# Patient Record
Sex: FEMALE | Race: BLACK OR AFRICAN AMERICAN | NOT HISPANIC OR LATINO | Employment: OTHER | URBAN - METROPOLITAN AREA
[De-identification: names, ages, dates, MRNs, and addresses within clinical notes are randomized per-mention and may not be internally consistent; named-entity substitution may affect disease eponyms.]

---

## 2017-01-19 ENCOUNTER — CLINICAL SUPPORT (OUTPATIENT)
Dept: ELECTROPHYSIOLOGY | Facility: CLINIC | Age: 82
End: 2017-01-19
Payer: MEDICARE

## 2017-01-19 DIAGNOSIS — G45.9 TRANSIENT CEREBRAL ISCHEMIA: ICD-10-CM

## 2017-01-19 PROCEDURE — 93299 LOOP RECORDER REMOTE: CPT | Mod: S$GLB,,, | Performed by: INTERNAL MEDICINE

## 2017-01-19 PROCEDURE — 93297 REM INTERROG DEV EVAL ICPMS: CPT | Mod: S$GLB,,, | Performed by: INTERNAL MEDICINE

## 2017-02-21 ENCOUNTER — CLINICAL SUPPORT (OUTPATIENT)
Dept: ELECTROPHYSIOLOGY | Facility: CLINIC | Age: 82
End: 2017-02-21
Payer: MEDICARE

## 2017-02-21 DIAGNOSIS — G45.9 TRANSIENT CEREBRAL ISCHEMIA: ICD-10-CM

## 2017-02-21 PROCEDURE — 93297 REM INTERROG DEV EVAL ICPMS: CPT | Mod: S$GLB,,, | Performed by: INTERNAL MEDICINE

## 2017-02-21 PROCEDURE — 93299 LOOP RECORDER REMOTE: CPT | Mod: S$GLB,,, | Performed by: INTERNAL MEDICINE

## 2017-03-02 ENCOUNTER — HOSPITAL ENCOUNTER (OUTPATIENT)
Dept: RADIOLOGY | Facility: HOSPITAL | Age: 82
Discharge: HOME | End: 2017-03-02
Attending: INTERNAL MEDICINE
Payer: MEDICARE

## 2017-03-02 DIAGNOSIS — R92.30 DENSE BREASTS: ICD-10-CM

## 2017-03-02 PROCEDURE — 77062 BREAST TOMOSYNTHESIS BI: CPT | Mod: 26,,, | Performed by: RADIOLOGY

## 2017-03-02 PROCEDURE — 77062 BREAST TOMOSYNTHESIS BI: CPT | Mod: TC

## 2017-03-02 PROCEDURE — 77066 DX MAMMO INCL CAD BI: CPT | Mod: 26,,, | Performed by: RADIOLOGY

## 2017-03-20 DIAGNOSIS — E11.9 TYPE 2 DIABETES MELLITUS WITHOUT COMPLICATION: ICD-10-CM

## 2017-03-24 ENCOUNTER — CLINICAL SUPPORT (OUTPATIENT)
Dept: ELECTROPHYSIOLOGY | Facility: CLINIC | Age: 82
End: 2017-03-24
Payer: MEDICARE

## 2017-03-24 DIAGNOSIS — G45.9 TRANSIENT CEREBRAL ISCHEMIA: ICD-10-CM

## 2017-04-23 PROCEDURE — 93299 LOOP RECORDER REMOTE: CPT | Mod: S$GLB,,, | Performed by: INTERNAL MEDICINE

## 2017-04-23 PROCEDURE — 93297 REM INTERROG DEV EVAL ICPMS: CPT | Mod: S$GLB,,, | Performed by: INTERNAL MEDICINE

## 2017-04-27 DIAGNOSIS — H40.003 GLAUCOMA SUSPECT, BILATERAL: ICD-10-CM

## 2017-04-27 RX ORDER — LATANOPROST 50 UG/ML
SOLUTION/ DROPS OPHTHALMIC
Qty: 2.5 ML | Refills: 6 | Status: SHIPPED | OUTPATIENT
Start: 2017-04-27 | End: 2017-10-27 | Stop reason: SDUPTHER

## 2017-05-25 ENCOUNTER — TELEPHONE (OUTPATIENT)
Dept: INTERNAL MEDICINE | Facility: CLINIC | Age: 82
End: 2017-05-25

## 2017-05-25 NOTE — TELEPHONE ENCOUNTER
----- Message from Lexus Lopes sent at 5/25/2017  7:53 AM CDT -----  Contact: self  Appointment Request From: Calli Jamison    With Provider: Sheridan Hurd MD [-Primary Care Physician-]    Would Accept With:Only the person I've selected    Preferred Date Range: From 5/23/2017 To 6/12/2017    Preferred Times: Monday Afternoon    Reason for visit: Request an Appt    Comments:  There are two test and one foot exam that should have been done. These test or exams are overdue.  Thank you.                                                                                                                                                                         Calli Jamison

## 2017-05-28 ENCOUNTER — TELEPHONE (OUTPATIENT)
Dept: ELECTROPHYSIOLOGY | Facility: CLINIC | Age: 82
End: 2017-05-28

## 2017-05-28 NOTE — TELEPHONE ENCOUNTER
So, she plugged it back in and I asked her to wait 24 hours to recharge the mouse. Then follow what the machine displays for her to do. She was familiar with the process. After this transmission , she will wait an additional 24 hours (no less) and send another transmission. This will determine if the batter had an erroneous reading or if it is true RRT. I gave her Markel's number to call as I will be out of the office this week.

## 2017-05-29 ENCOUNTER — CLINICAL SUPPORT (OUTPATIENT)
Dept: ELECTROPHYSIOLOGY | Facility: CLINIC | Age: 82
End: 2017-05-29
Payer: MEDICARE

## 2017-05-29 DIAGNOSIS — G45.9 TRANSIENT CEREBRAL ISCHEMIA: ICD-10-CM

## 2017-05-29 PROCEDURE — 93297 REM INTERROG DEV EVAL ICPMS: CPT | Mod: S$GLB,,, | Performed by: INTERNAL MEDICINE

## 2017-05-29 PROCEDURE — 93299 LOOP RECORDER REMOTE: CPT | Mod: S$GLB,,, | Performed by: INTERNAL MEDICINE

## 2017-05-31 ENCOUNTER — TELEPHONE (OUTPATIENT)
Dept: INTERNAL MEDICINE | Facility: CLINIC | Age: 82
End: 2017-05-31

## 2017-05-31 ENCOUNTER — OFFICE VISIT (OUTPATIENT)
Dept: INTERNAL MEDICINE | Facility: CLINIC | Age: 82
End: 2017-05-31
Payer: MEDICARE

## 2017-05-31 VITALS
HEIGHT: 61 IN | TEMPERATURE: 98 F | DIASTOLIC BLOOD PRESSURE: 88 MMHG | OXYGEN SATURATION: 97 % | WEIGHT: 196.19 LBS | BODY MASS INDEX: 37.04 KG/M2 | SYSTOLIC BLOOD PRESSURE: 164 MMHG | HEART RATE: 82 BPM

## 2017-05-31 DIAGNOSIS — R19.7 DIARRHEA, UNSPECIFIED TYPE: ICD-10-CM

## 2017-05-31 DIAGNOSIS — M54.31 BILATERAL SCIATICA: Primary | ICD-10-CM

## 2017-05-31 DIAGNOSIS — M54.32 BILATERAL SCIATICA: Primary | ICD-10-CM

## 2017-05-31 PROCEDURE — 1125F AMNT PAIN NOTED PAIN PRSNT: CPT | Mod: S$GLB,,, | Performed by: NURSE PRACTITIONER

## 2017-05-31 PROCEDURE — 99999 PR PBB SHADOW E&M-EST. PATIENT-LVL V: CPT | Mod: PBBFAC,,, | Performed by: NURSE PRACTITIONER

## 2017-05-31 PROCEDURE — 99213 OFFICE O/P EST LOW 20 MIN: CPT | Mod: S$GLB,,, | Performed by: NURSE PRACTITIONER

## 2017-05-31 PROCEDURE — 1159F MED LIST DOCD IN RCRD: CPT | Mod: S$GLB,,, | Performed by: NURSE PRACTITIONER

## 2017-05-31 RX ORDER — ACETAMINOPHEN 500 MG
500-1000 TABLET ORAL EVERY 12 HOURS PRN
Refills: 0 | COMMUNITY
Start: 2017-05-31 | End: 2021-06-17

## 2017-05-31 NOTE — TELEPHONE ENCOUNTER
Spoke to pt and she states having diarrhea for the last few days due to dumping syndrome. Pt also c/o pain and soreness in her buttocks and believes she has cellulitis. Scheduled an urgent care appt to see Bria Menon NP in extended hours for 7:30 p.m today 5/31/17.

## 2017-05-31 NOTE — TELEPHONE ENCOUNTER
----- Message from Lexus Lopes sent at 5/31/2017  6:42 AM CDT -----  Contact: self  Appointment Request From: Calli Jamison    With Provider: Sheridan Hurd MD [-Primary Care Physician-]    Would Accept With:Only the person I've selected    Preferred Date Range: From 5/31/2017 To 6/5/2017    Preferred Times: Monday Morning, Monday Afternoon    Reason for visit: Request an Appt    Comments:  Dr. Hurd:   I have had dumping syndrome every since I had surgery, five years ago, for Chrons disease.  Recently I have been having pain in my left buttocks and soreness. I tried to check the situation, and it looks like cellulitis as far   as I can see. and now my right buttocks is hurting too. This has been going on for about close to three weeks.  I need help please. Thank you.

## 2017-06-01 NOTE — PROGRESS NOTES
Subjective:       Patient ID: Calli Jamison is a 86 y.o. female.    Chief Complaint: Back Pain (pain that extends from back into left buttocks x 3 wks) and Diarrhea    Ms. Jamison presents for recurrent diarrhea and pain in her left buttock and occasionally in her right buttock that radiates to her thigh.       Back Pain   This is a new problem. The current episode started 1 to 4 weeks ago. The problem occurs daily. The problem has been gradually worsening since onset. The pain is present in the gluteal. The quality of the pain is described as aching and burning. The pain radiates to the left thigh. The pain is at a severity of 4/10. The pain is moderate. The pain is the same all the time. The symptoms are aggravated by standing. Stiffness is present all day. Associated symptoms include headaches and leg pain. Pertinent negatives include no abdominal pain, bladder incontinence, bowel incontinence, chest pain, dysuria, fever, numbness, paresis, paresthesias, pelvic pain, perianal numbness, tingling, weakness or weight loss. She has tried nothing for the symptoms. The treatment provided no relief.   Diarrhea    This is a chronic problem. The current episode started more than 1 year ago. The problem occurs 2 to 4 times per day. The problem has been waxing and waning. The stool consistency is described as watery. The patient states that diarrhea does not awaken her from sleep. Associated symptoms include headaches. Pertinent negatives include no abdominal pain, arthralgias, bloating, chills, coughing, fever, increased  flatus, myalgias, sweats, URI, vomiting or weight loss. She has tried nothing for the symptoms. Her past medical history is significant for bowel resection.     Review of Systems   Constitutional: Negative for chills, fever and weight loss.   HENT: Negative for facial swelling.    Eyes: Negative for visual disturbance.   Respiratory: Negative for cough.    Cardiovascular: Negative for chest pain.    Gastrointestinal: Positive for diarrhea. Negative for abdominal pain, bloating, bowel incontinence, flatus and vomiting.   Genitourinary: Negative for bladder incontinence, dysuria, hematuria and pelvic pain.   Musculoskeletal: Positive for back pain. Negative for arthralgias and myalgias.   Skin: Negative for rash.   Neurological: Positive for headaches. Negative for tingling, weakness, numbness and paresthesias.   Psychiatric/Behavioral: Negative for confusion.       Objective:      Physical Exam   Constitutional: She is oriented to person, place, and time. She appears well-developed. No distress.   obese   HENT:   Head: Atraumatic.   Eyes: No scleral icterus.   Neck: Normal range of motion. Neck supple.   Cardiovascular: Normal rate, regular rhythm and normal heart sounds.  Exam reveals no gallop and no friction rub.    No murmur heard.  Pulmonary/Chest: Effort normal and breath sounds normal. No respiratory distress. She has no wheezes. She has no rales.   Abdominal: Soft. Bowel sounds are normal. She exhibits no distension. There is no tenderness. There is no guarding.   Musculoskeletal:        Lumbar back: She exhibits decreased range of motion and pain. She exhibits no tenderness, no bony tenderness, no swelling, no edema, no deformity, no laceration, no spasm and normal pulse.   Neurological: She is alert and oriented to person, place, and time.   Skin: Skin is warm and dry. She is not diaphoretic.   Psychiatric: She has a normal mood and affect. Her behavior is normal.   Nursing note and vitals reviewed.      Assessment:       1. Bilateral sciatica    2. Diarrhea, unspecified type        Plan:   1. Bilateral sciatica  - acetaminophen (TYLENOL) 500 MG tablet; Take 1-2 tablets (500-1,000 mg total) by mouth every 12 (twelve) hours as needed for Pain.; Refill: 0    2. Diarrhea, unspecified type  - polycarbophil (FIBERCON) 625 mg tablet; Take 1 tablet (625 mg total) by mouth once daily.      Pt has been given  instructions populated from [employer] database and has verbalized understanding of the after visit summary and information contained wherein.    Follow up with a primary care provider. May go to ER for acute shortness of breath, lightheadedness, fever, or any other emergent complaints or changes in condition.  Answers for HPI/ROS submitted by the patient on 5/31/2017   genital pain: No

## 2017-06-11 RX ORDER — POTASSIUM CHLORIDE 750 MG/1
10 TABLET, EXTENDED RELEASE ORAL
Qty: 24 TABLET | Refills: 2 | Status: SHIPPED | OUTPATIENT
Start: 2017-06-11 | End: 2017-09-16 | Stop reason: SDUPTHER

## 2017-06-19 ENCOUNTER — OFFICE VISIT (OUTPATIENT)
Dept: GASTROENTEROLOGY | Facility: CLINIC | Age: 82
End: 2017-06-19
Payer: MEDICARE

## 2017-06-19 VITALS
HEART RATE: 83 BPM | HEIGHT: 61 IN | WEIGHT: 196.19 LBS | SYSTOLIC BLOOD PRESSURE: 145 MMHG | DIASTOLIC BLOOD PRESSURE: 82 MMHG | BODY MASS INDEX: 37.04 KG/M2

## 2017-06-19 DIAGNOSIS — K50.80 CROHN'S DISEASE OF BOTH SMALL AND LARGE INTESTINE WITHOUT COMPLICATION: Primary | ICD-10-CM

## 2017-06-19 PROCEDURE — 1126F AMNT PAIN NOTED NONE PRSNT: CPT | Mod: S$GLB,,, | Performed by: NURSE PRACTITIONER

## 2017-06-19 PROCEDURE — 99214 OFFICE O/P EST MOD 30 MIN: CPT | Mod: S$GLB,,, | Performed by: NURSE PRACTITIONER

## 2017-06-19 PROCEDURE — 99999 PR PBB SHADOW E&M-EST. PATIENT-LVL IV: CPT | Mod: PBBFAC,,, | Performed by: NURSE PRACTITIONER

## 2017-06-19 PROCEDURE — 1159F MED LIST DOCD IN RCRD: CPT | Mod: S$GLB,,, | Performed by: NURSE PRACTITIONER

## 2017-06-19 PROCEDURE — 99499 UNLISTED E&M SERVICE: CPT | Mod: S$GLB,,, | Performed by: NURSE PRACTITIONER

## 2017-06-19 NOTE — PROGRESS NOTES
"    [facility] Established Patient Visit    Reason for Visit:  The encounter diagnosis was Crohn's disease of both small and large intestine without complication.    PCP: Sheridan Hurd    HPI:  This is a 86 y.o. female here for f/u IBD- crohn's disease of the large and small bowel dx several years ago. She is s/p ileocecal resection with small and large bowel resection in 2011. She is followed by Dr. Robles and her last visit with him was in 12/2017. She currently reports she usually has 2 bristol type 4 BM/week and feels like this is "good" for her. She takes fibercon once daily since 5/31/2017. She may have multiple loose stools per day occurring once monthly-worse with spicy meals-takes imodium with relief. No blood, pus, mucus, nor nocturnal stools.  She reports she was seen in primary care for back and leg pain and dx w/ sciatica. She was advised to take tylenol for this.     Abdominal pain - no  Reflux - + hx GERD. Rare. Takes Prilosec PRN (twice monthly) with good control  Dysphagia/odynophagia - +hx esophageal stricture. Currently states swallowing has been fine.  GI bleeding - denies hematochezia, hematemesis, melena, BRBPR, black/tarry stools, and coffee ground emesis  NSAID usage - none    ROS:  Constitutional: No fevers, no chills, No unintentional weight loss, no increased fatigue,   ENT: No allergies  CV: No chest pain, no palpitations, no perif. edema, no sob on exertion  Pulm: No cough, No shortness of breath, no wheezes, no sputum  Ophtho: No vision changes  GI: see HPI; also no nausea, no vomiting, no change in appetite  Derm: No rash  Heme: No lymphadenopathy, No bruising  MSK: No arthritis, no muscle pain, no muscle weakness  : No dysuria, No hematuria  Endo: No hot or cold intolerance  Neuro: No syncope, No seizure,     PMHX:  has a past medical history of Abnormal Pap smear; After-cataract, unspecified - Both Eyes (11/5/2012); Arthritis; ASCUS (atypical squamous cells of " undetermined significance) on Pap smear (7/1/2012); Asthma; Back pain (2009); Bullous pemphigoid; Carpal tunnel syndrome; Chronic back pain; COPD (chronic obstructive pulmonary disease); Coronary artery disease; Crohn's disease; Diabetes mellitus; Discoid lupus; Diverticulosis; History of migraine headaches; Hypertension; Multiple thyroid nodules; Personal history of colonic polyps; Pulmonary nodule; Restless legs syndrome; Sciatica; Sleep apnea; Stricture and stenosis of esophagus; Stroke; and TIA (transient ischemic attack).    PSHX:  has a past surgical history that includes Left carpal tunnel surgery; Hysterectomy; Iliocolic Crohns Disease with stricure (3/2011); Upper gastrointestinal endoscopy (11/2011); Colonoscopy (9/30/2008); Cataract: right eye (1/2008); Cataract left eye (1/2008); Breast: wire localization (1/2004); Rectal fistulae repair; Laparoscopic-assisted ileocecectomy. ; Left carpal tunnel; Colon surgery; Tonsillectomy; and Cataract extraction w/  intraocular lens implant (Bilateral).    The patient's social and family histories were reviewed by me and updated in the appropriate section of the electronic medical record.    Review of patient's allergies indicates:   Allergen Reactions    Oxycodone Other (See Comments)       Current Outpatient Prescriptions   Medication Sig    atorvastatin (LIPITOR) 20 MG tablet Take 1 tablet (20 mg total) by mouth once daily.    blood sugar diagnostic (BLOOD GLUCOSE TEST) Strp Inject 1 strip into the skin once daily.    calcium citrate-vitamin D3 315-200 mg (CALCIUM CITRATE + D) 315-200 mg-unit per tablet Take 2 tablets by mouth Daily.    conjugated estrogens (PREMARIN) vaginal cream fnger tip twice  Per week    cyanocobalamin (VITAMIN B-12) 500 MCG tablet Take 1 tablet by mouth Daily.    fluticasone (FLONASE) 50 mcg/actuation nasal spray 1 spray by Each Nare route once daily.    fluticasone (FLOVENT HFA) 110 mcg/actuation inhaler USE 2 PUFFS BY MOUTH TWICE  "A DAY for asthma    furosemide (LASIX) 20 MG tablet Take 1 tablet (20 mg total) by mouth once daily.    latanoprost 0.005 % ophthalmic solution PLACE 1 DROP INTO BOTH EYES NIGHTLY.    losartan (COZAAR) 100 MG tablet Take 1 tablet (100 mg total) by mouth once daily.    metformin (GLUCOPHAGE) 500 MG tablet Take 1 tablet (500 mg total) by mouth 2 (two) times daily.    omeprazole (PRILOSEC) 20 MG capsule Take 1 capsule by mouth Daily.    polycarbophil (FIBERCON) 625 mg tablet Take 1 tablet (625 mg total) by mouth once daily.    potassium chloride SA (K-DUR,KLOR-CON) 10 MEQ tablet TAKE 1 TABLET (10 MEQ TOTAL) BY MOUTH AS DIRECTED.    PROAIR HFA 90 mcg/actuation inhaler INHALE 2 PUFFS BY MOUTH EVERY 4-6 HOURS AS NEEDED    ropinirole (REQUIP) 0.25 MG tablet Take 2 tablets (0.5 mg total) by mouth nightly as needed. As directed.    verapamil (VERELAN) 360 MG C24P Take 1 capsule (360 mg total) by mouth once daily.    acetaminophen (TYLENOL) 500 MG tablet Take 1-2 tablets (500-1,000 mg total) by mouth every 12 (twelve) hours as needed for Pain.    fluocinonide (LIDEX) 0.05 % cream Apply topically. AAA scalp qd bid.     No current facility-administered medications for this visit.          Objective Findings:    Vital Signs:  BP (!) 145/82   Pulse 83   Ht 5' 1" (1.549 m)   Wt 89 kg (196 lb 3.4 oz)   BMI 37.07 kg/m²  Body mass index is 37.07 kg/m².    Physical Exam:  General Appearance: Well appearing in no acute distress  Head:   Normocephalic, without obvious abnormality  Eyes:    No scleral icterus, EOMI  Throat: Lips, mucosa, and tongue normal; teeth and gums normal  Lungs: CTA bilaterally in anterior and posterior fields, no wheezes, no crackles.  Heart:  Regular rate and rhythm, S1, S2 normal, no murmurs heard  Abdomen: Soft, non tender, non distended with positive bowel sounds in all four quadrants. No hepatosplenomegaly, ascites, or mass  Extremities:    2+ radial pulses, no clubbing, cyanosis or " edema  Skin: No rash to exposed areas  Neurologic: A&Ox4      Labs:  Lab Results   Component Value Date    WBC 9.33 06/19/2017    HGB 13.0 06/19/2017    HCT 39.3 06/19/2017     06/19/2017    CHOL 136 02/19/2016    TRIG 75 02/19/2016    HDL 51 02/19/2016    ALT 19 12/19/2016    AST 17 12/19/2016     12/19/2016    K 3.8 12/19/2016     12/19/2016    CREATININE 0.9 12/19/2016    BUN 17 12/19/2016    CO2 25 12/19/2016    TSH 1.307 02/19/2016    INR 0.9 03/20/2014    HGBA1C 6.4 (H) 07/21/2016       Endoscopy:   3/29/2016 EGD Smith-benign appearing esophageal stenosis. Dilated w/ balloon to a max of 12 mm. Normal stomach. Normal duodenum. Repeat PRN.   2/16/2016 EGD Robles- benign esophageal stenosis. Dilated w/ balloon to a max of 11 mm. Gastric erythema.  repeat in 1 month for retreatment.   3/27/2014 EGD Robles-benign esophageal stenosis. Dilated w/ balloon to a max of 13.5mm. HH. Gastric erythema. Duodenal erythema. bx-chronic gastritis. Repeat PRN.  2013 EGD Robles-benign esophageal stricture. Dilated w/ balloon to a max of 13.5 mm. Repeat PRN.  2012 colonoscopy Robles- normal colon. TI erosions. Ileal crohn's disease.  Assessment:    1. Crohn's disease of both small and large intestine without complication          Recommendations:  1.  Crohn's-doing well from a symptomology standpoint. Continue conservative treatment. labs today. Increase fiber to twice daily for constipation with a goal of at least 3 BM/week. Maintain adequate daily hydration. Start a daily probiotic. Written instructions provided.     Return in about 6 months (around 12/19/2017) for IBD w/ Dr. Robles.    Order summary:  Orders Placed This Encounter    CBC auto differential    Comprehensive metabolic panel    C-reactive protein       Thank you for allowing me to participate in the care of Calli Niño, APRN, FNP-C

## 2017-06-19 NOTE — PATIENT INSTRUCTIONS
"Increase fibercon to twice daily for a goal of at least 3 formed-soft bowel movements per week.  Drink at least 6-8 eight oz cups of water per day.  Take a daily probiotic as per the handout below.         Probiotics       For IBS and bloating, we typically recommend Align, VSL#3, or [employer] Colon Health, which can typically be found without a prescription at the pharmacy. Give this at least  a month to work, but can take up to 3 months to repopulate your intestines, so be patient!     VSL#3 is a potent probiotic which can be found in pill form (try [employer] for best price, or VSL3.com). $52 for a 2 month supply. The sachets are for ulcerative colitis and require a prescription.    If you go on the internet, a reputable/powerful probiotic appears to be Super Shield from [employer] at: http://www.bluerockholistics.com/product/pross.asp  You can also choose PB 8 which can be found at [employer] or online.    For diarrhea from travel or antibiotics, we typically recommend Culturelle or Florastor (especially for diarrhea from C diff infection).         General information:  Pick one that has at least seven strains, and five billion CFU (colony forming units).    Products containing probiotics have flooded the market in recent years. As more people seek natural or non-drug ways to maintain their health, manufacturers have responded by offering probiotics in everything from yogurt to chocolate and granola bars to powders and capsules.    Although probiotics have been around for generations - think of the "live active cultures"in several brands of yogurt - the sheer number of products with probiotics now available may overwhelm even the most conscientious of shoppers. In some respects, the industry has grown faster than the research and scientists and doctors are calling for more studies to help determine which probiotics are beneficial and which might be a waste of money.    What Are Probiotics?  Probiotics are " living microscopic organisms, or micro-organisms, that scientific research has shown to benefit your health. Most often they are bacteria, but they may also be other organisms such as yeasts. In some cases they are similar, or the same, as the good bacteria already in your body, particularly those in your gut.    The most common probiotic bacteria come from two groups, Lactobacillus or Bifidobacterium, although it is important to remember that there are many other types of bacteria that are also classified as probiotics. Each group of bacteria has different species and each species has different strains. This is important to remember because different strains have different benefits for different parts of your body. For example, Lactobacillus casei Shirota has been shown to support the immune system and to help food move through the gut, but Lactobacillus bulgaricus may help relieve symptoms of lactose intolerance, a condition in which people cannot digest the lactose found in most milk and dairy products. In general, not all probiotics are the same, and they dont all work the same way.    Scientists are still sorting out exactly how probiotics work. They may:       Boost your immune system by producing antibodies for certain viruses.  Produce substances that prevent infection.  Prevent harmful bacteria from attaching to the gut wall and growing there.  Send signals to your cells to strengthen the mucus in your intestine and help it act as a barrier against infection.  Inhibit or destroy toxins released by certain bad bacteria that can make you sick.  Produce B vitamins necessary for metabolizing the food you eat, warding off anemia caused by deficiencies in B-6 and B-12, and maintaining healthy skin and a healthy nervous system.      Common Uses  Probiotics are most often used to promote digestive health. Because there are different kinds of probiotics, it is important to find the right one for the specific  health benefit you seek. Researchers are still studying which probiotic should be used for which health or disease state. Nevertheless, probiotics have been shown to help regulate the movement of food through the intestine. They also may help treat digestive disease, something of much interest to gastroenterologists. Some of the most common uses for probiotics include the treatment of the following:    Irritable Bowel Syndrome    Irritable bowel syndrome (IBS) is a disorder of movement in the gut. People who have IBS may have diarrhea, constipation or alternating bouts of both. IBS is not caused by injury or illness. Often the only way doctors can diagnose it is to rule out other conditions through testing.    Probiotics, particularly Bifidobacterium infantis, Sacchromyces boulardii, Lactobacillus plantarum and combination probiotics may help regulate how often people with IBS have bowel movements. Probiotics may also help relieve bloating from gas. Bifidobacterium infantis 82177, Lactobacillus plantarum 299V or Bifidobacterium bifidum [id number] have been shown to help regulate bowel movements and relieve bloating, pain and gas.    Inflammatory Bowel Disease    Though some of the symptoms are the same, inflammatory bowel disease (IBD) is different from IBS because in IBD, the intestines become inflamed. Unlike IBS, IBD is a disorder of the immune system. Symptoms include abdominal cramps, pain, diarrhea, weight loss and blood in your stools. In Crohns disease, ulcers may develop anywhere in your intestine including both the large and small bowels. In ulcerative colitis, inflammation only involves the large intestine. Bouts of inflammation may come and go, but in some cases, prescription medication is needed to keep inflammation in check.        Some studies suggest that probiotics may help decrease inflammation and delay the next bout of disease. Ulcerative colitis seems to respond better to probiotics than Crohns  disease does. So far, E. coli Nissle, and a mixture of several strains of Lactobacillus, Bifidobacterium and Streptococcus may be most beneficial. Research is continuing to determine which probiotics are best to treat IBD.    Infectious Diarrhea    Infectious diarrhea is caused by bacteria, viruses or parasites. There is evidence that probiotics such as Lactobacillus rhamnosus and Lactobacillus casei may be particularly helpful in treating diarrhea caused by rotavirus, which often affects babies and small children. Several strains of Lactobacillus and a strain of the yeast Saccharomyces boulardii may help treat and shorten the course of infectious diarrhea.    Antibiotic-Related Diarrhea  Take Lactobacillus rhamnosus GG and/or Saccharomyces boulardii (Culturelle and/or Florastor) six hours after each dose of antibiotics. Increase the dose to 10 billion CFUs per day and continue for one to two weeks after you stop taking the antibiotic.    Sometimes taking an antibiotic can cause infectious diarrhea by reducing the number of good microorganisms in your gut. Then bacteria that normally do not give you any trouble can grow out of control. One such bacterium is Clostridium difficile, which is a major cause of diarrhea in hospitalized patients and people in long-term care facilities like nursing homes. The trouble with Clostridium difficile is that it tends to come back, but there is evidence that taking probiotics such as Saccharomyces boulardii may help prevent this. There is also evidence that taking probiotics when you first start taking an antibiotic may help prevent antibiotic-related diarrhea in the first place.    Travelers Diarrhea    Its possible to get infectious diarrhea when you travel and your body is exposed to new, normally harmless bacteria (travelers diarrhea). Most studies show that probiotics are not very effective in preventing or treating travelers diarrhea in adults. Taking Saccharomyces  boulardii weeks before your trip may help prevent travelers diarrhea, which usually comes from ingesting food or water thats been contaminated with bacteria.    Other Uses    Other potential uses for probiotics include maintaining a healthy mouth, preventing and treating certain skin conditions like eczema, promoting health in the urinary tract and vagina, and preventing allergies (especially in children). There is not as much research about these uses as there is about the benefits of probiotics for your digestive system, and studies have had mixed results. If you have eczema ?Lactobacillus rhamnosus  and Lactobacillus fermentum VRI-003 PCC have been shown to help treat those itchy, scaly skin rashes -- especially in children.If you have a cold ?Some research suggests that Bifidobacterium animalis lactis Bi-07 and Lactobacillus acidophilus NCFM can help reduce the duration and severity of the common cold and flu by enhancing the bodys production of antibodies. If you have a vaginal infection ?Lactobacillus acidophilus, Lactobacillus rhamnosus GR-1 and Lactobacillus reuteri RC-14 have been shown to help prevent and clear up bacterial vaginosis and urinary tract infections in some individuals. Researchers point to Lactobacillus reuteri RC-14 and Lactobacillus rhamnosus GR-1 as the most effective stains to protect against yeast infections as theyre especially adept at colonizing the vaginal environment and fighting off unwelcome bacteria and fungi. If you have bad breath, gingivitis or periodontitis ?A probiotic lozenge or mouthwash might be your best bet. Lactobacillus reuteri LR-1 or LR-2 promote oral health by binding to teeth and gums, preventing plaque formation in the mouth. Research has demonstrated the ability of Weissella cibaria to freshen breath by inhibiting the production of sulfur compounds in the mouth.    Are Probiotics Safe?  It is generally thought that most probiotics are safe, although  it is not yet known if they are safe for people with severely impaired immune systems. They may be taken by people without a diagnosed digestive problem. Their safety is evident since they have a long history of use in dairy foods like yogurt, cheese and milk.    However, you should talk to your doctor before adding these or any other probiotics to your diet. Probiotics might not be appropriate for seniors. Some probiotics may interfere with or interact with medications. Your doctor will be able to help you determine if probiotics are right for you based on your medical history.    Research about the use of probiotics in children has grown in recent years. Although studies have shown that probiotics may help to treat infectious diarrhea in babies and small children, researchers are unsure whether probiotics are particularly helpful for children with Crohns disease or other types of inflammatory bowel disease. Ask your childs pediatrician about probiotics before giving them to your child.    The exception here is breastfeeding. Breast milk stimulates the growth of normal gut organisms that are important for a babys digestive health and developing immune system. That is one reason why doctors strongly encourage mothers to breastfeed their babies.    Overall, scientists agree that more research is necessary before they can make blanket statements about the safety of probiotics in general or about individual groups and strains. Future studies will show whether probiotics can be used to treat diseases, are safe to use for a long time, and if it is possible to take too many probiotics or mix them in inappropriate ways. These studies will also guide us as to which probiotics to use for different disorders.    Keep in mind that probiotics are considered dietary supplements and are not FDA-regulated like drugs. They are not standardized, meaning they are made in different ways by different companies and have different  additives. How well a probiotic works may differ from brand to brand and even from batch to batch within the same brand. Probiotics also vary tremendously in their cost, and cost does not necessarily reflect higher quality.    Side effects may vary, too. The most common are gas and bloating. These are usually mild and temporary. More serious side effects include allergic reactions, either to the probiotics themselves or to other ingredients in the food or supplement.    Choosing a Probiotic  Probiotics are available in yogurt and other dairy products, chocolate and granola bars, juices, powders, and capsules. You can purchase them at your local [employer] or health food store as well as on the Internet. Here are some tips to help you choose.    Check the label. The more information there is on the label, the better. Ideally, the label will tell you the probiotics group, species and strain, and how many of the microorganisms will still be alive on the use-by date. Although some products guarantee how many organisms were present at the time it was manufactured, often it is less clear how many organisms are present when these products are actually consumed.    Call the . Unfortunately, many labels dont say exactly which strain is in the product; many list only the group and the species, such as Lactobacillus acidophilus or Bifidobacterium lactis. If youre planning to take a probiotic for a specific condition, call the company and find out exactly which strains its products contain and what research they have done to support their health claims. You may be able to find this information on their Web site, as well.    Beware of the Internet. If you order products from the Internet, make sure you know the company from which you are ordering. There are plenty of scammers out there who are willing to send you fake products labeled as probiotics. At best, the ingredients could be harmless, like garlic powder.  At worst, they could be laced with powerful herbs, prescription medications or illegal drugs. Some companies may simply take your money and disappear.    Stick to well-established companies and companies you know. The longer a company has been around, the more likely its products have been tested and studied repeatedly and the bigger the reputation the company has to protect. Some manufacturers that have been making products with probiotics for a while are [employer], [employer], [employer], [employer], [employer], [employer], [employer], and [employer].    Storage    One final note: Remember to store your probiotic according to package instructions and make sure the product has a sell-by or expiration date. Probiotics are living organisms. Even if they are dried and dormant, like in a powder or capsule, they must be stored properly or they will die. Some require refrigeration whereas others do not. They also have a shelf-life, so make sure you use them before the expiration date on the package.

## 2017-06-22 ENCOUNTER — TELEPHONE (OUTPATIENT)
Dept: INTERNAL MEDICINE | Facility: CLINIC | Age: 82
End: 2017-06-22

## 2017-06-22 NOTE — TELEPHONE ENCOUNTER
----- Message from Cristina Molina sent at 6/21/2017  2:42 PM CDT -----  Contact: Pt can be reached at   Pt is called to schedule an appt for scaitia nerve of spine, pt is requesting June 28th. 9-11:00 p.m.      Thank you!

## 2017-06-27 DIAGNOSIS — E78.5 HYPERLIPIDEMIA, UNSPECIFIED HYPERLIPIDEMIA TYPE: ICD-10-CM

## 2017-06-28 ENCOUNTER — OFFICE VISIT (OUTPATIENT)
Dept: INTERNAL MEDICINE | Facility: CLINIC | Age: 82
End: 2017-06-28
Payer: MEDICARE

## 2017-06-28 ENCOUNTER — PATIENT MESSAGE (OUTPATIENT)
Dept: INTERNAL MEDICINE | Facility: CLINIC | Age: 82
End: 2017-06-28

## 2017-06-28 ENCOUNTER — HOSPITAL ENCOUNTER (OUTPATIENT)
Dept: RADIOLOGY | Facility: HOSPITAL | Age: 82
Discharge: HOME | End: 2017-06-28
Attending: INTERNAL MEDICINE
Payer: MEDICARE

## 2017-06-28 VITALS
HEART RATE: 88 BPM | WEIGHT: 194.69 LBS | SYSTOLIC BLOOD PRESSURE: 139 MMHG | HEIGHT: 61 IN | DIASTOLIC BLOOD PRESSURE: 86 MMHG | BODY MASS INDEX: 36.76 KG/M2

## 2017-06-28 VITALS
HEIGHT: 61 IN | BODY MASS INDEX: 36.76 KG/M2 | DIASTOLIC BLOOD PRESSURE: 86 MMHG | SYSTOLIC BLOOD PRESSURE: 139 MMHG | HEART RATE: 88 BPM | WEIGHT: 194.69 LBS | OXYGEN SATURATION: 96 %

## 2017-06-28 DIAGNOSIS — I67.89 CEREBRAL MICROVASCULAR DISEASE: ICD-10-CM

## 2017-06-28 DIAGNOSIS — I48.0 PAROXYSMAL ATRIAL FIBRILLATION: ICD-10-CM

## 2017-06-28 DIAGNOSIS — E11.9 DM TYPE 2 WITHOUT RETINOPATHY: ICD-10-CM

## 2017-06-28 DIAGNOSIS — K21.9 GASTROESOPHAGEAL REFLUX DISEASE WITHOUT ESOPHAGITIS: ICD-10-CM

## 2017-06-28 DIAGNOSIS — I10 ESSENTIAL HYPERTENSION: ICD-10-CM

## 2017-06-28 DIAGNOSIS — Z86.73 HISTORY OF TIA (TRANSIENT ISCHEMIC ATTACK): ICD-10-CM

## 2017-06-28 DIAGNOSIS — E66.01 MORBID OBESITY DUE TO EXCESS CALORIES: ICD-10-CM

## 2017-06-28 DIAGNOSIS — E11.22 CONTROLLED TYPE 2 DIABETES MELLITUS WITH STAGE 3 CHRONIC KIDNEY DISEASE, WITHOUT LONG-TERM CURRENT USE OF INSULIN: ICD-10-CM

## 2017-06-28 DIAGNOSIS — G47.30 SLEEP APNEA, UNSPECIFIED TYPE: ICD-10-CM

## 2017-06-28 DIAGNOSIS — J44.9 CHRONIC OBSTRUCTIVE PULMONARY DISEASE, UNSPECIFIED COPD TYPE: ICD-10-CM

## 2017-06-28 DIAGNOSIS — F43.23 ADJUSTMENT DISORDER WITH MIXED ANXIETY AND DEPRESSED MOOD: ICD-10-CM

## 2017-06-28 DIAGNOSIS — N18.30 CONTROLLED TYPE 2 DIABETES MELLITUS WITH STAGE 3 CHRONIC KIDNEY DISEASE, WITHOUT LONG-TERM CURRENT USE OF INSULIN: ICD-10-CM

## 2017-06-28 DIAGNOSIS — I70.0 AORTIC ATHEROSCLEROSIS: ICD-10-CM

## 2017-06-28 DIAGNOSIS — K22.2 ESOPHAGEAL STRICTURE: ICD-10-CM

## 2017-06-28 DIAGNOSIS — M48.00 SPINAL STENOSIS, UNSPECIFIED SPINAL REGION: ICD-10-CM

## 2017-06-28 DIAGNOSIS — L12.9 PEMPHIGOID: ICD-10-CM

## 2017-06-28 DIAGNOSIS — E04.2 MULTIPLE THYROID NODULES: ICD-10-CM

## 2017-06-28 DIAGNOSIS — Z00.00 ENCOUNTER FOR PREVENTIVE HEALTH EXAMINATION: Primary | ICD-10-CM

## 2017-06-28 DIAGNOSIS — M48.00 SPINAL STENOSIS, UNSPECIFIED SPINAL REGION: Primary | ICD-10-CM

## 2017-06-28 DIAGNOSIS — E78.00 PURE HYPERCHOLESTEROLEMIA: ICD-10-CM

## 2017-06-28 DIAGNOSIS — K50.80 CROHN'S DISEASE OF BOTH SMALL AND LARGE INTESTINE WITHOUT COMPLICATION: ICD-10-CM

## 2017-06-28 DIAGNOSIS — J45.20 ASTHMA WITH ALLERGIC RHINITIS, MILD INTERMITTENT, UNCOMPLICATED: ICD-10-CM

## 2017-06-28 PROCEDURE — G0439 PPPS, SUBSEQ VISIT: HCPCS | Mod: S$GLB,,, | Performed by: NURSE PRACTITIONER

## 2017-06-28 PROCEDURE — 99499 UNLISTED E&M SERVICE: CPT | Mod: S$GLB,,, | Performed by: NURSE PRACTITIONER

## 2017-06-28 PROCEDURE — 99999 PR PBB SHADOW E&M-EST. PATIENT-LVL V: CPT | Mod: PBBFAC,,, | Performed by: INTERNAL MEDICINE

## 2017-06-28 PROCEDURE — 72100 X-RAY EXAM L-S SPINE 2/3 VWS: CPT | Mod: 26,,, | Performed by: RADIOLOGY

## 2017-06-28 PROCEDURE — 99999 PR PBB SHADOW E&M-EST. PATIENT-LVL V: CPT | Mod: PBBFAC,,, | Performed by: NURSE PRACTITIONER

## 2017-06-28 PROCEDURE — 99215 OFFICE O/P EST HI 40 MIN: CPT | Mod: S$GLB,,, | Performed by: INTERNAL MEDICINE

## 2017-06-28 PROCEDURE — 99499 UNLISTED E&M SERVICE: CPT | Mod: S$GLB,,, | Performed by: INTERNAL MEDICINE

## 2017-06-28 PROCEDURE — 1125F AMNT PAIN NOTED PAIN PRSNT: CPT | Mod: S$GLB,,, | Performed by: INTERNAL MEDICINE

## 2017-06-28 PROCEDURE — 1159F MED LIST DOCD IN RCRD: CPT | Mod: S$GLB,,, | Performed by: INTERNAL MEDICINE

## 2017-06-28 RX ORDER — ASPIRIN 81 MG/1
81 TABLET ORAL DAILY
COMMUNITY

## 2017-06-28 RX ORDER — ATORVASTATIN CALCIUM 20 MG/1
20 TABLET, FILM COATED ORAL DAILY
Qty: 90 TABLET | Refills: 3 | Status: SHIPPED | OUTPATIENT
Start: 2017-06-28 | End: 2017-07-03 | Stop reason: SDUPTHER

## 2017-06-28 RX ORDER — FLUOCINONIDE 0.5 MG/G
CREAM TOPICAL 2 TIMES DAILY PRN
Qty: 1 TUBE | Refills: 0 | Status: SHIPPED | OUTPATIENT
Start: 2017-06-28 | End: 2021-06-17

## 2017-06-28 NOTE — PROGRESS NOTES
Subjective:      Patient ID: Calli Jamison is a 86 y.o. female.    Chief Complaint: Back Pain    HPI:  HPI   1. Off and on severe pain in lower back, pain goes to toes in both legs. She cannot stand up for a long period and then has to rest.She was seen in urgent care in 5/31/2017. Patient has a loop recorder. Her last xray was in 11/2014 which showed significant DJD.    Patient need follow up of diabetes and needs to update :    Patient Active Problem List   Diagnosis    Sleep apnea    Iron deficiency    Asthma with allergic rhinitis    Pemphigoid    Multiple thyroid nodules    Bilateral carpal tunnel syndrome    Diverticulosis    Type II diabetes mellitus    GERD (gastroesophageal reflux disease)    Personal history of solitary pulmonary nodule    Dry eyes - Both Eyes    Pseudophakia    Angina pectoris    COPD (chronic obstructive pulmonary disease)    Restless legs syndrome (RLS)    Transient cerebral ischemia    Hyperlipidemia    Cerebral microvascular disease    Refractive error    Adjustment disorder with mixed anxiety and depressed mood    Crohn's disease of both small and large intestine    Paroxysmal atrial fibrillation    Esophageal stricture    Essential hypertension    DM type 2 without retinopathy    Glaucoma suspect of both eyes    Bilateral posterior capsular opacification    Severe obesity    History of TIA (transient ischemic attack)    Insufficiency of tear film of both eyes    Controlled type 2 diabetes mellitus with stage 3 chronic kidney disease, without long-term current use of insulin     Past Medical History:   Diagnosis Date    Abnormal Pap smear     After-cataract, unspecified - Both Eyes 11/5/2012    Arthritis     ASCUS (atypical squamous cells of undetermined significance) on Pap smear 7/1/2012    Asthma     Back pain 2009    Bullous pemphigoid     Carpal tunnel syndrome     Chronic back pain     COPD (chronic obstructive pulmonary disease)      Coronary artery disease     Crohn's disease     Diabetes mellitus     Discoid lupus     Diverticulosis     History of migraine headaches     Hypertension     Multiple thyroid nodules     Personal history of colonic polyps     Pulmonary nodule     Restless legs syndrome     Sciatica     Right leg    Sleep apnea     Stricture and stenosis of esophagus     Stroke     TIA (transient ischemic attack)      Past Surgical History:   Procedure Laterality Date    Breast: wire localization  1/2004    CATARACT EXTRACTION W/  INTRAOCULAR LENS IMPLANT Bilateral     Cataract left eye  1/2008    Cataract: right eye  1/2008    COLON SURGERY      ileocecectomy 2011    COLONOSCOPY  9/30/2008    Crohns in remission    HYSTERECTOMY      ovaries remain    Iliocolic Crohns Disease with stricure  3/2011    [place] iliocecectomy    Laparoscopic-assisted ileocecectomy.       Left carpal tunnel      Left carpal tunnel surgery      Rectal fistulae repair      TONSILLECTOMY      UPPER GASTROINTESTINAL ENDOSCOPY  11/2011    hiatal hernia, benign appearing esophageal  stricture dilated  prn repeat rec.     Family History   Problem Relation Age of Onset    Pneumonia Father     Cataracts Mother     Macular degeneration Mother     Alkaptonuria Maternal Grandfather     Crohn's disease Neg Hx     Ulcerative colitis Neg Hx     Inflammatory bowel disease Neg Hx     Amblyopia Neg Hx     Blindness Neg Hx     Glaucoma Neg Hx     Retinal detachment Neg Hx     Strabismus Neg Hx      Review of Systems   Constitutional: Negative for chills, fatigue, fever and unexpected weight change.   HENT: Negative for trouble swallowing.    Respiratory: Negative for cough, shortness of breath and wheezing.    Cardiovascular: Negative for chest pain, palpitations and leg swelling.   Gastrointestinal: Negative for abdominal distention, abdominal pain, blood in stool and vomiting.   Musculoskeletal: Positive for back pain.  "    Objective:     Vitals:    06/28/17 0743   BP: (!) 142/78   Pulse: 88   SpO2: 96%   Weight: 88.3 kg (194 lb 10.7 oz)   Height: 5' 1" (1.549 m)   PainSc:   4     Body mass index is 36.78 kg/m².  Physical Exam   Constitutional: She is oriented to person, place, and time. She appears well-developed and well-nourished. No distress.   Neck: Carotid bruit is not present. No thyromegaly present.   Cardiovascular: Normal rate, regular rhythm and normal heart sounds.  PMI is not displaced.    Pulses:       Dorsalis pedis pulses are 1+ on the right side, and 1+ on the left side.        Posterior tibial pulses are 1+ on the right side, and 1+ on the left side.   Pulmonary/Chest: Effort normal and breath sounds normal. No respiratory distress.   Abdominal: Soft. Bowel sounds are normal. She exhibits no distension. There is no tenderness.   Musculoskeletal: She exhibits no edema.        Right foot: There is normal range of motion and no deformity.        Left foot: There is normal range of motion and no deformity.   Feet:   Right Foot:   Protective Sensation: 5 sites tested. 5 sites sensed.   Skin Integrity: Negative for ulcer, blister, skin breakdown, warmth, callus or dry skin.   Left Foot:   Protective Sensation: 5 sites tested. 5 sites sensed.   Skin Integrity: Negative for ulcer, blister, skin breakdown, erythema, warmth, callus or dry skin.   Neurological: She is alert and oriented to person, place, and time.     Assessment:     1. Spinal stenosis, unspecified spinal region    2. Asthma with allergic rhinitis, mild intermittent, uncomplicated    3. Cerebral microvascular disease    4. Chronic obstructive pulmonary disease, unspecified COPD type    5. DM type 2 without retinopathy    6. Esophageal stricture    7. Essential hypertension    8. Gastroesophageal reflux disease without esophagitis    9. Pure hypercholesterolemia    10. Multiple thyroid nodules    11. Paroxysmal atrial fibrillation    12. Pemphigoid    13. " Sleep apnea, unspecified type    14. Morbid obesity due to excess calories    15. Controlled type 2 diabetes mellitus with stage 3 chronic kidney disease, without long-term current use of insulin      Plan:   Calli was seen today for back pain.    Diagnoses and all orders for this visit:  Ordered This Visit     Ambulatory consult to Podiatry         CBC auto differential         Comprehensive metabolic panel         CT Lumbar Spine Without Contrast         Hemoglobin A1c         Lipid panel         TSH         X-Ray Lumbar Spine AP And Lateral               Spinal stenosis, unspecified spinal region: will work up spinal stenosis  -     X-Ray Lumbar Spine AP And Lateral; Future  -     CT Lumbar Spine Without Contrast; Future    Asthma with allergic rhinitis, mild intermittent, uncomplicated  The current medical regimen is effective;  continue present plan and medications.      Cerebral microvascular disease  The current medical regimen is effective;  continue present plan and medications.      Chronic obstructive pulmonary disease, unspecified COPD type  The current medical regimen is effective;  continue present plan and medications.      DM type 2 without retinopathy: will assess    Esophageal stricture: assymptomatic    Essential hypertension:  The current medical regimen is effective;  continue present plan and medications.    -     CBC auto differential; Future  -     Comprehensive metabolic panel; Future  -     TSH; Future    Gastroesophageal reflux disease without esophagitis  The current medical regimen is effective;  continue present plan and medications.      Pure hypercholesterolemia: labs needed    Multiple thyroid nodules: labs needed    Paroxysmal atrial fibrillation  The current medical regimen is effective;  continue present plan and medications.      Pemphigoid: stable    Sleep apnea, unspecified type: does not use machine    Morbid obesity due to excess calories: discissed    Controlled type 2  diabetes mellitus with stage 3 chronic kidney disease, without long-term current use of insulin: will need to evaluate  -     Ambulatory consult to Podiatry  -     Hemoglobin A1c; Future  -     Lipid panel; Future    Return in about 3 weeks (around 7/19/2017) for FU studies.         Medication List          Accurate as of 6/28/17  8:18 AM. If you have any questions, ask your nurse or doctor.               CONTINUE taking these medications    acetaminophen 500 MG tablet  Commonly known as:  TYLENOL  Take 1-2 tablets (500-1,000 mg total) by mouth every 12 (twelve) hours as needed for Pain.     atorvastatin 20 MG tablet  Commonly known as:  LIPITOR  Take 1 tablet (20 mg total) by mouth once daily.     blood sugar diagnostic Strp  Commonly known as:  BLOOD GLUCOSE TEST  Inject 1 strip into the skin once daily.     CALCIUM CITRATE + D 315-200 mg-unit per tablet  Generic drug:  calcium citrate-vitamin D3 315-200 mg     conjugated estrogens vaginal cream  Commonly known as:  PREMARIN  fnger tip twice  Per week     * fluticasone 50 mcg/actuation nasal spray  Commonly known as:  FLONASE  1 spray by Each Nare route once daily.     * fluticasone 110 mcg/actuation inhaler  Commonly known as:  FLOVENT HFA  USE 2 PUFFS BY MOUTH TWICE A DAY for asthma     furosemide 20 MG tablet  Commonly known as:  LASIX  Take 1 tablet (20 mg total) by mouth once daily.     latanoprost 0.005 % ophthalmic solution  PLACE 1 DROP INTO BOTH EYES NIGHTLY.     LIDEX 0.05 % cream  Generic drug:  fluocinonide 0.05%     losartan 100 MG tablet  Commonly known as:  COZAAR  Take 1 tablet (100 mg total) by mouth once daily.     metformin 500 MG tablet  Commonly known as:  GLUCOPHAGE  Take 1 tablet (500 mg total) by mouth 2 (two) times daily.     omeprazole 20 MG capsule  Commonly known as:  PRILOSEC     polycarbophil 625 mg tablet  Commonly known as:  FIBERCON  Take 1 tablet (625 mg total) by mouth once daily.     potassium chloride SA 10 MEQ tablet  Commonly  known as:  K-DUR,KLOR-CON  TAKE 1 TABLET (10 MEQ TOTAL) BY MOUTH AS DIRECTED.     PROAIR HFA 90 mcg/actuation inhaler  Generic drug:  albuterol  INHALE 2 PUFFS BY MOUTH EVERY 4-6 HOURS AS NEEDED     ropinirole 0.25 MG tablet  Commonly known as:  REQUIP  Take 2 tablets (0.5 mg total) by mouth nightly as needed. As directed.     verapamil 360 MG C24p  Commonly known as:  VERELAN  Take 1 capsule (360 mg total) by mouth once daily.     VITAMIN B-12 500 MCG tablet  Generic drug:  cyanocobalamin        * This list has 2 medication(s) that are the same as other medications prescribed for you. Read the directions carefully, and ask your doctor or other care provider to review them with you.

## 2017-06-28 NOTE — PATIENT INSTRUCTIONS
Counseling and Referral of Other Preventative  (Italic type indicates deductible and co-insurance are waived)    Patient Name: Calli Jamison  Today's Date: 6/28/2017      SERVICE LIMITATIONS RECOMMENDATION    Vaccines    · Pneumococcal (once after 65)    · Influenza (annually)    · Hepatitis B (if medium/high risk)    · Prevnar 13      Hepatitis B medium/high risk factors:       - End-stage renal disease       - Hemophiliacs who received Factor VII or         IX concentrates       - Clients of institutions for the mentally             retarded       - Persons who live in the same house as          a HepB carrier       - Homosexual men       - Illicit injectable drug abusers     Pneumococcal: Done, no repeat necessary     Influenza: Done, repeat in one year     Hepatitis B: N/A     Prevnar 13: Done, no repeat necessary    Mammogram (biennial age 50-74)  Annually (age 40 or over)  Done this year, repeat every year    Pap (up to age 70 and after 70 if unknown history or abnormal study last 10 years)    N/A     The USPSTF recommends against screening for cervical cancer in women older than age 65 years who have had adequate prior screening and are not otherwise at high risk for cervical cancer.      Colorectal cancer screening (to age 75)    · Fecal occult blood test (annual)  · Flexible sigmoidoscopy (5y)  · Screening colonoscopy (10y)  · Barium enema   Last done 2012, recommend to repeat every 3  years    Diabetes self-management training (no USPSTF recommendations)  Requires referral by treating physician for patient with diabetes or renal disease. 10 hours of initial DSMT sessions of no less than 30 minutes each in a continuous 12-month period. 2 hours of follow-up DSMT in subsequent years.  Scheduled, see appointments    Bone mass measurements (age 65 & older, biennial)  Requires diagnosis related to osteoporosis or estrogen deficiency. Biennial benefit unless patient has history of long-term glucocorticoid   Done 2/19/2016, no need to repeat unless clinical change    Glaucoma screening (no USPSTF recommendation)  Diabetes mellitus, family history   , age 50 or over    American, age 65 or over  Done this year, repeat every year    Medical nutrition therapy for diabetes or renal disease (no recommended schedule)  Requires referral by treating physician for patient with diabetes or renal disease or kidney transplant within the past 3 years.  Can be provided in same year as diabetes self-management training (DSMT), and CMS recommends medical nutrition therapy take place after DSMT. Up to 3 hours for initial year and 2 hours in subsequent years.  Scheduled, see appointments    Cardiovascular screening blood tests (every 5 years)  · Fasting lipid panel  Order as a panel if possible  Done this year, repeat every year    Diabetes screening tests (at least every 3 years, Medicare covers annually or at 6-month intervals for prediabetic patients)  · Fasting blood sugar (FBS) or glucose tolerance test (GTT)  Patient must be diagnosed with one of the following:       - Hypertension       - Dyslipidemia       - Obesity (BMI 30kg/m2)       - Previous elevated impaired FBS or GTT       ... or any two of the following:       - Overweight (BMI 25 but <30)       - Family history of diabetes       - Age 65 or older       - History of gestational diabetes or birth of baby weighing more than 9 pounds  Done this year, repeat every year    Abdominal aortic aneurysm screening (once)  · Sonogram   Limited to patients who meet one of the following criteria:       - Men who are 65-75 years old and have smoked more than 100 cigarette in their lifetime       - Anyone with a family history of abdominal aortic aneurysm       - Anyone recommended for screening by the USPSTF  N/A    HIV screening (annually for increased risk patients)  · HIV-1 and HIV-2 by EIA, or RODRICK, rapid antibody test or oral mucosa transudate  Patients  must be at increased risk for HIV infection per USPSTF guidelines or pregnant. Tests covered annually for patient at increased risk or as requested by the patient. Pregnant patients may receive up to 3 tests during pregnancy.  Risks discussed, screening is not recommended    Smoking cessation counseling (up to 8 sessions per year)  Patients must be asymptomatic of tobacco-related conditions to receive as a preventative service.  Non-smoker    Subsequent annual wellness visit  At least 12 months since last AWV  Return in one year     The following information is provided to all patients.  This information is to help you find resources for any of the problems found today that may be affecting your health:                Living healthy guide: www.[facility].[place].[facility]      Understanding Diabetes: www.diabetes.org      Eating healthy: www.cdc.gov/healthyweight      CDC home safety checklist: www.cdc.gov/steadi/patient.html      Agency on Aging: www.goea.[place].[facility]      Alcoholics anonymous (AA): www.aa.org      Physical Activity: www.franca.nih.gov/pa8iiun      Tobacco use: www.quitwithusla.org

## 2017-06-29 PROBLEM — I70.0 AORTIC ATHEROSCLEROSIS: Status: ACTIVE | Noted: 2017-06-29

## 2017-06-29 NOTE — PROGRESS NOTES
"Calli Jamison presented for a  Medicare AWV and comprehensive Health Risk Assessment today. The following components were reviewed and updated:    · Medical history  · Family History  · Social history  · Allergies and Current Medications  · Health Risk Assessment  · Health Maintenance  · Care Team     ** See Completed Assessments for Annual Wellness Visit within the encounter summary.**       The following assessments were completed:  · Living Situation  · CAGE  · Depression Screening  · Timed Get Up and Go  · Whisper Test  · Cognitive Function Screening  · Nutrition Screening  · ADL Screening  · PAQ Screening    Vitals:    06/28/17 0940   BP: 139/86   Pulse: 88   Weight: 88.3 kg (194 lb 10.7 oz)   Height: 5' 1" (1.549 m)     Body mass index is 36.78 kg/m².  Physical Exam   Constitutional: She is oriented to person, place, and time. She appears well-developed.   obese   HENT:   Head: Normocephalic and atraumatic.   Nose: Nose normal.   Eyes: Conjunctivae and EOM are normal.   Neck: Normal range of motion. Neck supple.   Cardiovascular: Normal rate, regular rhythm, normal heart sounds and intact distal pulses.    No murmur heard.  Pulmonary/Chest: Effort normal and breath sounds normal.   Musculoskeletal: Normal range of motion.   Neurological: She is alert and oriented to person, place, and time.   Skin: Skin is warm and dry.   Psychiatric: She has a normal mood and affect. Her behavior is normal. Judgment and thought content normal.   Nursing note and vitals reviewed.        Diagnoses and health risks identified today and associated recommendations/orders:    1. Encounter for preventive health examination  Assessment performed. Health maintenance updated. Chart review completed.    2. DM type 2 without retinopathy  - Ambulatory consult to Diabetic Education  Last A1C 6.2. Stable and controlled. Patient would like to discuss dietary modifications    3. Controlled type 2 diabetes mellitus with stage 3 chronic " kidney disease, without long-term current use of insulin  - Ambulatory consult to Diabetic Education  Last creatinine 1.2, A1C 6.2. Stable. Followed by PCP.    4. Chronic obstructive pulmonary disease, unspecified COPD type  Stable with current regimen. Followed by Pulmonology.    5. Paroxysmal atrial fibrillation  Chronic, stable with current regimen. Followed by Cardiology.    6. Crohn's disease of both small and large intestine without complication  Chronic but stable with current regimen. Followed by Gastroenterology.    7. Essential hypertension  Stable on medication. Followed by PCP.    8. Pure hypercholesterolemia  Component      Latest Ref Rng & Units 6/28/2017   Cholesterol      120 - 199 mg/dL 107 (L)   Triglycerides      30 - 150 mg/dL 61   HDL      40 - 75 mg/dL 53   LDL Cholesterol      63.0 - 159.0 mg/dL 41.8 (L)   HDL/Chol Ratio      20.0 - 50.0 % 49.5   Total Cholesterol/HDL Ratio      2.0 - 5.0 2.0   Non-HDL Cholesterol      mg/dL 54   Stable and controlled. Followed by PCP. LDL Low, HDL not cardioprotective.    9. Gastroesophageal reflux disease without esophagitis  Chronic, stable. Followed by Gastroenterology.    10. Severe Obesity  BMI 36. Discussed diet and exercise. Weight loss encouraged. Followed by PCP.    11. History of TIA (transient ischemic attack)  Stable followed by PCP.    12. Adjustment disorder with mixed anxiety and depressed mood  Chronic, stable with medication. Followed by Psychiatry.    13. Sleep apnea, unspecified type  Chronic stable. Followed by Sleep Medicine    14. Aortic atherosclerosis  Noted on imaging 6/28/2017. Stable on statin therapy and BP control. Followed by PCP.      Provided Leugenia with a 5-10 year written screening schedule and personal prevention plan. Recommendations were developed using the USPSTF age appropriate recommendations. Education, counseling, and referrals were provided as needed. After Visit Summary printed and given to patient which includes  a list of additional screenings\tests needed.    Return for follow up with Primary Care Provider as instructed, ;sooner if problems, HRA in 1 year.    OPAL Griffith

## 2017-06-30 PROCEDURE — 93299 LOOP RECORDER REMOTE: CPT | Mod: S$GLB,,, | Performed by: INTERNAL MEDICINE

## 2017-06-30 PROCEDURE — 93297 REM INTERROG DEV EVAL ICPMS: CPT | Mod: S$GLB,,, | Performed by: INTERNAL MEDICINE

## 2017-07-03 DIAGNOSIS — E78.5 HYPERLIPIDEMIA, UNSPECIFIED HYPERLIPIDEMIA TYPE: ICD-10-CM

## 2017-07-03 RX ORDER — ATORVASTATIN CALCIUM 20 MG/1
20 TABLET, FILM COATED ORAL DAILY
Qty: 90 TABLET | Refills: 3 | Status: SHIPPED | OUTPATIENT
Start: 2017-07-03 | End: 2018-07-03

## 2017-07-03 NOTE — TELEPHONE ENCOUNTER
----- Message from Candis London sent at 7/3/2017 11:44 AM CDT -----  Contact: [facility] [phone]  Prescription Request:     Name of medication: atorvastatin (LIPITOR) 20 MG tablet 90 day supply    Reason for request: Refill    Pharmacy: [facility]/pharmacy #[id number] - [place] [id number]    Please advise    Thank You

## 2017-07-11 ENCOUNTER — CLINICAL SUPPORT (OUTPATIENT)
Dept: ELECTROPHYSIOLOGY | Facility: CLINIC | Age: 82
End: 2017-07-11
Payer: MEDICARE

## 2017-07-11 DIAGNOSIS — G45.9 TRANSIENT CEREBRAL ISCHEMIA: ICD-10-CM

## 2017-07-12 ENCOUNTER — TELEPHONE (OUTPATIENT)
Dept: INTERNAL MEDICINE | Facility: CLINIC | Age: 82
End: 2017-07-12

## 2017-07-12 ENCOUNTER — HOSPITAL ENCOUNTER (OUTPATIENT)
Dept: RADIOLOGY | Facility: HOSPITAL | Age: 82
Discharge: HOME | End: 2017-07-12
Attending: INTERNAL MEDICINE
Payer: MEDICARE

## 2017-07-12 DIAGNOSIS — M48.00 SPINAL STENOSIS, UNSPECIFIED SPINAL REGION: ICD-10-CM

## 2017-07-12 PROCEDURE — 72131 CT LUMBAR SPINE W/O DYE: CPT | Mod: 26,,, | Performed by: RADIOLOGY

## 2017-07-12 PROCEDURE — 72131 CT LUMBAR SPINE W/O DYE: CPT | Mod: TC

## 2017-07-13 ENCOUNTER — TELEPHONE (OUTPATIENT)
Dept: INTERNAL MEDICINE | Facility: CLINIC | Age: 82
End: 2017-07-13

## 2017-07-13 DIAGNOSIS — M48.00 SPINAL STENOSIS, UNSPECIFIED SPINAL REGION: Primary | ICD-10-CM

## 2017-07-13 NOTE — TELEPHONE ENCOUNTER
Please call the patient:    I have put in a referral to Pain Clinic, please see if the patient would like to make an appt for her spinal stenosis.    GML

## 2017-07-15 ENCOUNTER — PATIENT MESSAGE (OUTPATIENT)
Dept: INTERNAL MEDICINE | Facility: CLINIC | Age: 82
End: 2017-07-15

## 2017-07-17 ENCOUNTER — TELEPHONE (OUTPATIENT)
Dept: ELECTROPHYSIOLOGY | Facility: CLINIC | Age: 82
End: 2017-07-17

## 2017-07-17 DIAGNOSIS — I15.9 SECONDARY HYPERTENSION: Primary | ICD-10-CM

## 2017-07-25 ENCOUNTER — PATIENT MESSAGE (OUTPATIENT)
Dept: DIABETES | Facility: CLINIC | Age: 82
End: 2017-07-25

## 2017-07-31 ENCOUNTER — CLINICAL SUPPORT (OUTPATIENT)
Dept: ELECTROPHYSIOLOGY | Facility: CLINIC | Age: 82
End: 2017-07-31
Payer: MEDICARE

## 2017-07-31 DIAGNOSIS — G45.9 TRANSIENT CEREBRAL ISCHEMIA: ICD-10-CM

## 2017-07-31 PROCEDURE — 93299 LOOP RECORDER REMOTE: CPT | Mod: S$GLB,,, | Performed by: INTERNAL MEDICINE

## 2017-07-31 PROCEDURE — 93297 REM INTERROG DEV EVAL ICPMS: CPT | Mod: S$GLB,,, | Performed by: INTERNAL MEDICINE

## 2017-08-04 RX ORDER — VERAPAMIL HYDROCHLORIDE 360 MG/1
360 CAPSULE, DELAYED RELEASE PELLETS ORAL DAILY
Qty: 90 CAPSULE | Refills: 3 | Status: SHIPPED | OUTPATIENT
Start: 2017-08-04 | End: 2018-09-27 | Stop reason: SDUPTHER

## 2017-08-04 RX ORDER — LOSARTAN POTASSIUM 100 MG/1
100 TABLET ORAL DAILY
Qty: 90 TABLET | Refills: 0 | Status: SHIPPED | OUTPATIENT
Start: 2017-08-04 | End: 2017-12-08 | Stop reason: SDUPTHER

## 2017-08-04 RX ORDER — VERAPAMIL HYDROCHLORIDE 360 MG/1
360 CAPSULE, DELAYED RELEASE PELLETS ORAL DAILY
Qty: 60 CAPSULE | Refills: 3 | Status: SHIPPED | OUTPATIENT
Start: 2017-08-04 | End: 2017-08-04 | Stop reason: SDUPTHER

## 2017-08-18 RX ORDER — METFORMIN HYDROCHLORIDE 500 MG/1
TABLET ORAL
Qty: 60 TABLET | Refills: 7 | Status: SHIPPED | OUTPATIENT
Start: 2017-08-18 | End: 2017-10-09 | Stop reason: SDUPTHER

## 2017-08-18 NOTE — TELEPHONE ENCOUNTER
----- Message from Candis London sent at 8/18/2017 11:22 AM CDT -----  Contact: Patient [phone]  Patient has transportation to come for a follow up on 08/31/2017 between 9:30-11:30. Please call today if possible.    Thank You

## 2017-08-29 ENCOUNTER — TELEPHONE (OUTPATIENT)
Dept: ELECTROPHYSIOLOGY | Facility: CLINIC | Age: 82
End: 2017-08-29

## 2017-09-17 RX ORDER — POTASSIUM CHLORIDE 750 MG/1
10 TABLET, EXTENDED RELEASE ORAL
Qty: 24 TABLET | Refills: 2 | Status: SHIPPED | OUTPATIENT
Start: 2017-09-17 | End: 2018-05-22

## 2017-09-19 ENCOUNTER — IMMUNIZATION (OUTPATIENT)
Dept: INTERNAL MEDICINE | Facility: CLINIC | Age: 82
End: 2017-09-19
Payer: MEDICARE

## 2017-09-19 ENCOUNTER — OFFICE VISIT (OUTPATIENT)
Dept: INTERNAL MEDICINE | Facility: CLINIC | Age: 82
End: 2017-09-19
Payer: MEDICARE

## 2017-09-19 VITALS
SYSTOLIC BLOOD PRESSURE: 132 MMHG | WEIGHT: 191 LBS | HEIGHT: 61 IN | OXYGEN SATURATION: 96 % | DIASTOLIC BLOOD PRESSURE: 82 MMHG | HEART RATE: 80 BPM | BODY MASS INDEX: 36.06 KG/M2

## 2017-09-19 DIAGNOSIS — E11.22 CONTROLLED TYPE 2 DIABETES MELLITUS WITH STAGE 3 CHRONIC KIDNEY DISEASE, WITHOUT LONG-TERM CURRENT USE OF INSULIN: ICD-10-CM

## 2017-09-19 DIAGNOSIS — Z01.00 ENCOUNTER FOR VISION SCREENING: ICD-10-CM

## 2017-09-19 DIAGNOSIS — E78.00 PURE HYPERCHOLESTEROLEMIA: ICD-10-CM

## 2017-09-19 DIAGNOSIS — K50.80 CROHN'S DISEASE OF BOTH SMALL AND LARGE INTESTINE WITHOUT COMPLICATION: ICD-10-CM

## 2017-09-19 DIAGNOSIS — M48.061 SPINAL STENOSIS OF LUMBAR REGION: ICD-10-CM

## 2017-09-19 DIAGNOSIS — I10 ESSENTIAL HYPERTENSION: Primary | ICD-10-CM

## 2017-09-19 DIAGNOSIS — R23.4 SKIN TEXTURE CHANGES: ICD-10-CM

## 2017-09-19 DIAGNOSIS — N18.30 CONTROLLED TYPE 2 DIABETES MELLITUS WITH STAGE 3 CHRONIC KIDNEY DISEASE, WITHOUT LONG-TERM CURRENT USE OF INSULIN: ICD-10-CM

## 2017-09-19 PROCEDURE — 99214 OFFICE O/P EST MOD 30 MIN: CPT | Mod: S$GLB,,, | Performed by: INTERNAL MEDICINE

## 2017-09-19 PROCEDURE — 90662 IIV NO PRSV INCREASED AG IM: CPT | Mod: S$GLB,,, | Performed by: INTERNAL MEDICINE

## 2017-09-19 PROCEDURE — 1159F MED LIST DOCD IN RCRD: CPT | Mod: S$GLB,,, | Performed by: INTERNAL MEDICINE

## 2017-09-19 PROCEDURE — 99499 UNLISTED E&M SERVICE: CPT | Mod: S$GLB,,, | Performed by: INTERNAL MEDICINE

## 2017-09-19 PROCEDURE — 99999 PR PBB SHADOW E&M-EST. PATIENT-LVL III: CPT | Mod: PBBFAC,,, | Performed by: INTERNAL MEDICINE

## 2017-09-19 PROCEDURE — G0008 ADMIN INFLUENZA VIRUS VAC: HCPCS | Mod: S$GLB,,, | Performed by: INTERNAL MEDICINE

## 2017-09-19 PROCEDURE — 1126F AMNT PAIN NOTED NONE PRSNT: CPT | Mod: S$GLB,,, | Performed by: INTERNAL MEDICINE

## 2017-09-19 PROCEDURE — 3008F BODY MASS INDEX DOCD: CPT | Mod: S$GLB,,, | Performed by: INTERNAL MEDICINE

## 2017-09-19 NOTE — PROGRESS NOTES
"Subjective:      Patient ID: Calli Jamison is a 86 y.o. female.    Chief Complaint: Follow-up    HPI:  HPI   Diabetes type 2    /82   Pulse 80   Ht 5' 1" (1.549 m)   Wt 86.6 kg (191 lb)   SpO2 96%   BMI 36.09 kg/m² ,   Hemoglobin A1C   Date Value Ref Range Status   06/28/2017 6.2 (H) 4.0 - 5.6 % Final     Comment:     According to ADA guidelines, hemoglobin A1c <7.0% represents  optimal control in non-pregnant diabetic patients. Different  metrics may apply to specific patient populations.   Standards of Medical Care in Diabetes-2016.  For the purpose of screening for the presence of diabetes:  <5.7%     Consistent with the absence of diabetes  5.7-6.4%  Consistent with increasing risk for diabetes   (prediabetes)  >or=6.5%  Consistent with diabetes  Currently, no consensus exists for use of hemoglobin A1c  for diagnosis of diabetes for children.  This Hemoglobin A1c assay has significant interference with fetal   hemoglobin   (HbF). The results are invalid for patients with abnormal amounts of   HbF,   including those with known Hereditary Persistence   of Fetal Hemoglobin. Heterozygous hemoglobin variants (HbAS, HbAC,   HbAD, HbAE, HbA2) do not significantly interfere with this assay;   however, presence of multiple variants in a sample may impact the %   interference.     07/21/2016 6.4 (H) 4.5 - 6.2 % Final     Comment:     According to ADA guidelines, hemoglobin A1C <7.0% represents  optimal control in non-pregnant diabetic patients.  Different  metrics may apply to specific populations.   Standards of Medical Care in Diabetes - 2016.  For the purpose of screening for the presence of diabetes:  <5.7%     Consistent with the absence of diabetes  5.7-6.4%  Consistent with increasing risk for diabetes   (prediabetes)  >or=6.5%  Consistent with diabetes  Currently no consensus exists for use of hemoglobin A1C  for diagnosis of diabetes for children.     02/19/2016 6.2 4.5 - 6.2 % Final   , "   Creatinine   Date Value Ref Range Status   06/28/2017 1.2 0.5 - 1.4 mg/dL Final   06/19/2017 1.0 0.5 - 1.4 mg/dL Final   12/19/2016 0.9 0.5 - 1.4 mg/dL Final       Lab Results   Component Value Date    LDLCALC 41.8 (L) 06/28/2017    LDLCALC 70.0 02/19/2016    LDLCALC 45.4 (L) 03/23/2015         Patient Active Problem List   Diagnosis    Sleep apnea    Iron deficiency    Asthma with allergic rhinitis    Pemphigoid    Multiple thyroid nodules    Bilateral carpal tunnel syndrome    Diverticulosis    GERD (gastroesophageal reflux disease)    Personal history of solitary pulmonary nodule    Dry eyes - Both Eyes    Pseudophakia    Angina pectoris    COPD (chronic obstructive pulmonary disease)    Restless legs syndrome (RLS)    Transient cerebral ischemia    Hyperlipidemia    Cerebral microvascular disease    Refractive error    Adjustment disorder with mixed anxiety and depressed mood    Crohn's disease of both small and large intestine    Paroxysmal atrial fibrillation    Esophageal stricture    Essential hypertension    DM type 2 without retinopathy    Glaucoma suspect of both eyes    Bilateral posterior capsular opacification    Severe obesity    History of TIA (transient ischemic attack)    Insufficiency of tear film of both eyes    Controlled type 2 diabetes mellitus with stage 3 chronic kidney disease, without long-term current use of insulin    Aortic atherosclerosis    Spinal stenosis of lumbar region: CT 2017     Past Medical History:   Diagnosis Date    Abnormal Pap smear     After-cataract, unspecified - Both Eyes 11/5/2012    Arthritis     ASCUS (atypical squamous cells of undetermined significance) on Pap smear 7/1/2012    Asthma     Back pain 2009    Bullous pemphigoid     Carpal tunnel syndrome     Chronic back pain     COPD (chronic obstructive pulmonary disease)     Coronary artery disease     Crohn's disease     Diabetes mellitus     Discoid lupus      Diverticulosis     History of migraine headaches     Hypertension     Multiple thyroid nodules     Personal history of colonic polyps     Pulmonary nodule     Restless legs syndrome     Sciatica     Right leg    Sleep apnea     Stricture and stenosis of esophagus     Stroke     TIA (transient ischemic attack)      Past Surgical History:   Procedure Laterality Date    Breast: wire localization  1/2004    CATARACT EXTRACTION W/  INTRAOCULAR LENS IMPLANT Bilateral     Cataract left eye  1/2008    Cataract: right eye  1/2008    COLON SURGERY      ileocecectomy 2011    COLONOSCOPY  9/30/2008    Crohns in remission    HYSTERECTOMY      ovaries remain    Iliocolic Crohns Disease with stricure  3/2011    [place] iliocecectomy    Laparoscopic-assisted ileocecectomy.       Left carpal tunnel      Left carpal tunnel surgery      Rectal fistulae repair      TONSILLECTOMY      UPPER GASTROINTESTINAL ENDOSCOPY  11/2011    hiatal hernia, benign appearing esophageal  stricture dilated  prn repeat rec.     Family History   Problem Relation Age of Onset    Pneumonia Father     Cataracts Mother     Macular degeneration Mother     Hypertension Sister     Alkaptonuria Maternal Grandfather     Asthma Son     No Known Problems Son     No Known Problems Son     No Known Problems Son     Crohn's disease Neg Hx     Ulcerative colitis Neg Hx     Inflammatory bowel disease Neg Hx     Amblyopia Neg Hx     Blindness Neg Hx     Glaucoma Neg Hx     Retinal detachment Neg Hx     Strabismus Neg Hx      Review of Systems   Constitutional: Negative for chills, fatigue, fever and unexpected weight change.   HENT: Negative for trouble swallowing.    Respiratory: Negative for cough, shortness of breath and wheezing.    Cardiovascular: Negative for chest pain, palpitations and leg swelling.   Gastrointestinal: Negative for abdominal distention, abdominal pain, blood in stool and vomiting.     Objective:     Vitals:     "09/19/17 1401   BP: 132/82   Pulse: 80   SpO2: 96%   Weight: 86.6 kg (191 lb)   Height: 5' 1" (1.549 m)   PainSc: 0-No pain     Body mass index is 36.09 kg/m².  Physical Exam   Constitutional: She is oriented to person, place, and time. She appears well-developed and well-nourished. No distress.   Neck: Carotid bruit is not present. No thyromegaly present.   Cardiovascular: Normal rate, regular rhythm and normal heart sounds.  PMI is not displaced.    Pulmonary/Chest: Effort normal and breath sounds normal. No respiratory distress.   Abdominal: Soft. Bowel sounds are normal. She exhibits no distension. There is no tenderness.   Musculoskeletal: She exhibits no edema.   Neurological: She is alert and oriented to person, place, and time.     Assessment:     1. Essential hypertension    2. Spinal stenosis of lumbar region: CT 2017    3. Controlled type 2 diabetes mellitus with stage 3 chronic kidney disease, without long-term current use of insulin    4. Skin texture changes    5. Pure hypercholesterolemia    6. Crohn's disease of both small and large intestine without complication      Plan:   Calli was seen today for follow-up.    Diagnoses and all orders for this visit:    Essential hypertension: well controlled    Spinal stenosis of lumbar region: CT 2017: tylenol 2 twice a day with lidocaine patch, TENS unit    Controlled type 2 diabetes mellitus with stage 3 chronic kidney disease, without long-term current use of insulin: less than 7    Skin texture changes: thigh    Pure hypercholesterolemia:  The current medical regimen is effective;  continue present plan and medications.      Crohn's disease of both small and large intestine without complication: stable    Return in about 4 months (around 1/19/2018) for Follow up with cbc, cmp, lipid, A1C.         Medication List          Accurate as of 9/19/17  2:40 PM. If you have any questions, ask your nurse or doctor.               CONTINUE taking these medications  "   acetaminophen 500 MG tablet  Commonly known as:  TYLENOL  Take 1-2 tablets (500-1,000 mg total) by mouth every 12 (twelve) hours as needed for Pain.     aspirin 81 MG EC tablet  Commonly known as:  ECOTRIN     atorvastatin 20 MG tablet  Commonly known as:  LIPITOR  Take 1 tablet (20 mg total) by mouth once daily.     blood sugar diagnostic Strp  Commonly known as:  BLOOD GLUCOSE TEST  Inject 1 strip into the skin once daily.     CALCIUM CITRATE + D 315-200 mg-unit per tablet  Generic drug:  calcium citrate-vitamin D3 315-200 mg     conjugated estrogens vaginal cream  Commonly known as:  PREMARIN  fnger tip twice  Per week     fluocinonide 0.05% 0.05 % cream  Commonly known as:  LIDEX  Apply topically 2 (two) times daily as needed. AAA scalp qd bid.     * fluticasone 50 mcg/actuation nasal spray  Commonly known as:  FLONASE  1 spray by Each Nare route once daily.     * fluticasone 110 mcg/actuation inhaler  Commonly known as:  FLOVENT HFA  USE 2 PUFFS BY MOUTH TWICE A DAY for asthma     furosemide 20 MG tablet  Commonly known as:  LASIX  Take 1 tablet (20 mg total) by mouth once daily.     latanoprost 0.005 % ophthalmic solution  PLACE 1 DROP INTO BOTH EYES NIGHTLY.     losartan 100 MG tablet  Commonly known as:  COZAAR  Take 1 tablet (100 mg total) by mouth once daily.     * metformin 500 MG tablet  Commonly known as:  GLUCOPHAGE  Take 1 tablet (500 mg total) by mouth 2 (two) times daily.     * metformin 500 MG tablet  Commonly known as:  GLUCOPHAGE  TAKE 1 TABLET BY MOUTH TWICE A DAY     omeprazole 20 MG capsule  Commonly known as:  PRILOSEC     polycarbophil 625 mg tablet  Commonly known as:  FIBERCON  Take 1 tablet (625 mg total) by mouth once daily.     potassium chloride SA 10 MEQ tablet  Commonly known as:  K-DUR,KLOR-CON  TAKE 1 TABLET (10 MEQ TOTAL) BY MOUTH AS DIRECTED.     PROAIR HFA 90 mcg/actuation inhaler  Generic drug:  albuterol  INHALE 2 PUFFS BY MOUTH EVERY 4-6 HOURS AS NEEDED     ropinirole 0.25  MG tablet  Commonly known as:  REQUIP  Take 2 tablets (0.5 mg total) by mouth nightly as needed. As directed.     verapamil 360 MG C24p  Commonly known as:  VERELAN  Take 1 capsule (360 mg total) by mouth once daily.     VITAMIN B-12 500 MCG tablet  Generic drug:  cyanocobalamin        * This list has 4 medication(s) that are the same as other medications prescribed for you. Read the directions carefully, and ask your doctor or other care provider to review them with you.

## 2017-09-26 ENCOUNTER — TELEPHONE (OUTPATIENT)
Dept: ELECTROPHYSIOLOGY | Facility: CLINIC | Age: 82
End: 2017-09-26

## 2017-09-26 ENCOUNTER — DOCUMENTATION ONLY (OUTPATIENT)
Dept: ELECTROPHYSIOLOGY | Facility: CLINIC | Age: 82
End: 2017-09-26

## 2017-09-26 NOTE — PROGRESS NOTES
Spoke to Ms Jamison. She missed her appt w/ Sue due to transportation issues. She would like to be seen to have her loop recorder explanted as it has reached EOS. Dr Med Carrillo's nurse is aware and will reeach out to her

## 2017-10-02 ENCOUNTER — TELEPHONE (OUTPATIENT)
Dept: ELECTROPHYSIOLOGY | Facility: CLINIC | Age: 82
End: 2017-10-02

## 2017-10-03 ENCOUNTER — TELEPHONE (OUTPATIENT)
Dept: ELECTROPHYSIOLOGY | Facility: CLINIC | Age: 82
End: 2017-10-03

## 2017-10-03 NOTE — TELEPHONE ENCOUNTER
Pt called In and wanted to reschedule her appt to discuss her ILR removal.  Scheduled her for 11:00 ekg, and Sue at 11:30.  Pt agreed.

## 2017-10-09 ENCOUNTER — HOSPITAL ENCOUNTER (OUTPATIENT)
Dept: CARDIOLOGY | Facility: CLINIC | Age: 82
Discharge: HOME | End: 2017-10-09
Payer: MEDICARE

## 2017-10-09 ENCOUNTER — OFFICE VISIT (OUTPATIENT)
Dept: ELECTROPHYSIOLOGY | Facility: CLINIC | Age: 82
End: 2017-10-09
Payer: MEDICARE

## 2017-10-09 VITALS
HEART RATE: 85 BPM | WEIGHT: 194 LBS | DIASTOLIC BLOOD PRESSURE: 76 MMHG | SYSTOLIC BLOOD PRESSURE: 126 MMHG | BODY MASS INDEX: 36.63 KG/M2 | HEIGHT: 61 IN

## 2017-10-09 DIAGNOSIS — J44.9 CHRONIC OBSTRUCTIVE PULMONARY DISEASE, UNSPECIFIED COPD TYPE: ICD-10-CM

## 2017-10-09 DIAGNOSIS — I15.9 SECONDARY HYPERTENSION: ICD-10-CM

## 2017-10-09 DIAGNOSIS — N18.30 CONTROLLED TYPE 2 DIABETES MELLITUS WITH STAGE 3 CHRONIC KIDNEY DISEASE, WITHOUT LONG-TERM CURRENT USE OF INSULIN: ICD-10-CM

## 2017-10-09 DIAGNOSIS — E11.22 CONTROLLED TYPE 2 DIABETES MELLITUS WITH STAGE 3 CHRONIC KIDNEY DISEASE, WITHOUT LONG-TERM CURRENT USE OF INSULIN: ICD-10-CM

## 2017-10-09 DIAGNOSIS — I10 ESSENTIAL HYPERTENSION: ICD-10-CM

## 2017-10-09 DIAGNOSIS — I70.0 AORTIC ATHEROSCLEROSIS: ICD-10-CM

## 2017-10-09 DIAGNOSIS — Z86.73 HISTORY OF TIA (TRANSIENT ISCHEMIC ATTACK): ICD-10-CM

## 2017-10-09 DIAGNOSIS — I48.0 PAROXYSMAL ATRIAL FIBRILLATION: Primary | ICD-10-CM

## 2017-10-09 DIAGNOSIS — G47.30 SLEEP APNEA, UNSPECIFIED TYPE: ICD-10-CM

## 2017-10-09 DIAGNOSIS — E66.01 SEVERE OBESITY (BMI 35.0-39.9) WITH COMORBIDITY: ICD-10-CM

## 2017-10-09 DIAGNOSIS — J45.909 ASTHMA WITH ALLERGIC RHINITIS WITHOUT COMPLICATION, UNSPECIFIED ASTHMA SEVERITY, UNSPECIFIED WHETHER PERSISTENT: ICD-10-CM

## 2017-10-09 DIAGNOSIS — E78.00 PURE HYPERCHOLESTEROLEMIA: ICD-10-CM

## 2017-10-09 DIAGNOSIS — F43.23 ADJUSTMENT DISORDER WITH MIXED ANXIETY AND DEPRESSED MOOD: ICD-10-CM

## 2017-10-09 PROCEDURE — 99999 PR PBB SHADOW E&M-EST. PATIENT-LVL III: CPT | Mod: PBBFAC,,, | Performed by: NURSE PRACTITIONER

## 2017-10-09 PROCEDURE — 99499 UNLISTED E&M SERVICE: CPT | Mod: S$GLB,,, | Performed by: NURSE PRACTITIONER

## 2017-10-09 PROCEDURE — 93000 ELECTROCARDIOGRAM COMPLETE: CPT | Mod: S$GLB,,, | Performed by: INTERNAL MEDICINE

## 2017-10-09 PROCEDURE — 99214 OFFICE O/P EST MOD 30 MIN: CPT | Mod: S$GLB,,, | Performed by: NURSE PRACTITIONER

## 2017-10-09 NOTE — PROGRESS NOTES
Subjective:    Patient ID:  Calli Jamison is a 86 y.o. female who presents for follow-up of Paroxsymal Atrial Fibrillation.     Calli Jamison is a patient of Dr. Jovel.    HPI     Ms. Jamison is an 86 year old female with HTN, Crohn's disease, DM, LULY, COPD, asthma, and a hx of possible TIA (work-up negative). An Echo at the time (03/21/14) revealed normal biventricular function; JULISA 28.41. A Loop monitor was implanted (03/30/14). Interrogations have revealed no AF, and no significant events. Her BP was elevated at her last office visit (she indicated that she had white coat HTN); SBPs at the time had been running in the 140's-150's at home; verapamil was increased to 360 mg daily.    Since her last office visit, Ms. Jamison reports feeling well; she denies chest pain, SOB/LESTER, dizziness, palpitations, or syncope. She states that her energy level remains stable.     Recent cardiac studies:  ILR Transmissions (07/28/14 - 04/23/17) revealed SR/occasional ST w/HRs in the 70s-100s, and occasional PVCs (in 2015); There was one episode of WCT (15 second duration; 06/2016), and only 1 SYMPTOM episode c/w SR in 80s w/PVCs (04/2015). Device at RRT (04/23/17).     I reviewed today's ECG which demonstrated NSR at 85 bpm; , QRS 80, and QTc 409.    Review of Systems   Constitution: Negative for diaphoresis and malaise/fatigue.   Eyes: Negative for double vision.   Cardiovascular: Negative for chest pain, dyspnea on exertion, irregular heartbeat, near-syncope, palpitations and syncope.   Respiratory: Negative for shortness of breath.    Skin: Negative.    Musculoskeletal: Positive for stiffness.   Neurological: Negative for dizziness and light-headedness.   Psychiatric/Behavioral: Negative for altered mental status.        Objective:    Physical Exam   Constitutional: She is oriented to person, place, and time. She appears well-developed and well-nourished.   HENT:   Head: Normocephalic and atraumatic.   Eyes:  Pupils are equal, round, and reactive to light.   Cardiovascular: Normal rate, regular rhythm and normal heart sounds.    Pulmonary/Chest: Effort normal and breath sounds normal.   Neurological: She is alert and oriented to person, place, and time.   Skin: She is not diaphoretic.   Vitals reviewed.        Assessment:       1. Paroxysmal atrial fibrillation    2. History of TIA (transient ischemic attack)    3. Aortic atherosclerosis    4. Pure hypercholesterolemia    5. Essential hypertension    6. Sleep apnea, unspecified type    7. Asthma with allergic rhinitis without complication, unspecified asthma severity, unspecified whether persistent    8. Chronic obstructive pulmonary disease, unspecified COPD type    9. Controlled type 2 diabetes mellitus with stage 3 chronic kidney disease, without long-term current use of insulin    10. Severe obesity (BMI 35.0-39.9) with comorbidity    11. Adjustment disorder with mixed anxiety and depressed mood         Plan:       Ms. Jamison is doing well from a rhythm perspective with no AF noted on ILR to date; device reached RRT (noted on 04/2017 transmission). On ASA.     I have discussed the risks, benefits, and alternatives of ILR removal in detail with  Albakulwinder Jamison; the alternative would be to leave the device in situ. Ms. Jamison verbalized understanding and all questions were answered. She wishes to defer at this time in favor of leaving the device in place.     Follow up with Dr. Hurd (PCP) as directed.   Follow up in EP clinic PRN.     Sue Aguilar, [place], APRN, FNP-C        (A copy of today's note was sent to Rosales Jovel and Vannessa.)

## 2017-10-18 ENCOUNTER — CLINICAL SUPPORT (OUTPATIENT)
Dept: OPHTHALMOLOGY | Facility: CLINIC | Age: 82
End: 2017-10-18
Payer: MEDICARE

## 2017-10-18 ENCOUNTER — OFFICE VISIT (OUTPATIENT)
Dept: OPHTHALMOLOGY | Facility: CLINIC | Age: 82
End: 2017-10-18
Payer: MEDICARE

## 2017-10-18 DIAGNOSIS — H52.7 REFRACTIVE ERROR: ICD-10-CM

## 2017-10-18 DIAGNOSIS — H04.123 DRY EYE SYNDROME, BILATERAL: ICD-10-CM

## 2017-10-18 DIAGNOSIS — H26.493 PCO (POSTERIOR CAPSULAR OPACIFICATION), BILATERAL: ICD-10-CM

## 2017-10-18 DIAGNOSIS — I10 ESSENTIAL HYPERTENSION: ICD-10-CM

## 2017-10-18 DIAGNOSIS — H40.003 GLAUCOMA SUSPECT, BILATERAL: Primary | ICD-10-CM

## 2017-10-18 DIAGNOSIS — Z96.1 PSEUDOPHAKIA: ICD-10-CM

## 2017-10-18 DIAGNOSIS — E11.9 DM TYPE 2 WITHOUT RETINOPATHY: ICD-10-CM

## 2017-10-18 PROCEDURE — 99999 PR PBB SHADOW E&M-EST. PATIENT-LVL II: CPT | Mod: PBBFAC,,, | Performed by: OPHTHALMOLOGY

## 2017-10-18 PROCEDURE — 99499 UNLISTED E&M SERVICE: CPT | Mod: S$GLB,,, | Performed by: OPHTHALMOLOGY

## 2017-10-18 PROCEDURE — 92014 COMPRE OPH EXAM EST PT 1/>: CPT | Mod: S$GLB,,, | Performed by: OPHTHALMOLOGY

## 2017-10-18 NOTE — LETTER
October 18, 2017      Sheridan Hurd MD  [address]  [facility] [address]           [place] [facility]  [address]  [place] [address]  Phone: [phone]  Fax: [phone]          Patient: Calli Jamison   MR Number: [id number]   YOB: 1931   Date of Visit: 10/18/2017       Dear Dr. Sheridan Hurd:    Thank you for referring Calli Jamison to me for evaluation. Attached you will find relevant portions of my assessment and plan of care.    If you have questions, please do not hesitate to call me. I look forward to following Calli Jamison along with you.    Sincerely,    Javid Head MD    Enclosure  CC:  No Recipients    If you would like to receive this communication electronically, please contact [email or web address] or [phone] to request more information on [facility] access.    For providers and/or their staff who would like to refer a patient to [facility], please contact us through our one-stop-shop provider referral line, [facility], at [phone].    If you feel you have received this communication in error or would no longer like to receive these types of communications, please e-mail [email or web address]

## 2017-10-18 NOTE — PROGRESS NOTES
Subjective:       Patient ID: Calli Jamison is a 86 y.o. female.    Chief Complaint: Glaucoma (IOP, HVF, DFE)    HPI  Review of Systems    Objective:      Physical Exam    Assessment:       1. Glaucoma suspect, bilateral    2. PCO (posterior capsular opacification), bilateral    3. Dry eye syndrome, bilateral    4. DM type 2 without retinopathy    5. Essential hypertension    6. Refractive error    7. Pseudophakia        Plan:       Glaucoma suspect OU-IOP's, ON's & HVF's are stable OU.  Early PCO OU- Not Visually Significant.     TRISTON-Doing well.  DM-No NPDR OU.  HTN-No retinopathy OU.  RE-No need to change Rx.      Cont Xalatan OU.  AT's.  Control DM & HTN.  RTC 6 mos for IOP's.

## 2017-10-27 DIAGNOSIS — H40.003 GLAUCOMA SUSPECT, BILATERAL: ICD-10-CM

## 2017-10-28 RX ORDER — LATANOPROST 50 UG/ML
SOLUTION/ DROPS OPHTHALMIC
Qty: 2.5 ML | Refills: 6 | Status: SHIPPED | OUTPATIENT
Start: 2017-10-28 | End: 2018-05-25 | Stop reason: SDUPTHER

## 2017-11-02 RX ORDER — FUROSEMIDE 20 MG/1
20 TABLET ORAL DAILY
Qty: 30 TABLET | Refills: 12 | Status: SHIPPED | OUTPATIENT
Start: 2017-11-02 | End: 2018-11-12 | Stop reason: SDUPTHER

## 2017-11-09 DIAGNOSIS — H40.003 GLAUCOMA SUSPECT OF BOTH EYES: Primary | ICD-10-CM

## 2017-12-07 ENCOUNTER — TELEPHONE (OUTPATIENT)
Dept: INTERNAL MEDICINE | Facility: CLINIC | Age: 82
End: 2017-12-07

## 2017-12-07 NOTE — TELEPHONE ENCOUNTER
----- Message from Rossy Lieberman sent at 12/7/2017  2:35 PM CST -----  Contact: Patient [phone]  Patient would like to get medical advice.  She is having a hard time swallowing her potassium although she's been taking it awhile it's gotten hard to swallow and the pharmacist told her to call you.    Thank you

## 2017-12-07 NOTE — TELEPHONE ENCOUNTER
Please call and clarify how she is taking the medication: example: is it every other day? I will then send a new prescription to the pharmacy. JONEL

## 2017-12-08 RX ORDER — POTASSIUM CHLORIDE 750 MG/1
10 TABLET, EXTENDED RELEASE ORAL DAILY
Qty: 30 TABLET | Refills: 12 | Status: SHIPPED | OUTPATIENT
Start: 2017-12-08 | End: 2018-01-22

## 2017-12-08 RX ORDER — POTASSIUM CHLORIDE 750 MG/1
10 TABLET, EXTENDED RELEASE ORAL
Qty: 24 TABLET | Refills: 2 | OUTPATIENT
Start: 2017-12-08

## 2017-12-08 RX ORDER — LOSARTAN POTASSIUM 100 MG/1
100 TABLET ORAL DAILY
Qty: 90 TABLET | Refills: 3 | Status: SHIPPED | OUTPATIENT
Start: 2017-12-08 | End: 2018-06-07 | Stop reason: ALTCHOICE

## 2017-12-28 ENCOUNTER — TELEPHONE (OUTPATIENT)
Dept: INTERNAL MEDICINE | Facility: CLINIC | Age: 82
End: 2017-12-28

## 2017-12-28 NOTE — TELEPHONE ENCOUNTER
----- Message from Marino Torrez sent at 12/22/2017  3:41 PM CST -----  Contact: self [phone]  Patient requesting a call from the office in regards to medication potassium chloride SA (K-DUR,KLOR-CON) 10 MEQ tablet , please advise Thanks

## 2018-01-05 ENCOUNTER — TELEPHONE (OUTPATIENT)
Dept: INTERNAL MEDICINE | Facility: CLINIC | Age: 83
End: 2018-01-05

## 2018-01-05 NOTE — TELEPHONE ENCOUNTER
----- Message from Lexus Lopes sent at 1/5/2018  6:49 AM CST -----  Contact: self  Appointment Request From: Calli Jamison    With Provider: Sheridan Hurd MD [-Primary Care Physician-]    Would Accept With:Only the person I've selected    Preferred Date Range: From 1/4/2018 To 1/9/2018    Preferred Times: Tuesday Morning    Reason for visit: Request an Appt    Comments:  Dr. Hurd I have had problems with my potassium 10 meQ tablet getting caught in my throat. I need this med. I haven't had a pill for over two weeks. Is their a liquid form I can take ?. Thank you.

## 2018-01-05 NOTE — TELEPHONE ENCOUNTER
Spoke to patient. States if she needed an appointment or can you change her Rx to something else.     Please advise. Thank you

## 2018-01-08 NOTE — TELEPHONE ENCOUNTER
Would you please call the pharmacy and see what a liquid potassium equivalent for 10 meq of potassium chloride is. I need a product that I can prescribe. GML

## 2018-01-09 NOTE — TELEPHONE ENCOUNTER
Called [facility] potassium would be:  Klorcon powder 10meq per packet.   it can be mixed with juice or water

## 2018-01-09 NOTE — TELEPHONE ENCOUNTER
Would you please phone this in  Klorcon powder 10 meq  Disp 30 packets  Sig: mix with 8 0z of fluid daily  12 refills

## 2018-01-22 ENCOUNTER — OFFICE VISIT (OUTPATIENT)
Dept: INTERNAL MEDICINE | Facility: CLINIC | Age: 83
End: 2018-01-22
Payer: MEDICARE

## 2018-01-22 VITALS
BODY MASS INDEX: 36.21 KG/M2 | OXYGEN SATURATION: 96 % | HEIGHT: 61 IN | SYSTOLIC BLOOD PRESSURE: 142 MMHG | DIASTOLIC BLOOD PRESSURE: 80 MMHG | WEIGHT: 191.81 LBS | HEART RATE: 88 BPM

## 2018-01-22 DIAGNOSIS — I10 ESSENTIAL HYPERTENSION: ICD-10-CM

## 2018-01-22 DIAGNOSIS — J44.9 CHRONIC OBSTRUCTIVE PULMONARY DISEASE, UNSPECIFIED COPD TYPE: ICD-10-CM

## 2018-01-22 DIAGNOSIS — I20.9 ANGINA PECTORIS: ICD-10-CM

## 2018-01-22 DIAGNOSIS — I48.0 PAROXYSMAL ATRIAL FIBRILLATION: ICD-10-CM

## 2018-01-22 DIAGNOSIS — E66.01 SEVERE OBESITY (BMI 35.0-39.9) WITH COMORBIDITY: ICD-10-CM

## 2018-01-22 DIAGNOSIS — E11.22 CONTROLLED TYPE 2 DIABETES MELLITUS WITH STAGE 3 CHRONIC KIDNEY DISEASE, WITHOUT LONG-TERM CURRENT USE OF INSULIN: Primary | ICD-10-CM

## 2018-01-22 DIAGNOSIS — J45.20 MILD INTERMITTENT ASTHMA WITH ALLERGIC RHINITIS WITHOUT COMPLICATION: ICD-10-CM

## 2018-01-22 DIAGNOSIS — E11.9 DM TYPE 2 WITHOUT RETINOPATHY: ICD-10-CM

## 2018-01-22 DIAGNOSIS — K50.80 CROHN'S DISEASE OF BOTH SMALL AND LARGE INTESTINE WITHOUT COMPLICATION: ICD-10-CM

## 2018-01-22 DIAGNOSIS — N18.30 CONTROLLED TYPE 2 DIABETES MELLITUS WITH STAGE 3 CHRONIC KIDNEY DISEASE, WITHOUT LONG-TERM CURRENT USE OF INSULIN: Primary | ICD-10-CM

## 2018-01-22 DIAGNOSIS — E04.2 MULTIPLE THYROID NODULES: ICD-10-CM

## 2018-01-22 DIAGNOSIS — K22.2 ESOPHAGEAL STRICTURE: ICD-10-CM

## 2018-01-22 DIAGNOSIS — E78.00 PURE HYPERCHOLESTEROLEMIA: ICD-10-CM

## 2018-01-22 DIAGNOSIS — Z86.73 HISTORY OF TIA (TRANSIENT ISCHEMIC ATTACK): ICD-10-CM

## 2018-01-22 DIAGNOSIS — F43.23 ADJUSTMENT DISORDER WITH MIXED ANXIETY AND DEPRESSED MOOD: ICD-10-CM

## 2018-01-22 DIAGNOSIS — I70.0 AORTIC ATHEROSCLEROSIS: ICD-10-CM

## 2018-01-22 DIAGNOSIS — E61.1 IRON DEFICIENCY: ICD-10-CM

## 2018-01-22 PROCEDURE — 99999 PR PBB SHADOW E&M-EST. PATIENT-LVL IV: CPT | Mod: PBBFAC,,, | Performed by: INTERNAL MEDICINE

## 2018-01-22 PROCEDURE — 99214 OFFICE O/P EST MOD 30 MIN: CPT | Mod: S$GLB,,, | Performed by: INTERNAL MEDICINE

## 2018-01-22 PROCEDURE — 99499 UNLISTED E&M SERVICE: CPT | Mod: S$GLB,,, | Performed by: INTERNAL MEDICINE

## 2018-01-22 RX ORDER — FLUTICASONE PROPIONATE 110 UG/1
AEROSOL, METERED RESPIRATORY (INHALATION)
Qty: 12 G | Refills: 12 | Status: SHIPPED | OUTPATIENT
Start: 2018-01-22 | End: 2019-03-24 | Stop reason: SDUPTHER

## 2018-01-22 RX ORDER — ALBUTEROL SULFATE 90 UG/1
2 AEROSOL, METERED RESPIRATORY (INHALATION)
Qty: 1 INHALER | Refills: 12 | Status: SHIPPED | OUTPATIENT
Start: 2018-01-22 | End: 2019-03-24 | Stop reason: SDUPTHER

## 2018-01-22 NOTE — PROGRESS NOTES
Subjective:      Patient ID: Calli Jamison is a 86 y.o. female.    Chief Complaint: Follow-up (4 months f/u)    HPI:  HPI   Patient is here for a 4 month follow up: and follow up of urgent care    Diabetes Management Status    Statin: Taking  ACE/ARB: Taking    Screening or Prevention Patient's value Goal Complete/Controlled?   HgA1C Testing and Control   Lab Results   Component Value Date    HGBA1C 6.2 (H) 06/28/2017      Annually/Less than 8% Yes   Lipid profile : 06/28/2017 Annually Yes   LDL control Lab Results   Component Value Date    LDLCALC 41.8 (L) 06/28/2017    Annually/Less than 100 mg/dl  Yes   Nephropathy screening Lab Results   Component Value Date    LABMICR 37.0 11/11/2015     Lab Results   Component Value Date    PROTEINUA Negative 02/23/2011    Annually No   Blood pressure BP Readings from Last 1 Encounters:   01/22/18 (!) 162/82    Less than 140/90 No   Dilated retinal exam : 10/18/2017 Annually Yes   Foot exam   : 06/28/2017 Annually Yes       Patient Active Problem List   Diagnosis    Sleep apnea    Iron deficiency    Asthma with allergic rhinitis    Pemphigoid    Multiple thyroid nodules    Bilateral carpal tunnel syndrome    Diverticulosis    GERD (gastroesophageal reflux disease)    Personal history of solitary pulmonary nodule    Dry eyes - Both Eyes    Pseudophakia    Angina pectoris    COPD (chronic obstructive pulmonary disease)    Restless legs syndrome (RLS)    Transient cerebral ischemia    Hyperlipidemia    Cerebral microvascular disease    Refractive error    Adjustment disorder with mixed anxiety and depressed mood    Crohn's disease of both small and large intestine    Paroxysmal atrial fibrillation    Esophageal stricture    Essential hypertension    DM type 2 without retinopathy    Glaucoma suspect, bilateral    PCO (posterior capsular opacification), bilateral    Severe obesity (BMI 35.0-39.9) with comorbidity    History of TIA (transient  ischemic attack)    Dry eye syndrome, bilateral    Controlled type 2 diabetes mellitus with stage 3 chronic kidney disease, without long-term current use of insulin    Aortic atherosclerosis    Spinal stenosis of lumbar region: CT 2017    BMI 37.0-37.9, adult     Past Medical History:   Diagnosis Date    Abnormal Pap smear     After-cataract, unspecified - Both Eyes 11/5/2012    Arthritis     ASCUS (atypical squamous cells of undetermined significance) on Pap smear 7/1/2012    Asthma     Back pain 2009    Bullous pemphigoid     Carpal tunnel syndrome     Chronic back pain     COPD (chronic obstructive pulmonary disease)     Coronary artery disease     Crohn's disease     Diabetes mellitus     Discoid lupus     Diverticulosis     History of migraine headaches     Hypertension     Multiple thyroid nodules     Personal history of colonic polyps     Pulmonary nodule     Restless legs syndrome     Sciatica     Right leg    Sleep apnea     Stricture and stenosis of esophagus     Stroke     TIA (transient ischemic attack)      Past Surgical History:   Procedure Laterality Date    Breast: wire localization  1/2004    CATARACT EXTRACTION W/  INTRAOCULAR LENS IMPLANT Bilateral     Cataract left eye  1/2008    Cataract: right eye  1/2008    COLON SURGERY      ileocecectomy 2011    COLONOSCOPY  9/30/2008    Crohns in remission    HYSTERECTOMY      ovaries remain    Iliocolic Crohns Disease with stricure  3/2011    LA iliocecectomy    Laparoscopic-assisted ileocecectomy.       Left carpal tunnel      Left carpal tunnel surgery      Rectal fistulae repair      TONSILLECTOMY      UPPER GASTROINTESTINAL ENDOSCOPY  11/2011    hiatal hernia, benign appearing esophageal  stricture dilated  prn repeat rec.     Family History   Problem Relation Age of Onset    Pneumonia Father     Cataracts Mother     Macular degeneration Mother     Hypertension Sister     Alkaptonuria Maternal  "Grandfather     Asthma Son     No Known Problems Son     No Known Problems Son     No Known Problems Son     Crohn's disease Neg Hx     Ulcerative colitis Neg Hx     Inflammatory bowel disease Neg Hx     Amblyopia Neg Hx     Blindness Neg Hx     Glaucoma Neg Hx     Retinal detachment Neg Hx     Strabismus Neg Hx      Review of Systems   Constitutional: Negative for chills, fatigue, fever and unexpected weight change.   HENT: Negative for trouble swallowing.    Respiratory: Negative for cough, shortness of breath and wheezing.    Cardiovascular: Negative for chest pain, palpitations and leg swelling.   Gastrointestinal: Negative for abdominal distention, abdominal pain, blood in stool and vomiting.     Objective:     Vitals:    01/22/18 1309   BP: (!) 162/82   Pulse: 88   SpO2: 96%   Weight: 87 kg (191 lb 12.8 oz)   Height: 5' 1" (1.549 m)   PainSc: 0-No pain     Body mass index is 36.24 kg/m².  Physical Exam   Constitutional: She is oriented to person, place, and time. She appears well-developed and well-nourished. No distress.   Neck: Carotid bruit is not present. No thyromegaly present.   Cardiovascular: Normal rate, regular rhythm and normal heart sounds.  PMI is not displaced.    Pulmonary/Chest: Effort normal and breath sounds normal. No respiratory distress.   Abdominal: Soft. Bowel sounds are normal. She exhibits no distension. There is no tenderness.   Musculoskeletal: She exhibits no edema.   Neurological: She is alert and oriented to person, place, and time.     Assessment:     1. Controlled type 2 diabetes mellitus with stage 3 chronic kidney disease, without long-term current use of insulin    2. BMI 37.0-37.9, adult    3. Adjustment disorder with mixed anxiety and depressed mood    4. Angina pectoris    5. Aortic atherosclerosis    6. Mild intermittent asthma with allergic rhinitis without complication    7. Chronic obstructive pulmonary disease, unspecified COPD type    8. Crohn's disease " of both small and large intestine without complication    9. Esophageal stricture    10. Essential hypertension    11. History of TIA (transient ischemic attack)    12. Pure hypercholesterolemia    13. Iron deficiency    14. Multiple thyroid nodules    15. Paroxysmal atrial fibrillation    16. Severe obesity (BMI 35.0-39.9) with comorbidity    17. DM type 2 without retinopathy      Plan:   Calli was seen today for follow-up.    Diagnoses and all orders for this visit:    Controlled type 2 diabetes mellitus with stage 3 chronic kidney disease, without long-term current use of insulin: will obtain A1C today  -     Ambulatory consult to Podiatry  -     Microalbumin/creatinine urine ratio; Future  -     CBC auto differential; Future  -     Comprehensive metabolic panel; Future  -     Hemoglobin A1c; Future    BMI 37.0-37.9, adult: has lost some weight    Adjustment disorder with mixed anxiety and depressed mood discussed    Angina pectoris: stable    Aortic atherosclerosis    Mild intermittent asthma with allergic rhinitis without complication: stable on medication    Chronic obstructive pulmonary disease, unspecified COPD type    Crohn's disease of both small and large intestine without complication    Esophageal stricture    Essential hypertension: elevated    History of TIA (transient ischemic attack)    Pure hypercholesterolemia    Iron deficiency    Multiple thyroid nodules    Paroxysmal atrial fibrillation    Severe obesity (BMI 35.0-39.9) with comorbidity    DM type 2 without retinopathy      Follow-up in about 4 months (around 5/22/2018) for Fu with cbc, cmp, A!C.     Medication List          Accurate as of 1/22/18  1:47 PM. If you have any questions, ask your nurse or doctor.               CONTINUE taking these medications    acetaminophen 500 MG tablet  Commonly known as:  TYLENOL  Take 1-2 tablets (500-1,000 mg total) by mouth every 12 (twelve) hours as needed for Pain.     aspirin 81 MG EC  tablet  Commonly known as:  ECOTRIN     atorvastatin 20 MG tablet  Commonly known as:  LIPITOR  Take 1 tablet (20 mg total) by mouth once daily.     blood sugar diagnostic Strp  Commonly known as:  BLOOD GLUCOSE TEST  Inject 1 strip into the skin once daily.     CALCIUM CITRATE + D 315-200 mg-unit per tablet  Generic drug:  calcium citrate-vitamin D3 315-200 mg     conjugated estrogens vaginal cream  Commonly known as:  PREMARIN  fnger tip twice  Per week     fluocinonide 0.05% 0.05 % cream  Commonly known as:  LIDEX  Apply topically 2 (two) times daily as needed. AAA scalp qd bid.     * fluticasone 50 mcg/actuation nasal spray  Commonly known as:  FLONASE  1 spray by Each Nare route once daily.     * fluticasone 110 mcg/actuation inhaler  Commonly known as:  FLOVENT HFA  USE 2 PUFFS BY MOUTH TWICE A DAY for asthma     furosemide 20 MG tablet  Commonly known as:  LASIX  Take 1 tablet (20 mg total) by mouth once daily.     latanoprost 0.005 % ophthalmic solution  PLACE 1 DROP INTO BOTH EYES NIGHTLY.     losartan 100 MG tablet  Commonly known as:  COZAAR  TAKE 1 TABLET (100 MG TOTAL) BY MOUTH ONCE DAILY.     metFORMIN 500 MG tablet  Commonly known as:  GLUCOPHAGE  Take 1 tablet (500 mg total) by mouth 2 (two) times daily.     omeprazole 20 MG capsule  Commonly known as:  PRILOSEC     polycarbophil 625 mg tablet  Commonly known as:  FIBERCON  Take 1 tablet (625 mg total) by mouth once daily.     * potassium chloride SA 10 MEQ tablet  Commonly known as:  K-DUR,KLOR-CON  TAKE 1 TABLET (10 MEQ TOTAL) BY MOUTH AS DIRECTED.     * potassium chloride SA 10 MEQ tablet  Commonly known as:  K-DUR,KLOR-CON  Take 1 tablet (10 mEq total) by mouth once daily.     PROAIR HFA 90 mcg/actuation inhaler  Generic drug:  albuterol  INHALE 2 PUFFS BY MOUTH EVERY 4-6 HOURS AS NEEDED     rOPINIRole 0.25 MG tablet  Commonly known as:  REQUIP  Take 2 tablets (0.5 mg total) by mouth nightly as needed. As directed.     verapamil 360 MG  C24p  Commonly known as:  VERELAN  Take 1 capsule (360 mg total) by mouth once daily.     VITAMIN B-12 500 MCG tablet  Generic drug:  cyanocobalamin        * This list has 4 medication(s) that are the same as other medications prescribed for you. Read the directions carefully, and ask your doctor or other care provider to review them with you.

## 2018-02-01 ENCOUNTER — TELEPHONE (OUTPATIENT)
Dept: INTERNAL MEDICINE | Facility: CLINIC | Age: 83
End: 2018-02-01

## 2018-02-01 NOTE — TELEPHONE ENCOUNTER
Urgent care appt, please tell her I will not be in until next week so please book her with someone. GML

## 2018-02-01 NOTE — TELEPHONE ENCOUNTER
----- Message from Chintan Mcmahon sent at 2/1/2018  9:04 AM CST -----  Contact: self/[phone]  Pt is calling to speak with someone in the office. She states that she hasn't been having a bowel movement in about 7 or 8 days she also states that she does not have any pain. She also states that she's pretty scared and does not know what's going on. Please call and advise.    Thank Bear

## 2018-02-02 ENCOUNTER — HOSPITAL ENCOUNTER (EMERGENCY)
Facility: HOSPITAL | Age: 83
Discharge: HOME | End: 2018-02-02
Attending: EMERGENCY MEDICINE
Payer: MEDICARE

## 2018-02-02 ENCOUNTER — OFFICE VISIT (OUTPATIENT)
Dept: INTERNAL MEDICINE | Facility: CLINIC | Age: 83
End: 2018-02-02
Payer: MEDICARE

## 2018-02-02 VITALS
BODY MASS INDEX: 36.05 KG/M2 | HEIGHT: 61 IN | WEIGHT: 190.94 LBS | OXYGEN SATURATION: 97 % | HEART RATE: 115 BPM | TEMPERATURE: 98 F

## 2018-02-02 VITALS
SYSTOLIC BLOOD PRESSURE: 176 MMHG | WEIGHT: 190 LBS | TEMPERATURE: 99 F | BODY MASS INDEX: 35.87 KG/M2 | RESPIRATION RATE: 16 BRPM | OXYGEN SATURATION: 96 % | HEIGHT: 61 IN | DIASTOLIC BLOOD PRESSURE: 81 MMHG | HEART RATE: 80 BPM

## 2018-02-02 DIAGNOSIS — R10.9 ABDOMINAL PAIN, UNSPECIFIED ABDOMINAL LOCATION: Primary | ICD-10-CM

## 2018-02-02 DIAGNOSIS — K56.41 FECAL IMPACTION IN RECTUM: Primary | ICD-10-CM

## 2018-02-02 PROCEDURE — 99211 OFF/OP EST MAY X REQ PHY/QHP: CPT | Mod: S$GLB,,, | Performed by: NURSE PRACTITIONER

## 2018-02-02 PROCEDURE — 99283 EMERGENCY DEPT VISIT LOW MDM: CPT

## 2018-02-02 PROCEDURE — 99999 PR PBB SHADOW E&M-EST. PATIENT-LVL IV: CPT | Mod: PBBFAC,,, | Performed by: NURSE PRACTITIONER

## 2018-02-02 PROCEDURE — 99283 EMERGENCY DEPT VISIT LOW MDM: CPT | Mod: ,,, | Performed by: EMERGENCY MEDICINE

## 2018-02-02 PROCEDURE — 25000003 PHARM REV CODE 250: Performed by: EMERGENCY MEDICINE

## 2018-02-02 RX ORDER — PSEUDOEPHEDRINE/ACETAMINOPHEN 30MG-500MG
100 TABLET ORAL
Status: COMPLETED | OUTPATIENT
Start: 2018-02-02 | End: 2018-02-02

## 2018-02-02 RX ORDER — SYRING-NEEDL,DISP,INSUL,0.3 ML 29 G X1/2"
296 SYRINGE, EMPTY DISPOSABLE MISCELLANEOUS
Status: COMPLETED | OUTPATIENT
Start: 2018-02-02 | End: 2018-02-02

## 2018-02-02 RX ORDER — LIDOCAINE HYDROCHLORIDE 20 MG/ML
JELLY TOPICAL
Status: COMPLETED | OUTPATIENT
Start: 2018-02-02 | End: 2018-02-02

## 2018-02-02 RX ORDER — SENNOSIDES 8.6 MG/1
1 TABLET ORAL DAILY
Qty: 20 TABLET | COMMUNITY
Start: 2018-02-02 | End: 2018-02-02

## 2018-02-02 RX ORDER — SENNOSIDES 8.6 MG/1
1 TABLET ORAL DAILY
Qty: 14 TABLET | Refills: 0 | Status: SHIPPED | OUTPATIENT
Start: 2018-02-02 | End: 2019-01-31

## 2018-02-02 RX ORDER — DOCUSATE SODIUM 100 MG/1
100 CAPSULE, LIQUID FILLED ORAL 2 TIMES DAILY
Qty: 60 CAPSULE | Refills: 0 | COMMUNITY
Start: 2018-02-02 | End: 2018-02-02

## 2018-02-02 RX ORDER — DOCUSATE SODIUM 100 MG/1
100 CAPSULE, LIQUID FILLED ORAL 2 TIMES DAILY
Qty: 14 CAPSULE | Refills: 0 | Status: SHIPPED | OUTPATIENT
Start: 2018-02-02 | End: 2019-02-18 | Stop reason: SDUPTHER

## 2018-02-02 RX ADMIN — MAGESIUM CITRATE 296 ML: 1.75 LIQUID ORAL at 03:02

## 2018-02-02 RX ADMIN — LIDOCAINE HYDROCHLORIDE: 20 JELLY TOPICAL at 02:02

## 2018-02-02 RX ADMIN — SODIUM CHLORIDE 500 ML: 0.9 INJECTION, SOLUTION INTRAVENOUS at 02:02

## 2018-02-02 RX ADMIN — Medication 100 ML: at 03:02

## 2018-02-02 NOTE — ED NOTES
Patient identifiers verified and correct for Ms Jamiosn  C/C: Anal pain ,fecal impaction  APPEARANCE: awake and alert in NAD.  SKIN: warm, dry and intact. No breakdown or bruising.  MUSCULOSKELETAL: Patient moving all extremities spontaneously, no obvious swelling or deformities noted. Ambulates independently.  RESPIRATORY: Denies shortness of breath.Respirations unlabored.   CARDIAC: Denies CP, 2+ distal pulses; no peripheral edema  ABDOMEN: S/ND/NT, Denies nausea. Round area to rectum with stool noted.   : voids spontaneously, denies difficulty  Neurologic: AAO x 4; follows commands equal strength in all extremities; denies numbness/tingling. Denies dizziness

## 2018-02-02 NOTE — ED PROVIDER NOTES
Encounter Date: 2/2/2018       History     Chief Complaint   Patient presents with    Fecal Impaction     no bm 6-7 day took ducolox, impaction half in and out     86 year old female with PMH of DM2, HTN, COPD, stroke, CAD and Crohn's disease s/p ileocecetomy (2011) presents with inability to pass stool.  She reports that she has not had a bowel movement in 6 days, which she attributes to a change to Mediterranean diet.  Denies any use of opioids. She tried taking ducolax last night and passed a very small amount of stool this morning.  Patient attempted digital disimpaction herself but was unable to get any stool out.  Reports 10/10 rectal pain and fullness.  Denies abdominal pain, nausea, vomiting or blood in stool. Denies chest pain, shortness of breath, dysuria or urinary frequency.      The history is provided by the patient.     Review of patient's allergies indicates:   Allergen Reactions    Oxycodone Other (See Comments)     Past Medical History:   Diagnosis Date    Abnormal Pap smear     After-cataract, unspecified - Both Eyes 11/5/2012    Arthritis     ASCUS (atypical squamous cells of undetermined significance) on Pap smear 7/1/2012    Asthma     Back pain 2009    Bullous pemphigoid     Carpal tunnel syndrome     Chronic back pain     COPD (chronic obstructive pulmonary disease)     Coronary artery disease     Crohn's disease     Diabetes mellitus     Discoid lupus     Diverticulosis     History of migraine headaches     Hypertension     Multiple thyroid nodules     Personal history of colonic polyps     Pulmonary nodule     Restless legs syndrome     Sciatica     Right leg    Sleep apnea     Stricture and stenosis of esophagus     Stroke     TIA (transient ischemic attack)      Past Surgical History:   Procedure Laterality Date    Breast: wire localization  1/2004    CATARACT EXTRACTION W/  INTRAOCULAR LENS IMPLANT Bilateral     Cataract left eye  1/2008    Cataract: right  eye  1/2008    COLON SURGERY      ileocecectomy 2011    COLONOSCOPY  9/30/2008    Crohns in remission    HYSTERECTOMY      ovaries remain    Iliocolic Crohns Disease with stricure  3/2011    LA iliocecectomy    Laparoscopic-assisted ileocecectomy.       Left carpal tunnel      Left carpal tunnel surgery      Rectal fistulae repair      TONSILLECTOMY      UPPER GASTROINTESTINAL ENDOSCOPY  11/2011    hiatal hernia, benign appearing esophageal  stricture dilated  prn repeat rec.     Family History   Problem Relation Age of Onset    Pneumonia Father     Cataracts Mother     Macular degeneration Mother     Hypertension Sister     Alkaptonuria Maternal Grandfather     Asthma Son     No Known Problems Son     No Known Problems Son     No Known Problems Son     Crohn's disease Neg Hx     Ulcerative colitis Neg Hx     Inflammatory bowel disease Neg Hx     Amblyopia Neg Hx     Blindness Neg Hx     Glaucoma Neg Hx     Retinal detachment Neg Hx     Strabismus Neg Hx      Social History   Substance Use Topics    Smoking status: Former Smoker     Packs/day: 1.00     Years: 25.00     Types: Cigarettes     Quit date: 1/12/1980    Smokeless tobacco: Never Used    Alcohol use No     Review of Systems   Constitutional: Negative for appetite change, fatigue and fever.   HENT: Negative for congestion.    Respiratory: Negative for cough and shortness of breath.    Cardiovascular: Negative for chest pain and palpitations.   Gastrointestinal: Positive for constipation and rectal pain. Negative for abdominal distention, abdominal pain, anal bleeding, blood in stool, diarrhea, nausea and vomiting.   Genitourinary: Negative for difficulty urinating, dysuria, frequency and urgency.   Musculoskeletal: Negative for myalgias.   Skin: Negative for rash.   Neurological: Negative for light-headedness and headaches.   Psychiatric/Behavioral: Negative for confusion.       Physical Exam     Initial Vitals [02/02/18 1317]    BP Pulse Resp Temp SpO2   (!) 194/86 (!) 118 18 98.2 °F (36.8 °C) --      MAP       122         Physical Exam    Nursing note and vitals reviewed.  Constitutional: She appears well-developed and well-nourished.   HENT:   Head: Normocephalic and atraumatic.   Eyes: Pupils are equal, round, and reactive to light.   Neck: Normal range of motion. Neck supple.   Cardiovascular: Normal rate, regular rhythm and normal heart sounds. Exam reveals no gallop and no friction rub.    No murmur heard.  Pulmonary/Chest: Breath sounds normal. She has no wheezes. She has no rhonchi. She has no rales. She exhibits no tenderness.   Abdominal: Soft. Bowel sounds are normal. She exhibits no distension and no mass. There is no tenderness. There is no rebound and no guarding.   Genitourinary: Rectal exam shows tenderness. Rectal exam shows no external hemorrhoid, no mass and anal tone normal.       Pelvic exam was performed with patient in the knee-chest position.   Neurological: She is alert and oriented to person, place, and time.   Skin: Skin is warm and dry.   Psychiatric: She has a normal mood and affect. Her behavior is normal.         ED Course   Procedures  Labs Reviewed - No data to display          Medical Decision Making:   History:   Old Medical Records: I decided to obtain old medical records.  Old Records Summarized: records from clinic visits.  Initial Assessment:   86 year old female with impacted stool.   ED Management:  Performed rectal disimpaction, patient felt immediate relief.  Follow up enema relieved pressure as well.  Patient's vitals staiblized after treatment.       APC / Resident Notes:   Patient presenting for constipation and rectal pain.  Performed disimpaction with lidocaine jelly to alleviate some tenderness.  Hard brown stool removed. Patient reported relief after this.  Will follow up with an enema to remove remaining soft stool.    Ddx:  Fecal impaction  Bowel obstruction  Constipation    Absence of  abdominal pain/nausea/vomiting make concern for bowel obstruction less likely.    BP and HR high on admission, suspect this is due to pain and discomfort.  Repeat vitals stable.    After disimpaction and enema, pt felt complete relief.  I do not believe she needs any labs at this time and is stable for discharge.  Patient discharged with Colace and Senna for constipation.  Instructed patient to follow up with her PCP.  All patient's questions were answered.    I discussed this patient with my supervising physician.         Attending Attestation:     Physician Attestation Statement for NP/PA:   I have conducted a face to face encounter with this patient in addition to the NP/PA, due to Medical Complexity    Other NP/PA Attestation Additions:    History of Present Illness: 85 yo f, h/o crohn's, DM, HTN, stroke, here with severe constipation x 1 week with rectal pain x 1 day, no relief with OTC laxatives.  No severe abd pain, no n/v.  On exam, + hard fecal mass c/w impaction.  Disimpacted in the ED, followed by brown bomb enema with significant improvement in sx and VS.  Will d/c home with laxatives/bowel regimen, close f/u PCP                   ED Course      Clinical Impression:   Fecal Impaction    Disposition:   Disposition: Discharged  Condition: Stable                        Kristen Chen PA-C  02/02/18 2791

## 2018-02-02 NOTE — PROGRESS NOTES
Patient checked into clinic but then directed to ER for severe abdominal pain related to fecal impaction.  Patient was not examined or treated but encouraged to go to ER for 10/10 abdominal pain and appearing in great distress.

## 2018-02-02 NOTE — ED TRIAGE NOTES
"Patient states constipation x 6-7 days, after dieting,  Took 3 Dulcolax last night, states she has a fecal impaction that is "about to kill me"   "

## 2018-02-28 ENCOUNTER — TELEPHONE (OUTPATIENT)
Dept: INTERNAL MEDICINE | Facility: CLINIC | Age: 83
End: 2018-02-28

## 2018-02-28 DIAGNOSIS — Z12.31 ENCOUNTER FOR SCREENING MAMMOGRAM FOR BREAST CANCER: Primary | ICD-10-CM

## 2018-02-28 NOTE — TELEPHONE ENCOUNTER
----- Message from Samantha Perez sent at 2/28/2018 11:45 AM CST -----  Contact: patient [phone]  Pt is due for her annual mammogram and had the last one 03/02/2017. Please enter orders and call patient so she can schedule it for herself.

## 2018-03-15 ENCOUNTER — HOSPITAL ENCOUNTER (OUTPATIENT)
Dept: RADIOLOGY | Facility: HOSPITAL | Age: 83
Discharge: HOME | End: 2018-03-15
Attending: INTERNAL MEDICINE
Payer: MEDICARE

## 2018-03-15 DIAGNOSIS — Z12.31 ENCOUNTER FOR SCREENING MAMMOGRAM FOR BREAST CANCER: ICD-10-CM

## 2018-03-15 PROCEDURE — 77067 SCR MAMMO BI INCL CAD: CPT | Mod: TC

## 2018-03-15 PROCEDURE — 77067 SCR MAMMO BI INCL CAD: CPT | Mod: 26,,, | Performed by: RADIOLOGY

## 2018-03-26 ENCOUNTER — TELEPHONE (OUTPATIENT)
Dept: INTERNAL MEDICINE | Facility: CLINIC | Age: 83
End: 2018-03-26

## 2018-03-26 NOTE — TELEPHONE ENCOUNTER
----- Message from Yazmin Ashby sent at 3/26/2018  8:44 AM CDT -----  Contact: [facility]  Appointment Request From: Calli Jamison    With Provider: Sheridan Hurd MD [-Primary Care Physician-]    Would Accept With:Only the person I've selected    Preferred Date Range: Any date 4/3/2018 or earlier    Preferred Times: Any    Reason for visit: Request an Appt    Comments:  Dear Dr Hurd,  I have been having pain in my right breast and under my right arm  and I need this check please. Also I have had pain in my stomach under the naval area for the last four days, I have not had this stomach pain in this area before. This is not Dumpin Syndrome pain. My son was able to get this time off from work on 4/3/2018, to bring me to the clinic. We need the time of the appointment to be at 9:30 a.m please. My mammograms on 3/15/18 showed no sign of breast cancer.They showed signs of breast Density though.

## 2018-04-03 ENCOUNTER — OFFICE VISIT (OUTPATIENT)
Dept: INTERNAL MEDICINE | Facility: CLINIC | Age: 83
End: 2018-04-03
Payer: MEDICARE

## 2018-04-03 ENCOUNTER — PES CALL (OUTPATIENT)
Dept: ADMINISTRATIVE | Facility: CLINIC | Age: 83
End: 2018-04-03

## 2018-04-03 VITALS — HEART RATE: 78 BPM | OXYGEN SATURATION: 97 % | BODY MASS INDEX: 35.01 KG/M2 | WEIGHT: 185.44 LBS | HEIGHT: 61 IN

## 2018-04-03 DIAGNOSIS — R47.89 WORD FINDING DIFFICULTY: ICD-10-CM

## 2018-04-03 DIAGNOSIS — Z12.31 ENCOUNTER FOR SCREENING MAMMOGRAM FOR BREAST CANCER: ICD-10-CM

## 2018-04-03 DIAGNOSIS — M25.511 RIGHT SHOULDER PAIN, UNSPECIFIED CHRONICITY: Primary | ICD-10-CM

## 2018-04-03 PROCEDURE — 99213 OFFICE O/P EST LOW 20 MIN: CPT | Mod: S$GLB,,, | Performed by: INTERNAL MEDICINE

## 2018-04-03 PROCEDURE — 99999 PR PBB SHADOW E&M-EST. PATIENT-LVL V: CPT | Mod: PBBFAC,,, | Performed by: INTERNAL MEDICINE

## 2018-04-03 NOTE — PROGRESS NOTES
Subjective:      Patient ID: Calli Jamison is a 87 y.o. female.    Chief Complaint: Shoulder Pain (right side ) and Breast Pain (right side )    HPI:  Shoulder Pain    Pain location: right shoulder. This is a new problem. Episode onset: about a month ago. There has been no history of extremity trauma. The problem occurs constantly. Progression since onset: no improvement. Pertinent negatives include no fever. Associated symptoms comments: No associated symptoms.      Right breast lateral discomfort, no masses have been felt    Patient wanted to discuss memory:worse sense her husbands death.    Patient Active Problem List   Diagnosis    Sleep apnea    Iron deficiency    Asthma with allergic rhinitis    Pemphigoid    Multiple thyroid nodules    Bilateral carpal tunnel syndrome    Diverticulosis    GERD (gastroesophageal reflux disease)    Personal history of solitary pulmonary nodule    Dry eyes - Both Eyes    Pseudophakia    Angina pectoris    COPD (chronic obstructive pulmonary disease)    Restless legs syndrome (RLS)    Transient cerebral ischemia    Hyperlipidemia    Cerebral microvascular disease    Refractive error    Adjustment disorder with mixed anxiety and depressed mood    Crohn's disease of both small and large intestine    Paroxysmal atrial fibrillation    Esophageal stricture    Essential hypertension    DM type 2 without retinopathy    Glaucoma suspect, bilateral    PCO (posterior capsular opacification), bilateral    Severe obesity (BMI 35.0-39.9) with comorbidity    History of TIA (transient ischemic attack)    Dry eye syndrome, bilateral    Controlled type 2 diabetes mellitus with stage 3 chronic kidney disease, without long-term current use of insulin    Aortic atherosclerosis    Spinal stenosis of lumbar region: CT 2017    BMI 37.0-37.9, adult     Past Medical History:   Diagnosis Date    Abnormal Pap smear     After-cataract, unspecified - Both Eyes  11/5/2012    Arthritis     ASCUS (atypical squamous cells of undetermined significance) on Pap smear 7/1/2012    Asthma     Back pain 2009    Bullous pemphigoid     Carpal tunnel syndrome     Chronic back pain     COPD (chronic obstructive pulmonary disease)     Coronary artery disease     Crohn's disease     Diabetes mellitus     Discoid lupus     Diverticulosis     History of migraine headaches     Hypertension     Multiple thyroid nodules     Personal history of colonic polyps     Pulmonary nodule     Restless legs syndrome     Sciatica     Right leg    Sleep apnea     Stricture and stenosis of esophagus     Stroke     TIA (transient ischemic attack)      Past Surgical History:   Procedure Laterality Date    BREAST BIOPSY      Breast: wire localization  1/2004    CATARACT EXTRACTION W/  INTRAOCULAR LENS IMPLANT Bilateral     Cataract left eye  1/2008    Cataract: right eye  1/2008    COLON SURGERY      ileocecectomy 2011    COLONOSCOPY  9/30/2008    Crohns in remission    HYSTERECTOMY      ovaries remain    Iliocolic Crohns Disease with stricure  3/2011    [place] iliocecectomy    Laparoscopic-assisted ileocecectomy.       Left carpal tunnel      Left carpal tunnel surgery      Rectal fistulae repair      TONSILLECTOMY      UPPER GASTROINTESTINAL ENDOSCOPY  11/2011    hiatal hernia, benign appearing esophageal  stricture dilated  prn repeat rec.     Family History   Problem Relation Age of Onset    Pneumonia Father     Cataracts Mother     Macular degeneration Mother     Hypertension Sister     Alkaptonuria Maternal Grandfather     Asthma Son     No Known Problems Son     No Known Problems Son     No Known Problems Son     Crohn's disease Neg Hx     Ulcerative colitis Neg Hx     Inflammatory bowel disease Neg Hx     Amblyopia Neg Hx     Blindness Neg Hx     Glaucoma Neg Hx     Retinal detachment Neg Hx     Strabismus Neg Hx      Review of Systems  "  Constitutional: Negative for activity change, chills, fever and unexpected weight change.   Respiratory: Negative for cough, chest tightness, shortness of breath and wheezing.    Cardiovascular: Negative for chest pain, palpitations and leg swelling.     Objective:     Vitals:    04/03/18 0925   Pulse: 78   SpO2: 97%   Weight: 84.1 kg (185 lb 6.5 oz)   Height: 5' 1" (1.549 m)   PainSc:   9   PainLoc: Shoulder     Body mass index is 35.03 kg/m².  Physical Exam   Constitutional: She appears well-developed and well-nourished.   Neck: No JVD present. No thyromegaly present.   Cardiovascular: Normal rate, normal heart sounds and intact distal pulses.    Pulmonary/Chest: Effort normal and breath sounds normal. No respiratory distress.   Musculoskeletal:   Restriction of motion of arm     Assessment:     1. Right shoulder pain, unspecified chronicity    2. Encounter for screening mammogram for breast cancer    3. Word finding difficulty      Plan:   Calli was seen today for shoulder pain and breast pain.    Diagnoses and all orders for this visit:    Right shoulder pain, unspecified chronicity  -     Ambulatory consult to Physical Therapy    Encounter for screening mammogram for breast cancer  -     Mammo Digital Screening Bilat with Tomosynthesis CAD; Future    Word finding difficulty: [facility]      Appt is made for May     Medication List          Accurate as of 4/3/18 10:05 AM. If you have any questions, ask your nurse or doctor.               CONTINUE taking these medications    acetaminophen 500 MG tablet  Commonly known as:  TYLENOL  Take 1-2 tablets (500-1,000 mg total) by mouth every 12 (twelve) hours as needed for Pain.     albuterol 90 mcg/actuation inhaler  Commonly known as:  PROAIR HFA  Inhale 2 puffs into the lungs every 4 to 6 hours as needed. Rescue     aspirin 81 MG EC tablet  Commonly known as:  ECOTRIN     atorvastatin 20 MG tablet  Commonly known as:  LIPITOR  Take 1 tablet (20 mg " total) by mouth once daily.     blood sugar diagnostic Strp  Commonly known as:  BLOOD GLUCOSE TEST  Inject 1 strip into the skin once daily.     CALCIUM CITRATE + D 315-200 mg-unit per tablet  Generic drug:  calcium citrate-vitamin D3 315-200 mg     conjugated estrogens vaginal cream  Commonly known as:  PREMARIN  fnger tip twice  Per week     docusate sodium 100 MG capsule  Commonly known as:  COLACE  Take 1 capsule (100 mg total) by mouth 2 (two) times daily.     fluocinonide 0.05% 0.05 % cream  Commonly known as:  LIDEX  Apply topically 2 (two) times daily as needed. AAA scalp qd bid.     * fluticasone 50 mcg/actuation nasal spray  Commonly known as:  FLONASE  1 spray by Each Nare route once daily.     * fluticasone 110 mcg/actuation inhaler  Commonly known as:  FLOVENT HFA  USE 2 PUFFS BY MOUTH TWICE A DAY for asthma     furosemide 20 MG tablet  Commonly known as:  LASIX  Take 1 tablet (20 mg total) by mouth once daily.     latanoprost 0.005 % ophthalmic solution  PLACE 1 DROP INTO BOTH EYES NIGHTLY.     losartan 100 MG tablet  Commonly known as:  COZAAR  TAKE 1 TABLET (100 MG TOTAL) BY MOUTH ONCE DAILY.     metFORMIN 500 MG tablet  Commonly known as:  GLUCOPHAGE  Take 1 tablet (500 mg total) by mouth 2 (two) times daily.     omeprazole 20 MG capsule  Commonly known as:  PRILOSEC     polycarbophil 625 mg tablet  Commonly known as:  FIBERCON  Take 1 tablet (625 mg total) by mouth once daily.     potassium chloride SA 10 MEQ tablet  Commonly known as:  K-DUR,KLOR-CON  TAKE 1 TABLET (10 MEQ TOTAL) BY MOUTH AS DIRECTED.     rOPINIRole 0.25 MG tablet  Commonly known as:  REQUIP  Take 2 tablets (0.5 mg total) by mouth nightly as needed. As directed.     senna 8.6 mg tablet  Commonly known as:  SENNA  Take 1 tablet by mouth once daily.     verapamil 360 MG C24p  Commonly known as:  VERELAN  Take 1 capsule (360 mg total) by mouth once daily.     VITAMIN B-12 500 MCG tablet  Generic drug:  cyanocobalamin        * This  list has 2 medication(s) that are the same as other medications prescribed for you. Read the directions carefully, and ask your doctor or other care provider to review them with you.

## 2018-04-09 ENCOUNTER — TELEPHONE (OUTPATIENT)
Dept: INTERNAL MEDICINE | Facility: CLINIC | Age: 83
End: 2018-04-09

## 2018-04-09 DIAGNOSIS — M25.511 RIGHT SHOULDER PAIN, UNSPECIFIED CHRONICITY: Primary | ICD-10-CM

## 2018-04-09 NOTE — TELEPHONE ENCOUNTER
----- Message from Katrin Man sent at 4/6/2018 10:39 AM CDT -----  Contact: self...Home: [phone]   Patient is having a hard time getting to physical therapy. She's requesting to get home health physical therapy for her right shoulder.

## 2018-04-10 ENCOUNTER — TELEPHONE (OUTPATIENT)
Dept: ADMINISTRATIVE | Facility: CLINIC | Age: 83
End: 2018-04-10

## 2018-04-10 NOTE — PATIENT INSTRUCTIONS
ANTICOAGULATION FOLLOW-UP CLINIC VISIT    Patient Name:  Arianna Siddiqi  Date:  10/3/2017  Contact Type:  Telephone    SUBJECTIVE:     Patient Findings     Positives Change in diet/appetite (Arianna was not drinking Ensure very much, but this last week has been having it for lunch.  She will try to keep it consistent)           OBJECTIVE    INR   Date Value Ref Range Status   10/03/2017 1.7  Final       ASSESSMENT / PLAN  INR assessment SUB    Recheck INR In: 1 WEEK    INR Location Home INR      Anticoagulation Summary as of 10/3/2017     INR goal 2.0-3.0   Today's INR 1.7!   Maintenance plan 3 mg (3 mg x 1) on Mon, Fri; 4.5 mg (3 mg x 1.5) all other days   Full instructions 10/3: 6 mg; Otherwise 3 mg on Mon, Fri; 4.5 mg all other days   Weekly total 28.5 mg   Plan last modified Paradise Pizarro RN (10/3/2017)   Next INR check 10/10/2017   Priority INR   Target end date     Indications   Long-term (current) use of anticoagulants [Z79.01] [Z79.01]  Atrial fibrillation (H) [I48.91] [I48.91]         Anticoagulation Episode Summary     INR check location Clinic Lab    Preferred lab     Send INR reminders to [facility]    Comments 1/17 [employer] [facility] [employer] to start on 2/28.  Pt's voicemail box is full and pt is unable to retrieve voice messages       Anticoagulation Care Providers     Provider Role Specialty Phone number    Amelia Angulo MD PhD Responsible Family Practice [phone]            See the Encounter Report to view Anticoagulation Flowsheet and Dosing Calendar (Go to Encounters tab in chart review, and find the Anticoagulation Therapy Visit)    Spoke with Arianna.  She hadn't been drinking Ensure very much, but this last week has been having it for her lunch.  Warfarin dose was increased today because she is planning to drink Ensure on a more regular basis.  Reviewed with her that starting Ensure or stopping it can affect her INR.  She verbalizes understanding of this and plans to be consistent.   [facility] is requesting approval for new admit date:      Attempted homecare admit visit today. Patient request visit be rescheduled to 4/11/18.        Darinel Akhtar RN   [facility] [place]   [phone]      Please reply         Thanks       Paradise Pizarro RN

## 2018-04-11 ENCOUNTER — TELEPHONE (OUTPATIENT)
Dept: INTERNAL MEDICINE | Facility: CLINIC | Age: 83
End: 2018-04-11

## 2018-04-11 NOTE — TELEPHONE ENCOUNTER
----- Message from Domonique Ryan sent at 4/11/2018 12:23 PM CDT -----  Contact: [facility]  [phone]  Just completed evaluation, wants to continue seeing her for a little while to help her get complete usage of arm and shoulder area again

## 2018-04-27 ENCOUNTER — TELEPHONE (OUTPATIENT)
Dept: INTERNAL MEDICINE | Facility: CLINIC | Age: 83
End: 2018-04-27

## 2018-04-27 DIAGNOSIS — E11.22 CONTROLLED TYPE 2 DIABETES MELLITUS WITH STAGE 3 CHRONIC KIDNEY DISEASE, WITHOUT LONG-TERM CURRENT USE OF INSULIN: Primary | ICD-10-CM

## 2018-04-27 DIAGNOSIS — N18.30 CONTROLLED TYPE 2 DIABETES MELLITUS WITH STAGE 3 CHRONIC KIDNEY DISEASE, WITHOUT LONG-TERM CURRENT USE OF INSULIN: Primary | ICD-10-CM

## 2018-04-27 NOTE — TELEPHONE ENCOUNTER
----- Message from Fabriciohui Salome sent at 4/27/2018  1:37 PM CDT -----  Contact: self/[phone]  Pt states that she needs another glucose meter. She states that she has misplaced the other one because she has to test her sugars daily. She will need the meter sent to [facility]/pharmacy #[id number] - [place], [place] - [address] Please advise.      Thanks

## 2018-05-01 RX ORDER — DEXTROSE 4 G
TABLET,CHEWABLE ORAL
Qty: 1 EACH | Refills: 0 | Status: SHIPPED | OUTPATIENT
Start: 2018-05-01 | End: 2019-05-10 | Stop reason: SDUPTHER

## 2018-05-03 ENCOUNTER — TELEPHONE (OUTPATIENT)
Dept: INTERNAL MEDICINE | Facility: CLINIC | Age: 83
End: 2018-05-03

## 2018-05-03 DIAGNOSIS — E11.22 CONTROLLED TYPE 2 DIABETES MELLITUS WITH STAGE 3 CHRONIC KIDNEY DISEASE, WITHOUT LONG-TERM CURRENT USE OF INSULIN: Primary | ICD-10-CM

## 2018-05-03 DIAGNOSIS — N18.30 CONTROLLED TYPE 2 DIABETES MELLITUS WITH STAGE 3 CHRONIC KIDNEY DISEASE, WITHOUT LONG-TERM CURRENT USE OF INSULIN: Primary | ICD-10-CM

## 2018-05-03 RX ORDER — LANCETS
EACH MISCELLANEOUS
Qty: 180 EACH | Refills: 3 | Status: SHIPPED | OUTPATIENT
Start: 2018-05-03 | End: 2019-02-18 | Stop reason: SDUPTHER

## 2018-05-03 RX ORDER — DEXTROSE 4 G
TABLET,CHEWABLE ORAL
Qty: 1 EACH | Refills: 0 | Status: SHIPPED | OUTPATIENT
Start: 2018-05-03 | End: 2019-02-18 | Stop reason: SDUPTHER

## 2018-05-03 NOTE — TELEPHONE ENCOUNTER
----- Message from Candis London sent at 5/3/2018  1:06 PM CDT -----  Contact: [facility] [phone]  Prescription Alternative Needed:     The pharmacy needs alternative on the following RX:    blood-glucose meter Misc    Reason: Patient has been using One Touch but it is no longer covered.  Requesting new Rx for meter, strips and lancets.    Pharmacy: [facility]ACY #[id number] - [place], [place] - [address]  Please advise.    Thank You

## 2018-05-08 ENCOUNTER — TELEPHONE (OUTPATIENT)
Dept: INTERNAL MEDICINE | Facility: CLINIC | Age: 83
End: 2018-05-08

## 2018-05-08 NOTE — TELEPHONE ENCOUNTER
----- Message from Marino Torrez sent at 5/7/2018  3:30 PM CDT -----  Contact: self [phone]  Patient requesting a call back from the office in regards to her cellulitis , stated she think think are getting infected . Please advise, Thanks

## 2018-05-22 ENCOUNTER — OFFICE VISIT (OUTPATIENT)
Dept: INTERNAL MEDICINE | Facility: CLINIC | Age: 83
End: 2018-05-22
Payer: MEDICARE

## 2018-05-22 ENCOUNTER — LAB VISIT (OUTPATIENT)
Dept: LAB | Facility: HOSPITAL | Age: 83
End: 2018-05-22
Attending: INTERNAL MEDICINE
Payer: MEDICARE

## 2018-05-22 VITALS
WEIGHT: 183 LBS | DIASTOLIC BLOOD PRESSURE: 84 MMHG | HEART RATE: 91 BPM | BODY MASS INDEX: 34.55 KG/M2 | SYSTOLIC BLOOD PRESSURE: 152 MMHG | HEIGHT: 61 IN

## 2018-05-22 VITALS
SYSTOLIC BLOOD PRESSURE: 170 MMHG | DIASTOLIC BLOOD PRESSURE: 80 MMHG | WEIGHT: 183 LBS | HEIGHT: 61 IN | BODY MASS INDEX: 34.55 KG/M2 | HEART RATE: 91 BPM

## 2018-05-22 DIAGNOSIS — F43.23 ADJUSTMENT DISORDER WITH MIXED ANXIETY AND DEPRESSED MOOD: ICD-10-CM

## 2018-05-22 DIAGNOSIS — E11.22 CONTROLLED TYPE 2 DIABETES MELLITUS WITH STAGE 3 CHRONIC KIDNEY DISEASE, WITHOUT LONG-TERM CURRENT USE OF INSULIN: ICD-10-CM

## 2018-05-22 DIAGNOSIS — Z86.73 HISTORY OF TIA (TRANSIENT ISCHEMIC ATTACK): ICD-10-CM

## 2018-05-22 DIAGNOSIS — R03.0 ELEVATED BLOOD PRESSURE READING: Primary | ICD-10-CM

## 2018-05-22 DIAGNOSIS — Z87.898 PERSONAL HISTORY OF SOLITARY PULMONARY NODULE: ICD-10-CM

## 2018-05-22 DIAGNOSIS — E11.9 DM TYPE 2 WITHOUT RETINOPATHY: ICD-10-CM

## 2018-05-22 DIAGNOSIS — M48.061 SPINAL STENOSIS OF LUMBAR REGION WITHOUT NEUROGENIC CLAUDICATION: ICD-10-CM

## 2018-05-22 DIAGNOSIS — E61.1 IRON DEFICIENCY: ICD-10-CM

## 2018-05-22 DIAGNOSIS — N18.2 STAGE 2 CHRONIC KIDNEY DISEASE: ICD-10-CM

## 2018-05-22 DIAGNOSIS — E11.21 TYPE 2 DIABETES MELLITUS WITH DIABETIC NEPHROPATHY, WITHOUT LONG-TERM CURRENT USE OF INSULIN: ICD-10-CM

## 2018-05-22 DIAGNOSIS — R00.2 AWARENESS OF HEART BEAT: ICD-10-CM

## 2018-05-22 DIAGNOSIS — E66.09 CLASS 1 OBESITY DUE TO EXCESS CALORIES WITHOUT SERIOUS COMORBIDITY WITH BODY MASS INDEX (BMI) OF 34.0 TO 34.9 IN ADULT: ICD-10-CM

## 2018-05-22 DIAGNOSIS — K57.90 DIVERTICULOSIS OF INTESTINE WITHOUT BLEEDING, UNSPECIFIED INTESTINAL TRACT LOCATION: ICD-10-CM

## 2018-05-22 DIAGNOSIS — I10 ESSENTIAL HYPERTENSION: ICD-10-CM

## 2018-05-22 DIAGNOSIS — L12.9 PEMPHIGOID: ICD-10-CM

## 2018-05-22 DIAGNOSIS — I20.9 ANGINA PECTORIS: ICD-10-CM

## 2018-05-22 DIAGNOSIS — G25.81 RESTLESS LEGS SYNDROME (RLS): ICD-10-CM

## 2018-05-22 DIAGNOSIS — I70.0 AORTIC ATHEROSCLEROSIS: ICD-10-CM

## 2018-05-22 DIAGNOSIS — K21.9 GASTROESOPHAGEAL REFLUX DISEASE, ESOPHAGITIS PRESENCE NOT SPECIFIED: ICD-10-CM

## 2018-05-22 DIAGNOSIS — G47.30 SLEEP APNEA, UNSPECIFIED TYPE: ICD-10-CM

## 2018-05-22 DIAGNOSIS — N18.30 CONTROLLED TYPE 2 DIABETES MELLITUS WITH STAGE 3 CHRONIC KIDNEY DISEASE, WITHOUT LONG-TERM CURRENT USE OF INSULIN: ICD-10-CM

## 2018-05-22 DIAGNOSIS — K22.2 ESOPHAGEAL STRICTURE: ICD-10-CM

## 2018-05-22 DIAGNOSIS — I48.0 PAROXYSMAL ATRIAL FIBRILLATION: ICD-10-CM

## 2018-05-22 DIAGNOSIS — J43.9 PULMONARY EMPHYSEMA, UNSPECIFIED EMPHYSEMA TYPE: ICD-10-CM

## 2018-05-22 DIAGNOSIS — J45.20 MILD INTERMITTENT ASTHMA WITH ALLERGIC RHINITIS WITHOUT COMPLICATION: ICD-10-CM

## 2018-05-22 DIAGNOSIS — M47.816 LUMBAR FACET ARTHROPATHY: ICD-10-CM

## 2018-05-22 DIAGNOSIS — Z00.00 ENCOUNTER FOR PREVENTIVE HEALTH EXAMINATION: Primary | ICD-10-CM

## 2018-05-22 DIAGNOSIS — K50.80 CROHN'S DISEASE OF BOTH SMALL AND LARGE INTESTINE WITHOUT COMPLICATION: ICD-10-CM

## 2018-05-22 DIAGNOSIS — Z51.81 MEDICATION MONITORING ENCOUNTER: ICD-10-CM

## 2018-05-22 DIAGNOSIS — E04.2 MULTIPLE THYROID NODULES: ICD-10-CM

## 2018-05-22 DIAGNOSIS — I67.89 CEREBRAL MICROVASCULAR DISEASE: ICD-10-CM

## 2018-05-22 DIAGNOSIS — E78.5 HYPERLIPIDEMIA, UNSPECIFIED HYPERLIPIDEMIA TYPE: ICD-10-CM

## 2018-05-22 PROBLEM — E11.29 TYPE 2 DIABETES MELLITUS WITH RENAL MANIFESTATIONS: Status: ACTIVE | Noted: 2017-06-28

## 2018-05-22 PROBLEM — E66.9 CLASS 1 OBESITY IN ADULT: Status: ACTIVE | Noted: 2018-05-22

## 2018-05-22 PROBLEM — E66.811 CLASS 1 OBESITY IN ADULT: Status: ACTIVE | Noted: 2018-05-22

## 2018-05-22 LAB
ALBUMIN SERPL BCP-MCNC: 4 G/DL
ALP SERPL-CCNC: 100 U/L
ALT SERPL W/O P-5'-P-CCNC: 19 U/L
ANION GAP SERPL CALC-SCNC: 10 MMOL/L
AST SERPL-CCNC: 21 U/L
BASOPHILS # BLD AUTO: 0.06 K/UL
BASOPHILS NFR BLD: 0.6 %
BILIRUB SERPL-MCNC: 0.6 MG/DL
BUN SERPL-MCNC: 13 MG/DL
CALCIUM SERPL-MCNC: 9.9 MG/DL
CHLORIDE SERPL-SCNC: 106 MMOL/L
CO2 SERPL-SCNC: 25 MMOL/L
CREAT SERPL-MCNC: 0.8 MG/DL
DIFFERENTIAL METHOD: ABNORMAL
EOSINOPHIL # BLD AUTO: 0.3 K/UL
EOSINOPHIL NFR BLD: 3.2 %
ERYTHROCYTE [DISTWIDTH] IN BLOOD BY AUTOMATED COUNT: 16.7 %
EST. GFR  (AFRICAN AMERICAN): >60 ML/MIN/1.73 M^2
EST. GFR  (NON AFRICAN AMERICAN): >60 ML/MIN/1.73 M^2
ESTIMATED AVG GLUCOSE: 123 MG/DL
GLUCOSE SERPL-MCNC: 93 MG/DL
HBA1C MFR BLD HPLC: 5.9 %
HCT VFR BLD AUTO: 40.2 %
HGB BLD-MCNC: 13 G/DL
LYMPHOCYTES # BLD AUTO: 2.5 K/UL
LYMPHOCYTES NFR BLD: 26.4 %
MCH RBC QN AUTO: 27.7 PG
MCHC RBC AUTO-ENTMCNC: 32.3 G/DL
MCV RBC AUTO: 86 FL
MONOCYTES # BLD AUTO: 0.6 K/UL
MONOCYTES NFR BLD: 6.5 %
NEUTROPHILS # BLD AUTO: 6 K/UL
NEUTROPHILS NFR BLD: 63.2 %
PLATELET # BLD AUTO: 336 K/UL
PMV BLD AUTO: 10.9 FL
POTASSIUM SERPL-SCNC: 3.9 MMOL/L
PROT SERPL-MCNC: 8.3 G/DL
RBC # BLD AUTO: 4.69 M/UL
SODIUM SERPL-SCNC: 141 MMOL/L
WBC # BLD AUTO: 9.54 K/UL

## 2018-05-22 PROCEDURE — 85025 COMPLETE CBC W/AUTO DIFF WBC: CPT

## 2018-05-22 PROCEDURE — 99999 PR PBB SHADOW E&M-EST. PATIENT-LVL V: CPT | Mod: PBBFAC,,, | Performed by: NURSE PRACTITIONER

## 2018-05-22 PROCEDURE — 99499 UNLISTED E&M SERVICE: CPT | Mod: S$GLB,,, | Performed by: NURSE PRACTITIONER

## 2018-05-22 PROCEDURE — 99999 PR PBB SHADOW E&M-EST. PATIENT-LVL III: CPT | Mod: PBBFAC,,, | Performed by: INTERNAL MEDICINE

## 2018-05-22 PROCEDURE — 93010 ELECTROCARDIOGRAM REPORT: CPT | Mod: S$GLB,,, | Performed by: INTERNAL MEDICINE

## 2018-05-22 PROCEDURE — 80053 COMPREHEN METABOLIC PANEL: CPT

## 2018-05-22 PROCEDURE — 99499 UNLISTED E&M SERVICE: CPT | Mod: S$PBB,,, | Performed by: INTERNAL MEDICINE

## 2018-05-22 PROCEDURE — 83036 HEMOGLOBIN GLYCOSYLATED A1C: CPT

## 2018-05-22 PROCEDURE — G0439 PPPS, SUBSEQ VISIT: HCPCS | Mod: S$GLB,,, | Performed by: NURSE PRACTITIONER

## 2018-05-22 PROCEDURE — 99214 OFFICE O/P EST MOD 30 MIN: CPT | Mod: S$GLB,,, | Performed by: INTERNAL MEDICINE

## 2018-05-22 PROCEDURE — 36415 COLL VENOUS BLD VENIPUNCTURE: CPT

## 2018-05-22 PROCEDURE — 93005 ELECTROCARDIOGRAM TRACING: CPT | Mod: S$GLB,,, | Performed by: INTERNAL MEDICINE

## 2018-05-22 NOTE — PROGRESS NOTES
"Calli Jamison presented for a  Medicare AWV and comprehensive Health Risk Assessment today. The following components were reviewed and updated:    · Medical history  · Family History  · Social history  · Allergies and Current Medications  · Health Risk Assessment  · Health Maintenance  · Care Team     ** See Completed Assessments for Annual Wellness Visit within the encounter summary.**       The following assessments were completed:  · Living Situation  · CAGE  · Depression Screening  · Timed Get Up and Go  · Whisper Test  · Cognitive Function Screening  ·   ·   ·   · Nutrition Screening  · ADL Screening  · PAQ Screening    Vitals:    05/22/18 1301 05/22/18 1436   BP: (!) 170/80 (!) 152/84   BP Location:  Left arm   Pulse: 91    Weight: 83 kg (182 lb 15.7 oz)    Height: 5' 1" (1.549 m)      Body mass index is 34.57 kg/m².  Physical Exam   Constitutional: She is oriented to person, place, and time.   Obese   Cardiovascular:   Pulses:       Dorsalis pedis pulses are 2+ on the right side, and 2+ on the left side.        Posterior tibial pulses are 1+ on the right side, and 1+ on the left side.   Musculoskeletal:        Right foot: There is no deformity.        Left foot: There is decreased range of motion. There is no deformity.   Ambulates with walker   Feet:   Right Foot:   Protective Sensation: 7 sites tested. 7 sites sensed.   Skin Integrity: Negative for ulcer, blister, skin breakdown, erythema, warmth, callus or dry skin.   Left Foot:   Protective Sensation: 7 sites tested. 7 sites sensed.   Skin Integrity: Negative for ulcer, blister, skin breakdown, erythema, warmth, callus or dry skin.   Neurological: She is alert and oriented to person, place, and time.   Skin: Skin is warm and dry.   Psychiatric: She has a normal mood and affect.   Nursing note and vitals reviewed.        Diagnoses and health risks identified today and associated recommendations/orders:    1. Encounter for preventive health " examination  Here for Health Risk Assessment/Annual Wellness Visit.  Follow up in one year.    2. Essential hypertension  Chronic, B/P elevated. Seen by PCP prior to AWV.    3. Aortic atherosclerosis  Chronic, stable on current medications. Noted Lumbar XR 6/28/12. Followed by PCP.    4. Paroxysmal atrial fibrillation  Chronic, stable on current medications. Followed by Cardiology.    5. Angina pectoris  Chronic, stable. No C/O chest pain. Followed by Cardiology.    6. Hyperlipidemia, unspecified hyperlipidemia type  Chronic, stable on current medications. Followed by PCP.    7. Pulmonary emphysema, unspecified emphysema type  Chronic, stable on current medications. Followed by PCP.    8. Mild intermittent asthma with allergic rhinitis without complication  Chronic, stable on current medications. Followed by PCP.    9. Personal history of solitary pulmonary nodule  Chronic, stable. . Followed by PCP.    10. Pemphigoid  Stable. Followed by PCP.    11. History of TIA (transient ischemic attack)  Stable on current medications. Followed by PCP.    12. Cerebral microvascular disease  Chronic, stable on current medications. Followed by PCP.    13. DM type 2 without retinopathy  Chronic, stable on current medications. Followed by PCP.    14. Type 2 diabetes mellitus with diabetic nephropathy, without long-term current use of insulin  Chronic, stable on current medication. Followed by PCP.    15. Stage 2 chronic kidney disease  Chronic, mild, stable. GFR 77. . Followed by PCP.    16. Multiple thyroid nodules  Chronic, stable. Followed by PCP.    17. Iron deficiency  Chronic, stable. Followed by PCP.    18. Class 1 obesity due to excess calories without serious comorbidity with body mass index (BMI) of 34.0 to 34.9 in adult  Chronic, weight decreased 11 pounds from AWV 6/2017.  Followed by PCP.    19. Crohn's disease of both small and large intestine without complication  Chronic, stable on current medications. Followed by  PCP.    20. Diverticulosis of intestine without bleeding, unspecified intestinal tract location  Chronic, stable.  Followed by PCP, Gastroenterology.    21. Esophageal stricture  Chronic, stable Followed by Gastroenterology.    22. Gastroesophageal reflux disease, esophagitis presence not specified  Chronic, stable on current medications. Followed by PCP, Gastroenterology.    23. Sleep apnea, unspecified type  Chronic, no CPAP use. Followed by PCP.    24. Lumbar facet arthropathy  Chronic, stable. Noted Lumbar XR 6/28/17. . Followed by PCP.    25. Adjustment disorder with mixed anxiety and depressed mood  Chronic, stable on current medication. PHQ-2 score 0. Followed by PCP.    26. Spinal stenosis of lumbar region without neurogenic claudication  Chronic, stable. . Followed by PCP, Physical Medicine.    27. Restless legs syndrome (RLS)  Chronic, stable on PRN  medications. Followed by PCP.      Provided Leugenia with a 5-10 year written screening schedule and personal prevention plan. Recommendations were developed using the USPSTF age appropriate recommendations. Education, counseling, and referrals were provided as needed. After Visit Summary printed and given to patient which includes a list of additional screenings\tests needed.    Follow-up in 2 weeks (on 6/7/2018).with PCP    Fany Livingston NP

## 2018-05-22 NOTE — PROGRESS NOTES
Subjective:      Patient ID: Calli Jamison is a 87 y.o. female.    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 Sometimes she feels it in her throat. She has no shortness of breath, and no chest pain but she feels it in her chest. She has not changed any medications. It is not worsened with walking. It is worse after eating and she sits down and feels tired. After it stops it she feels OK.     Reviewing her vitals : pulses average in the high 80's. Her last blood pressure recorded was high and she did not take her medication her high. She states some times she does not take her medication because she has not eaten. I have told her that she does not have to fast for any lab test and she does not have to eat to take her medications.    Diabetes Management Status    Statin: Taking  ACE/ARB: Taking    Screening or Prevention Patient's value Goal Complete/Controlled?   HgA1C Testing and Control   Lab Results   Component Value Date    HGBA1C 6.2 (H) 06/28/2017      Annually/Less than 8% Yes   Lipid profile : 06/28/2017 Annually Yes   LDL control Lab Results   Component Value Date    LDLCALC 41.8 (L) 06/28/2017    Annually/Less than 100 mg/dl  Yes   Nephropathy screening Lab Results   Component Value Date    LABMICR 37.0 11/11/2015     Lab Results   Component Value Date    PROTEINUA Negative 02/23/2011    Annually No   Blood pressure BP Readings from Last 1 Encounters:   05/22/18 (!) 170/80    Less than 140/90 No   Dilated retinal exam : 10/18/2017 Annually Yes   Foot exam   : 06/28/2017 Annually Yes           Patient Active Problem List   Diagnosis    Sleep apnea    Iron deficiency    Asthma with allergic rhinitis    Pemphigoid    Multiple thyroid nodules    Bilateral carpal tunnel syndrome     Diverticulosis    GERD (gastroesophageal reflux disease)    Personal history of solitary pulmonary nodule    Dry eyes - Both Eyes    Pseudophakia    Angina pectoris    COPD (chronic obstructive pulmonary disease)    Restless legs syndrome (RLS)    Transient cerebral ischemia    Hyperlipidemia    Cerebral microvascular disease    Refractive error    Adjustment disorder with mixed anxiety and depressed mood    Crohn's disease of both small and large intestine    Paroxysmal atrial fibrillation    Esophageal stricture    Essential hypertension    DM type 2 without retinopathy    Glaucoma suspect, bilateral    PCO (posterior capsular opacification), bilateral    Severe obesity (BMI 35.0-39.9) with comorbidity    History of TIA (transient ischemic attack)    Dry eye syndrome, bilateral    Controlled type 2 diabetes mellitus with stage 3 chronic kidney disease, without long-term current use of insulin    Aortic atherosclerosis    Spinal stenosis of lumbar region: CT 2017    BMI 37.0-37.9, adult     Past Medical History:   Diagnosis Date    Abnormal Pap smear     After-cataract, unspecified - Both Eyes 11/5/2012    Arthritis     ASCUS (atypical squamous cells of undetermined significance) on Pap smear 7/1/2012    Asthma     Back pain 2009    Bullous pemphigoid     Carpal tunnel syndrome     Chronic back pain     COPD (chronic obstructive pulmonary disease)     Coronary artery disease     Crohn's disease     Diabetes mellitus     Discoid lupus     Diverticulosis     History of migraine headaches     Hypertension     Multiple thyroid nodules     Personal history of colonic polyps     Pulmonary nodule     Restless legs syndrome     Sciatica     Right leg    Sleep apnea     Stricture and stenosis of esophagus     Stroke     TIA (transient ischemic attack)      Past Surgical History:   Procedure Laterality Date    BREAST BIOPSY      Breast: wire localization  1/2004     "CATARACT EXTRACTION W/  INTRAOCULAR LENS IMPLANT Bilateral     Cataract left eye  1/2008    Cataract: right eye  1/2008    COLON SURGERY      ileocecectomy 2011    COLONOSCOPY  9/30/2008    Crohns in remission    HYSTERECTOMY      ovaries remain    Iliocolic Crohns Disease with stricure  3/2011    LA iliocecectomy    Laparoscopic-assisted ileocecectomy.       Left carpal tunnel      Left carpal tunnel surgery      Rectal fistulae repair      TONSILLECTOMY      UPPER GASTROINTESTINAL ENDOSCOPY  11/2011    hiatal hernia, benign appearing esophageal  stricture dilated  prn repeat rec.     Family History   Problem Relation Age of Onset    Pneumonia Father     Cataracts Mother     Macular degeneration Mother     Hypertension Sister     Alkaptonuria Maternal Grandfather     Asthma Son     No Known Problems Son     No Known Problems Son     No Known Problems Son     Crohn's disease Neg Hx     Ulcerative colitis Neg Hx     Inflammatory bowel disease Neg Hx     Amblyopia Neg Hx     Blindness Neg Hx     Glaucoma Neg Hx     Retinal detachment Neg Hx     Strabismus Neg Hx      Review of Systems   Constitutional: Negative for chills, fever and unexpected weight change.   HENT: Negative for trouble swallowing.    Respiratory: Negative for cough, shortness of breath and wheezing.    Cardiovascular:        See HPI   Gastrointestinal: Negative for abdominal distention, abdominal pain, blood in stool and vomiting.   Musculoskeletal: Negative for back pain.     Objective:     Vitals:    05/22/18 1305 05/22/18 1319   BP:  (!) 170/80   Pulse: 91    Weight: 83 kg (183 lb)    Height: 5' 1" (1.549 m)    PainSc: 0-No pain      Body mass index is 34.58 kg/m².  Physical Exam   Constitutional: She is oriented to person, place, and time. She appears well-developed and well-nourished. No distress.   Neck: Carotid bruit is not present. No thyromegaly present.   Cardiovascular: Normal rate, regular rhythm and normal " heart sounds.  PMI is not displaced.    Pulmonary/Chest: Effort normal and breath sounds normal. No respiratory distress.   Abdominal: Soft. Bowel sounds are normal. She exhibits no distension. There is no tenderness.   Musculoskeletal: She exhibits no edema.   Neurological: She is alert and oriented to person, place, and time.     Assessment:     1. Elevated blood pressure reading    2. Medication monitoring encounter    3. Essential hypertension    4. Awareness of heart beat      Plan:   Calli was seen today for follow-up.    Diagnoses and all orders for this visit:  Patient has not taken blood pressure medication today and may not take it if she has not eaten or needs blood work. She is told that she should always take the medication no matter what. I would like to see her on the medications and suspect she stills has hypertension that will need additional medication.    Elevated blood pressure reading  -     Ambulatory consult to Cardiology    Medication monitoring encounter  -     Ambulatory consult to Cardiology    Essential hypertension  -     Ambulatory consult to Cardiology    Awareness of heart beat  -     Ambulatory consult to Cardiology  -     IN OFFICE EKG 12-LEAD (to Muse)      Follow-up in about 1 week (around 5/29/2018) for Follow up hypertension.     Medication List          Accurate as of 5/22/18  1:31 PM. If you have any questions, ask your nurse or doctor.               CHANGE how you take these medications    KLOR-CON SPRINKLE 10 MEQ Cpsr  Generic drug:  potassium chloride  What changed:  Another medication with the same name was removed. Continue taking this medication, and follow the directions you see here.  Changed by:  Sheridan Hurd MD        CONTINUE taking these medications    acetaminophen 500 MG tablet  Commonly known as:  TYLENOL  Take 1-2 tablets (500-1,000 mg total) by mouth every 12 (twelve) hours as needed for Pain.     albuterol 90 mcg/actuation inhaler  Commonly known as:   PROAIR HFA  Inhale 2 puffs into the lungs every 4 to 6 hours as needed. Rescue     aspirin 81 MG EC tablet  Commonly known as:  ECOTRIN     atorvastatin 20 MG tablet  Commonly known as:  LIPITOR  Take 1 tablet (20 mg total) by mouth once daily.     * blood sugar diagnostic Strp  Commonly known as:  BLOOD GLUCOSE TEST  Inject 1 strip into the skin once daily.     * blood sugar diagnostic Strp  Commonly known as:  BLOOD GLUCOSE TEST  One test strip per glucose testing twice a day: Insurance Brand Preference     * blood-glucose meter Misc  Dispense one meter: Insurance Brand Preference     * blood-glucose meter Misc  Dispense one meter: Insurance Brand Preference     CALCIUM CITRATE + D 315-200 mg-unit per tablet  Generic drug:  calcium citrate-vitamin D3 315-200 mg     conjugated estrogens vaginal cream  Commonly known as:  PREMARIN  fnger tip twice  Per week     docusate sodium 100 MG capsule  Commonly known as:  COLACE  Take 1 capsule (100 mg total) by mouth 2 (two) times daily.     fluocinonide 0.05% 0.05 % cream  Commonly known as:  LIDEX  Apply topically 2 (two) times daily as needed. AAA scalp qd bid.     * fluticasone 50 mcg/actuation nasal spray  Commonly known as:  FLONASE  1 spray by Each Nare route once daily.     * fluticasone 110 mcg/actuation inhaler  Commonly known as:  FLOVENT HFA  USE 2 PUFFS BY MOUTH TWICE A DAY for asthma     furosemide 20 MG tablet  Commonly known as:  LASIX  Take 1 tablet (20 mg total) by mouth once daily.     lancets Misc  One lancet per glucose testing twice a day: Insurance Brand Preference     latanoprost 0.005 % ophthalmic solution  PLACE 1 DROP INTO BOTH EYES NIGHTLY.     losartan 100 MG tablet  Commonly known as:  COZAAR  TAKE 1 TABLET (100 MG TOTAL) BY MOUTH ONCE DAILY.     metFORMIN 500 MG tablet  Commonly known as:  GLUCOPHAGE  Take 1 tablet (500 mg total) by mouth 2 (two) times daily.     omeprazole 20 MG capsule  Commonly known as:  PRILOSEC     polycarbophil 625 mg  tablet  Commonly known as:  FIBERCON  Take 1 tablet (625 mg total) by mouth once daily.     rOPINIRole 0.25 MG tablet  Commonly known as:  REQUIP  Take 2 tablets (0.5 mg total) by mouth nightly as needed. As directed.     senna 8.6 mg tablet  Commonly known as:  SENNA  Take 1 tablet by mouth once daily.     verapamil 360 MG C24p  Commonly known as:  VERELAN  Take 1 capsule (360 mg total) by mouth once daily.     VITAMIN B-12 500 MCG tablet  Generic drug:  cyanocobalamin        * This list has 6 medication(s) that are the same as other medications prescribed for you. Read the directions carefully, and ask your doctor or other care provider to review them with you.

## 2018-05-22 NOTE — PATIENT INSTRUCTIONS
Counseling and Referral of Other Preventative  (Italic type indicates deductible and co-insurance are waived)    Patient Name: Calli Jamison  Today's Date: 5/22/2018    Health Maintenance       Date Due Completion Date    Lipid Panel 06/28/2018 6/28/2017    Influenza Vaccine 08/01/2018 9/19/2017    Eye Exam 10/18/2018 10/18/2017    Hemoglobin A1c 11/22/2018 5/22/2018 (Done)    Override on 5/22/2018: Done    Mammogram 03/15/2019 3/15/2018    Foot Exam 05/22/2019 5/22/2018 (Done)    Override on 5/22/2018: Done    Override on 6/28/2017: Done    TETANUS VACCINE 09/24/2022 9/24/2012        No orders of the defined types were placed in this encounter.    The following information is provided to all patients.  This information is to help you find resources for any of the problems found today that may be affecting your health:                Living healthy guide: www.[facility].[place].[facility]      Understanding Diabetes: www.diabetes.org      Eating healthy: www.cdc.gov/healthyweight      CDC home safety checklist: www.cdc.gov/steadi/patient.html      Agency on Aging: www.goea.[place].[facility]      Alcoholics anonymous (AA): www.aa.org      Physical Activity: www.franca.nih.gov/zh2bilt      Tobacco use: www.quitwithusla.org

## 2018-05-25 DIAGNOSIS — H40.003 GLAUCOMA SUSPECT, BILATERAL: ICD-10-CM

## 2018-05-25 RX ORDER — LATANOPROST 50 UG/ML
SOLUTION/ DROPS OPHTHALMIC
Qty: 2.5 ML | Refills: 6 | Status: SHIPPED | OUTPATIENT
Start: 2018-05-25 | End: 2018-11-20 | Stop reason: SDUPTHER

## 2018-06-07 ENCOUNTER — OFFICE VISIT (OUTPATIENT)
Dept: INTERNAL MEDICINE | Facility: CLINIC | Age: 83
End: 2018-06-07
Payer: MEDICARE

## 2018-06-07 VITALS
OXYGEN SATURATION: 95 % | BODY MASS INDEX: 35.38 KG/M2 | WEIGHT: 187.38 LBS | HEIGHT: 61 IN | SYSTOLIC BLOOD PRESSURE: 150 MMHG | DIASTOLIC BLOOD PRESSURE: 80 MMHG | HEART RATE: 84 BPM

## 2018-06-07 DIAGNOSIS — R03.0 ELEVATED BLOOD PRESSURE READING: ICD-10-CM

## 2018-06-07 DIAGNOSIS — I10 ESSENTIAL HYPERTENSION: Primary | ICD-10-CM

## 2018-06-07 PROCEDURE — 99213 OFFICE O/P EST LOW 20 MIN: CPT | Mod: S$GLB,,, | Performed by: INTERNAL MEDICINE

## 2018-06-07 PROCEDURE — 99499 UNLISTED E&M SERVICE: CPT | Mod: S$PBB,,, | Performed by: INTERNAL MEDICINE

## 2018-06-07 PROCEDURE — 99999 PR PBB SHADOW E&M-EST. PATIENT-LVL III: CPT | Mod: PBBFAC,,, | Performed by: INTERNAL MEDICINE

## 2018-06-07 RX ORDER — VALSARTAN 320 MG/1
320 TABLET ORAL DAILY
Qty: 90 TABLET | Refills: 3 | Status: SHIPPED | OUTPATIENT
Start: 2018-06-07 | End: 2018-06-19 | Stop reason: ALTCHOICE

## 2018-06-07 NOTE — PROGRESS NOTES
Subjective:      Patient ID: Calli Jamison is a 87 y.o. female.    Chief Complaint: Follow-up  Elevated blood pressure    HPI:  HPI   At the last appt the patient had not taken her blood pressure medications. She states that she did take them today. She takes her medication between 7-10 every days. For blood pressure  Lasix 20 mg plus potassium  Losartan 100 mg  Verapamil 360 mg  Patient Active Problem List   Diagnosis    Sleep apnea    Iron deficiency    Asthma with allergic rhinitis    Pemphigoid    Multiple thyroid nodules    Bilateral carpal tunnel syndrome    Diverticulosis    GERD (gastroesophageal reflux disease)    Personal history of solitary pulmonary nodule    Pseudophakia    Angina pectoris    COPD (chronic obstructive pulmonary disease)    Restless legs syndrome (RLS)    Hyperlipidemia    Cerebral microvascular disease    Refractive error    Adjustment disorder with mixed anxiety and depressed mood    Crohn's disease of both small and large intestine    Paroxysmal atrial fibrillation    Esophageal stricture    Essential hypertension    DM type 2 without retinopathy    Glaucoma suspect, bilateral    PCO (posterior capsular opacification), bilateral    History of TIA (transient ischemic attack)    Dry eye syndrome, bilateral    Type 2 diabetes mellitus with renal manifestations    Aortic atherosclerosis    Spinal stenosis of lumbar region: CT 2017    Stage 2 chronic kidney disease    Class 1 obesity in adult    Lumbar facet arthropathy     Past Medical History:   Diagnosis Date    Abnormal Pap smear     After-cataract, unspecified - Both Eyes 11/5/2012    Arthritis     ASCUS (atypical squamous cells of undetermined significance) on Pap smear 7/1/2012    Asthma     Atrial fibrillation     Back pain 2009    Bullous pemphigoid     Carpal tunnel syndrome     Chronic back pain     COPD (chronic obstructive pulmonary disease)     Coronary artery disease      Crohn's disease     Depression     Diabetes mellitus     Discoid lupus     Diverticulosis     History of migraine headaches     Hyperlipidemia     Hypertension     Multiple thyroid nodules     Obesity     Personal history of colonic polyps     Pulmonary nodule     Renal manifestation of secondary diabetes mellitus     Restless legs syndrome     Sciatica     Right leg    Sleep apnea     Stricture and stenosis of esophagus     Stroke     TIA (transient ischemic attack)      Past Surgical History:   Procedure Laterality Date    BREAST BIOPSY      Breast: wire localization  1/2004    CATARACT EXTRACTION W/  INTRAOCULAR LENS IMPLANT Bilateral     Cataract left eye  1/2008    Cataract: right eye  1/2008    COLON SURGERY      ileocecectomy 2011    COLONOSCOPY  9/30/2008    Crohns in remission    HYSTERECTOMY      ovaries remain    Iliocolic Crohns Disease with stricure  3/2011    [place] iliocecectomy    Laparoscopic-assisted ileocecectomy.       Left carpal tunnel      Left carpal tunnel surgery      Rectal fistulae repair      TONSILLECTOMY      UPPER GASTROINTESTINAL ENDOSCOPY  11/2011    hiatal hernia, benign appearing esophageal  stricture dilated  prn repeat rec.     Family History   Problem Relation Age of Onset    Pneumonia Father     Cataracts Mother     Macular degeneration Mother     Hypertension Sister     Alkaptonuria Maternal Grandfather     Asthma Son     No Known Problems Son     No Known Problems Son     No Known Problems Son     Crohn's disease Neg Hx     Ulcerative colitis Neg Hx     Inflammatory bowel disease Neg Hx     Amblyopia Neg Hx     Blindness Neg Hx     Glaucoma Neg Hx     Retinal detachment Neg Hx     Strabismus Neg Hx      Review of Systems   Constitutional: Negative for activity change, chills, fever and unexpected weight change.   Respiratory: Negative for cough, chest tightness, shortness of breath and wheezing.    Cardiovascular: Negative for  "chest pain, palpitations and leg swelling.     Objective:     Vitals:    06/07/18 1432 06/07/18 1442   BP: (!) 160/86 (!) 150/80   Pulse: 84    SpO2: 95%    Weight: 85 kg (187 lb 6.3 oz)    Height: 5' 1" (1.549 m)    PainSc: 0-No pain      Body mass index is 35.41 kg/m².  Physical Exam   Constitutional: She appears well-developed and well-nourished.   Neck: No JVD present. No thyromegaly present.   Cardiovascular: Normal rate, normal heart sounds and intact distal pulses.    Pulmonary/Chest: Effort normal and breath sounds normal. No respiratory distress.     Assessment:     1. Essential hypertension    2. Elevated blood pressure reading      Plan:   Calli was seen today for follow-up.    Diagnoses and all orders for this visit:    Essential hypertension: will stop the losartan and start valsartan 320 mg and continue the lasix and verapamil    Elevated blood pressure reading: continues to be elevated      Follow-up in about 1 month (around 7/7/2018) for Follow up.     Medication List          Accurate as of 6/7/18  2:49 PM. If you have any questions, ask your nurse or doctor.               START taking these medications    valsartan 320 MG tablet  Commonly known as:  DIOVAN  Take 1 tablet (320 mg total) by mouth once daily. Stop losartan  Started by:  Sheridan Hurd MD        CONTINUE taking these medications    acetaminophen 500 MG tablet  Commonly known as:  TYLENOL  Take 1-2 tablets (500-1,000 mg total) by mouth every 12 (twelve) hours as needed for Pain.     albuterol 90 mcg/actuation inhaler  Commonly known as:  PROAIR HFA  Inhale 2 puffs into the lungs every 4 to 6 hours as needed. Rescue     aspirin 81 MG EC tablet  Commonly known as:  ECOTRIN     atorvastatin 20 MG tablet  Commonly known as:  LIPITOR  Take 1 tablet (20 mg total) by mouth once daily.     * blood sugar diagnostic Strp  Commonly known as:  BLOOD GLUCOSE TEST  Inject 1 strip into the skin once daily.     * blood sugar diagnostic " Strp  Commonly known as:  BLOOD GLUCOSE TEST  One test strip per glucose testing twice a day: Insurance Brand Preference     * blood-glucose meter Misc  Dispense one meter: Insurance Brand Preference     * blood-glucose meter Misc  Dispense one meter: Insurance Brand Preference     CALCIUM CITRATE + D 315-200 mg-unit per tablet  Generic drug:  calcium citrate-vitamin D3 315-200 mg     conjugated estrogens vaginal cream  Commonly known as:  PREMARIN  fnger tip twice  Per week     docusate sodium 100 MG capsule  Commonly known as:  COLACE  Take 1 capsule (100 mg total) by mouth 2 (two) times daily.     fluocinonide 0.05% 0.05 % cream  Commonly known as:  LIDEX  Apply topically 2 (two) times daily as needed. AAA scalp qd bid.     * fluticasone 50 mcg/actuation nasal spray  Commonly known as:  FLONASE  1 spray by Each Nare route once daily.     * fluticasone 110 mcg/actuation inhaler  Commonly known as:  FLOVENT HFA  USE 2 PUFFS BY MOUTH TWICE A DAY for asthma     furosemide 20 MG tablet  Commonly known as:  LASIX  Take 1 tablet (20 mg total) by mouth once daily.     KLOR-CON SPRINKLE 10 MEQ Cpsr  Generic drug:  potassium chloride     lancets Misc  One lancet per glucose testing twice a day: Insurance Brand Preference     latanoprost 0.005 % ophthalmic solution  PLACE 1 DROP INTO BOTH EYES NIGHTLY.     metFORMIN 500 MG tablet  Commonly known as:  GLUCOPHAGE  Take 1 tablet (500 mg total) by mouth 2 (two) times daily.     omeprazole 20 MG capsule  Commonly known as:  PRILOSEC     polycarbophil 625 mg tablet  Commonly known as:  FIBERCON  Take 1 tablet (625 mg total) by mouth once daily.     rOPINIRole 0.25 MG tablet  Commonly known as:  REQUIP  Take 2 tablets (0.5 mg total) by mouth nightly as needed. As directed.     senna 8.6 mg tablet  Commonly known as:  SENNA  Take 1 tablet by mouth once daily.     verapamil 360 MG C24p  Commonly known as:  VERELAN  Take 1 capsule (360 mg total) by mouth once daily.     VITAMIN B-12  500 MCG tablet  Generic drug:  cyanocobalamin        * This list has 6 medication(s) that are the same as other medications prescribed for you. Read the directions carefully, and ask your doctor or other care provider to review them with you.            STOP taking these medications    losartan 100 MG tablet  Commonly known as:  SIDDHARTH  Stopped by:  Sheridan Hurd MD           Where to Get Your Medications      You can get these medications from any pharmacy    Bring a paper prescription for each of these medications  · valsartan 320 MG tablet

## 2018-06-12 DIAGNOSIS — E78.5 HYPERLIPIDEMIA, UNSPECIFIED HYPERLIPIDEMIA TYPE: ICD-10-CM

## 2018-06-12 RX ORDER — METFORMIN HYDROCHLORIDE 500 MG/1
TABLET ORAL
Qty: 60 TABLET | Refills: 7 | Status: SHIPPED | OUTPATIENT
Start: 2018-06-12 | End: 2018-08-29 | Stop reason: SDUPTHER

## 2018-06-12 RX ORDER — ATORVASTATIN CALCIUM 20 MG/1
TABLET, FILM COATED ORAL
Qty: 90 TABLET | Refills: 3 | Status: SHIPPED | OUTPATIENT
Start: 2018-06-12 | End: 2019-06-21 | Stop reason: SDUPTHER

## 2018-06-15 ENCOUNTER — TELEPHONE (OUTPATIENT)
Dept: INTERNAL MEDICINE | Facility: CLINIC | Age: 83
End: 2018-06-15

## 2018-06-15 DIAGNOSIS — Z12.31 ENCOUNTER FOR SCREENING MAMMOGRAM FOR BREAST CANCER: Primary | ICD-10-CM

## 2018-06-15 NOTE — TELEPHONE ENCOUNTER
----- Message from Katrin Man sent at 6/15/2018  1:12 PM CDT -----  Contact: Kailey with [facility]  [phone]  Pharmacy is calling to clarify an RX.  RX name:  Valsartan 320 mg   What do they need to clarify:  Change to 160 mg taking it twice a day.    Comments: Patient finds the 320 mg hard to swollen.

## 2018-06-15 NOTE — TELEPHONE ENCOUNTER
----- Message from Fady Dorman sent at 6/14/2018  2:09 PM CDT -----  Contact: PT  Pt would like a call back regarding putting order in for mammogram   Pt can be reached at [phone]

## 2018-06-18 ENCOUNTER — TELEPHONE (OUTPATIENT)
Dept: OPHTHALMOLOGY | Facility: CLINIC | Age: 83
End: 2018-06-18

## 2018-06-19 ENCOUNTER — TELEPHONE (OUTPATIENT)
Dept: INTERNAL MEDICINE | Facility: CLINIC | Age: 83
End: 2018-06-19

## 2018-06-19 ENCOUNTER — TELEPHONE (OUTPATIENT)
Dept: GASTROENTEROLOGY | Facility: CLINIC | Age: 83
End: 2018-06-19

## 2018-06-19 DIAGNOSIS — K22.2 ESOPHAGEAL STRICTURE: Primary | ICD-10-CM

## 2018-06-19 RX ORDER — VALSARTAN 160 MG/1
320 TABLET ORAL DAILY
Qty: 60 TABLET | Refills: 12 | Status: SHIPPED | OUTPATIENT
Start: 2018-06-19 | End: 2019-06-26 | Stop reason: SDUPTHER

## 2018-06-19 NOTE — TELEPHONE ENCOUNTER
----- Message from Du Robles MD sent at 6/19/2018  8:35 AM CDT -----  Contact: Home: [phone]   done  ----- Message -----  From: Kathryn Lopez MA  Sent: 6/19/2018   8:31 AM  To: Du Robles MD        ----- Message -----  From: Radha Campuzano MA  Sent: 6/19/2018   8:28 AM  To: [facility] Staff    Needs Advice    Reason for call:   Pt is having trouble swallowing her medication, feels like its time to have a scope of her esophagus    Communication Preference: Home: [phone]

## 2018-06-19 NOTE — TELEPHONE ENCOUNTER
----- Message from Candis London sent at 6/19/2018  9:28 AM CDT -----  Contact: [facility] [phone]  Prescription Alternative Needed:     The pharmacy needs alternative on the following RX:    valsartan (DIOVAN) 320 MG tablet    Reason: Patient cannot swallow 320 mg tablet is too big. Advised to cut in half but staed that the edges hurt her throat. Requesting 160 mg twice a day. Please send new Rx    Pharmacy: [facility]/pharmacy #[id number] - [place] - [address]    Please advise.    Thank You

## 2018-06-21 ENCOUNTER — NURSE TRIAGE (OUTPATIENT)
Dept: ADMINISTRATIVE | Facility: CLINIC | Age: 83
End: 2018-06-21

## 2018-06-21 NOTE — TELEPHONE ENCOUNTER
Reason for Disposition   [1] Prescription not at pharmacy AND [2] was prescribed today by PCP    Protocols used: ST MEDICATION QUESTION CALL-A-    Call transferred.  Pt calling regarding high blood pressure reading of /86 Pulse 82.  Pt states she is not use to blood pressure being that high.  Pt also states she was prescribed medication (Valsartan) for blood pressure but unable to take due to swallowing/stomach issue.  Noted in pt's chart Dr. Hurd changed medication on 06/19/2018.  Notified Pt.  Called Pt's Pharmacy to verify change and ready for -Pharmacy Staff Member states medication is ready for .  Pt called and notified.

## 2018-06-22 ENCOUNTER — TELEPHONE (OUTPATIENT)
Dept: INTERNAL MEDICINE | Facility: CLINIC | Age: 83
End: 2018-06-22

## 2018-06-22 NOTE — TELEPHONE ENCOUNTER
----- Message from Mayela Macario sent at 6/21/2018  9:32 AM CDT -----  Contact: [phone]  Pharmacy would like a call back regarding pts prescription for valsartan (DIOVAN) 160 MG tablet.

## 2018-07-12 ENCOUNTER — OFFICE VISIT (OUTPATIENT)
Dept: INTERNAL MEDICINE | Facility: CLINIC | Age: 83
End: 2018-07-12
Payer: MEDICARE

## 2018-07-12 VITALS — WEIGHT: 188.06 LBS | HEART RATE: 84 BPM | BODY MASS INDEX: 35.5 KG/M2 | OXYGEN SATURATION: 98 % | HEIGHT: 61 IN

## 2018-07-12 DIAGNOSIS — I10 ESSENTIAL HYPERTENSION: Primary | ICD-10-CM

## 2018-07-12 PROCEDURE — 99999 PR PBB SHADOW E&M-EST. PATIENT-LVL V: CPT | Mod: PBBFAC,,, | Performed by: INTERNAL MEDICINE

## 2018-07-12 PROCEDURE — 99213 OFFICE O/P EST LOW 20 MIN: CPT | Mod: S$GLB,,, | Performed by: INTERNAL MEDICINE

## 2018-07-12 RX ORDER — TERAZOSIN 1 MG/1
1 CAPSULE ORAL NIGHTLY
Qty: 30 CAPSULE | Refills: 2 | Status: SHIPPED | OUTPATIENT
Start: 2018-07-12 | End: 2018-10-09 | Stop reason: SDUPTHER

## 2018-07-12 NOTE — PROGRESS NOTES
Subjective:      Patient ID: Calli Jamison is a 87 y.o. female.    Chief Complaint: Follow-up (hypertension)    HPI:  HPI     Patient returns today for uncontrolled hypertension and adjustment of her medication.    At the last appt the patient had not taken her blood pressure medications. She states that she did take them today. She takes her medication between 7-10 every days. For blood pressure  Lasix 20 mg plus potassium  Losartan 100 mg changed to valsartan 320 mg  Verapamil 360 mg  Patient Active Problem List   Diagnosis    Sleep apnea    Iron deficiency    Asthma with allergic rhinitis    Pemphigoid    Multiple thyroid nodules    Bilateral carpal tunnel syndrome    Diverticulosis    GERD (gastroesophageal reflux disease)    Personal history of solitary pulmonary nodule    Pseudophakia    Angina pectoris    COPD (chronic obstructive pulmonary disease)    Restless legs syndrome (RLS)    Hyperlipidemia    Cerebral microvascular disease    Refractive error    Adjustment disorder with mixed anxiety and depressed mood    Crohn's disease of both small and large intestine    Paroxysmal atrial fibrillation    Esophageal stricture    Essential hypertension    DM type 2 without retinopathy    Glaucoma suspect, bilateral    PCO (posterior capsular opacification), bilateral    History of TIA (transient ischemic attack)    Dry eye syndrome, bilateral    Type 2 diabetes mellitus with renal manifestations    Aortic atherosclerosis    Spinal stenosis of lumbar region: CT 2017    Stage 2 chronic kidney disease    Class 1 obesity in adult    Lumbar facet arthropathy     Past Medical History:   Diagnosis Date    Abnormal Pap smear     After-cataract, unspecified - Both Eyes 11/5/2012    Arthritis     ASCUS (atypical squamous cells of undetermined significance) on Pap smear 7/1/2012    Asthma     Atrial fibrillation     Back pain 2009    Bullous pemphigoid     Carpal tunnel syndrome      Chronic back pain     COPD (chronic obstructive pulmonary disease)     Coronary artery disease     Crohn's disease     Depression     Diabetes mellitus     Discoid lupus     Diverticulosis     History of migraine headaches     Hyperlipidemia     Hypertension     Multiple thyroid nodules     Obesity     Personal history of colonic polyps     Pulmonary nodule     Renal manifestation of secondary diabetes mellitus     Restless legs syndrome     Sciatica     Right leg    Sleep apnea     Stricture and stenosis of esophagus     Stroke     TIA (transient ischemic attack)      Past Surgical History:   Procedure Laterality Date    BREAST BIOPSY      Breast: wire localization  1/2004    CATARACT EXTRACTION W/  INTRAOCULAR LENS IMPLANT Bilateral     Cataract left eye  1/2008    Cataract: right eye  1/2008    COLON SURGERY      ileocecectomy 2011    COLONOSCOPY  9/30/2008    Crohns in remission    HYSTERECTOMY      ovaries remain    Iliocolic Crohns Disease with stricure  3/2011    [place] iliocecectomy    Laparoscopic-assisted ileocecectomy.       Left carpal tunnel      Left carpal tunnel surgery      Rectal fistulae repair      TONSILLECTOMY      UPPER GASTROINTESTINAL ENDOSCOPY  11/2011    hiatal hernia, benign appearing esophageal  stricture dilated  prn repeat rec.     Family History   Problem Relation Age of Onset    Pneumonia Father     Cataracts Mother     Macular degeneration Mother     Hypertension Sister     Alkaptonuria Maternal Grandfather     Asthma Son     No Known Problems Son     No Known Problems Son     No Known Problems Son     Crohn's disease Neg Hx     Ulcerative colitis Neg Hx     Inflammatory bowel disease Neg Hx     Amblyopia Neg Hx     Blindness Neg Hx     Glaucoma Neg Hx     Retinal detachment Neg Hx     Strabismus Neg Hx      Review of Systems   Constitutional: Negative for activity change, chills, fever and unexpected weight change.   Respiratory:  "Negative for cough, chest tightness, shortness of breath and wheezing.    Cardiovascular: Negative for chest pain, palpitations and leg swelling.     Objective:     Vitals:    07/12/18 1449   Pulse: 84   SpO2: 98%   Weight: 85.3 kg (188 lb 0.8 oz)   Height: 5' 1" (1.549 m)   PainSc: 0-No pain     Body mass index is 35.53 kg/m².  Physical Exam   Constitutional: She appears well-developed and well-nourished.   Neck: No JVD present. No thyromegaly present.   Cardiovascular: Normal rate, normal heart sounds and intact distal pulses.    Pulmonary/Chest: Effort normal and breath sounds normal. No respiratory distress.     Assessment:     1. Essential hypertension      Plan:   Calli was seen today for follow-up.    Diagnoses and all orders for this visit:    Essential hypertension: add terazosin 1 mg at bedtime    Other orders  -     terazosin (HYTRIN) 1 MG capsule; Take 1 capsule (1 mg total) by mouth every evening.      Follow-up in about 1 month (around 8/12/2018).     Medication List          Accurate as of 7/12/18  3:11 PM. If you have any questions, ask your nurse or doctor.               START taking these medications    terazosin 1 MG capsule  Commonly known as:  HYTRIN  Take 1 capsule (1 mg total) by mouth every evening.  Started by:  Sheridan Hurd MD        CONTINUE taking these medications    acetaminophen 500 MG tablet  Commonly known as:  TYLENOL  Take 1-2 tablets (500-1,000 mg total) by mouth every 12 (twelve) hours as needed for Pain.     albuterol 90 mcg/actuation inhaler  Commonly known as:  PROAIR HFA  Inhale 2 puffs into the lungs every 4 to 6 hours as needed. Rescue     aspirin 81 MG EC tablet  Commonly known as:  ECOTRIN     atorvastatin 20 MG tablet  Commonly known as:  LIPITOR  TAKE 1 TABLET BY MOUTH EVERY DAY     * blood sugar diagnostic Strp  Commonly known as:  BLOOD GLUCOSE TEST  Inject 1 strip into the skin once daily.     * blood sugar diagnostic Strp  Commonly known as:  BLOOD GLUCOSE " TEST  One test strip per glucose testing twice a day: Insurance Brand Preference     * blood-glucose meter Misc  Dispense one meter: Insurance Brand Preference     * blood-glucose meter Misc  Dispense one meter: Insurance Brand Preference     CALCIUM CITRATE + D 315-200 mg-unit per tablet  Generic drug:  calcium citrate-vitamin D3 315-200 mg     conjugated estrogens vaginal cream  Commonly known as:  PREMARIN  fnger tip twice  Per week     docusate sodium 100 MG capsule  Commonly known as:  COLACE  Take 1 capsule (100 mg total) by mouth 2 (two) times daily.     fluocinonide 0.05% 0.05 % cream  Commonly known as:  LIDEX  Apply topically 2 (two) times daily as needed. AAA scalp qd bid.     * fluticasone 50 mcg/actuation nasal spray  Commonly known as:  FLONASE  1 spray by Each Nare route once daily.     * fluticasone 110 mcg/actuation inhaler  Commonly known as:  FLOVENT HFA  USE 2 PUFFS BY MOUTH TWICE A DAY for asthma     furosemide 20 MG tablet  Commonly known as:  LASIX  Take 1 tablet (20 mg total) by mouth once daily.     KLOR-CON SPRINKLE 10 MEQ Cpsr  Generic drug:  potassium chloride     lancets Misc  One lancet per glucose testing twice a day: Insurance Brand Preference     latanoprost 0.005 % ophthalmic solution  PLACE 1 DROP INTO BOTH EYES NIGHTLY.     * metFORMIN 500 MG tablet  Commonly known as:  GLUCOPHAGE  Take 1 tablet (500 mg total) by mouth 2 (two) times daily.     * metFORMIN 500 MG tablet  Commonly known as:  GLUCOPHAGE  TAKE 1 TABLET BY MOUTH TWICE A DAY     omeprazole 20 MG capsule  Commonly known as:  PRILOSEC     polycarbophil 625 mg tablet  Commonly known as:  FIBERCON  Take 1 tablet (625 mg total) by mouth once daily.     rOPINIRole 0.25 MG tablet  Commonly known as:  REQUIP  Take 2 tablets (0.5 mg total) by mouth nightly as needed. As directed.     senna 8.6 mg tablet  Commonly known as:  SENNA  Take 1 tablet by mouth once daily.     valsartan 160 MG tablet  Commonly known as:  DIOVAN  Take 2  tablets (320 mg total) by mouth once daily.     verapamil 360 MG C24p  Commonly known as:  VERELAN  Take 1 capsule (360 mg total) by mouth once daily.     VITAMIN B-12 500 MCG tablet  Generic drug:  cyanocobalamin        * This list has 8 medication(s) that are the same as other medications prescribed for you. Read the directions carefully, and ask your doctor or other care provider to review them with you.               Where to Get Your Medications      These medications [id number] [place] [facility] [address]  [address], [place] [address]    Phone:  [phone]   · terazosin 1 MG capsule

## 2018-07-18 ENCOUNTER — OFFICE VISIT (OUTPATIENT)
Dept: OPHTHALMOLOGY | Facility: CLINIC | Age: 83
End: 2018-07-18
Payer: MEDICARE

## 2018-07-18 DIAGNOSIS — H40.003 GLAUCOMA SUSPECT OF BOTH EYES: Primary | ICD-10-CM

## 2018-07-18 DIAGNOSIS — Z96.1 PSEUDOPHAKIA: ICD-10-CM

## 2018-07-18 DIAGNOSIS — H26.493 PCO (POSTERIOR CAPSULAR OPACIFICATION), BILATERAL: ICD-10-CM

## 2018-07-18 DIAGNOSIS — H04.123 DRY EYE SYNDROME, BILATERAL: ICD-10-CM

## 2018-07-18 DIAGNOSIS — H52.7 REFRACTIVE ERROR: ICD-10-CM

## 2018-07-18 PROCEDURE — 99999 PR PBB SHADOW E&M-EST. PATIENT-LVL II: CPT | Mod: PBBFAC,,, | Performed by: OPHTHALMOLOGY

## 2018-07-18 PROCEDURE — 92012 INTRM OPH EXAM EST PATIENT: CPT | Mod: S$GLB,,, | Performed by: OPHTHALMOLOGY

## 2018-07-18 NOTE — PROGRESS NOTES
Subjective:       Patient ID: Calli Jamison is a 87 y.o. female.    Chief Complaint: Glaucoma    HPI     87 y.o. Female is here for 6 months IOP check. Denies eye pain and f/f.   No noticeable VA changes since. One time pt put two drops into the eyes   wonder it can affect eye pressure or her eyes.     Meds: Latanoprost qhs OU     Last edited by CUCA Millan on 7/18/2018  9:24 AM. (History)             Assessment:       1. Glaucoma suspect of both eyes    2. PCO (posterior capsular opacification), bilateral    3. Dry eye syndrome, bilateral    4. Refractive error    5. Pseudophakia        Plan:       Glaucoma suspect OU-IOP's higher today OU.  Early PCO OU- Not Visually Significant.     [place]-Needs more AT's.  RE-Pt does not want MRx.      Cont Xalatan OU.  AT's.  RTC 4 mos for IOP's, HVF's & DFE.

## 2018-07-23 ENCOUNTER — ANESTHESIA EVENT (OUTPATIENT)
Dept: ENDOSCOPY | Facility: HOSPITAL | Age: 83
End: 2018-07-23
Payer: MEDICARE

## 2018-07-24 ENCOUNTER — SURGERY (OUTPATIENT)
Age: 83
End: 2018-07-24

## 2018-07-24 ENCOUNTER — HOSPITAL ENCOUNTER (OUTPATIENT)
Facility: HOSPITAL | Age: 83
Discharge: HOME | End: 2018-07-24
Attending: INTERNAL MEDICINE | Admitting: INTERNAL MEDICINE
Payer: MEDICARE

## 2018-07-24 ENCOUNTER — ANESTHESIA (OUTPATIENT)
Dept: ENDOSCOPY | Facility: HOSPITAL | Age: 83
End: 2018-07-24
Payer: MEDICARE

## 2018-07-24 VITALS
DIASTOLIC BLOOD PRESSURE: 76 MMHG | HEART RATE: 92 BPM | OXYGEN SATURATION: 98 % | RESPIRATION RATE: 16 BRPM | SYSTOLIC BLOOD PRESSURE: 133 MMHG | TEMPERATURE: 97 F

## 2018-07-24 DIAGNOSIS — K22.2 ESOPHAGEAL STRICTURE: ICD-10-CM

## 2018-07-24 LAB — POCT GLUCOSE: 98 MG/DL (ref 70–110)

## 2018-07-24 PROCEDURE — 43248 EGD GUIDE WIRE INSERTION: CPT | Mod: ,,, | Performed by: INTERNAL MEDICINE

## 2018-07-24 PROCEDURE — 43249 ESOPH EGD DILATION <30 MM: CPT | Mod: 51,,, | Performed by: INTERNAL MEDICINE

## 2018-07-24 PROCEDURE — 37000009 HC ANESTHESIA EA ADD 15 MINS: Performed by: INTERNAL MEDICINE

## 2018-07-24 PROCEDURE — E9220 PRA ENDO ANESTHESIA: HCPCS | Mod: ,,, | Performed by: NURSE ANESTHETIST, CERTIFIED REGISTERED

## 2018-07-24 PROCEDURE — 37000008 HC ANESTHESIA 1ST 15 MINUTES: Performed by: INTERNAL MEDICINE

## 2018-07-24 PROCEDURE — C1769 GUIDE WIRE: HCPCS | Performed by: INTERNAL MEDICINE

## 2018-07-24 PROCEDURE — 43249 ESOPH EGD DILATION <30 MM: CPT | Performed by: INTERNAL MEDICINE

## 2018-07-24 PROCEDURE — 63600175 PHARM REV CODE 636 W HCPCS: Performed by: NURSE ANESTHETIST, CERTIFIED REGISTERED

## 2018-07-24 PROCEDURE — C1726 CATH, BAL DIL, NON-VASCULAR: HCPCS | Performed by: INTERNAL MEDICINE

## 2018-07-24 PROCEDURE — 25000003 PHARM REV CODE 250: Performed by: INTERNAL MEDICINE

## 2018-07-24 PROCEDURE — 43248 EGD GUIDE WIRE INSERTION: CPT | Performed by: INTERNAL MEDICINE

## 2018-07-24 RX ORDER — SODIUM CHLORIDE 0.9 % (FLUSH) 0.9 %
3 SYRINGE (ML) INJECTION
Status: DISCONTINUED | OUTPATIENT
Start: 2018-07-24 | End: 2018-07-24 | Stop reason: HOSPADM

## 2018-07-24 RX ORDER — LIDOCAINE HCL/PF 100 MG/5ML
SYRINGE (ML) INTRAVENOUS
Status: DISCONTINUED | OUTPATIENT
Start: 2018-07-24 | End: 2018-07-24

## 2018-07-24 RX ORDER — SODIUM CHLORIDE 9 MG/ML
INJECTION, SOLUTION INTRAVENOUS CONTINUOUS
Status: DISCONTINUED | OUTPATIENT
Start: 2018-07-24 | End: 2018-07-24 | Stop reason: HOSPADM

## 2018-07-24 RX ORDER — PROPOFOL 10 MG/ML
VIAL (ML) INTRAVENOUS CONTINUOUS PRN
Status: DISCONTINUED | OUTPATIENT
Start: 2018-07-24 | End: 2018-07-24

## 2018-07-24 RX ORDER — PROPOFOL 10 MG/ML
VIAL (ML) INTRAVENOUS
Status: DISCONTINUED | OUTPATIENT
Start: 2018-07-24 | End: 2018-07-24

## 2018-07-24 RX ADMIN — PROPOFOL 150 MCG/KG/MIN: 10 INJECTION, EMULSION INTRAVENOUS at 03:07

## 2018-07-24 RX ADMIN — PROPOFOL 100 MG: 10 INJECTION, EMULSION INTRAVENOUS at 03:07

## 2018-07-24 RX ADMIN — SODIUM CHLORIDE: 0.9 INJECTION, SOLUTION INTRAVENOUS at 03:07

## 2018-07-24 RX ADMIN — LIDOCAINE HYDROCHLORIDE 50 MG: 20 INJECTION, SOLUTION INTRAVENOUS at 03:07

## 2018-07-24 NOTE — H&P (VIEW-ONLY)
Subjective:      Patient ID: Calli Jamison is a 87 y.o. female.    Chief Complaint: Follow-up (hypertension)    HPI:  HPI     Patient returns today for uncontrolled hypertension and adjustment of her medication.    At the last appt the patient had not taken her blood pressure medications. She states that she did take them today. She takes her medication between 7-10 every days. For blood pressure  Lasix 20 mg plus potassium  Losartan 100 mg changed to valsartan 320 mg  Verapamil 360 mg  Patient Active Problem List   Diagnosis    Sleep apnea    Iron deficiency    Asthma with allergic rhinitis    Pemphigoid    Multiple thyroid nodules    Bilateral carpal tunnel syndrome    Diverticulosis    GERD (gastroesophageal reflux disease)    Personal history of solitary pulmonary nodule    Pseudophakia    Angina pectoris    COPD (chronic obstructive pulmonary disease)    Restless legs syndrome (RLS)    Hyperlipidemia    Cerebral microvascular disease    Refractive error    Adjustment disorder with mixed anxiety and depressed mood    Crohn's disease of both small and large intestine    Paroxysmal atrial fibrillation    Esophageal stricture    Essential hypertension    DM type 2 without retinopathy    Glaucoma suspect, bilateral    PCO (posterior capsular opacification), bilateral    History of TIA (transient ischemic attack)    Dry eye syndrome, bilateral    Type 2 diabetes mellitus with renal manifestations    Aortic atherosclerosis    Spinal stenosis of lumbar region: CT 2017    Stage 2 chronic kidney disease    Class 1 obesity in adult    Lumbar facet arthropathy     Past Medical History:   Diagnosis Date    Abnormal Pap smear     After-cataract, unspecified - Both Eyes 11/5/2012    Arthritis     ASCUS (atypical squamous cells of undetermined significance) on Pap smear 7/1/2012    Asthma     Atrial fibrillation     Back pain 2009    Bullous pemphigoid     Carpal tunnel syndrome      Chronic back pain     COPD (chronic obstructive pulmonary disease)     Coronary artery disease     Crohn's disease     Depression     Diabetes mellitus     Discoid lupus     Diverticulosis     History of migraine headaches     Hyperlipidemia     Hypertension     Multiple thyroid nodules     Obesity     Personal history of colonic polyps     Pulmonary nodule     Renal manifestation of secondary diabetes mellitus     Restless legs syndrome     Sciatica     Right leg    Sleep apnea     Stricture and stenosis of esophagus     Stroke     TIA (transient ischemic attack)      Past Surgical History:   Procedure Laterality Date    BREAST BIOPSY      Breast: wire localization  1/2004    CATARACT EXTRACTION W/  INTRAOCULAR LENS IMPLANT Bilateral     Cataract left eye  1/2008    Cataract: right eye  1/2008    COLON SURGERY      ileocecectomy 2011    COLONOSCOPY  9/30/2008    Crohns in remission    HYSTERECTOMY      ovaries remain    Iliocolic Crohns Disease with stricure  3/2011    LA iliocecectomy    Laparoscopic-assisted ileocecectomy.       Left carpal tunnel      Left carpal tunnel surgery      Rectal fistulae repair      TONSILLECTOMY      UPPER GASTROINTESTINAL ENDOSCOPY  11/2011    hiatal hernia, benign appearing esophageal  stricture dilated  prn repeat rec.     Family History   Problem Relation Age of Onset    Pneumonia Father     Cataracts Mother     Macular degeneration Mother     Hypertension Sister     Alkaptonuria Maternal Grandfather     Asthma Son     No Known Problems Son     No Known Problems Son     No Known Problems Son     Crohn's disease Neg Hx     Ulcerative colitis Neg Hx     Inflammatory bowel disease Neg Hx     Amblyopia Neg Hx     Blindness Neg Hx     Glaucoma Neg Hx     Retinal detachment Neg Hx     Strabismus Neg Hx      Review of Systems   Constitutional: Negative for activity change, chills, fever and unexpected weight change.   Respiratory:  "Negative for cough, chest tightness, shortness of breath and wheezing.    Cardiovascular: Negative for chest pain, palpitations and leg swelling.     Objective:     Vitals:    07/12/18 1449   Pulse: 84   SpO2: 98%   Weight: 85.3 kg (188 lb 0.8 oz)   Height: 5' 1" (1.549 m)   PainSc: 0-No pain     Body mass index is 35.53 kg/m².  Physical Exam   Constitutional: She appears well-developed and well-nourished.   Neck: No JVD present. No thyromegaly present.   Cardiovascular: Normal rate, normal heart sounds and intact distal pulses.    Pulmonary/Chest: Effort normal and breath sounds normal. No respiratory distress.     Assessment:     1. Essential hypertension      Plan:   Calli was seen today for follow-up.    Diagnoses and all orders for this visit:    Essential hypertension: add terazosin 1 mg at bedtime    Other orders  -     terazosin (HYTRIN) 1 MG capsule; Take 1 capsule (1 mg total) by mouth every evening.      Follow-up in about 1 month (around 8/12/2018).     Medication List          Accurate as of 7/12/18  3:11 PM. If you have any questions, ask your nurse or doctor.               START taking these medications    terazosin 1 MG capsule  Commonly known as:  HYTRIN  Take 1 capsule (1 mg total) by mouth every evening.  Started by:  Sheridan Hurd MD        CONTINUE taking these medications    acetaminophen 500 MG tablet  Commonly known as:  TYLENOL  Take 1-2 tablets (500-1,000 mg total) by mouth every 12 (twelve) hours as needed for Pain.     albuterol 90 mcg/actuation inhaler  Commonly known as:  PROAIR HFA  Inhale 2 puffs into the lungs every 4 to 6 hours as needed. Rescue     aspirin 81 MG EC tablet  Commonly known as:  ECOTRIN     atorvastatin 20 MG tablet  Commonly known as:  LIPITOR  TAKE 1 TABLET BY MOUTH EVERY DAY     * blood sugar diagnostic Strp  Commonly known as:  BLOOD GLUCOSE TEST  Inject 1 strip into the skin once daily.     * blood sugar diagnostic Strp  Commonly known as:  BLOOD GLUCOSE " TEST  One test strip per glucose testing twice a day: Insurance Brand Preference     * blood-glucose meter Misc  Dispense one meter: Insurance Brand Preference     * blood-glucose meter Misc  Dispense one meter: Insurance Brand Preference     CALCIUM CITRATE + D 315-200 mg-unit per tablet  Generic drug:  calcium citrate-vitamin D3 315-200 mg     conjugated estrogens vaginal cream  Commonly known as:  PREMARIN  fnger tip twice  Per week     docusate sodium 100 MG capsule  Commonly known as:  COLACE  Take 1 capsule (100 mg total) by mouth 2 (two) times daily.     fluocinonide 0.05% 0.05 % cream  Commonly known as:  LIDEX  Apply topically 2 (two) times daily as needed. AAA scalp qd bid.     * fluticasone 50 mcg/actuation nasal spray  Commonly known as:  FLONASE  1 spray by Each Nare route once daily.     * fluticasone 110 mcg/actuation inhaler  Commonly known as:  FLOVENT HFA  USE 2 PUFFS BY MOUTH TWICE A DAY for asthma     furosemide 20 MG tablet  Commonly known as:  LASIX  Take 1 tablet (20 mg total) by mouth once daily.     KLOR-CON SPRINKLE 10 MEQ Cpsr  Generic drug:  potassium chloride     lancets Misc  One lancet per glucose testing twice a day: Insurance Brand Preference     latanoprost 0.005 % ophthalmic solution  PLACE 1 DROP INTO BOTH EYES NIGHTLY.     * metFORMIN 500 MG tablet  Commonly known as:  GLUCOPHAGE  Take 1 tablet (500 mg total) by mouth 2 (two) times daily.     * metFORMIN 500 MG tablet  Commonly known as:  GLUCOPHAGE  TAKE 1 TABLET BY MOUTH TWICE A DAY     omeprazole 20 MG capsule  Commonly known as:  PRILOSEC     polycarbophil 625 mg tablet  Commonly known as:  FIBERCON  Take 1 tablet (625 mg total) by mouth once daily.     rOPINIRole 0.25 MG tablet  Commonly known as:  REQUIP  Take 2 tablets (0.5 mg total) by mouth nightly as needed. As directed.     senna 8.6 mg tablet  Commonly known as:  SENNA  Take 1 tablet by mouth once daily.     valsartan 160 MG tablet  Commonly known as:  DIOVAN  Take 2  tablets (320 mg total) by mouth once daily.     verapamil 360 MG C24p  Commonly known as:  VERELAN  Take 1 capsule (360 mg total) by mouth once daily.     VITAMIN B-12 500 MCG tablet  Generic drug:  cyanocobalamin        * This list has 8 medication(s) that are the same as other medications prescribed for you. Read the directions carefully, and ask your doctor or other care provider to review them with you.               Where to Get Your Medications      These medications were sent to [id number] [place] - [address]  [address], [facility] [address]    Phone:  [phone]   · terazosin 1 MG capsule

## 2018-07-24 NOTE — ANESTHESIA PREPROCEDURE EVALUATION
07/24/2018  Calli Jamison is a 87 y.o., female.    Anesthesia Evaluation    I have reviewed the Patient Summary Reports.     I have reviewed the Medications.     Review of Systems  Hematology/Oncology:  Hematology Normal   Oncology Normal     EENT/Dental:EENT/Dental Normal   Cardiovascular:   Hypertension CAD   Angina (denies chest pain)    Pulmonary:   COPD Asthma mild Sleep Apnea (no CPAP)    Renal/:   Chronic Renal Disease    Hepatic/GI:   GERD    Musculoskeletal:  Musculoskeletal Normal    Neurological:   TIA, CVA Neuromuscular Disease,    Endocrine:   Diabetes    Dermatological:  Skin Normal    Psych:   depression          Physical Exam  General:  Well nourished    Airway/Jaw/Neck:  Airway Findings: Mallampati: I       Eyes/Ears/Nose:  EYES/EARS/NOSE FINDINGS: Normal   Dental:  DENTAL FINDINGS: Normal   Chest/Lungs:  Chest/Lungs Clear    Heart/Vascular:  Heart Findings: Normal Heart murmur: negative Vascular Findings: Normal    Abdomen:  Abdomen Findings: Normal    Musculoskeletal:  Musculoskeletal Findings: Normal   Skin:  Skin Findings: Normal    Mental Status:  Mental Status Findings: Normal        Anesthesia Plan  Type of Anesthesia, risks & benefits discussed:  Anesthesia Type:  general  Patient's Preference: general  Intra-op Monitoring Plan: standard ASA monitors  Intra-op Monitoring Plan Comments:   Post Op Pain Control Plan:   Post Op Pain Control Plan Comments:   Induction:   IV  Beta Blocker:  Patient is not currently on a Beta-Blocker (No further documentation required).       Informed Consent: Patient understands risks and agrees with Anesthesia plan.  Questions answered. Anesthesia consent signed with patient.  ASA Score: 3     Day of Surgery Review of History & Physical:  There are no significant changes.          Ready For Surgery From Anesthesia Perspective.

## 2018-07-24 NOTE — TRANSFER OF CARE
Anesthesia Transfer of Care Note    Patient: Calli Jamison    Procedure(s) Performed: Procedure(s) (LRB):  EGD (ESOPHAGOGASTRODUODENOSCOPY) (N/A)    Patient location: PACU    Anesthesia Type: general    Transport from OR: Transported from OR on room air with adequate spontaneous ventilation    Post pain: adequate analgesia    Post assessment: no apparent anesthetic complications    Post vital signs: stable    Level of consciousness: awake and alert    Nausea/Vomiting: no nausea/vomiting    Complications: none    Transfer of care protocol was followed      Last vitals:   Visit Vitals  BP (!) 173/84   Breastfeeding? No

## 2018-07-24 NOTE — DISCHARGE INSTRUCTIONS

## 2018-07-24 NOTE — [employer] PATIENT INSTRUCTIONS
Discharge Summary/Instructions after an Endoscopic Procedure  Patient Name: Calli Jamison  Patient MRN: [id number]  Patient YOB: 1931  Tuesday, July 24, 2018  Du Robles MD  RESTRICTIONS:  During your procedure today, you received medications for sedation.  These   medications may affect your judgment, balance and coordination.  Therefore,   for 24 hours, you have the following restrictions:   - DO NOT drive a car, operate machinery, make legal/financial decisions,   sign important papers or drink alcohol.    ACTIVITY:  Today: no heavy lifting, straining or running due to procedural   sedation/anesthesia.  The following day: return to full activity including work.  DIET:  Eat and drink normally unless instructed otherwise.     TREATMENT FOR COMMON SIDE EFFECTS:  - Mild abdominal pain, nausea, belching, bloating or excessive gas:  rest,   eat lightly and use a heating pad.  - Sore Throat: treat with throat lozenges and/or gargle with warm salt   water.  - Because air was used during the procedure, expelling large amounts of air   from your rectum or belching is normal.  - If a bowel prep was taken, you may not have a bowel movement for 1-3 days.    This is normal.  SYMPTOMS TO WATCH FOR AND REPORT TO YOUR PHYSICIAN:  1. Abdominal pain or bloating, other than gas cramps.  2. Chest pain.  3. Back pain.  4. Signs of infection such as: chills or fever occurring within 24 hours   after the procedure.  5. Rectal bleeding, which would show as bright red, maroon, or black stools.   (A tablespoon of blood from the rectum is not serious, especially if   hemorrhoids are present.)  6. Vomiting.  7. Weakness or dizziness.  GO DIRECTLY TO THE NEAREST EMERGENCY ROOM IF YOU HAVE ANY OF THE FOLLOWING:      Difficulty breathing              Chills and/or fever over 101 F   Persistent vomiting and/or vomiting blood   Severe abdominal pain   Severe chest pain   Black, tarry stools   Bleeding- more than one tablespoon   Any  other symptom or condition that you feel may need urgent attention  Your doctor recommends these additional instructions:  If any biopsies were taken, your doctors clinic will contact you in 1 to 2   weeks with any results.  - Patient has a contact number available for emergencies.  The signs and   symptoms of potential delayed complications were discussed with the   patient.  Return to normal activities tomorrow.  Written discharge   instructions were provided to the patient.   - Discharge patient to home (ambulatory).   - Resume previous diet.   - Continue present medications.   - Repeat upper endoscopy PRN for retreatment.   - Return to primary care physician PRN.  For questions, problems or results please call your physician - Du Robles MD at Work:  [phone].  [facility], EMERGENCY ROOM PHONE NUMBER: [phone]  IF A COMPLICATION OR EMERGENCY SITUATION ARISES AND YOU ARE UNABLE TO REACH   YOUR PHYSICIAN - GO DIRECTLY TO THE EMERGENCY ROOM.  Du Robles MD  7/24/2018 4:04:13 PM  This report has been verified and signed electronically.  [employer]

## 2018-07-25 NOTE — ANESTHESIA POSTPROCEDURE EVALUATION
Anesthesia Post Evaluation    Patient: Calli Jamison    Procedure(s) Performed: Procedure(s) (LRB):  EGD (ESOPHAGOGASTRODUODENOSCOPY) (N/A)    Final Anesthesia Type: general  Patient location during evaluation: PACU  Patient participation: Yes- Able to Participate  Level of consciousness: awake and alert  Post-procedure vital signs: reviewed and stable  Pain management: adequate  Airway patency: patent  PONV status at discharge: No PONV  Anesthetic complications: no      Cardiovascular status: blood pressure returned to baseline  Respiratory status: unassisted  Hydration status: euvolemic  Follow-up not needed.        Visit Vitals  /76 (BP Location: Left arm, Patient Position: Lying)   Pulse 92   Temp 36.3 °C (97.4 °F) (Temporal)   Resp 16   SpO2 98%   Breastfeeding? No       Pain/Marie Score: Pain Assessment Performed: Yes (7/24/2018  4:25 PM)  Presence of Pain: denies (7/24/2018  4:25 PM)  Marie Score: 10 (7/24/2018  4:16 PM)

## 2018-07-31 ENCOUNTER — TELEPHONE (OUTPATIENT)
Dept: ENDOSCOPY | Facility: HOSPITAL | Age: 83
End: 2018-07-31

## 2018-08-29 ENCOUNTER — OFFICE VISIT (OUTPATIENT)
Dept: INTERNAL MEDICINE | Facility: CLINIC | Age: 83
End: 2018-08-29
Payer: MEDICARE

## 2018-08-29 ENCOUNTER — LAB VISIT (OUTPATIENT)
Dept: LAB | Facility: HOSPITAL | Age: 83
End: 2018-08-29
Attending: INTERNAL MEDICINE
Payer: MEDICARE

## 2018-08-29 VITALS
HEART RATE: 72 BPM | HEIGHT: 61 IN | OXYGEN SATURATION: 96 % | SYSTOLIC BLOOD PRESSURE: 128 MMHG | DIASTOLIC BLOOD PRESSURE: 68 MMHG | BODY MASS INDEX: 36.13 KG/M2 | WEIGHT: 191.38 LBS

## 2018-08-29 DIAGNOSIS — E11.22 TYPE 2 DIABETES MELLITUS WITH STAGE 2 CHRONIC KIDNEY DISEASE, WITHOUT LONG-TERM CURRENT USE OF INSULIN: ICD-10-CM

## 2018-08-29 DIAGNOSIS — I10 ESSENTIAL HYPERTENSION: Primary | ICD-10-CM

## 2018-08-29 DIAGNOSIS — I10 ESSENTIAL HYPERTENSION: ICD-10-CM

## 2018-08-29 DIAGNOSIS — E78.00 PURE HYPERCHOLESTEROLEMIA: ICD-10-CM

## 2018-08-29 DIAGNOSIS — N18.2 TYPE 2 DIABETES MELLITUS WITH STAGE 2 CHRONIC KIDNEY DISEASE, WITHOUT LONG-TERM CURRENT USE OF INSULIN: ICD-10-CM

## 2018-08-29 LAB
ALBUMIN SERPL BCP-MCNC: 3.9 G/DL
ALP SERPL-CCNC: 102 U/L
ALT SERPL W/O P-5'-P-CCNC: 17 U/L
ANION GAP SERPL CALC-SCNC: 10 MMOL/L
AST SERPL-CCNC: 17 U/L
BASOPHILS # BLD AUTO: 0.06 K/UL
BASOPHILS NFR BLD: 0.6 %
BILIRUB SERPL-MCNC: 0.5 MG/DL
BUN SERPL-MCNC: 16 MG/DL
CALCIUM SERPL-MCNC: 10.3 MG/DL
CHLORIDE SERPL-SCNC: 105 MMOL/L
CHOLEST SERPL-MCNC: 106 MG/DL
CHOLEST/HDLC SERPL: 2 {RATIO}
CO2 SERPL-SCNC: 26 MMOL/L
CREAT SERPL-MCNC: 1 MG/DL
DIFFERENTIAL METHOD: ABNORMAL
EOSINOPHIL # BLD AUTO: 0.4 K/UL
EOSINOPHIL NFR BLD: 4.3 %
ERYTHROCYTE [DISTWIDTH] IN BLOOD BY AUTOMATED COUNT: 15.7 %
EST. GFR  (AFRICAN AMERICAN): 59 ML/MIN/1.73 M^2
EST. GFR  (NON AFRICAN AMERICAN): 51 ML/MIN/1.73 M^2
ESTIMATED AVG GLUCOSE: 120 MG/DL
GLUCOSE SERPL-MCNC: 107 MG/DL
HBA1C MFR BLD HPLC: 5.8 %
HCT VFR BLD AUTO: 38.8 %
HDLC SERPL-MCNC: 52 MG/DL
HDLC SERPL: 49.1 %
HGB BLD-MCNC: 12.5 G/DL
LDLC SERPL CALC-MCNC: 42.8 MG/DL
LYMPHOCYTES # BLD AUTO: 2.7 K/UL
LYMPHOCYTES NFR BLD: 28.2 %
MCH RBC QN AUTO: 27.8 PG
MCHC RBC AUTO-ENTMCNC: 32.2 G/DL
MCV RBC AUTO: 86 FL
MONOCYTES # BLD AUTO: 0.7 K/UL
MONOCYTES NFR BLD: 7 %
NEUTROPHILS # BLD AUTO: 5.8 K/UL
NEUTROPHILS NFR BLD: 59.8 %
NONHDLC SERPL-MCNC: 54 MG/DL
PLATELET # BLD AUTO: 298 K/UL
PMV BLD AUTO: 10.3 FL
POTASSIUM SERPL-SCNC: 3.5 MMOL/L
PROT SERPL-MCNC: 7.7 G/DL
RBC # BLD AUTO: 4.49 M/UL
SODIUM SERPL-SCNC: 141 MMOL/L
TRIGL SERPL-MCNC: 56 MG/DL
WBC # BLD AUTO: 9.7 K/UL

## 2018-08-29 PROCEDURE — 85025 COMPLETE CBC W/AUTO DIFF WBC: CPT

## 2018-08-29 PROCEDURE — 80053 COMPREHEN METABOLIC PANEL: CPT

## 2018-08-29 PROCEDURE — 99999 PR PBB SHADOW E&M-EST. PATIENT-LVL III: CPT | Mod: PBBFAC,,, | Performed by: INTERNAL MEDICINE

## 2018-08-29 PROCEDURE — 80061 LIPID PANEL: CPT

## 2018-08-29 PROCEDURE — 99214 OFFICE O/P EST MOD 30 MIN: CPT | Mod: S$GLB,,, | Performed by: INTERNAL MEDICINE

## 2018-08-29 PROCEDURE — 36415 COLL VENOUS BLD VENIPUNCTURE: CPT

## 2018-08-29 PROCEDURE — 83036 HEMOGLOBIN GLYCOSYLATED A1C: CPT

## 2018-08-29 NOTE — ASSESSMENT & PLAN NOTE
Patient is a goal and will continue to monitor her in 2 months.  Valsartan 160 2 daily, Verapamil 360, terazosin 1 mg

## 2018-08-29 NOTE — PROGRESS NOTES
Subjective:      Patient ID: Calli Jamison is a 87 y.o. female.    Chief Complaint: Follow-up and Fatigue    HPI:  HPI  Patient is here in follow-up of hypertension.  She has had difficult to control hypertension and at the last appointment we had added doxazosin 1 mg.  Her blood pressure is quite good on 2 checks today.  She also tells me that she get a systolic blood pressure of 134 at home and she was very pleased.  Patient Active Problem List   Diagnosis    Sleep apnea    Iron deficiency    Asthma with allergic rhinitis    Pemphigoid    Multiple thyroid nodules    Bilateral carpal tunnel syndrome    Diverticulosis    GERD (gastroesophageal reflux disease)    Personal history of solitary pulmonary nodule    Pseudophakia    Angina pectoris    COPD (chronic obstructive pulmonary disease)    Restless legs syndrome (RLS)    Hyperlipidemia    Cerebral microvascular disease    Refractive error    Adjustment disorder with mixed anxiety and depressed mood    Crohn's disease of both small and large intestine    Paroxysmal atrial fibrillation    Esophageal stricture    Essential hypertension    DM type 2 without retinopathy    Glaucoma suspect of both eyes    PCO (posterior capsular opacification), bilateral    History of TIA (transient ischemic attack)    Dry eye syndrome, bilateral    Type 2 diabetes mellitus with renal manifestations    Aortic atherosclerosis    Spinal stenosis of lumbar region: CT 2017    Stage 2 chronic kidney disease    Class 1 obesity in adult    Lumbar facet arthropathy     Past Medical History:   Diagnosis Date    Abnormal Pap smear     After-cataract, unspecified - Both Eyes 11/5/2012    Arthritis     ASCUS (atypical squamous cells of undetermined significance) on Pap smear 7/1/2012    Asthma     Atrial fibrillation     Back pain 2009    Bullous pemphigoid     Carpal tunnel syndrome     Chronic back pain     COPD (chronic obstructive pulmonary  disease)     Coronary artery disease     Crohn's disease     Depression     Diabetes mellitus     Discoid lupus     Diverticulosis     History of migraine headaches     Hyperlipidemia     Hypertension     Multiple thyroid nodules     Obesity     Personal history of colonic polyps     Pulmonary nodule     Renal manifestation of secondary diabetes mellitus     Restless legs syndrome     Sciatica     Right leg    Sleep apnea     Stricture and stenosis of esophagus     Stroke     TIA (transient ischemic attack)      Past Surgical History:   Procedure Laterality Date    BREAST BIOPSY      Breast: wire localization  1/2004    CATARACT EXTRACTION W/  INTRAOCULAR LENS IMPLANT Bilateral     Cataract left eye  1/2008    Cataract: right eye  1/2008    COLON SURGERY      ileocecectomy 2011    COLONOSCOPY  9/30/2008    Crohns in remission    HYSTERECTOMY      ovaries remain    Iliocolic Crohns Disease with stricure  3/2011    LA iliocecectomy    Laparoscopic-assisted ileocecectomy.       Left carpal tunnel      Left carpal tunnel surgery      Rectal fistulae repair      TONSILLECTOMY      UPPER GASTROINTESTINAL ENDOSCOPY  11/2011    hiatal hernia, benign appearing esophageal  stricture dilated  prn repeat rec.     Family History   Problem Relation Age of Onset    Pneumonia Father     Cataracts Mother     Macular degeneration Mother     Hypertension Sister     Alkaptonuria Maternal Grandfather     Asthma Son     No Known Problems Son     No Known Problems Son     No Known Problems Son     Crohn's disease Neg Hx     Ulcerative colitis Neg Hx     Inflammatory bowel disease Neg Hx     Amblyopia Neg Hx     Blindness Neg Hx     Glaucoma Neg Hx     Retinal detachment Neg Hx     Strabismus Neg Hx      Review of Systems   Constitutional: Negative for activity change, chills, fever and unexpected weight change.   Respiratory: Negative for cough, chest tightness, shortness of breath and  "wheezing.    Cardiovascular: Negative for chest pain, palpitations and leg swelling.     Objective:     Vitals:    08/29/18 1419 08/29/18 1457   BP:  128/68   Pulse: 72    SpO2: 96%    Weight: 86.8 kg (191 lb 5.8 oz)    Height: 5' 1" (1.549 m)    PainSc: 0-No pain      Body mass index is 36.16 kg/m².  Physical Exam   Constitutional: She appears well-developed and well-nourished.   Neck: No JVD present. No thyromegaly present.   Cardiovascular: Normal rate, normal heart sounds and intact distal pulses.   Pulmonary/Chest: Effort normal and breath sounds normal. No respiratory distress.     Assessment:     1. Essential hypertension    2. Type 2 diabetes mellitus with stage 2 chronic kidney disease, without long-term current use of insulin    3. Pure hypercholesterolemia      Plan:     Problem List Items Addressed This Visit     Hyperlipidemia    Current Assessment & Plan     On atorvastatin 20 mg  Check lipid profile today         Relevant Orders    Lipid panel    Essential hypertension - Primary    Current Assessment & Plan     Patient is a goal and will continue to monitor her in 2 months.  Valsartan 160 2 daily, Verapamil 360, terazosin 1 mg         Relevant Orders    CBC auto differential (Completed)    Comprehensive metabolic panel (Completed)    Type 2 diabetes mellitus with renal manifestations    Current Assessment & Plan     Monitor and obtain A1c today  Continue metformin 500 mg once daily         Relevant Orders    Hemoglobin A1c        Follow-up in about 2 months (around 11/12/2018) for follow up 3:00.     Medication List           Accurate as of 8/29/18  5:36 PM. If you have any questions, ask your nurse or doctor.               CHANGE how you take these medications    metFORMIN 500 MG tablet  Commonly known as:  GLUCOPHAGE  Take 1 tablet (500 mg total) by mouth 2 (two) times daily.  What changed:    · when to take this  · additional instructions        CONTINUE taking these medications    acetaminophen " 500 MG tablet  Commonly known as:  TYLENOL  Take 1-2 tablets (500-1,000 mg total) by mouth every 12 (twelve) hours as needed for Pain.     albuterol 90 mcg/actuation inhaler  Commonly known as:  PROAIR HFA  Inhale 2 puffs into the lungs every 4 to 6 hours as needed. Rescue     aspirin 81 MG EC tablet  Commonly known as:  ECOTRIN     atorvastatin 20 MG tablet  Commonly known as:  LIPITOR  TAKE 1 TABLET BY MOUTH EVERY DAY     * blood sugar diagnostic Strp  Commonly known as:  BLOOD GLUCOSE TEST  Inject 1 strip into the skin once daily.     * blood sugar diagnostic Strp  Commonly known as:  BLOOD GLUCOSE TEST  One test strip per glucose testing twice a day: Insurance Brand Preference     * blood-glucose meter Misc  Dispense one meter: Insurance Brand Preference     * blood-glucose meter Misc  Dispense one meter: Insurance Brand Preference     CALCIUM CITRATE + D 315-200 mg-unit per tablet  Generic drug:  calcium citrate-vitamin D3 315-200 mg     conjugated estrogens vaginal cream  Commonly known as:  PREMARIN  fnger tip twice  Per week     docusate sodium 100 MG capsule  Commonly known as:  COLACE  Take 1 capsule (100 mg total) by mouth 2 (two) times daily.     fluocinonide 0.05% 0.05 % cream  Commonly known as:  LIDEX  Apply topically 2 (two) times daily as needed. AAA scalp qd bid.     * fluticasone 50 mcg/actuation nasal spray  Commonly known as:  FLONASE  1 spray by Each Nare route once daily.     * fluticasone 110 mcg/actuation inhaler  Commonly known as:  FLOVENT HFA  USE 2 PUFFS BY MOUTH TWICE A DAY for asthma     furosemide 20 MG tablet  Commonly known as:  LASIX  Take 1 tablet (20 mg total) by mouth once daily.     KLOR-CON SPRINKLE 10 MEQ Cpsr  Generic drug:  potassium chloride     lancets Misc  One lancet per glucose testing twice a day: Insurance Brand Preference     latanoprost 0.005 % ophthalmic solution  PLACE 1 DROP INTO BOTH EYES NIGHTLY.     omeprazole 20 MG capsule  Commonly known as:  PRILOSEC      polycarbophil 625 mg tablet  Commonly known as:  FIBERCON  Take 1 tablet (625 mg total) by mouth once daily.     rOPINIRole 0.25 MG tablet  Commonly known as:  REQUIP  Take 2 tablets (0.5 mg total) by mouth nightly as needed. As directed.     senna 8.6 mg tablet  Commonly known as:  SENNA  Take 1 tablet by mouth once daily.     terazosin 1 MG capsule  Commonly known as:  HYTRIN  Take 1 capsule (1 mg total) by mouth every evening.     valsartan 160 MG tablet  Commonly known as:  DIOVAN  Take 2 tablets (320 mg total) by mouth once daily.     verapamil 360 MG C24p  Commonly known as:  VERELAN  Take 1 capsule (360 mg total) by mouth once daily.     VITAMIN B-12 500 MCG tablet  Generic drug:  cyanocobalamin         * This list has 6 medication(s) that are the same as other medications prescribed for you. Read the directions carefully, and ask your doctor or other care provider to review them with you.

## 2018-09-27 RX ORDER — VERAPAMIL HYDROCHLORIDE 360 MG/1
360 CAPSULE, DELAYED RELEASE PELLETS ORAL DAILY
Qty: 90 CAPSULE | Refills: 3 | Status: SHIPPED | OUTPATIENT
Start: 2018-09-27 | End: 2019-05-10 | Stop reason: SDUPTHER

## 2018-10-09 RX ORDER — TERAZOSIN 1 MG/1
1 CAPSULE ORAL NIGHTLY
Qty: 30 CAPSULE | Refills: 2 | Status: SHIPPED | OUTPATIENT
Start: 2018-10-09 | End: 2019-01-09 | Stop reason: SDUPTHER

## 2018-10-30 ENCOUNTER — OFFICE VISIT (OUTPATIENT)
Dept: INTERNAL MEDICINE | Facility: CLINIC | Age: 83
End: 2018-10-30
Payer: MEDICARE

## 2018-10-30 ENCOUNTER — IMMUNIZATION (OUTPATIENT)
Dept: INTERNAL MEDICINE | Facility: CLINIC | Age: 83
End: 2018-10-30
Payer: MEDICARE

## 2018-10-30 VITALS — HEART RATE: 86 BPM | HEIGHT: 61 IN | WEIGHT: 191.13 LBS | BODY MASS INDEX: 36.09 KG/M2 | OXYGEN SATURATION: 97 %

## 2018-10-30 DIAGNOSIS — E11.21 TYPE 2 DIABETES MELLITUS WITH DIABETIC NEPHROPATHY, WITHOUT LONG-TERM CURRENT USE OF INSULIN: ICD-10-CM

## 2018-10-30 DIAGNOSIS — I10 ESSENTIAL HYPERTENSION: ICD-10-CM

## 2018-10-30 DIAGNOSIS — M79.606 PAIN OF LOWER EXTREMITY, UNSPECIFIED LATERALITY: Primary | ICD-10-CM

## 2018-10-30 PROCEDURE — 99999 PR PBB SHADOW E&M-EST. PATIENT-LVL V: CPT | Mod: PBBFAC,HCNC,, | Performed by: INTERNAL MEDICINE

## 2018-10-30 PROCEDURE — 90662 IIV NO PRSV INCREASED AG IM: CPT | Mod: PBBFAC,HCNC

## 2018-10-30 PROCEDURE — 1101F PT FALLS ASSESS-DOCD LE1/YR: CPT | Mod: CPTII,HCNC,, | Performed by: INTERNAL MEDICINE

## 2018-10-30 PROCEDURE — 99214 OFFICE O/P EST MOD 30 MIN: CPT | Mod: S$PBB,HCNC,, | Performed by: INTERNAL MEDICINE

## 2018-10-30 PROCEDURE — 99215 OFFICE O/P EST HI 40 MIN: CPT | Mod: PBBFAC,HCNC,25 | Performed by: INTERNAL MEDICINE

## 2018-10-30 NOTE — PROGRESS NOTES
Subjective:      Patient ID: Calli Jamison is a 87 y.o. female.    Chief Complaint: Follow-up    HPI:  HPI   Patient is here for a 2 month follow up: she has pain in her feet and it comes up to calf. This pain does not last all day. Her pain has occurred for 2 years. She has a history of diabetes and also has moderate central canal narrowing.    Blood pressure is well controlled    Her urinary frequency she believes is related to 8 glasses of water a day.  Patient Active Problem List   Diagnosis    Sleep apnea    Iron deficiency    Asthma with allergic rhinitis    Pemphigoid    Multiple thyroid nodules    Bilateral carpal tunnel syndrome    Diverticulosis    GERD (gastroesophageal reflux disease)    Personal history of solitary pulmonary nodule    Pseudophakia    Angina pectoris    COPD (chronic obstructive pulmonary disease)    Restless legs syndrome (RLS)    Hyperlipidemia    Cerebral microvascular disease    Refractive error    Adjustment disorder with mixed anxiety and depressed mood    Crohn's disease of both small and large intestine    Paroxysmal atrial fibrillation    Esophageal stricture    Essential hypertension    DM type 2 without retinopathy    Glaucoma suspect of both eyes    PCO (posterior capsular opacification), bilateral    History of TIA (transient ischemic attack)    Dry eye syndrome, bilateral    Type 2 diabetes mellitus with renal manifestations    Aortic atherosclerosis    Spinal stenosis of lumbar region: CT 2017    Stage 2 chronic kidney disease    Class 1 obesity in adult    Lumbar facet arthropathy     Past Medical History:   Diagnosis Date    Abnormal Pap smear     After-cataract, unspecified - Both Eyes 11/5/2012    Arthritis     ASCUS (atypical squamous cells of undetermined significance) on Pap smear 7/1/2012    Asthma     Atrial fibrillation     Back pain 2009    Bullous pemphigoid     Carpal tunnel syndrome     Chronic back pain      COPD (chronic obstructive pulmonary disease)     Coronary artery disease     Crohn's disease     Depression     Diabetes mellitus     Discoid lupus     Diverticulosis     History of migraine headaches     Hyperlipidemia     Hypertension     Multiple thyroid nodules     Obesity     Personal history of colonic polyps     Pulmonary nodule     Renal manifestation of secondary diabetes mellitus     Restless legs syndrome     Sciatica     Right leg    Sleep apnea     Stricture and stenosis of esophagus     Stroke     TIA (transient ischemic attack)      Past Surgical History:   Procedure Laterality Date    BREAST BIOPSY      Breast: wire localization  1/2004    CATARACT EXTRACTION W/  INTRAOCULAR LENS IMPLANT Bilateral     Cataract left eye  1/2008    Cataract: right eye  1/2008    COLON SURGERY      ileocecectomy 2011    COLONOSCOPY  9/30/2008    Crohns in remission    EGD (ESOPHAGOGASTRODUODENOSCOPY) N/A 7/24/2018    [facility]    EGD (ESOPHAGOGASTRODUODENOSCOPY) N/A 3/27/2014    [facility]    EGD (ESOPHAGOGASTRODUODENOSCOPY) N/A 4/9/2013    [facility] ([facility])    ESOPHAGOGASTRODUODENOSCOPY N/A 7/24/2018    Procedure: EGD (ESOPHAGOGASTRODUODENOSCOPY);  Surgeon: Du Robles MD;  [facility]  Service: Endoscopy;  Laterality: N/A;  loop recoreder-battery dead per pt. (not active). corona dpt. to move to morning . unable to do so.EC    ESOPHAGOGASTRODUODENOSCOPY (EGD) N/A 3/29/2016    [facility] ([facility]    ESOPHAGOGASTRODUODENOSCOPY (EGD) N/A 2/16/2016    [facility] at [facility] ([facility])    HYSTERECTOMY      ovaries remain    Iliocolic Crohns Disease with stricure  3/2011    LA iliocecectomy    Laparoscopic-assisted ileocecectomy.       Left carpal tunnel      Left carpal tunnel surgery    "   Rectal fistulae repair      TONSILLECTOMY      UPPER GASTROINTESTINAL ENDOSCOPY  11/2011    hiatal hernia, benign appearing esophageal  stricture dilated  prn repeat rec.     Family History   Problem Relation Age of Onset    Pneumonia Father     Cataracts Mother     Macular degeneration Mother     Hypertension Sister     Alkaptonuria Maternal Grandfather     Asthma Son     No Known Problems Son     No Known Problems Son     No Known Problems Son     Crohn's disease Neg Hx     Ulcerative colitis Neg Hx     Inflammatory bowel disease Neg Hx     Amblyopia Neg Hx     Blindness Neg Hx     Glaucoma Neg Hx     Retinal detachment Neg Hx     Strabismus Neg Hx      Review of Systems   Constitutional: Negative for chills, fever and unexpected weight change.   HENT: Negative for trouble swallowing.    Respiratory: Negative for cough, shortness of breath and wheezing.    Cardiovascular: Negative for chest pain and palpitations.   Gastrointestinal: Negative for abdominal distention, abdominal pain, blood in stool and vomiting.   Musculoskeletal: Negative for back pain.     Objective:     Vitals:    10/30/18 1432   Pulse: 86   SpO2: 97%   Weight: 86.7 kg (191 lb 2.2 oz)   Height: 5' 1" (1.549 m)   PainSc:   5   PainLoc: Generalized     Body mass index is 36.12 kg/m².  Physical Exam   Constitutional: She is oriented to person, place, and time. She appears well-developed and well-nourished. No distress.   Neck: Carotid bruit is not present. No thyromegaly present.   Cardiovascular: Normal rate, regular rhythm and normal heart sounds. PMI is not displaced.   Pulmonary/Chest: Effort normal and breath sounds normal. No respiratory distress.   Abdominal: Soft. Bowel sounds are normal. She exhibits no distension. There is no tenderness.   Musculoskeletal: She exhibits no edema.   Neurological: She is alert and oriented to person, place, and time.     Assessment:     1. Pain of lower extremity, unspecified " laterality    2. Essential hypertension    3. Type 2 diabetes mellitus with diabetic nephropathy, without long-term current use of insulin      Plan:   Calli was seen today for follow-up.    Diagnoses and all orders for this visit:    Pain of lower extremity, unspecified laterality: should have neurology evaluation    Essential hypertension: well controlled same meds    Type 2 diabetes mellitus with diabetic nephropathy, without long-term current use of insulin: well controlled same meds.        Problem List Items Addressed This Visit     Essential hypertension    Relevant Orders    CBC auto differential    Comprehensive metabolic panel    Lipid panel    Type 2 diabetes mellitus with renal manifestations    Relevant Orders    Hemoglobin A1c      Other Visit Diagnoses     Pain of lower extremity, unspecified laterality    -  Primary    Relevant Orders    Ambulatory consult to Neurology        Orders Placed This Encounter   Procedures    CBC auto differential     Standing Status:   Future     Standing Expiration Date:   5/30/2019    Comprehensive metabolic panel     Standing Status:   Future     Standing Expiration Date:   5/30/2019    Hemoglobin A1c     Standing Status:   Future     Standing Expiration Date:   5/30/2019    Lipid panel     Standing Status:   Future     Standing Expiration Date:   5/30/2019    Ambulatory consult to Neurology     Referral Priority:   Routine     Referral Type:   Consultation     Referral Reason:   Specialty Services Required     Requested Specialty:   Neurology     Number of Visits Requested:   1     Follow-up in about 4 months (around 2/28/2019) for FU: cbc, cmp, lipid and A1C.     Medication List           Accurate as of 10/30/18  3:17 PM. If you have any questions, ask your nurse or doctor.               CHANGE how you take these medications    metFORMIN 500 MG tablet  Commonly known as:  GLUCOPHAGE  Take 1 tablet (500 mg total) by mouth 2 (two) times daily.  What changed:     · when to take this  · additional instructions        CONTINUE taking these medications    acetaminophen 500 MG tablet  Commonly known as:  TYLENOL  Take 1-2 tablets (500-1,000 mg total) by mouth every 12 (twelve) hours as needed for Pain.     albuterol 90 mcg/actuation inhaler  Commonly known as:  PROAIR HFA  Inhale 2 puffs into the lungs every 4 to 6 hours as needed. Rescue     aspirin 81 MG EC tablet  Commonly known as:  ECOTRIN     atorvastatin 20 MG tablet  Commonly known as:  LIPITOR  TAKE 1 TABLET BY MOUTH EVERY DAY     * blood sugar diagnostic Strp  Commonly known as:  BLOOD GLUCOSE TEST  Inject 1 strip into the skin once daily.     * blood sugar diagnostic Strp  Commonly known as:  BLOOD GLUCOSE TEST  One test strip per glucose testing twice a day: Insurance Brand Preference     * blood-glucose meter Misc  Dispense one meter: Insurance Brand Preference     * blood-glucose meter Misc  Dispense one meter: Insurance Brand Preference     CALCIUM CITRATE + D 315-200 mg-unit per tablet  Generic drug:  calcium citrate-vitamin D3 315-200 mg     conjugated estrogens vaginal cream  Commonly known as:  PREMARIN  fnger tip twice  Per week     docusate sodium 100 MG capsule  Commonly known as:  COLACE  Take 1 capsule (100 mg total) by mouth 2 (two) times daily.     fluocinonide 0.05% 0.05 % cream  Commonly known as:  LIDEX  Apply topically 2 (two) times daily as needed. AAA scalp qd bid.     * fluticasone 50 mcg/actuation nasal spray  Commonly known as:  FLONASE  1 spray by Each Nare route once daily.     * fluticasone 110 mcg/actuation inhaler  Commonly known as:  FLOVENT HFA  USE 2 PUFFS BY MOUTH TWICE A DAY for asthma     furosemide 20 MG tablet  Commonly known as:  LASIX  Take 1 tablet (20 mg total) by mouth once daily.     KLOR-CON SPRINKLE 10 MEQ Cpsr  Generic drug:  potassium chloride     lancets Misc  One lancet per glucose testing twice a day: Insurance Brand Preference     latanoprost 0.005 % ophthalmic  solution  PLACE 1 DROP INTO BOTH EYES NIGHTLY.     omeprazole 20 MG capsule  Commonly known as:  PRILOSEC     polycarbophil 625 mg tablet  Commonly known as:  FIBERCON  Take 1 tablet (625 mg total) by mouth once daily.     rOPINIRole 0.25 MG tablet  Commonly known as:  REQUIP  Take 2 tablets (0.5 mg total) by mouth nightly as needed. As directed.     senna 8.6 mg tablet  Commonly known as:  SENNA  Take 1 tablet by mouth once daily.     terazosin 1 MG capsule  Commonly known as:  HYTRIN  TAKE 1 CAPSULE (1 MG TOTAL) BY MOUTH EVERY EVENING.     valsartan 160 MG tablet  Commonly known as:  DIOVAN  Take 2 tablets (320 mg total) by mouth once daily.     verapamil 360 MG C24p  Commonly known as:  VERELAN  TAKE 1 CAPSULE (360 MG TOTAL) BY MOUTH ONCE DAILY.     VITAMIN B-12 500 MCG tablet  Generic drug:  cyanocobalamin         * This list has 6 medication(s) that are the same as other medications prescribed for you. Read the directions carefully, and ask your doctor or other care provider to review them with you.

## 2018-11-12 RX ORDER — FUROSEMIDE 20 MG/1
TABLET ORAL
Qty: 30 TABLET | Refills: 5 | Status: SHIPPED | OUTPATIENT
Start: 2018-11-12 | End: 2019-05-05 | Stop reason: SDUPTHER

## 2018-11-20 DIAGNOSIS — H40.003 GLAUCOMA SUSPECT, BILATERAL: ICD-10-CM

## 2018-11-20 RX ORDER — LATANOPROST 50 UG/ML
SOLUTION/ DROPS OPHTHALMIC
Qty: 2.5 ML | Refills: 6 | Status: SHIPPED | OUTPATIENT
Start: 2018-11-20 | End: 2019-05-26 | Stop reason: SDUPTHER

## 2018-12-03 ENCOUNTER — TELEPHONE (OUTPATIENT)
Dept: ELECTROPHYSIOLOGY | Facility: CLINIC | Age: 83
End: 2018-12-03

## 2018-12-03 NOTE — TELEPHONE ENCOUNTER
Attempted to Return call / Lm to call back to r/s  ----- Message from Mirna Momin sent at 12/3/2018 12:12 PM CST -----  Contact: pt called   Pt need to reschedule appt. Please call pt @ [phone]. Thank you.

## 2019-01-09 RX ORDER — TERAZOSIN 1 MG/1
1 CAPSULE ORAL NIGHTLY
Qty: 30 CAPSULE | Refills: 2 | Status: SHIPPED | OUTPATIENT
Start: 2019-01-09 | End: 2019-01-31

## 2019-01-15 RX ORDER — POTASSIUM CHLORIDE 750 MG/1
CAPSULE, EXTENDED RELEASE ORAL
Qty: 30 CAPSULE | Refills: 10 | Status: SHIPPED | OUTPATIENT
Start: 2019-01-15 | End: 2019-10-13 | Stop reason: SDUPTHER

## 2019-01-21 ENCOUNTER — TELEPHONE (OUTPATIENT)
Dept: INTERNAL MEDICINE | Facility: CLINIC | Age: 84
End: 2019-01-21

## 2019-01-21 DIAGNOSIS — M54.30 SCIATIC NERVE PAIN, UNSPECIFIED LATERALITY: ICD-10-CM

## 2019-01-21 DIAGNOSIS — M15.9 PRIMARY OSTEOARTHRITIS INVOLVING MULTIPLE JOINTS: Primary | ICD-10-CM

## 2019-01-21 NOTE — TELEPHONE ENCOUNTER
----- Message from Mary Garcia sent at 1/21/2019  8:06 AM CST -----  Contact: Pt Home [phone] or Mobile [phone]  Patient would like to put in an order for home health. She said that she have arthritis and she also have sciatica and she's been having problems with walking.

## 2019-01-22 ENCOUNTER — NURSE TRIAGE (OUTPATIENT)
Dept: ADMINISTRATIVE | Facility: CLINIC | Age: 84
End: 2019-01-22

## 2019-01-23 ENCOUNTER — TELEPHONE (OUTPATIENT)
Dept: INTERNAL MEDICINE | Facility: CLINIC | Age: 84
End: 2019-01-23

## 2019-01-23 DIAGNOSIS — E11.9 DM TYPE 2 WITHOUT RETINOPATHY: Primary | ICD-10-CM

## 2019-01-23 NOTE — TELEPHONE ENCOUNTER
"    Reason for Disposition   [1] Caller requesting NON-URGENT health information AND [2] PCP's office is the best resource    Answer Assessment - Initial Assessment Questions  1. REASON FOR CALL or QUESTION: "What is your reason for calling today?" or "How can I best help you?" or "What question do you have that I can help answer?"      Pt reported she needs help with am ADL's and her meds - was doing ok but due to back problems her independence is requesting something like home health for a while as needed. She stated she is computer literate and has an [employer] account but requested I send msg to pcp.    Protocols used: ST INFORMATION ONLY CALL-A-AH      "

## 2019-01-23 NOTE — TELEPHONE ENCOUNTER
Would you please call Ms. Jamison who is a nurse and see what else we can help with. I ordered home PT which will start Sat.    Dr. Hurd

## 2019-01-23 NOTE — TELEPHONE ENCOUNTER
"Reason for Disposition   [1] Caller requesting NON-URGENT health information AND [2] PCP's office is the best resource    Answer Assessment - Initial Assessment Questions  1. REASON FOR CALL or QUESTION: "What is your reason for calling today?" or "How can I best help you?" or "What question do you have that I can help answer?"      Pt reported she needs help with am ADL's and her meds - was doing ok but due to back problems her independence is requesting something like home health for a while as needed. She stated she is computer literate and has an [employer] account but requested I send msg to pcp.    Protocols used: ST INFORMATION ONLY CALL-A-AH     "

## 2019-01-23 NOTE — TELEPHONE ENCOUNTER
Spoke to pt, she will be glad to have Home Health come in to help her with medication intake and organization, and other needs to maintain her health. Pt asked about social service to assist with services and her home needs.

## 2019-01-31 ENCOUNTER — OFFICE VISIT (OUTPATIENT)
Dept: NEUROLOGY | Facility: CLINIC | Age: 84
End: 2019-01-31
Payer: MEDICARE

## 2019-01-31 VITALS
HEIGHT: 61 IN | WEIGHT: 193.56 LBS | DIASTOLIC BLOOD PRESSURE: 87 MMHG | BODY MASS INDEX: 36.55 KG/M2 | SYSTOLIC BLOOD PRESSURE: 151 MMHG | HEART RATE: 87 BPM

## 2019-01-31 DIAGNOSIS — I10 ESSENTIAL HYPERTENSION: ICD-10-CM

## 2019-01-31 DIAGNOSIS — G63 POLYNEUROPATHY IN DISEASES CLASSIFIED ELSEWHERE: Primary | ICD-10-CM

## 2019-01-31 DIAGNOSIS — Z86.73 HISTORY OF TIA (TRANSIENT ISCHEMIC ATTACK): ICD-10-CM

## 2019-01-31 DIAGNOSIS — M48.061 SPINAL STENOSIS OF LUMBAR REGION WITHOUT NEUROGENIC CLAUDICATION: ICD-10-CM

## 2019-01-31 DIAGNOSIS — E11.21 TYPE 2 DIABETES MELLITUS WITH DIABETIC NEPHROPATHY, WITHOUT LONG-TERM CURRENT USE OF INSULIN: ICD-10-CM

## 2019-01-31 DIAGNOSIS — I48.0 PAROXYSMAL ATRIAL FIBRILLATION: ICD-10-CM

## 2019-01-31 DIAGNOSIS — G25.81 RESTLESS LEGS SYNDROME (RLS): ICD-10-CM

## 2019-01-31 DIAGNOSIS — I70.0 AORTIC ATHEROSCLEROSIS: ICD-10-CM

## 2019-01-31 PROBLEM — G60.8 PERIPHERAL SENSORY NEUROPATHY: Status: ACTIVE | Noted: 2019-01-31

## 2019-01-31 PROCEDURE — 99999 PR PBB SHADOW E&M-EST. PATIENT-LVL III: CPT | Mod: PBBFAC,HCNC,, | Performed by: PSYCHIATRY & NEUROLOGY

## 2019-01-31 PROCEDURE — 1101F PT FALLS ASSESS-DOCD LE1/YR: CPT | Mod: HCNC,CPTII,S$GLB, | Performed by: PSYCHIATRY & NEUROLOGY

## 2019-01-31 PROCEDURE — 99499 UNLISTED E&M SERVICE: CPT | Mod: HCNC,S$GLB,, | Performed by: PSYCHIATRY & NEUROLOGY

## 2019-01-31 PROCEDURE — 1101F PR PT FALLS ASSESS DOC 0-1 FALLS W/OUT INJ PAST YR: ICD-10-PCS | Mod: HCNC,CPTII,S$GLB, | Performed by: PSYCHIATRY & NEUROLOGY

## 2019-01-31 PROCEDURE — 99999 PR PBB SHADOW E&M-EST. PATIENT-LVL III: ICD-10-PCS | Mod: PBBFAC,HCNC,, | Performed by: PSYCHIATRY & NEUROLOGY

## 2019-01-31 PROCEDURE — 99204 PR OFFICE/OUTPT VISIT, NEW, LEVL IV, 45-59 MIN: ICD-10-PCS | Mod: HCNC,S$GLB,, | Performed by: PSYCHIATRY & NEUROLOGY

## 2019-01-31 PROCEDURE — 99204 OFFICE O/P NEW MOD 45 MIN: CPT | Mod: HCNC,S$GLB,, | Performed by: PSYCHIATRY & NEUROLOGY

## 2019-01-31 PROCEDURE — 99499 RISK ADDL DX/OHS AUDIT: ICD-10-PCS | Mod: HCNC,S$GLB,, | Performed by: PSYCHIATRY & NEUROLOGY

## 2019-01-31 NOTE — PATIENT INSTRUCTIONS
Discussed with patient. She has evidence primarily a sensory peripheral neuropathy in addition to symptoms that do suggest a left-sided lumbar radiculopathy that is most likely related to her history of degenerative lumbar spine disease with stenosis, in the past.  Her blood sugars have been well controlled recently.  Will get an EMG/NCS bilateral lower extremities and the patient will follow up after study is completed.

## 2019-01-31 NOTE — PROGRESS NOTES
Subjective:       Patient ID: Calli Jamison is a 87 y.o. female.    Chief Complaint:  Leg Pain      History of Present Illness  HPI   This is an 87-year-old female was referred for evaluation of bilateral leg pains, tingling and numbness predominantly affecting the left leg.  She has had intermittent symptoms for at least 2-3 years however slightly more pronounced in the last 6 months.  She has had back problems the past and workup had revealed spinal stenosis at multiple levels.  At that time she was having pain radiating to the left hip and occasionally the left leg.  The symptoms have become more frequent recently and in addition she has numbness in the soles of her feet bilaterally with paresthesias below the knee.  She has had some symptoms with restless leg and was started on Requip in 2016 however has not been taking regularly as she reports that she is not sure helped somewhat she has had no recent falls.  She keeps fairly active and ambulates without assistance.  She has history of diabetes that is well controlled.  Recent labs done were reviewed.       Review of Systems  Review of Systems   Constitutional: Negative.    HENT: Negative.  Negative for hearing loss.    Eyes: Negative.  Negative for visual disturbance.   Respiratory: Negative.  Negative for shortness of breath.    Cardiovascular: Negative.  Negative for chest pain and palpitations.   Gastrointestinal: Negative.    Genitourinary: Negative.    Musculoskeletal: Positive for arthralgias and back pain. Negative for gait problem and neck pain.   Skin: Negative.    Neurological: Positive for numbness. Negative for tremors, seizures, syncope, speech difficulty, weakness and headaches.   Psychiatric/Behavioral: Negative.  Negative for confusion and decreased concentration.       Objective:      Neurologic Exam     Mental Status   Oriented to person, place, and time.   Registration: recalls 3 of 3 objects. Follows 3 step commands.   Attention:  normal. Concentration: normal.   Speech: speech is normal   Level of consciousness: alert  Knowledge: good.   Able to name object. Able to read. Able to repeat. Able to write. Normal comprehension.   Patient is alert, verbal and coherent and in no distress.  She is able to give an adequate recent and past medical history.  Affect is appropriate and mood is even.     Cranial Nerves   Cranial nerves II through XII intact.     CN II   Visual fields full to confrontation.     CN III, IV, VI   Pupils are equal, round, and reactive to light.  Extraocular motions are normal.   Right pupil: Size: 3 mm. Shape: regular. Reactivity: brisk. Consensual response: intact. Accommodation: intact.   Left pupil: Size: 3 mm. Shape: regular. Reactivity: brisk. Consensual response: intact. Accommodation: intact.   CN III: no CN III palsy  CN VI: no CN VI palsy  Nystagmus: none   Diplopia: none  Ophthalmoparesis: none    CN V   Facial sensation intact.     CN VII   Facial expression full, symmetric.     CN VIII   CN VIII normal.     CN IX, X   CN IX normal.     CN XI   CN XI normal.     CN XII   CN XII normal.   Fundus examination:  Sharp disc margins.  Normal vessels on nondilated exam.     Motor Exam   Muscle bulk: normal  Overall muscle tone: normal    Strength   Strength 5/5 throughout. Examination of extremities revealed normal tone and power in both upper and lower extremities with no focal weakness, involuntary movements or atrophy.     Sensory Exam   Right arm light touch: normal  Left arm light touch: normal  Right leg light touch: decreased from ankle  Left leg light touch: decreased from ankle  Right arm vibration: normal  Left arm vibration: normal  Right leg vibration: decreased from ankle  Left leg vibration: decreased from ankle  Proprioception normal.   Right arm pinprick: normal  Left arm pinprick: normal  Right leg pinprick: decreased from ankle  Left leg pinprick: decreased from ankle    Gait, Coordination, and Reflexes      Gait  Gait: normal    Coordination   Romberg: negative  Finger to nose coordination: normal    Tremor   Resting tremor: absent  Intention tremor: absent  Action tremor: absent    Reflexes   Right brachioradialis: 0  Left brachioradialis: 0  Right biceps: 0  Left biceps: 0  Right triceps: 1+  Left triceps: 1+  Right patellar: 0  Left patellar: 0  Right achilles: 0  Left achilles: 0  Right plantar: normal  Left plantar: normal      Physical Exam   Constitutional: She is oriented to person, place, and time. She appears well-developed and well-nourished.   HENT:   Head: Normocephalic and atraumatic.   Eyes: EOM are normal. Pupils are equal, round, and reactive to light.   Neck: Normal range of motion. Neck supple. Carotid bruit is not present.   Cardiovascular: Normal rate and regular rhythm.   Neurological: She is oriented to person, place, and time. She has normal strength. She has a normal Finger-Nose-Finger Test and a normal Romberg Test. Gait normal.   Reflex Scores:       Tricep reflexes are 1+ on the right side and 1+ on the left side.       Bicep reflexes are 0 on the right side and 0 on the left side.       Brachioradialis reflexes are 0 on the right side and 0 on the left side.       Patellar reflexes are 0 on the right side and 0 on the left side.       Achilles reflexes are 0 on the right side and 0 on the left side.  Psychiatric: Her speech is normal.   Vitals reviewed.        Assessment:        1. Polyneuropathy in diseases classified elsewhere     2. Spinal stenosis of lumbar region without neurogenic claudication    3. Restless legs syndrome (RLS)    4. History of TIA (transient ischemic attack)    5. Aortic atherosclerosis    6. Essential hypertension    7. Paroxysmal atrial fibrillation    8. Type 2 diabetes mellitus with diabetic nephropathy, without long-term current use of insulin            Plan:       Discussed with patient. She has evidence primarily a sensory peripheral neuropathy in  addition to symptoms that do suggest a left-sided lumbar radiculopathy that is most likely related to her history of degenerative lumbar spine disease with stenosis, in the past.  Her blood sugars have been well controlled recently.  Will get an EMG/NCS bilateral lower extremities and the patient will follow up after study is completed.

## 2019-01-31 NOTE — PROGRESS NOTES
Patient, Calli Jamison (MRN #[id number]), presented with a recorded BMI of 36.57 kg/m^2 and a documented comorbidity(s):  - Diabetes Mellitus Type 2  - Hypertension  - Atrial Fibrillation  to which the severe obesity is a contributing factor. This is consistent with the definition of severe obesity (BMI 35.0-39.9) with comorbidity (ICD-10 E66.01, Z68.35). The patient's severe obesity was monitored, evaluated, addressed and/or treated. This addendum to the medical record is made on 01/31/2019.

## 2019-02-01 DIAGNOSIS — G25.81 RESTLESS LEGS SYNDROME: ICD-10-CM

## 2019-02-01 RX ORDER — ROPINIROLE 0.25 MG/1
0.5 TABLET, FILM COATED ORAL NIGHTLY PRN
Qty: 60 TABLET | Refills: 12 | Status: SHIPPED | OUTPATIENT
Start: 2019-02-01 | End: 2019-12-02 | Stop reason: SDUPTHER

## 2019-02-01 NOTE — TELEPHONE ENCOUNTER
----- Message from Liv Burnette sent at 2/1/2019 10:03 AM CST -----  Contact: [phone]  Type: Rx    Name of medication(s): ropinirole (REQUIP) 0.25 MG tablet    Is this a refill? New rx? refill    Who prescribed medication?  Vannessa    Pharmacy Name, Phone, & Location:  [facility]/pharmacy #[id number] [place] [address]    Comments:  Please advise, thank you

## 2019-02-04 ENCOUNTER — TELEPHONE (OUTPATIENT)
Dept: NEUROLOGY | Facility: CLINIC | Age: 84
End: 2019-02-04

## 2019-02-04 NOTE — TELEPHONE ENCOUNTER
Left message for pt in order to schedule her EMG procedure. Left my name and number for a return call so that we may do so.

## 2019-02-05 ENCOUNTER — TELEPHONE (OUTPATIENT)
Dept: ADMINISTRATIVE | Facility: CLINIC | Age: 84
End: 2019-02-05

## 2019-02-05 NOTE — TELEPHONE ENCOUNTER
Home Health SOC 01/26/2019 - 03/26/2019 with [facility] ([place]) - Dr. Sheridan Hurd. Patient received SN and PT services.

## 2019-02-11 ENCOUNTER — LAB VISIT (OUTPATIENT)
Dept: LAB | Facility: HOSPITAL | Age: 84
End: 2019-02-11
Attending: INTERNAL MEDICINE
Payer: MEDICARE

## 2019-02-11 ENCOUNTER — TELEPHONE (OUTPATIENT)
Dept: INTERNAL MEDICINE | Facility: CLINIC | Age: 84
End: 2019-02-11

## 2019-02-11 DIAGNOSIS — I10 ESSENTIAL HYPERTENSION: ICD-10-CM

## 2019-02-11 DIAGNOSIS — E11.21 TYPE 2 DIABETES MELLITUS WITH DIABETIC NEPHROPATHY, WITHOUT LONG-TERM CURRENT USE OF INSULIN: ICD-10-CM

## 2019-02-11 LAB
ALBUMIN SERPL BCP-MCNC: 3.9 G/DL
ALP SERPL-CCNC: 83 U/L
ALT SERPL W/O P-5'-P-CCNC: 15 U/L
ANION GAP SERPL CALC-SCNC: 7 MMOL/L
AST SERPL-CCNC: 15 U/L
BASOPHILS # BLD AUTO: 0.03 K/UL
BASOPHILS NFR BLD: 0.3 %
BILIRUB SERPL-MCNC: 0.6 MG/DL
BUN SERPL-MCNC: 15 MG/DL
CALCIUM SERPL-MCNC: 10.1 MG/DL
CHLORIDE SERPL-SCNC: 105 MMOL/L
CHOLEST SERPL-MCNC: 103 MG/DL
CHOLEST/HDLC SERPL: 2.2 {RATIO}
CO2 SERPL-SCNC: 28 MMOL/L
CREAT SERPL-MCNC: 0.9 MG/DL
DIFFERENTIAL METHOD: ABNORMAL
EOSINOPHIL # BLD AUTO: 0.4 K/UL
EOSINOPHIL NFR BLD: 4.9 %
ERYTHROCYTE [DISTWIDTH] IN BLOOD BY AUTOMATED COUNT: 16.2 %
EST. GFR  (AFRICAN AMERICAN): >60 ML/MIN/1.73 M^2
EST. GFR  (NON AFRICAN AMERICAN): 58 ML/MIN/1.73 M^2
ESTIMATED AVG GLUCOSE: 126 MG/DL
GLUCOSE SERPL-MCNC: 87 MG/DL
HBA1C MFR BLD HPLC: 6 %
HCT VFR BLD AUTO: 38.3 %
HDLC SERPL-MCNC: 47 MG/DL
HDLC SERPL: 45.6 %
HGB BLD-MCNC: 11.9 G/DL
LDLC SERPL CALC-MCNC: 43.6 MG/DL
LYMPHOCYTES # BLD AUTO: 2.2 K/UL
LYMPHOCYTES NFR BLD: 24.8 %
MCH RBC QN AUTO: 26.7 PG
MCHC RBC AUTO-ENTMCNC: 31.1 G/DL
MCV RBC AUTO: 86 FL
MONOCYTES # BLD AUTO: 0.6 K/UL
MONOCYTES NFR BLD: 7.2 %
NEUTROPHILS # BLD AUTO: 5.5 K/UL
NEUTROPHILS NFR BLD: 62.7 %
NONHDLC SERPL-MCNC: 56 MG/DL
PLATELET # BLD AUTO: 317 K/UL
PMV BLD AUTO: 9.8 FL
POTASSIUM SERPL-SCNC: 3.7 MMOL/L
PROT SERPL-MCNC: 7.8 G/DL
RBC # BLD AUTO: 4.46 M/UL
SODIUM SERPL-SCNC: 140 MMOL/L
TRIGL SERPL-MCNC: 62 MG/DL
WBC # BLD AUTO: 8.8 K/UL

## 2019-02-11 PROCEDURE — 36415 COLL VENOUS BLD VENIPUNCTURE: CPT | Mod: HCNC

## 2019-02-11 PROCEDURE — 85025 COMPLETE CBC W/AUTO DIFF WBC: CPT | Mod: HCNC

## 2019-02-11 PROCEDURE — 80061 LIPID PANEL: CPT | Mod: HCNC

## 2019-02-11 PROCEDURE — 80053 COMPREHEN METABOLIC PANEL: CPT | Mod: HCNC

## 2019-02-11 PROCEDURE — 83036 HEMOGLOBIN GLYCOSYLATED A1C: CPT | Mod: HCNC

## 2019-02-11 NOTE — TELEPHONE ENCOUNTER
----- Message from Samantha Eugene sent at 2/11/2019  2:24 PM CST -----  Contact: [facility]/mikayla/[phone]  [facility] called in regards to can they continue physical therapy for 3 more visits on the pt.       Please advise

## 2019-02-13 ENCOUNTER — TELEPHONE (OUTPATIENT)
Dept: NEUROLOGY | Facility: CLINIC | Age: 84
End: 2019-02-13

## 2019-02-13 NOTE — TELEPHONE ENCOUNTER
Spoke to patient pertaining to scheduling her EMG.  She advised that she would have to consult with her sons and call me back when she is ready to schedule.

## 2019-02-18 ENCOUNTER — OFFICE VISIT (OUTPATIENT)
Dept: INTERNAL MEDICINE | Facility: CLINIC | Age: 84
End: 2019-02-18
Payer: MEDICARE

## 2019-02-18 VITALS
HEART RATE: 99 BPM | HEIGHT: 61 IN | DIASTOLIC BLOOD PRESSURE: 48 MMHG | OXYGEN SATURATION: 96 % | SYSTOLIC BLOOD PRESSURE: 144 MMHG | WEIGHT: 184.75 LBS | BODY MASS INDEX: 34.88 KG/M2

## 2019-02-18 DIAGNOSIS — I20.9 ANGINA PECTORIS: ICD-10-CM

## 2019-02-18 DIAGNOSIS — M46.96 INFLAMMATORY SPONDYLOPATHY OF LUMBAR REGION: ICD-10-CM

## 2019-02-18 DIAGNOSIS — E11.9 DM TYPE 2 WITHOUT RETINOPATHY: Primary | ICD-10-CM

## 2019-02-18 DIAGNOSIS — K50.80 CROHN'S DISEASE OF BOTH SMALL AND LARGE INTESTINE WITHOUT COMPLICATION: ICD-10-CM

## 2019-02-18 DIAGNOSIS — I10 ESSENTIAL HYPERTENSION: ICD-10-CM

## 2019-02-18 DIAGNOSIS — J45.20 MILD INTERMITTENT ASTHMA WITH ALLERGIC RHINITIS WITHOUT COMPLICATION: ICD-10-CM

## 2019-02-18 DIAGNOSIS — K21.9 GASTROESOPHAGEAL REFLUX DISEASE, ESOPHAGITIS PRESENCE NOT SPECIFIED: ICD-10-CM

## 2019-02-18 DIAGNOSIS — J43.9 PULMONARY EMPHYSEMA, UNSPECIFIED EMPHYSEMA TYPE: ICD-10-CM

## 2019-02-18 DIAGNOSIS — Z12.31 ENCOUNTER FOR SCREENING MAMMOGRAM FOR BREAST CANCER: ICD-10-CM

## 2019-02-18 DIAGNOSIS — I70.0 AORTIC ATHEROSCLEROSIS: ICD-10-CM

## 2019-02-18 PROCEDURE — 99999 PR PBB SHADOW E&M-EST. PATIENT-LVL IV: ICD-10-PCS | Mod: PBBFAC,HCNC,, | Performed by: INTERNAL MEDICINE

## 2019-02-18 PROCEDURE — 99499 UNLISTED E&M SERVICE: CPT | Mod: HCNC,S$GLB,, | Performed by: INTERNAL MEDICINE

## 2019-02-18 PROCEDURE — 1101F PT FALLS ASSESS-DOCD LE1/YR: CPT | Mod: HCNC,CPTII,S$GLB, | Performed by: INTERNAL MEDICINE

## 2019-02-18 PROCEDURE — 1101F PR PT FALLS ASSESS DOC 0-1 FALLS W/OUT INJ PAST YR: ICD-10-PCS | Mod: HCNC,CPTII,S$GLB, | Performed by: INTERNAL MEDICINE

## 2019-02-18 PROCEDURE — 99214 OFFICE O/P EST MOD 30 MIN: CPT | Mod: HCNC,S$GLB,, | Performed by: INTERNAL MEDICINE

## 2019-02-18 PROCEDURE — 99214 PR OFFICE/OUTPT VISIT, EST, LEVL IV, 30-39 MIN: ICD-10-PCS | Mod: HCNC,S$GLB,, | Performed by: INTERNAL MEDICINE

## 2019-02-18 PROCEDURE — 99999 PR PBB SHADOW E&M-EST. PATIENT-LVL IV: CPT | Mod: PBBFAC,HCNC,, | Performed by: INTERNAL MEDICINE

## 2019-02-18 PROCEDURE — 99499 RISK ADDL DX/OHS AUDIT: ICD-10-PCS | Mod: HCNC,S$GLB,, | Performed by: INTERNAL MEDICINE

## 2019-02-18 NOTE — PROGRESS NOTES
Subjective:      Patient ID: Calli Jamison is a 87 y.o. female.    Chief Complaint: Follow-up    HPI:  HPI   Patient is here in follow up of medical problems.  She had requested PT. Yoly is coming to the house and she is improving. She does not have home health and states that she does not need an aide. At times her whole left side hurts. She has seen Dr. Conner and he has requested an EMG which has not been scheduled.    Diabetes Management Status    Statin: Taking  ACE/ARB: Taking    Screening or Prevention Patient's value Goal Complete/Controlled?   HgA1C Testing and Control   Lab Results   Component Value Date    HGBA1C 6.0 (H) 02/11/2019      Annually/Less than 8% Yes   Lipid profile : 02/11/2019 Annually Yes   LDL control Lab Results   Component Value Date    LDLCALC 43.6 (L) 02/11/2019    Annually/Less than 100 mg/dl  Yes   Nephropathy screening Lab Results   Component Value Date    LABMICR 37.0 11/11/2015     Lab Results   Component Value Date    PROTEINUA Negative 02/23/2011    Annually No   Blood pressure BP Readings from Last 1 Encounters:   02/18/19 (!) 144/48    Less than 140/90 No   Dilated retinal exam : 07/18/2018 Annually Yes   Foot exam   : 05/22/2018 Annually Yes     LDL 43: continue same meds  GFR: greater than 60 normal  Hgb: 11.9    Patient Active Problem List   Diagnosis    Sleep apnea    Iron deficiency    Asthma with allergic rhinitis    Pemphigoid    Multiple thyroid nodules    Bilateral carpal tunnel syndrome    Diverticulosis    GERD (gastroesophageal reflux disease)    Personal history of solitary pulmonary nodule    Pseudophakia    Angina pectoris    COPD (chronic obstructive pulmonary disease)    Restless legs syndrome (RLS)    Hyperlipidemia    Cerebral microvascular disease    Refractive error    Adjustment disorder with mixed anxiety and depressed mood    Crohn's disease of both small and large intestine    Paroxysmal atrial fibrillation    Esophageal  stricture    Essential hypertension    DM type 2 without retinopathy    Glaucoma suspect of both eyes    PCO (posterior capsular opacification), bilateral    History of TIA (transient ischemic attack)    Dry eye syndrome, bilateral    Type 2 diabetes mellitus with renal manifestations    Aortic atherosclerosis    Spinal stenosis of lumbar region: CT 2017    Stage 2 chronic kidney disease    Class 1 obesity in adult    Lumbar facet arthropathy    Polyneuropathy in diseases classified elsewhere     Peripheral sensory neuropathy    Inflammatory spondylopathy of lumbar region     Past Medical History:   Diagnosis Date    Abnormal Pap smear     After-cataract, unspecified - Both Eyes 11/5/2012    Arthritis     ASCUS (atypical squamous cells of undetermined significance) on Pap smear 7/1/2012    Asthma     Atrial fibrillation     Back pain 2009    Bullous pemphigoid     Carpal tunnel syndrome     Chronic back pain     COPD (chronic obstructive pulmonary disease)     Coronary artery disease     Crohn's disease     Depression     Diabetes mellitus     Discoid lupus     Diverticulosis     History of migraine headaches     Hyperlipidemia     Hypertension     Multiple thyroid nodules     Obesity     Personal history of colonic polyps     Pulmonary nodule     Renal manifestation of secondary diabetes mellitus     Restless legs syndrome     Sciatica     Right leg    Sleep apnea     Stricture and stenosis of esophagus     Stroke     TIA (transient ischemic attack)      Past Surgical History:   Procedure Laterality Date    BREAST BIOPSY      Breast: wire localization  1/2004    CATARACT EXTRACTION W/  INTRAOCULAR LENS IMPLANT Bilateral     Cataract left eye  1/2008    Cataract: right eye  1/2008    COLON SURGERY      ileocecectomy 2011    COLONOSCOPY  9/30/2008    Crohns in remission    EGD (ESOPHAGOGASTRODUODENOSCOPY) N/A 7/24/2018    Performed by [facility] at [facility]  "ENDO (4TH FLR)    EGD (ESOPHAGOGASTRODUODENOSCOPY) N/A 3/27/2014    [facility] ENDO (4TH FLR)    EGD (ESOPHAGOGASTRODUODENOSCOPY) N/A 4/9/2013    Performed by [facility] at [facility] ENDO (4TH FLR)    ESOPHAGOGASTRODUODENOSCOPY (EGD) N/A 3/29/2016    Performed by [facility] ENDO (4TH FLR)    ESOPHAGOGASTRODUODENOSCOPY (EGD) N/A 2/16/2016    Performed by [facility] at [facility] ENDO (4TH FLR)    HYSTERECTOMY      ovaries remain    Iliocolic Crohns Disease with stricure  3/2011    [place] iliocecectomy    Laparoscopic-assisted ileocecectomy.       Left carpal tunnel      Left carpal tunnel surgery      Rectal fistulae repair      TONSILLECTOMY      UPPER GASTROINTESTINAL ENDOSCOPY  11/2011    hiatal hernia, benign appearing esophageal  stricture dilated  prn repeat rec.     Family History   Problem Relation Age of Onset    Pneumonia Father     Cataracts Mother     Macular degeneration Mother     Hypertension Sister     Alkaptonuria Maternal Grandfather     Asthma Son     No Known Problems Son     No Known Problems Son     No Known Problems Son     Crohn's disease Neg Hx     Ulcerative colitis Neg Hx     Inflammatory bowel disease Neg Hx     Amblyopia Neg Hx     Blindness Neg Hx     Glaucoma Neg Hx     Retinal detachment Neg Hx     Strabismus Neg Hx      Review of Systems   Constitutional: Negative for chills, fatigue, fever and unexpected weight change.   HENT: Negative for trouble swallowing.    Respiratory: Negative for cough, shortness of breath and wheezing.    Cardiovascular: Negative for chest pain, palpitations and leg swelling.   Gastrointestinal: Negative for abdominal distention, abdominal pain, blood in stool and vomiting.     Objective:     Vitals:    02/18/19 1436 02/18/19 1505   BP: (!) 162/86 (!) 144/48   Pulse: 99    SpO2: 96%    Weight: 83.8 kg (184 lb 11.9 oz)    Height: 5' 1" (1.549 m)    PainSc: 0-No pain      Body mass index " is 34.91 kg/m².  Physical Exam   Constitutional: She is oriented to person, place, and time. She appears well-developed and well-nourished. No distress.   Neck: Carotid bruit is not present. No thyromegaly present.   Cardiovascular: Normal rate, regular rhythm and normal heart sounds. PMI is not displaced.   Pulmonary/Chest: Effort normal and breath sounds normal. No respiratory distress.   Abdominal: Soft. Bowel sounds are normal. She exhibits no distension. There is no tenderness.   Musculoskeletal: She exhibits no edema.   Neurological: She is alert and oriented to person, place, and time.     Assessment:     1. DM type 2 without retinopathy    2. Crohn's disease of both small and large intestine without complication    3. Inflammatory spondylopathy of lumbar region    4. Angina pectoris    5. Pulmonary emphysema, unspecified emphysema type    6. Encounter for screening mammogram for breast cancer    7. Aortic atherosclerosis    8. Mild intermittent asthma with allergic rhinitis without complication    9. Essential hypertension    10. Gastroesophageal reflux disease, esophagitis presence not specified      Plan:   Calli was seen today for follow-up.    Diagnoses and all orders for this visit:    DM type 2 without retinopathy  Comments:  A1C good control    Crohn's disease of both small and large intestine without complication  Comments:  Stable and followed in GI    Inflammatory spondylopathy of lumbar region  Comments:  Stable, no acute back pain    Angina pectoris  Comments:  Assmptomatic    Pulmonary emphysema, unspecified emphysema type  Comments:  Stable, no systems    Encounter for screening mammogram for breast cancer  -     Mammo Digital Screening Bilat w/ Jay; Future    Aortic atherosclerosis  Comments:  On asa and statin    Mild intermittent asthma with allergic rhinitis without complication  Comments:  Stable    Essential hypertension  Comments:  well controlled same med    Gastroesophageal reflux  disease, esophagitis presence not specified  Comments:  On PPI        Problem List Items Addressed This Visit     Asthma with allergic rhinitis    Overview     Best peak flow 375         GERD (gastroesophageal reflux disease)    Angina pectoris    COPD (chronic obstructive pulmonary disease)    Crohn's disease of both small and large intestine    Essential hypertension    DM type 2 without retinopathy - Primary    Aortic atherosclerosis    Overview     Noted on imaging 6/28/2017.         Inflammatory spondylopathy of lumbar region      Other Visit Diagnoses     Encounter for screening mammogram for breast cancer        Relevant Orders    Mammo Digital Screening Bilat w/ Jay        Orders Placed This Encounter   Procedures    Mammo Digital Screening Bilat w/ Jay     Standing Status:   Future     Standing Expiration Date:   4/19/2020     Order Specific Question:   May the Radiologist modify the order per protocol to meet the clinical needs of the patient?     Answer:   Yes     Follow-up in about 4 months (around 6/18/2019) for Follow up: patient wishes to make appt.     Medication List           Accurate as of 2/18/19 11:59 PM. If you have any questions, ask your nurse or doctor.               CHANGE how you take these medications    metFORMIN 500 MG tablet  Commonly known as:  GLUCOPHAGE  Take 1 tablet (500 mg total) by mouth 2 (two) times daily.  What changed:    · when to take this  · additional instructions        CONTINUE taking these medications    acetaminophen 500 MG tablet  Commonly known as:  TYLENOL  Take 1-2 tablets (500-1,000 mg total) by mouth every 12 (twelve) hours as needed for Pain.     albuterol 90 mcg/actuation inhaler  Commonly known as:  PROAIR HFA  Inhale 2 puffs into the lungs every 4 to 6 hours as needed. Rescue     aspirin 81 MG EC tablet  Commonly known as:  ECOTRIN     atorvastatin 20 MG tablet  Commonly known as:  LIPITOR  TAKE 1 TABLET BY MOUTH EVERY DAY     blood sugar diagnostic  Strp  Commonly known as:  BLOOD GLUCOSE TEST  One test strip per glucose testing twice a day: Insurance Brand Preference     blood-glucose meter Misc  Dispense one meter: Insurance Brand Preference     CALCIUM CITRATE + D 315-200 mg-unit per tablet  Generic drug:  calcium citrate-vitamin D3 315-200 mg     conjugated estrogens vaginal cream  Commonly known as:  PREMARIN  fnger tip twice  Per week     fluocinonide 0.05% 0.05 % cream  Commonly known as:  LIDEX  Apply topically 2 (two) times daily as needed. AAA scalp qd bid.     fluticasone 110 mcg/actuation inhaler  Commonly known as:  FLOVENT HFA  USE 2 PUFFS BY MOUTH TWICE A DAY for asthma     furosemide 20 MG tablet  Commonly known as:  LASIX  TAKE 1 TABLET BY MOUTH ONCE DAILY.     latanoprost 0.005 % ophthalmic solution  PLACE 1 DROP INTO BOTH EYES NIGHTLY.     omeprazole 20 MG capsule  Commonly known as:  PRILOSEC     potassium chloride 10 MEQ Cpsr  Commonly known as:  MICRO-K  OPEN 1 CAPSULE AND SPRINKLE OVER UNHEATED SOFT FOOD (APPLESAUCE) EVERY DAY. DO NOT CHEW OR CRUSH     rOPINIRole 0.25 MG tablet  Commonly known as:  REQUIP  Take 2 tablets (0.5 mg total) by mouth nightly as needed. As directed.     valsartan 160 MG tablet  Commonly known as:  DIOVAN  Take 2 tablets (320 mg total) by mouth once daily.     verapamil 360 MG C24p  Commonly known as:  VERELAN  TAKE 1 CAPSULE (360 MG TOTAL) BY MOUTH ONCE DAILY.     VITAMIN B-12 500 MCG tablet  Generic drug:  cyanocobalamin

## 2019-03-15 ENCOUNTER — TELEPHONE (OUTPATIENT)
Dept: INTERNAL MEDICINE | Facility: CLINIC | Age: 84
End: 2019-03-15

## 2019-03-15 RX ORDER — AZITHROMYCIN 250 MG/1
TABLET, FILM COATED ORAL
Qty: 6 TABLET | Refills: 0 | Status: SHIPPED | OUTPATIENT
Start: 2019-03-15 | End: 2019-10-25

## 2019-03-15 NOTE — TELEPHONE ENCOUNTER
Spoke with patient, mucous thick and yellow taint to it, no SOB, no elevated temp. She has not had any ABT or steroid treatment. Please advise

## 2019-03-15 NOTE — TELEPHONE ENCOUNTER
----- Message from Chintan Mcmahon sent at 3/15/2019  8:38 AM CDT -----  Contact: self/[phone]  Pt states that she has been coughing up mucus since the Sunday before Mardi Gras and still is. Pt states that she has had issues with her throat and she thinks she's getting worse and the mucus is not clearing up. Pt would like for something to be called into the pharmacy [id number] - [place], [place] - [address] Please advise.            Thank You

## 2019-03-15 NOTE — TELEPHONE ENCOUNTER
Could you get me little more info    Has she had an antibiotic  Has she had steroids  Is there a fever  Is she short of breath    She does have /Asthma: does she have an inhaler?

## 2019-03-24 RX ORDER — FLUTICASONE PROPIONATE 110 UG/1
AEROSOL, METERED RESPIRATORY (INHALATION)
Qty: 12 G | Refills: 12 | Status: SHIPPED | OUTPATIENT
Start: 2019-03-24 | End: 2021-06-17

## 2019-03-24 RX ORDER — ALBUTEROL SULFATE 90 UG/1
AEROSOL, METERED RESPIRATORY (INHALATION)
Qty: 18 INHALER | Refills: 12 | Status: SHIPPED | OUTPATIENT
Start: 2019-03-24 | End: 2019-12-02 | Stop reason: SDUPTHER

## 2019-03-28 ENCOUNTER — TELEPHONE (OUTPATIENT)
Dept: INTERNAL MEDICINE | Facility: CLINIC | Age: 84
End: 2019-03-28

## 2019-03-28 NOTE — TELEPHONE ENCOUNTER
----- Message from Vince Pedroza sent at 3/28/2019  1:49 PM CDT -----  Contact: Patient [phone]  Patient calling stating would like to speak with office, thinks needs home health care, due to multiple of reason will discus with call back with more detail.    Please call an advise  Thank you

## 2019-04-01 NOTE — TELEPHONE ENCOUNTER
I spoke to the patient, she will get visiting angels. She will need someone daily to cook and clean and  her sleep habits. AYDEN

## 2019-04-07 RX ORDER — TERAZOSIN 1 MG/1
1 CAPSULE ORAL NIGHTLY
Qty: 30 CAPSULE | Refills: 2 | Status: SHIPPED | OUTPATIENT
Start: 2019-04-07 | End: 2019-06-26 | Stop reason: SDUPTHER

## 2019-05-05 RX ORDER — FUROSEMIDE 20 MG/1
TABLET ORAL
Qty: 30 TABLET | Refills: 5 | Status: SHIPPED | OUTPATIENT
Start: 2019-05-05 | End: 2019-09-27 | Stop reason: SDUPTHER

## 2019-05-06 DIAGNOSIS — E11.22 CONTROLLED TYPE 2 DIABETES MELLITUS WITH STAGE 3 CHRONIC KIDNEY DISEASE, WITHOUT LONG-TERM CURRENT USE OF INSULIN: ICD-10-CM

## 2019-05-06 DIAGNOSIS — N18.30 CONTROLLED TYPE 2 DIABETES MELLITUS WITH STAGE 3 CHRONIC KIDNEY DISEASE, WITHOUT LONG-TERM CURRENT USE OF INSULIN: ICD-10-CM

## 2019-05-06 RX ORDER — BLOOD SUGAR DIAGNOSTIC
STRIP MISCELLANEOUS
Qty: 200 STRIP | Refills: 2 | Status: SHIPPED | OUTPATIENT
Start: 2019-05-06 | End: 2019-11-05 | Stop reason: SDUPTHER

## 2019-05-06 NOTE — TELEPHONE ENCOUNTER
----- Message from Jailene Miller sent at 5/6/2019  3:28 PM CDT -----  Contact: Patient [phone]  Pt states that she is calling to speak with  in regards to her medication. She states   That the pharmacy advised that they don't have some of her medications. She also states that she wants to know if any of her medications have been discontinued or not. Please call back and advise.      Thanks

## 2019-05-06 NOTE — TELEPHONE ENCOUNTER
Spoke to pt and she's requesting a call from you about medications and a kidney test. Please advise

## 2019-05-07 NOTE — TELEPHONE ENCOUNTER
Pt stated her Verapamil is on backorder and needs another Rx. She would like to know does she needs to continue doing her finger sticks because he past couple of months her glucose is staying in the 80's at 86.       Made an FU appt for 6/28--mailed out appt reminder.

## 2019-05-10 DIAGNOSIS — E11.22 CONTROLLED TYPE 2 DIABETES MELLITUS WITH STAGE 3 CHRONIC KIDNEY DISEASE, WITHOUT LONG-TERM CURRENT USE OF INSULIN: ICD-10-CM

## 2019-05-10 DIAGNOSIS — N18.30 CONTROLLED TYPE 2 DIABETES MELLITUS WITH STAGE 3 CHRONIC KIDNEY DISEASE, WITHOUT LONG-TERM CURRENT USE OF INSULIN: ICD-10-CM

## 2019-05-10 RX ORDER — VERAPAMIL HYDROCHLORIDE 360 MG/1
360 CAPSULE, DELAYED RELEASE PELLETS ORAL DAILY
Qty: 90 CAPSULE | Refills: 3 | Status: SHIPPED | OUTPATIENT
Start: 2019-05-10 | End: 2021-06-17

## 2019-05-10 NOTE — TELEPHONE ENCOUNTER
Prescription Request:     Name of medication: ACCU-CHEK SADIE SMARTVIEW METER    ACCU-CHECK LANCETS    Reason for request: Refill    Pharmacy: [facility]ACY #[id number] - [place] - [address]    Please advise.    Thank You

## 2019-05-11 RX ORDER — DEXTROSE 4 G
TABLET,CHEWABLE ORAL
Qty: 1 EACH | Refills: 0 | Status: SHIPPED | OUTPATIENT
Start: 2019-05-11 | End: 2020-02-04 | Stop reason: SDUPTHER

## 2019-05-26 DIAGNOSIS — H40.003 GLAUCOMA SUSPECT, BILATERAL: ICD-10-CM

## 2019-05-27 RX ORDER — LATANOPROST 50 UG/ML
SOLUTION/ DROPS OPHTHALMIC
Qty: 2.5 ML | Refills: 6 | Status: SHIPPED | OUTPATIENT
Start: 2019-05-27 | End: 2019-11-14 | Stop reason: SDUPTHER

## 2019-06-04 DIAGNOSIS — N18.30 CONTROLLED TYPE 2 DIABETES MELLITUS WITH STAGE 3 CHRONIC KIDNEY DISEASE, WITHOUT LONG-TERM CURRENT USE OF INSULIN: ICD-10-CM

## 2019-06-04 DIAGNOSIS — E11.22 CONTROLLED TYPE 2 DIABETES MELLITUS WITH STAGE 3 CHRONIC KIDNEY DISEASE, WITHOUT LONG-TERM CURRENT USE OF INSULIN: ICD-10-CM

## 2019-06-04 RX ORDER — LANCETS
EACH MISCELLANEOUS
Qty: 204 EACH | Refills: 1 | Status: SHIPPED | OUTPATIENT
Start: 2019-06-04 | End: 2019-11-05 | Stop reason: SDUPTHER

## 2019-06-14 ENCOUNTER — TELEPHONE (OUTPATIENT)
Dept: INTERNAL MEDICINE | Facility: CLINIC | Age: 84
End: 2019-06-14

## 2019-06-14 NOTE — TELEPHONE ENCOUNTER
Pt requesting call from you when you return she have questions about pill pack and sticking her finger

## 2019-06-17 NOTE — TELEPHONE ENCOUNTER
Please advise     ----- Message from Vince Pedroza sent at 6/17/2019  3:03 PM CDT -----  Contact: Patient [phone]  RX request - refill or new RX.  Is this a refill or new RX:  New   RX name and strength:     Pharm[facility] and phone [id number] - [place], [place] - [address] [phone] (Phone) [phone] (Fax    Comments: patient calling regarding a Pick Pac: that the insurance will cover, done ever 14 Days, that put on the skin instead of poking the skins, stating is tired of poking skin, would like a call back discuss more as to what patient is requesting.    Please call an advise  Thank you

## 2019-06-18 RX ORDER — METFORMIN HYDROCHLORIDE 500 MG/1
TABLET ORAL
Qty: 60 TABLET | Refills: 5 | Status: ON HOLD | OUTPATIENT
Start: 2019-06-18 | End: 2019-10-26 | Stop reason: HOSPADM

## 2019-06-18 NOTE — TELEPHONE ENCOUNTER
Pt would to know if Dr Hurd would be okay with pill packing, the insurance company packs pills pt needs to take everyday and send them every month.     Also she would like an Rx for the glucose patch that reads her glucose without sticking so she can get her glucose readings without having to stick her fingers everyday.     Please advise, thank you.

## 2019-06-19 NOTE — TELEPHONE ENCOUNTER
Informed pt.     She would like the Free Style Susana to be sent to her [facility]

## 2019-06-21 DIAGNOSIS — E78.5 HYPERLIPIDEMIA, UNSPECIFIED HYPERLIPIDEMIA TYPE: ICD-10-CM

## 2019-06-21 RX ORDER — ATORVASTATIN CALCIUM 20 MG/1
TABLET, FILM COATED ORAL
Qty: 90 TABLET | Refills: 2 | Status: SHIPPED | OUTPATIENT
Start: 2019-06-21 | End: 2020-03-17

## 2019-06-26 RX ORDER — TERAZOSIN 1 MG/1
1 CAPSULE ORAL NIGHTLY
Qty: 30 CAPSULE | Refills: 2 | Status: SHIPPED | OUTPATIENT
Start: 2019-06-26 | End: 2019-07-24 | Stop reason: SDUPTHER

## 2019-06-26 RX ORDER — VALSARTAN 160 MG/1
320 TABLET ORAL DAILY
Qty: 60 TABLET | Refills: 12 | Status: SHIPPED | OUTPATIENT
Start: 2019-06-26 | End: 2020-02-04 | Stop reason: SDUPTHER

## 2019-06-28 ENCOUNTER — PROCEDURE VISIT (OUTPATIENT)
Dept: NEUROLOGY | Facility: CLINIC | Age: 84
End: 2019-06-28
Payer: MEDICARE

## 2019-06-28 DIAGNOSIS — G63 POLYNEUROPATHY IN DISEASES CLASSIFIED ELSEWHERE: ICD-10-CM

## 2019-06-28 DIAGNOSIS — G25.81 RESTLESS LEGS SYNDROME (RLS): ICD-10-CM

## 2019-06-28 DIAGNOSIS — M48.061 SPINAL STENOSIS OF LUMBAR REGION WITHOUT NEUROGENIC CLAUDICATION: ICD-10-CM

## 2019-06-28 PROCEDURE — 95911 NRV CNDJ TEST 9-10 STUDIES: CPT | Mod: HCNC,S$GLB,, | Performed by: PSYCHIATRY & NEUROLOGY

## 2019-06-28 PROCEDURE — 95911 PR NERVE CONDUCTION STUDY; 9-10 STUDIES: ICD-10-PCS | Mod: HCNC,S$GLB,, | Performed by: PSYCHIATRY & NEUROLOGY

## 2019-06-28 PROCEDURE — 95886 MUSC TEST DONE W/N TEST COMP: CPT | Mod: HCNC,S$GLB,, | Performed by: PSYCHIATRY & NEUROLOGY

## 2019-06-28 PROCEDURE — 95886 PR EMG COMPLETE, W/ NERVE CONDUCTION STUDIES, 5+ MUSCLES: ICD-10-PCS | Mod: HCNC,S$GLB,, | Performed by: PSYCHIATRY & NEUROLOGY

## 2019-07-09 ENCOUNTER — TELEPHONE (OUTPATIENT)
Dept: INTERNAL MEDICINE | Facility: CLINIC | Age: 84
End: 2019-07-09

## 2019-07-09 NOTE — TELEPHONE ENCOUNTER
----- Message from Marino Torrez sent at 7/9/2019  1:13 PM CDT -----  Contact: [phone]  Patient requesting a call from the office to discuss why she cant come in today for visit . Please call and advise, Thanks

## 2019-07-09 NOTE — TELEPHONE ENCOUNTER
Spoke to pt and was informed pt will not be able to make appt today. Pt will call back to reschedule

## 2019-07-11 ENCOUNTER — TELEPHONE (OUTPATIENT)
Dept: OPHTHALMOLOGY | Facility: CLINIC | Age: 84
End: 2019-07-11

## 2019-07-11 NOTE — TELEPHONE ENCOUNTER
----- Message from Meredith Diallo sent at 7/11/2019  9:43 AM CDT -----  Contact: Calli Jamison   Pt would like to speak with Dr.Guillmette manjarrez about the appointment for [phone] something earlier that day,pt can be reached at [phone] please thank you.

## 2019-07-22 ENCOUNTER — TELEPHONE (OUTPATIENT)
Dept: INTERNAL MEDICINE | Facility: CLINIC | Age: 84
End: 2019-07-22

## 2019-07-22 DIAGNOSIS — Z12.31 ENCOUNTER FOR SCREENING MAMMOGRAM FOR BREAST CANCER: Primary | ICD-10-CM

## 2019-07-22 NOTE — TELEPHONE ENCOUNTER
Pt follow up appt with you is scheduled July 31 at 2pm pt is requesting a mammogram order to be scheduled the same day. Mammogram order is pending

## 2019-07-22 NOTE — TELEPHONE ENCOUNTER
----- Message from Jina Foley sent at 7/22/2019  8:34 AM CDT -----  Contact: [phone]  Patient is requesting a call from the office. Patient is requesting to have an appointment on July 31 at 2:00 or 2:30 due to her transportation. Patient is also requesting to have a mammogram on the same day.    Please advise, thanks .

## 2019-07-23 NOTE — TELEPHONE ENCOUNTER
----- Message from Liv Burnette sent at 7/23/2019 10:51 AM CDT -----  Contact: [phone]  Type: Rx    Name of medication(s): Replense    Is this a refill? New rx? refill    Who prescribed medication?  Vannessa    Pharmacy Name, Phone, & Location:  [facility]/pharmacy #[id number] - [place], [place] - [address]    Comments:  Please advise, thank you

## 2019-07-24 RX ORDER — TERAZOSIN 1 MG/1
1 CAPSULE ORAL NIGHTLY
Qty: 30 CAPSULE | Refills: 6 | Status: SHIPPED | OUTPATIENT
Start: 2019-07-24 | End: 2019-12-02 | Stop reason: SDUPTHER

## 2019-07-31 ENCOUNTER — HOSPITAL ENCOUNTER (OUTPATIENT)
Dept: RADIOLOGY | Facility: HOSPITAL | Age: 84
Discharge: HOME | End: 2019-07-31
Attending: INTERNAL MEDICINE
Payer: MEDICARE

## 2019-07-31 ENCOUNTER — OFFICE VISIT (OUTPATIENT)
Dept: INTERNAL MEDICINE | Facility: CLINIC | Age: 84
End: 2019-07-31
Payer: MEDICARE

## 2019-07-31 VITALS
SYSTOLIC BLOOD PRESSURE: 132 MMHG | WEIGHT: 184.31 LBS | OXYGEN SATURATION: 99 % | DIASTOLIC BLOOD PRESSURE: 80 MMHG | HEART RATE: 81 BPM | HEIGHT: 61 IN | BODY MASS INDEX: 34.8 KG/M2

## 2019-07-31 DIAGNOSIS — E11.42 DIABETIC POLYNEUROPATHY ASSOCIATED WITH TYPE 2 DIABETES MELLITUS: ICD-10-CM

## 2019-07-31 DIAGNOSIS — E11.9 DM TYPE 2 WITHOUT RETINOPATHY: ICD-10-CM

## 2019-07-31 DIAGNOSIS — I10 ESSENTIAL HYPERTENSION: ICD-10-CM

## 2019-07-31 DIAGNOSIS — Z12.31 ENCOUNTER FOR SCREENING MAMMOGRAM FOR BREAST CANCER: ICD-10-CM

## 2019-07-31 DIAGNOSIS — E78.49 OTHER HYPERLIPIDEMIA: ICD-10-CM

## 2019-07-31 DIAGNOSIS — L98.9 SKIN DISORDER: Primary | ICD-10-CM

## 2019-07-31 PROCEDURE — 99214 OFFICE O/P EST MOD 30 MIN: CPT | Mod: HCNC,S$GLB,, | Performed by: INTERNAL MEDICINE

## 2019-07-31 PROCEDURE — 77067 SCR MAMMO BI INCL CAD: CPT | Mod: 26,HCNC,, | Performed by: RADIOLOGY

## 2019-07-31 PROCEDURE — 1101F PR PT FALLS ASSESS DOC 0-1 FALLS W/OUT INJ PAST YR: ICD-10-PCS | Mod: HCNC,CPTII,S$GLB, | Performed by: INTERNAL MEDICINE

## 2019-07-31 PROCEDURE — 99999 PR PBB SHADOW E&M-EST. PATIENT-LVL V: ICD-10-PCS | Mod: PBBFAC,HCNC,, | Performed by: INTERNAL MEDICINE

## 2019-07-31 PROCEDURE — 99999 PR PBB SHADOW E&M-EST. PATIENT-LVL V: CPT | Mod: PBBFAC,HCNC,, | Performed by: INTERNAL MEDICINE

## 2019-07-31 PROCEDURE — 77063 MAMMO DIGITAL SCREENING BILAT WITH TOMOSYNTHESIS_CAD: ICD-10-PCS | Mod: 26,HCNC,, | Performed by: RADIOLOGY

## 2019-07-31 PROCEDURE — 77067 MAMMO DIGITAL SCREENING BILAT WITH TOMOSYNTHESIS_CAD: ICD-10-PCS | Mod: 26,HCNC,, | Performed by: RADIOLOGY

## 2019-07-31 PROCEDURE — 1101F PT FALLS ASSESS-DOCD LE1/YR: CPT | Mod: HCNC,CPTII,S$GLB, | Performed by: INTERNAL MEDICINE

## 2019-07-31 PROCEDURE — 77063 BREAST TOMOSYNTHESIS BI: CPT | Mod: 26,HCNC,, | Performed by: RADIOLOGY

## 2019-07-31 PROCEDURE — 77067 SCR MAMMO BI INCL CAD: CPT | Mod: TC,HCNC

## 2019-07-31 PROCEDURE — 99214 PR OFFICE/OUTPT VISIT, EST, LEVL IV, 30-39 MIN: ICD-10-PCS | Mod: HCNC,S$GLB,, | Performed by: INTERNAL MEDICINE

## 2019-07-31 NOTE — PROGRESS NOTES
Subjective:      Patient ID: Calli Jamison is a 88 y.o. female.    Chief Complaint: Follow-up    HPI:  HPI   Patient is here in follow-up of diabetes but also in follow-up of a intermittent discomfort to her feet.  Based on the EMG finding she has diabetic neuropathy.  She questions with her good control of her A1c how this can happen and I did explain to her that it does happen.  She does not need any medication.  Her neurologist is recently  but I do not feel that she needs to establish with another neurologist unless something changes or her symptoms worsen or are not controlled by medication.    Her blood pressure is well controlled today.  Diabetes Management Status    Statin: Taking  ACE/ARB: Taking    Screening or Prevention Patient's value Goal Complete/Controlled?   HgA1C Testing and Control   Lab Results   Component Value Date    HGBA1C 6.0 (H) 2019      Annually/Less than 8% Yes   Lipid profile : 2019 Annually Yes   LDL control Lab Results   Component Value Date    LDLCALC 43.6 (L) 2019    Annually/Less than 100 mg/dl  Yes   Nephropathy screening Lab Results   Component Value Date    LABMICR 37.0 2015     Lab Results   Component Value Date    PROTEINUA Negative 2011    Annually No   Blood pressure BP Readings from Last 1 Encounters:   19 132/80    Less than 140/90 Yes   Dilated retinal exam : 2018 Annually No   Foot exam   : 2018 Annually No       Patient Active Problem List   Diagnosis    Sleep apnea    Iron deficiency    Asthma with allergic rhinitis    Pemphigoid    Multiple thyroid nodules    Bilateral carpal tunnel syndrome    Diverticulosis    GERD (gastroesophageal reflux disease)    Personal history of solitary pulmonary nodule    Pseudophakia    Angina pectoris    COPD (chronic obstructive pulmonary disease)    Restless legs syndrome (RLS)    Cerebral microvascular disease    Refractive error    Adjustment disorder with  mixed anxiety and depressed mood    Crohn's disease of both small and large intestine    Paroxysmal atrial fibrillation    Esophageal stricture    Essential hypertension    DM type 2 without retinopathy    Glaucoma suspect of both eyes    PCO (posterior capsular opacification), bilateral    History of TIA (transient ischemic attack)    Dry eye syndrome, bilateral    Type 2 diabetes mellitus with renal manifestations    Aortic atherosclerosis    Spinal stenosis of lumbar region: CT 2017    Stage 2 chronic kidney disease    Class 1 obesity in adult    Lumbar facet arthropathy    Polyneuropathy in diseases classified elsewhere     Peripheral sensory neuropathy    Inflammatory spondylopathy of lumbar region    Diabetic polyneuropathy associated with type 2 diabetes mellitus    Other hyperlipidemia     Past Medical History:   Diagnosis Date    Abnormal Pap smear     After-cataract, unspecified - Both Eyes 11/5/2012    Arthritis     ASCUS (atypical squamous cells of undetermined significance) on Pap smear 7/1/2012    Asthma     Atrial fibrillation     Back pain 2009    Bullous pemphigoid     Carpal tunnel syndrome     Chronic back pain     COPD (chronic obstructive pulmonary disease)     Coronary artery disease     Crohn's disease     Depression     Diabetes mellitus     Discoid lupus     Diverticulosis     History of migraine headaches     Hyperlipidemia     Hypertension     Multiple thyroid nodules     Obesity     Personal history of colonic polyps     Pulmonary nodule     Renal manifestation of secondary diabetes mellitus     Restless legs syndrome     Sciatica     Right leg    Sleep apnea     Stricture and stenosis of esophagus     Stroke     TIA (transient ischemic attack)      Past Surgical History:   Procedure Laterality Date    BREAST BIOPSY Left     Breast: wire localization  1/2004    CATARACT EXTRACTION W/  INTRAOCULAR LENS IMPLANT Bilateral     Cataract  left eye  1/2008    Cataract: right eye  1/2008    COLON SURGERY      ileocecectomy 2011    COLONOSCOPY  9/30/2008    Crohns in remission    EGD (ESOPHAGOGASTRODUODENOSCOPY) N/A 7/24/2018    [facility]    EGD (ESOPHAGOGASTRODUODENOSCOPY) N/A 3/27/2014    [facility] ([facility] F[facility])    EGD (ESOPHAGOGASTRODUODENOSCOPY) N/A 4/9/2013    [facility] (4TH FLR)    ESOPHAGOGASTRODUODENOSCOPY (EGD) N/A 3/29/2016    [facility] (4TH FLR)    ESOPHAGOGASTRODUODENOSCOPY (EGD) N/A 2/16/2016    [facility] (4[facility] FLR)    HYSTERECTOMY      ovaries remain    Iliocolic Crohns Disease with stricure  3/2011    [place] iliocecectomy    Laparoscopic-assisted ileocecectomy.       Left carpal tunnel      Left carpal tunnel surgery      Rectal fistulae repair      TONSILLECTOMY      UPPER GASTROINTESTINAL ENDOSCOPY  11/2011    hiatal hernia, benign appearing esophageal  stricture dilated  prn repeat rec.     Family History   Problem Relation Age of Onset    Pneumonia Father     Cataracts Mother     Macular degeneration Mother     Hypertension Sister     Alkaptonuria Maternal Grandfather     Asthma Son     No Known Problems Son     No Known Problems Son     No Known Problems Son     Crohn's disease Neg Hx     Ulcerative colitis Neg Hx     Inflammatory bowel disease Neg Hx     Amblyopia Neg Hx     Blindness Neg Hx     Glaucoma Neg Hx     Retinal detachment Neg Hx     Strabismus Neg Hx      Review of Systems   Constitutional: Negative for chills, fever and unexpected weight change.   HENT: Negative for trouble swallowing.    Respiratory: Negative for cough, shortness of breath and wheezing.    Cardiovascular: Negative for chest pain and palpitations.   Gastrointestinal: Negative for abdominal distention, abdominal pain, blood in stool and vomiting.  "  Musculoskeletal: Negative for back pain.     Objective:     Vitals:    07/31/19 1413   BP: 132/80   Pulse: 81   SpO2: 99%   Weight: 83.6 kg (184 lb 4.9 oz)   Height: 5' 1" (1.549 m)   PainSc:   3     Body mass index is 34.82 kg/m².  Physical Exam   Constitutional: She is oriented to person, place, and time. She appears well-developed and well-nourished. No distress.   Neck: Carotid bruit is not present. No thyromegaly present.   Cardiovascular: Normal rate, regular rhythm and normal heart sounds. PMI is not displaced.   Pulmonary/Chest: Effort normal and breath sounds normal. No respiratory distress.   Abdominal: Soft. Bowel sounds are normal. She exhibits no distension. There is no tenderness.   Musculoskeletal: She exhibits no edema.   Neurological: She is alert and oriented to person, place, and time.     Assessment:     1. Skin disorder    2. DM type 2 without retinopathy    3. Diabetic polyneuropathy associated with type 2 diabetes mellitus    4. Other hyperlipidemia    5. Essential hypertension      Plan:   Calli was seen today for follow-up.    Diagnoses and all orders for this visit:    Skin disorder patient is concerned about a darkening in her skin which is been present for awhile as we can see it on her photo taken for her chart.  -     Ambulatory consult to Dermatology    DM type 2 without retinopathy patient needs to update health maintenance with lab and consult  -     Ambulatory consult to Podiatry  -     Ambulatory consult to Optometry  -     CBC auto differential; Future  -     Comprehensive metabolic panel; Future  -     Hemoglobin A1c; Future    Diabetic polyneuropathy associated with type 2 diabetes mellitus newly diagnosed patient states that she does not need any medication    Other hyperlipidemia continue statin no significant side effects    Essential hypertension well controlled continue same meds        Problem List Items Addressed This Visit     Essential hypertension    DM type 2 " without retinopathy    Relevant Orders    Ambulatory consult to Podiatry    Ambulatory consult to Optometry    CBC auto differential (Completed)    Comprehensive metabolic panel (Completed)    Hemoglobin A1c    Diabetic polyneuropathy associated with type 2 diabetes mellitus    Overview     EMG 2019         Other hyperlipidemia      Other Visit Diagnoses     Skin disorder    -  Primary    Relevant Orders    Ambulatory consult to Dermatology        Orders Placed This Encounter   Procedures    CBC auto differential     Standing Status:   Future     Number of Occurrences:   1     Standing Expiration Date:   9/29/2019    Comprehensive metabolic panel     Standing Status:   Future     Number of Occurrences:   1     Standing Expiration Date:   9/29/2019    Hemoglobin A1c     Standing Status:   Future     Number of Occurrences:   1     Standing Expiration Date:   9/29/2019    Ambulatory consult to Dermatology     Referral Priority:   Routine     Referral Type:   Consultation     Referral Reason:   Specialty Services Required     Requested Specialty:   Dermatology     Number of Visits Requested:   1    Ambulatory consult to Podiatry     Referral Priority:   Routine     Referral Type:   Consultation     Referral Reason:   Specialty Services Required     Requested Specialty:   Podiatry     Number of Visits Requested:   1    Ambulatory consult to Optometry     Referral Priority:   Routine     Referral Type:   [facility] (Optometry)     Referral Reason:   Specialty Services Required     Requested Specialty:   Optometry     Number of Visits Requested:   1     Follow up in about 4 months (around 11/30/2019) for Follow up.     Medication List           Accurate as of 7/31/19  6:28 PM. If you have any questions, ask your nurse or doctor.               CHANGE how you take these medications    * metFORMIN 500 MG tablet  Commonly known as:  GLUCOPHAGE  Take 1 tablet (500 mg total) by mouth 2 (two) times daily.  What changed:     · when to take this  · additional instructions     * metFORMIN 500 MG tablet  Commonly known as:  GLUCOPHAGE  TAKE 1 TABLET BY MOUTH TWICE A DAY  What changed:  Another medication with the same name was changed. Make sure you understand how and when to take each.         * This list has 2 medication(s) that are the same as other medications prescribed for you. Read the directions carefully, and ask your doctor or other care provider to review them with you.            CONTINUE taking these medications    ACCU-CHEK FASTCLIX LANCET DRUM [facility]  Generic drug:  lancets  ONE LANCET PER GLUCOSE TESTING TWICE A DAY: INSURANCE BRAND PREFERENCE     ACCU-CHEK SMARTVIEW TEST STRIP Strp  Generic drug:  blood sugar diagnostic  ONE TEST STRIP PER GLUCOSE TESTING TWICE A DAY: INSURANCE BRAND PREFERENCE     acetaminophen 500 MG tablet  Commonly known as:  TYLENOL  Take 1-2 tablets (500-1,000 mg total) by mouth every 12 (twelve) hours as needed for Pain.     aspirin 81 MG EC tablet  Commonly known as:  ECOTRIN     atorvastatin 20 MG tablet  Commonly known as:  LIPITOR  TAKE 1 TABLET BY MOUTH EVERY DAY     azithromycin 250 MG tablet  Commonly known as:  Z-MARII  2 initially then one daily     blood-glucose meter Misc  Dispense one meter: Insurance Brand Preference     CALCIUM CITRATE + D 315-200 mg-unit per tablet  Generic drug:  calcium citrate-vitamin D3 315-200 mg     conjugated estrogens vaginal cream  Commonly known as:  PREMARIN  fnger tip twice  Per week     flash glucose scanning reader Misc  Commonly known as:  FREESTYLE SHAYLA 14 DAY READER  1 Units by Misc.(Non-Drug; Combo Route) route 4 (four) times daily as needed.     flash glucose sensor Kit  Commonly known as:  FREESTYLE SHAYLA 14 DAY SENSOR  1 kit by Misc.(Non-Drug; Combo Route) route 4 (four) times daily as needed.     fluocinonide 0.05% 0.05 % cream  Commonly known as:  LIDEX  Apply topically 2 (two) times daily as needed. AAA scalp qd bid.     fluticasone propionate  110 mcg/actuation inhaler  Commonly known as:  FLOVENT HFA  USE 2 PUFFS BY MOUTH TWICE A DAY FOR ASTHMA     furosemide 20 MG tablet  Commonly known as:  LASIX  TAKE 1 TABLET BY MOUTH ONCE DAILY.     latanoprost 0.005 % ophthalmic solution  PLACE 1 DROP INTO BOTH EYES NIGHTLY.     omeprazole 20 MG capsule  Commonly known as:  PRILOSEC     potassium chloride 10 MEQ Cpsr  Commonly known as:  MICRO-K  OPEN 1 CAPSULE AND SPRINKLE OVER UNHEATED SOFT FOOD (APPLESAUCE) EVERY DAY. DO NOT CHEW OR CRUSH     rOPINIRole 0.25 MG tablet  Commonly known as:  REQUIP  Take 2 tablets (0.5 mg total) by mouth nightly as needed. As directed.     terazosin 1 MG capsule  Commonly known as:  HYTRIN  TAKE 1 CAPSULE (1 MG TOTAL) BY MOUTH EVERY EVENING.     valsartan 160 MG tablet  Commonly known as:  DIOVAN  TAKE 2 TABLETS (320 MG TOTAL) BY MOUTH ONCE DAILY.     VENTOLIN HFA 90 mcg/actuation inhaler  Generic drug:  albuterol  INHALE 2 PUFFS INTO THE LUNGS EVERY 4 TO 6 HOURS AS NEEDED. RESCUE     verapamil 360 MG C24p  Commonly known as:  VERELAN  Take 1 capsule (360 mg total) by mouth once daily.     VITAMIN B-12 500 MCG tablet  Generic drug:  cyanocobalamin

## 2019-08-02 ENCOUNTER — TELEPHONE (OUTPATIENT)
Dept: INTERNAL MEDICINE | Facility: CLINIC | Age: 84
End: 2019-08-02

## 2019-08-02 NOTE — TELEPHONE ENCOUNTER
----- Message from Sheridan Hurd MD sent at 8/1/2019  5:25 PM CDT -----  Please tell the patient that overall her labs are very good in particular her A1c is 6.2.

## 2019-09-11 ENCOUNTER — OFFICE VISIT (OUTPATIENT)
Dept: OPHTHALMOLOGY | Facility: CLINIC | Age: 84
End: 2019-09-11
Payer: MEDICARE

## 2019-09-11 DIAGNOSIS — H40.023 OPEN ANGLE WITH BORDERLINE FINDINGS AND HIGH GLAUCOMA RISK IN BOTH EYES: ICD-10-CM

## 2019-09-11 DIAGNOSIS — E11.9 DM TYPE 2 WITHOUT RETINOPATHY: ICD-10-CM

## 2019-09-11 DIAGNOSIS — H26.493 PCO (POSTERIOR CAPSULAR OPACIFICATION), BILATERAL: ICD-10-CM

## 2019-09-11 DIAGNOSIS — H04.123 DRY EYE SYNDROME, BILATERAL: ICD-10-CM

## 2019-09-11 DIAGNOSIS — Z96.1 PSEUDOPHAKIA: ICD-10-CM

## 2019-09-11 DIAGNOSIS — H40.003 GLAUCOMA SUSPECT OF BOTH EYES: Primary | ICD-10-CM

## 2019-09-11 PROCEDURE — 99499 RISK ADDL DX/OHS AUDIT: ICD-10-PCS | Mod: HCNC,S$GLB,, | Performed by: OPHTHALMOLOGY

## 2019-09-11 PROCEDURE — 99499 UNLISTED E&M SERVICE: CPT | Mod: HCNC,S$GLB,, | Performed by: OPHTHALMOLOGY

## 2019-09-11 PROCEDURE — 92014 PR EYE EXAM, EST PATIENT,COMPREHESV: ICD-10-PCS | Mod: HCNC,S$GLB,, | Performed by: OPHTHALMOLOGY

## 2019-09-11 PROCEDURE — 99999 PR PBB SHADOW E&M-EST. PATIENT-LVL II: CPT | Mod: PBBFAC,HCNC,, | Performed by: OPHTHALMOLOGY

## 2019-09-11 PROCEDURE — 99999 PR PBB SHADOW E&M-EST. PATIENT-LVL II: ICD-10-PCS | Mod: PBBFAC,HCNC,, | Performed by: OPHTHALMOLOGY

## 2019-09-11 PROCEDURE — 92014 COMPRE OPH EXAM EST PT 1/>: CPT | Mod: HCNC,S$GLB,, | Performed by: OPHTHALMOLOGY

## 2019-09-11 NOTE — LETTER
September 11, 2019      Sheridan Hurd MD  [address]  [facility] [address]           [place] [facility]  [address] [facility].  [place] [address]  Phone: [phone]  Fax: [phone]          Patient: Calli Jamison   MR Number: [id number]   YOB: 1931   Date of Visit: 9/11/2019       Dear Dr. Sheridan Hurd:    Thank you for referring Calli Jamison to me for evaluation. Attached you will find relevant portions of my assessment and plan of care.    If you have questions, please do not hesitate to call me. I look forward to following Calli Jamison along with you.    Sincerely,    Javid Head MD    Enclosure  CC:  No Recipients    If you would like to receive this communication electronically, please contact [email or web address] or [phone] to request more information on [employer] access.    For providers and/or their staff who would like to refer a patient to [facility], please contact us through our one-stop-shop provider referral line, [facility], at [phone].    If you feel you have received this communication in error or would no longer like to receive these types of communications, please e-mail [email or web address]

## 2019-09-11 NOTE — PROGRESS NOTES
Subjective:       Patient ID: Calli Jamison is a 88 y.o. female.    Chief Complaint: Glaucoma    HPI     DSL- 7/18/18     89 y/o female is here for IOP and DFE. Pt is aware that HVF is   out-of-service. Pt states no Va change. Pt is doing gtts as directed.     Eyemeds  Latanoprost OU QHS    Last edited by Lauren Hope on 9/11/2019 10:22 AM. (History)             Assessment:       1. Glaucoma suspect of both eyes    2. Open angle with borderline findings and high glaucoma risk in both eyes    3. PCO (posterior capsular opacification), bilateral    4. Dry eye syndrome, bilateral    5. DM type 2 without retinopathy    6. Pseudophakia        Plan:       Glaucoma suspect OU-IOP's are acceptable OU. ON's appear stable OU.  Early PCO OU- Not Visually Significant.     TRISTON-Doing well.  DM-No NPDR OU.        Cont latanoprost OU.  AT's.  Control DM.  RTC 6 mos for IOP's, HVF's & OCT's.

## 2019-09-27 RX ORDER — FUROSEMIDE 20 MG/1
TABLET ORAL
Qty: 90 TABLET | Refills: 1 | Status: SHIPPED | OUTPATIENT
Start: 2019-09-27 | End: 2020-03-29

## 2019-10-14 RX ORDER — POTASSIUM CHLORIDE 750 MG/1
CAPSULE, EXTENDED RELEASE ORAL
Qty: 30 CAPSULE | Refills: 10 | Status: SHIPPED | OUTPATIENT
Start: 2019-10-14 | End: 2019-12-02 | Stop reason: SDUPTHER

## 2019-10-25 ENCOUNTER — HOSPITAL ENCOUNTER (OUTPATIENT)
Facility: HOSPITAL | Age: 84
Discharge: HOME | End: 2019-10-26
Attending: EMERGENCY MEDICINE | Admitting: PSYCHIATRY & NEUROLOGY
Payer: MEDICARE

## 2019-10-25 DIAGNOSIS — R41.3 POOR SHORT TERM MEMORY: ICD-10-CM

## 2019-10-25 DIAGNOSIS — G45.9 TIA (TRANSIENT ISCHEMIC ATTACK): Primary | ICD-10-CM

## 2019-10-25 PROBLEM — Z45.09 ENCOUNTER FOR LOOP RECORDER CHECK: Status: ACTIVE | Noted: 2019-10-25

## 2019-10-25 PROBLEM — E11.9 TYPE 2 DIABETES MELLITUS, WITHOUT LONG-TERM CURRENT USE OF INSULIN: Status: ACTIVE | Noted: 2017-06-28

## 2019-10-25 PROBLEM — I63.9 STROKE: Status: ACTIVE | Noted: 2019-10-25

## 2019-10-25 PROBLEM — Z95.818 STATUS POST PLACEMENT OF IMPLANTABLE LOOP RECORDER: Status: ACTIVE | Noted: 2019-10-25

## 2019-10-25 LAB
ALBUMIN SERPL BCP-MCNC: 3.8 G/DL (ref 3.5–5.2)
ALP SERPL-CCNC: 112 U/L (ref 55–135)
ALT SERPL W/O P-5'-P-CCNC: 18 U/L (ref 10–44)
ANION GAP SERPL CALC-SCNC: 9 MMOL/L (ref 8–16)
AST SERPL-CCNC: 18 U/L (ref 10–40)
BASOPHILS # BLD AUTO: 0.05 K/UL (ref 0–0.2)
BASOPHILS NFR BLD: 0.5 % (ref 0–1.9)
BILIRUB SERPL-MCNC: 0.6 MG/DL (ref 0.1–1)
BILIRUB UR QL STRIP: NEGATIVE
BUN SERPL-MCNC: 14 MG/DL (ref 8–23)
CALCIUM SERPL-MCNC: 10.1 MG/DL (ref 8.7–10.5)
CHLORIDE SERPL-SCNC: 107 MMOL/L (ref 95–110)
CHOLEST SERPL-MCNC: 109 MG/DL (ref 120–199)
CHOLEST/HDLC SERPL: 2.1 {RATIO} (ref 2–5)
CLARITY UR REFRACT.AUTO: CLEAR
CO2 SERPL-SCNC: 26 MMOL/L (ref 23–29)
COLOR UR AUTO: YELLOW
CREAT SERPL-MCNC: 0.9 MG/DL (ref 0.5–1.4)
CREAT SERPL-MCNC: 0.9 MG/DL (ref 0.5–1.4)
DIFFERENTIAL METHOD: ABNORMAL
EOSINOPHIL # BLD AUTO: 0.4 K/UL (ref 0–0.5)
EOSINOPHIL NFR BLD: 4.5 % (ref 0–8)
ERYTHROCYTE [DISTWIDTH] IN BLOOD BY AUTOMATED COUNT: 16.3 % (ref 11.5–14.5)
EST. GFR  (AFRICAN AMERICAN): >60 ML/MIN/1.73 M^2
EST. GFR  (NON AFRICAN AMERICAN): 57.3 ML/MIN/1.73 M^2
ESTIMATED AVG GLUCOSE: 126 MG/DL (ref 68–131)
GLUCOSE SERPL-MCNC: 90 MG/DL (ref 70–110)
GLUCOSE UR QL STRIP: NEGATIVE
HBA1C MFR BLD HPLC: 6 % (ref 4–5.6)
HCT VFR BLD AUTO: 41.2 % (ref 37–48.5)
HDLC SERPL-MCNC: 53 MG/DL (ref 40–75)
HDLC SERPL: 48.6 % (ref 20–50)
HGB BLD-MCNC: 12.4 G/DL (ref 12–16)
HGB UR QL STRIP: NEGATIVE
IMM GRANULOCYTES # BLD AUTO: 0.03 K/UL (ref 0–0.04)
IMM GRANULOCYTES NFR BLD AUTO: 0.3 % (ref 0–0.5)
INR PPP: 1.1 (ref 0.8–1.2)
KETONES UR QL STRIP: NEGATIVE
LDLC SERPL CALC-MCNC: 40.4 MG/DL (ref 63–159)
LEUKOCYTE ESTERASE UR QL STRIP: NEGATIVE
LYMPHOCYTES # BLD AUTO: 2.6 K/UL (ref 1–4.8)
LYMPHOCYTES NFR BLD: 28.6 % (ref 18–48)
MCH RBC QN AUTO: 26.7 PG (ref 27–31)
MCHC RBC AUTO-ENTMCNC: 30.1 G/DL (ref 32–36)
MCV RBC AUTO: 89 FL (ref 82–98)
MONOCYTES # BLD AUTO: 0.6 K/UL (ref 0.3–1)
MONOCYTES NFR BLD: 6.8 % (ref 4–15)
NEUTROPHILS # BLD AUTO: 5.5 K/UL (ref 1.8–7.7)
NEUTROPHILS NFR BLD: 59.3 % (ref 38–73)
NITRITE UR QL STRIP: NEGATIVE
NONHDLC SERPL-MCNC: 56 MG/DL
NRBC BLD-RTO: 0 /100 WBC
PH UR STRIP: 6 [PH] (ref 5–8)
PLATELET # BLD AUTO: 284 K/UL (ref 150–350)
PMV BLD AUTO: 10.5 FL (ref 9.2–12.9)
POC PTINR: 1.5 (ref 0.9–1.2)
POC PTWBT: 17.8 SEC (ref 9.7–14.3)
POCT GLUCOSE: 96 MG/DL (ref 70–110)
POTASSIUM SERPL-SCNC: 3.8 MMOL/L (ref 3.5–5.1)
PROT SERPL-MCNC: 7.9 G/DL (ref 6–8.4)
PROT UR QL STRIP: NEGATIVE
PROTHROMBIN TIME: 11.2 SEC (ref 9–12.5)
RBC # BLD AUTO: 4.65 M/UL (ref 4–5.4)
SAMPLE: ABNORMAL
SAMPLE: NORMAL
SODIUM SERPL-SCNC: 142 MMOL/L (ref 136–145)
SP GR UR STRIP: 1 (ref 1–1.03)
TRIGL SERPL-MCNC: 78 MG/DL (ref 30–150)
TSH SERPL DL<=0.005 MIU/L-ACNC: 1.72 UIU/ML (ref 0.4–4)
URN SPEC COLLECT METH UR: NORMAL
WBC # BLD AUTO: 9.24 K/UL (ref 3.9–12.7)

## 2019-10-25 PROCEDURE — 63600175 PHARM REV CODE 636 W HCPCS: Mod: HCNC | Performed by: PHYSICIAN ASSISTANT

## 2019-10-25 PROCEDURE — 99285 EMERGENCY DEPT VISIT HI MDM: CPT | Mod: ,,, | Performed by: EMERGENCY MEDICINE

## 2019-10-25 PROCEDURE — 85025 COMPLETE CBC W/AUTO DIFF WBC: CPT | Mod: HCNC

## 2019-10-25 PROCEDURE — 82565 ASSAY OF CREATININE: CPT | Mod: HCNC

## 2019-10-25 PROCEDURE — 25500020 PHARM REV CODE 255: Mod: HCNC | Performed by: EMERGENCY MEDICINE

## 2019-10-25 PROCEDURE — 83036 HEMOGLOBIN GLYCOSYLATED A1C: CPT | Mod: HCNC

## 2019-10-25 PROCEDURE — 80053 COMPREHEN METABOLIC PANEL: CPT | Mod: HCNC

## 2019-10-25 PROCEDURE — 93005 ELECTROCARDIOGRAM TRACING: CPT | Mod: HCNC

## 2019-10-25 PROCEDURE — 93010 EKG 12-LEAD: ICD-10-PCS | Mod: HCNC,,, | Performed by: INTERNAL MEDICINE

## 2019-10-25 PROCEDURE — 81003 URINALYSIS AUTO W/O SCOPE: CPT | Mod: HCNC

## 2019-10-25 PROCEDURE — 99285 EMERGENCY DEPT VISIT HI MDM: CPT | Mod: 25,HCNC

## 2019-10-25 PROCEDURE — G0378 HOSPITAL OBSERVATION PER HR: HCPCS | Mod: HCNC

## 2019-10-25 PROCEDURE — 84443 ASSAY THYROID STIM HORMONE: CPT | Mod: HCNC

## 2019-10-25 PROCEDURE — 80061 LIPID PANEL: CPT | Mod: HCNC

## 2019-10-25 PROCEDURE — 85610 PROTHROMBIN TIME: CPT | Mod: HCNC

## 2019-10-25 PROCEDURE — 99285 PR EMERGENCY DEPT VISIT,LEVEL V: ICD-10-PCS | Mod: ,,, | Performed by: EMERGENCY MEDICINE

## 2019-10-25 PROCEDURE — 93010 ELECTROCARDIOGRAM REPORT: CPT | Mod: HCNC,,, | Performed by: INTERNAL MEDICINE

## 2019-10-25 PROCEDURE — 85610 PROTHROMBIN TIME: CPT | Mod: HCNC,91

## 2019-10-25 PROCEDURE — 99900035 HC TECH TIME PER 15 MIN (STAT): Mod: HCNC

## 2019-10-25 PROCEDURE — 63600175 PHARM REV CODE 636 W HCPCS: Mod: HCNC | Performed by: EMERGENCY MEDICINE

## 2019-10-25 RX ORDER — SODIUM CHLORIDE 0.9 % (FLUSH) 0.9 %
10 SYRINGE (ML) INJECTION
Status: DISCONTINUED | OUTPATIENT
Start: 2019-10-25 | End: 2019-10-26 | Stop reason: HOSPADM

## 2019-10-25 RX ORDER — ONDANSETRON 8 MG/1
8 TABLET, ORALLY DISINTEGRATING ORAL EVERY 8 HOURS PRN
Status: DISCONTINUED | OUTPATIENT
Start: 2019-10-25 | End: 2019-10-26 | Stop reason: HOSPADM

## 2019-10-25 RX ORDER — LATANOPROST 50 UG/ML
1 SOLUTION/ DROPS OPHTHALMIC NIGHTLY
Status: DISCONTINUED | OUTPATIENT
Start: 2019-10-25 | End: 2019-10-26 | Stop reason: HOSPADM

## 2019-10-25 RX ORDER — INSULIN ASPART 100 [IU]/ML
1-10 INJECTION, SOLUTION INTRAVENOUS; SUBCUTANEOUS
Status: DISCONTINUED | OUTPATIENT
Start: 2019-10-25 | End: 2019-10-26 | Stop reason: HOSPADM

## 2019-10-25 RX ORDER — GLUCAGON 1 MG
1 KIT INJECTION
Status: DISCONTINUED | OUTPATIENT
Start: 2019-10-25 | End: 2019-10-26 | Stop reason: HOSPADM

## 2019-10-25 RX ORDER — ACETAMINOPHEN 325 MG/1
650 TABLET ORAL EVERY 6 HOURS PRN
Status: DISCONTINUED | OUTPATIENT
Start: 2019-10-25 | End: 2019-10-26 | Stop reason: HOSPADM

## 2019-10-25 RX ORDER — IBUPROFEN 200 MG
24 TABLET ORAL
Status: DISCONTINUED | OUTPATIENT
Start: 2019-10-25 | End: 2019-10-26 | Stop reason: HOSPADM

## 2019-10-25 RX ORDER — ROPINIROLE 0.25 MG/1
0.5 TABLET, FILM COATED ORAL NIGHTLY PRN
Status: DISCONTINUED | OUTPATIENT
Start: 2019-10-25 | End: 2019-10-26 | Stop reason: HOSPADM

## 2019-10-25 RX ORDER — LORAZEPAM 2 MG/ML
1 INJECTION INTRAMUSCULAR
Status: COMPLETED | OUTPATIENT
Start: 2019-10-25 | End: 2019-10-25

## 2019-10-25 RX ORDER — DIAZEPAM 10 MG/2ML
5 INJECTION INTRAMUSCULAR ONCE AS NEEDED
Status: DISCONTINUED | OUTPATIENT
Start: 2019-10-25 | End: 2019-10-26 | Stop reason: HOSPADM

## 2019-10-25 RX ORDER — ASPIRIN 81 MG/1
81 TABLET ORAL DAILY
Status: DISCONTINUED | OUTPATIENT
Start: 2019-10-26 | End: 2019-10-26 | Stop reason: HOSPADM

## 2019-10-25 RX ORDER — IBUPROFEN 200 MG
16 TABLET ORAL
Status: DISCONTINUED | OUTPATIENT
Start: 2019-10-25 | End: 2019-10-26 | Stop reason: HOSPADM

## 2019-10-25 RX ORDER — ATORVASTATIN CALCIUM 20 MG/1
20 TABLET, FILM COATED ORAL DAILY
Status: DISCONTINUED | OUTPATIENT
Start: 2019-10-26 | End: 2019-10-26 | Stop reason: HOSPADM

## 2019-10-25 RX ORDER — ENOXAPARIN SODIUM 100 MG/ML
40 INJECTION SUBCUTANEOUS EVERY 24 HOURS
Status: DISCONTINUED | OUTPATIENT
Start: 2019-10-25 | End: 2019-10-26 | Stop reason: HOSPADM

## 2019-10-25 RX ADMIN — LORAZEPAM 1 MG: 2 INJECTION INTRAMUSCULAR; INTRAVENOUS at 11:10

## 2019-10-25 RX ADMIN — IOHEXOL 100 ML: 350 INJECTION, SOLUTION INTRAVENOUS at 09:10

## 2019-10-25 RX ADMIN — ENOXAPARIN SODIUM 40 MG: 100 INJECTION SUBCUTANEOUS at 11:10

## 2019-10-25 NOTE — ED PROVIDER NOTES
Encounter Date: 10/25/2019       History     Chief Complaint   Patient presents with    Aphasia     LSN 2130 yesterday, van -     Patient is an 88-year-old female with past medical history of TIA, atrial fibrillation who presents after her son found her with slurred speech and aphasia.  It is unclear exactly when it started as patient had not been seen since yesterday.  She did not call her neighbor this morning as she normally would and is not sure why.  When she did call the neighbor about an hour and a half ago the neighbor noticed that her speech was slurred and called the patient's son who presented to her house.  Upon arrival to the ER patient has had improvement completely in her symptoms and patient and son report she is currently at her baseline.  Patient states she takes aspirin but does not think she is on Plavix or any other blood thinners.        Review of patient's allergies indicates:   Allergen Reactions    Oxycodone Other (See Comments)     Past Medical History:   Diagnosis Date    Abnormal Pap smear     After-cataract, unspecified - Both Eyes 11/5/2012    Arthritis     ASCUS (atypical squamous cells of undetermined significance) on Pap smear 7/1/2012    Asthma     Atrial fibrillation     Back pain 2009    Bullous pemphigoid     Carpal tunnel syndrome     Chronic back pain     COPD (chronic obstructive pulmonary disease)     Coronary artery disease     Crohn's disease     Depression     Diabetes mellitus     Discoid lupus     Diverticulosis     History of migraine headaches     Hyperlipidemia     Hypertension     Multiple thyroid nodules     Obesity     Open angle with borderline findings and high glaucoma risk in both eyes 9/11/2019    Personal history of colonic polyps     Pulmonary nodule     Renal manifestation of secondary diabetes mellitus     Restless legs syndrome     Sciatica     Right leg    Sleep apnea     Stricture and stenosis of esophagus     Stroke      TIA (transient ischemic attack)      Past Surgical History:   Procedure Laterality Date    BREAST BIOPSY Left     Breast: wire localization  2004    CATARACT EXTRACTION W/  INTRAOCULAR LENS IMPLANT Bilateral     Cataract left eye  2008    Cataract: right eye  2008    COLON SURGERY      ileocecectomy     COLONOSCOPY  2008    Crohns in remission    ESOPHAGOGASTRODUODENOSCOPY N/A 2018    Procedure: EGD (ESOPHAGOGASTRODUODENOSCOPY);  Surgeon: Du Robles MD;  Location: [facility] ([address] [place] [address]);  Service: Endoscopy;  Laterality: N/A;  loop recoreder-battery dead per pt. (not active). corona dpt. to move to morning . unable to do so.EC    HYSTERECTOMY      ovaries remain    Iliocolic Crohns Disease with stricure  3/2011    LA iliocecectomy    Laparoscopic-assisted ileocecectomy.       Left carpal tunnel      Left carpal tunnel surgery      Rectal fistulae repair      TONSILLECTOMY      UPPER GASTROINTESTINAL ENDOSCOPY  2011    hiatal hernia, benign appearing esophageal  stricture dilated  prn repeat rec.     Family History   Problem Relation Age of Onset    Pneumonia Father     Cataracts Mother     Macular degeneration Mother     Hypertension Sister     Alkaptonuria Maternal Grandfather     Asthma Son     No Known Problems Son     No Known Problems Son     No Known Problems Son     Crohn's disease Neg Hx     Ulcerative colitis Neg Hx     Inflammatory bowel disease Neg Hx     Amblyopia Neg Hx     Blindness Neg Hx     Glaucoma Neg Hx     Retinal detachment Neg Hx     Strabismus Neg Hx      Social History     Tobacco Use    Smoking status: Former Smoker     Packs/day: 1.00     Years: 25.00     Pack years: 25.00     Types: Cigarettes     Last attempt to quit: 1980     Years since quittin.8    Smokeless tobacco: Never Used   Substance Use Topics    Alcohol use: No    Drug use: No     Review of Systems   Constitutional: Negative for chills and fever.   HENT:  Negative for congestion.    Eyes: Negative for visual disturbance.   Respiratory: Negative for shortness of breath.    Cardiovascular: Negative for chest pain.   Gastrointestinal: Negative for abdominal pain, diarrhea, nausea and vomiting.   Endocrine: Negative for polydipsia and polyuria.   Genitourinary: Negative for dyspareunia.   Musculoskeletal: Negative for back pain.   Skin: Negative for rash.   Allergic/Immunologic: Negative for immunocompromised state.   Neurological: Positive for facial asymmetry, weakness and numbness.   Hematological: Does not bruise/bleed easily.   Psychiatric/Behavioral: The patient is not nervous/anxious.    All other systems reviewed and are negative.      Physical Exam     Initial Vitals [10/25/19 1836]   BP Pulse Resp Temp SpO2   (!) 180/100 85 18 98.1 °F (36.7 °C) 99 %      MAP       --         Physical Exam    Nursing note and vitals reviewed.  Constitutional: She appears well-developed and well-nourished.   HENT:   Head: Normocephalic and atraumatic.   Right Ear: External ear normal.   Left Ear: External ear normal.   Eyes: Conjunctivae and EOM are normal. Pupils are equal, round, and reactive to light.   Neck: Normal range of motion. Neck supple.   Cardiovascular: Normal rate, regular rhythm and intact distal pulses.   Pulmonary/Chest: Breath sounds normal. No respiratory distress.   Abdominal: Soft.   Musculoskeletal: Normal range of motion.   Neurological: She is alert and oriented to person, place, and time. She has normal strength. No cranial nerve deficit or sensory deficit. GCS score is 15. GCS eye subscore is 4. GCS verbal subscore is 5. GCS motor subscore is 6.   Skin: Skin is warm and dry.   Psychiatric: She has a normal mood and affect. Thought content normal.         ED Course   Procedures  Labs Reviewed   CBC W/ AUTO DIFFERENTIAL - Abnormal; Notable for the following components:       Result Value    Mean Corpuscular Hemoglobin 26.7 (*)     Mean Corpuscular  Hemoglobin Conc 30.1 (*)     RDW 16.3 (*)     All other components within normal limits   COMPREHENSIVE METABOLIC PANEL - Abnormal; Notable for the following components:    eGFR if non  57.3 (*)     All other components within normal limits   LIPID PANEL - Abnormal; Notable for the following components:    Cholesterol 109 (*)     LDL Cholesterol 40.4 (*)     All other components within normal limits   HEMOGLOBIN A1C - Abnormal; Notable for the following components:    Hemoglobin A1C 6.0 (*)     All other components within normal limits    Narrative:     add on [facility] #[id number] per Maria G Gilmore MD @ 22:11    10/25/2019    ISTAT PROCEDURE - Abnormal; Notable for the following components:    POC PTWBT 17.8 (*)     POC PTINR 1.5 (*)     All other components within normal limits   PROTIME-INR   TSH   URINALYSIS, REFLEX TO URINE CULTURE    Narrative:     Preferred Collection Type->Urine, Clean Catch   HEMOGLOBIN A1C   POCT GLUCOSE   ISTAT CREATININE     EKG Readings: (Independently Interpreted)   Initial Reading: No STEMI. Previous EKG: Compared with most recent EKG Previous EKG Date: 5/22/18.   19:58 sinus rhythm, occ PAC normal axis normal intervals  Compared with most recent EKG PVCs no longer present     ECG Results          ECG 12 lead (Final result)  Result time 10/26/19 13:44:10    Final result by Interface, Lab In [facility] (10/26/19 13:44:10)                 Narrative:    Test Reason : I63.9,    Vent. Rate : 075 BPM     Atrial Rate : 075 BPM     P-R Int : 192 ms          QRS Dur : 078 ms      QT Int : 374 ms       P-R-T Axes : 066 042 019 degrees     QTc Int : 417 ms    Sinus rhythm with Premature atrial complexes  Otherwise normal ECG  When compared with ECG of 22-MAY-2018 13:49,  Premature ventricular complexes are no longer Present  Premature atrial complexes are now Present  Nonspecific T wave abnormality no longer evident in Lateral leads  Confirmed by KEILA LEOS, ANDRES ([id number]) on  10/26/2019 10:57:19 AM    Referred By: NIRMALA   SELF           Confirmed By:ANDRES PEDRAZA MD                            Imaging Results          MRI BRAIN WITHOUT CONTRAST (Final result)  Result time 10/26/19 00:22:40    Final result by Donald Cantu MD (10/26/19 00:22:40)                 Impression:      No acute intracranial abnormality.    Senescent changes.      Electronically signed by: Donald Cantu MD  Date:    10/26/2019  Time:    00:22             Narrative:    EXAMINATION:  MRI BRAIN WITHOUT CONTRAST    CLINICAL HISTORY:  Stroke;.    TECHNIQUE:  Multiplanar multisequence MR imaging of the brain was performed without contrast.    COMPARISON:  None    FINDINGS:  Intracranial compartment:    Generalized ventricular and sulcal enlargement consistent with atrophy.  No hydrocephalus.  No extra-axial blood or fluid collections.    Supratentorial white matter T2/flair hyperintense signal foci suggesting sequela of chronic small vessel ischemic change.  No mass lesion, acute hemorrhage, edema or acute infarct.    Normal vascular flow voids are preserved.    Skull/extracranial contents (limited evaluation): Bone marrow signal intensity is normal.                               CTA Head and Neck (xpd) (Final result)  Result time 10/25/19 22:49:13   Procedure changed from CT Head Without Contrast     Final result by Donald Cantu MD (10/25/19 22:49:13)                 Impression:      No acute major vascular distribution infarct or hemorrhage.  No high-grade stenosis or major vessel occlusion.    Electronically signed by resident: Mireya Mendez  Date:    10/25/2019  Time:    22:17    Electronically signed by: Donald Cantu MD  Date:    10/25/2019  Time:    22:49             Narrative:    EXAMINATION:  CTA HEAD AND NECK (XPD)    CLINICAL HISTORY:  Stroke;    TECHNIQUE:  Non contrast low dose axial images were obtained through the head. CT angiogram was performed from the level of the graeme to the top of  the head following the IV administration of 100mL of Omnipaque 350.   Sagittal and coronal reconstructions and maximum intensity projection reconstructions were performed. Arterial stenosis percentages are based on NASCET measurement criteria.    COMPARISON:  None    FINDINGS:  Intracranial Compartment:    Generalized cerebral volume loss with compensatory enlargement of ventricles and sulci without evidence of hydrocephalus.  No extra-axial blood or fluid collections.    Marked periventricular white matter hypoattenuation most consistent with chronic microvascular ischemic change.  No parenchymal mass, hemorrhage, edema, or major vascular distribution infarct.    Skull/Extracranial Contents (limited evaluation): Degenerative changes with marked disc space narrowing in the cervical spine with endplate sclerosis.  No fracture. Mastoid air cells and paranasal sinuses are essentially clear.    Non-Vascular Structures of the Neck/Thoracic Inlet (limited evaluation): Normal.    Aorta: Normal 3 vessel arch.    Carotid circulation: There is mild atherosclerotic calcification of the bilateral cavernous portion of the internal carotid arteries.  No hemodynamically significant stenosis, aneurysmal dilatation, or dissection.    Extracranial vertebral circulation: No hemodynamically significant stenosis, aneurysmal dilatation, or dissection.    Intracranial Arteries: No focal high-grade stenosis, occlusion, or aneurysm.    Venous structures (limited evaluation): Normal.                               X-Ray Chest AP Portable (Final result)  Result time 10/25/19 20:21:58    Final result by Trevor Alejo MD (10/25/19 20:21:58)                 Impression:      No detrimental change or radiographic acute intrathoracic process seen.      Electronically signed by: Trevor Alejo MD  Date:    10/25/2019  Time:    20:21             Narrative:    EXAMINATION:  XR CHEST AP PORTABLE    CLINICAL HISTORY:  Stroke;    TECHNIQUE:  Single  frontal view of the chest was performed.    COMPARISON:  Chest radiograph 03/20/2014    FINDINGS:  Monitoring leads overlie the chest.  Large body habitus.    Cardiomediastinal silhouette is midline and prominent similar to prior without convincing evidence of failure.  Few scattered linear opacities consistent with subsegmental scarring versus atelectasis.  The lungs are otherwise well expanded without large consolidation, pleural effusion or pneumothorax.  No acute osseous process seen.  PA and lateral views can be obtained.                                 Medical Decision Making:   History:   Old Medical Records: I decided to obtain old medical records.  Initial Assessment:   Pt wth slurred speech and aphasia prior to arrival which resolved at presentation to the ER  She was not a candidate for tpa or intervention given total improvement  Differential Diagnosis:   TIA, stroke, complex migraine, atrial fibrillation  Clinical Tests:   Lab Tests: Ordered and Reviewed  Radiological Study: Ordered and Reviewed  Medical Tests: Ordered and Reviewed  ED Management:  Pt likely with TIA, consulted vascular neurology who will admit for tia    Additional MDM:     NIH Stroke Scale:   Interval = baseline (upon arrival/admit)  Level of consciousness = 0 - alert  LOC questions = 0 - answers both correctly  LOC commands = 0 - performs both correctly  Best gaze = 0 - normal  Visual = 0 - no visual loss  Facial palsy = 0 - normal  Motor left arm =  0 - no drift  Motor right arm =  0 - no drift  Motor left leg = 0 - no drift  Motor right leg =  0 - no drift  Limb ataxia = 0 - absent  Sensory = 0 - normal  Best language = 0 - no aphasia  Dysarthria = 0 - normal articulation  Extinction and inattention = 0 - no neglect  NIH Stroke Scale Total = 0                ED Course as of Oct 27 1324   Fri Oct 25, 2019   1955 Patient is still very well-appearing no aphasia or slurred speech    [GK]      ED Course User Index  [GK] Maria G SAMUELS  MD Mando     Clinical Impression:       ICD-10-CM ICD-9-CM   1. TIA (transient ischemic attack) G45.9 435.9   2. Poor short term memory R41.3 780.93                                Maria G Gilmore MD  10/27/19 [id number]

## 2019-10-26 VITALS
DIASTOLIC BLOOD PRESSURE: 96 MMHG | OXYGEN SATURATION: 94 % | WEIGHT: 185 LBS | RESPIRATION RATE: 18 BRPM | HEART RATE: 85 BPM | SYSTOLIC BLOOD PRESSURE: 174 MMHG | HEIGHT: 61 IN | TEMPERATURE: 98 F | BODY MASS INDEX: 34.93 KG/M2

## 2019-10-26 LAB
ALBUMIN SERPL BCP-MCNC: 3.4 G/DL (ref 3.5–5.2)
ALP SERPL-CCNC: 102 U/L (ref 55–135)
ALT SERPL W/O P-5'-P-CCNC: 15 U/L (ref 10–44)
ANION GAP SERPL CALC-SCNC: 9 MMOL/L (ref 8–16)
APTT BLDCRRT: 27.3 SEC (ref 21–32)
ASCENDING AORTA: 3.1 CM
AST SERPL-CCNC: 17 U/L (ref 10–40)
BASOPHILS # BLD AUTO: 0.07 K/UL (ref 0–0.2)
BASOPHILS NFR BLD: 0.7 % (ref 0–1.9)
BILIRUB SERPL-MCNC: 0.5 MG/DL (ref 0.1–1)
BSA FOR ECHO PROCEDURE: 1.9 M2
BUN SERPL-MCNC: 15 MG/DL (ref 8–23)
CALCIUM SERPL-MCNC: 9.7 MG/DL (ref 8.7–10.5)
CHLORIDE SERPL-SCNC: 107 MMOL/L (ref 95–110)
CK MB SERPL-MCNC: 4.4 NG/ML (ref 0.1–6.5)
CK MB SERPL-RTO: 1.3 % (ref 0–5)
CK SERPL-CCNC: 336 U/L (ref 20–180)
CO2 SERPL-SCNC: 24 MMOL/L (ref 23–29)
CREAT SERPL-MCNC: 1.1 MG/DL (ref 0.5–1.4)
CV ECHO LV RWT: 0.47 CM
DIFFERENTIAL METHOD: ABNORMAL
DOP CALC LVOT AREA: 3.1 CM2
DOP CALC LVOT DIAMETER: 2 CM
E WAVE DECELERATION TIME: 145 MSEC
E/A RATIO: 0.71
E/E' RATIO: 10.71 M/S
ECHO LV POSTERIOR WALL: 0.9 CM (ref 0.6–1.1)
EOSINOPHIL # BLD AUTO: 0.4 K/UL (ref 0–0.5)
EOSINOPHIL NFR BLD: 4.3 % (ref 0–8)
ERYTHROCYTE [DISTWIDTH] IN BLOOD BY AUTOMATED COUNT: 16.5 % (ref 11.5–14.5)
EST. GFR  (AFRICAN AMERICAN): 51.8 ML/MIN/1.73 M^2
EST. GFR  (NON AFRICAN AMERICAN): 44.9 ML/MIN/1.73 M^2
FRACTIONAL SHORTENING: 47 % (ref 28–44)
GLUCOSE SERPL-MCNC: 97 MG/DL (ref 70–110)
HCT VFR BLD AUTO: 40.9 % (ref 37–48.5)
HGB BLD-MCNC: 12.4 G/DL (ref 12–16)
IMM GRANULOCYTES # BLD AUTO: 0.04 K/UL (ref 0–0.04)
IMM GRANULOCYTES NFR BLD AUTO: 0.4 % (ref 0–0.5)
INR PPP: 1 (ref 0.8–1.2)
INTERVENTRICULAR SEPTUM: 1 CM (ref 0.6–1.1)
LEFT INTERNAL DIMENSION IN SYSTOLE: 2 CM (ref 2.1–4)
LEFT VENTRICLE MASS INDEX: 60 G/M2
LEFT VENTRICULAR INTERNAL DIMENSION IN DIASTOLE: 3.8 CM (ref 3.5–6)
LEFT VENTRICULAR MASS: 109.03 G
LV LATERAL E/E' RATIO: 9.38 M/S
LV SEPTAL E/E' RATIO: 12.5 M/S
LYMPHOCYTES # BLD AUTO: 2.6 K/UL (ref 1–4.8)
LYMPHOCYTES NFR BLD: 25.8 % (ref 18–48)
MAGNESIUM SERPL-MCNC: 1.6 MG/DL (ref 1.6–2.6)
MCH RBC QN AUTO: 27.6 PG (ref 27–31)
MCHC RBC AUTO-ENTMCNC: 30.3 G/DL (ref 32–36)
MCV RBC AUTO: 91 FL (ref 82–98)
MONOCYTES # BLD AUTO: 0.8 K/UL (ref 0.3–1)
MONOCYTES NFR BLD: 7.6 % (ref 4–15)
MV PEAK A VEL: 1.05 M/S
MV PEAK E VEL: 0.75 M/S
NEUTROPHILS # BLD AUTO: 6.2 K/UL (ref 1.8–7.7)
NEUTROPHILS NFR BLD: 61.2 % (ref 38–73)
NRBC BLD-RTO: 0 /100 WBC
PHOSPHATE SERPL-MCNC: 3.3 MG/DL (ref 2.7–4.5)
PISA TR MAX VEL: 2.1 M/S
PLATELET # BLD AUTO: 241 K/UL (ref 150–350)
PMV BLD AUTO: 10.7 FL (ref 9.2–12.9)
POCT GLUCOSE: 108 MG/DL (ref 70–110)
POCT GLUCOSE: 88 MG/DL (ref 70–110)
POCT GLUCOSE: 99 MG/DL (ref 70–110)
POTASSIUM SERPL-SCNC: 3.9 MMOL/L (ref 3.5–5.1)
PROT SERPL-MCNC: 7 G/DL (ref 6–8.4)
PROTHROMBIN TIME: 10.5 SEC (ref 9–12.5)
RA PRESSURE: 3 MMHG
RBC # BLD AUTO: 4.5 M/UL (ref 4–5.4)
RIGHT VENTRICULAR END-DIASTOLIC DIMENSION: 3.5 CM
RV TISSUE DOPPLER FREE WALL SYSTOLIC VELOCITY 1 (APICAL 4 CHAMBER VIEW): 14 CM/S
SINUS: 2.5 CM
SODIUM SERPL-SCNC: 140 MMOL/L (ref 136–145)
STJ: 2.4 CM
TDI LATERAL: 0.08 M/S
TDI SEPTAL: 0.06 M/S
TDI: 0.07 M/S
TR MAX PG: 18 MMHG
TRICUSPID ANNULAR PLANE SYSTOLIC EXCURSION: 2.4 CM
TROPONIN I SERPL DL<=0.01 NG/ML-MCNC: <0.006 NG/ML (ref 0–0.03)
TV REST PULMONARY ARTERY PRESSURE: 21 MMHG
WBC # BLD AUTO: 10.05 K/UL (ref 3.9–12.7)

## 2019-10-26 PROCEDURE — 92523 SPEECH SOUND LANG COMPREHEN: CPT | Mod: HCNC

## 2019-10-26 PROCEDURE — 25000003 PHARM REV CODE 250: Mod: HCNC | Performed by: NURSE PRACTITIONER

## 2019-10-26 PROCEDURE — G0378 HOSPITAL OBSERVATION PER HR: HCPCS | Mod: HCNC

## 2019-10-26 PROCEDURE — 36415 COLL VENOUS BLD VENIPUNCTURE: CPT | Mod: HCNC

## 2019-10-26 PROCEDURE — 80053 COMPREHEN METABOLIC PANEL: CPT | Mod: HCNC

## 2019-10-26 PROCEDURE — 85025 COMPLETE CBC W/AUTO DIFF WBC: CPT | Mod: HCNC

## 2019-10-26 PROCEDURE — 99217 PR OBSERVATION CARE DISCHARGE: ICD-10-PCS | Mod: HCNC,GC,, | Performed by: PSYCHIATRY & NEUROLOGY

## 2019-10-26 PROCEDURE — 92610 EVALUATE SWALLOWING FUNCTION: CPT | Mod: HCNC

## 2019-10-26 PROCEDURE — 85610 PROTHROMBIN TIME: CPT | Mod: HCNC

## 2019-10-26 PROCEDURE — 84484 ASSAY OF TROPONIN QUANT: CPT | Mod: HCNC

## 2019-10-26 PROCEDURE — 25000003 PHARM REV CODE 250: Mod: HCNC | Performed by: PHYSICIAN ASSISTANT

## 2019-10-26 PROCEDURE — 84100 ASSAY OF PHOSPHORUS: CPT | Mod: HCNC

## 2019-10-26 PROCEDURE — 82550 ASSAY OF CK (CPK): CPT | Mod: HCNC

## 2019-10-26 PROCEDURE — 83735 ASSAY OF MAGNESIUM: CPT | Mod: HCNC

## 2019-10-26 PROCEDURE — 85730 THROMBOPLASTIN TIME PARTIAL: CPT | Mod: HCNC

## 2019-10-26 PROCEDURE — 82553 CREATINE MB FRACTION: CPT | Mod: HCNC

## 2019-10-26 PROCEDURE — 99217 PR OBSERVATION CARE DISCHARGE: CPT | Mod: HCNC,GC,, | Performed by: PSYCHIATRY & NEUROLOGY

## 2019-10-26 PROCEDURE — 63600175 PHARM REV CODE 636 W HCPCS: Mod: HCNC | Performed by: PHYSICIAN ASSISTANT

## 2019-10-26 RX ORDER — CLOPIDOGREL BISULFATE 75 MG/1
75 TABLET ORAL DAILY
Status: DISCONTINUED | OUTPATIENT
Start: 2019-10-26 | End: 2019-10-26 | Stop reason: HOSPADM

## 2019-10-26 RX ORDER — CLOPIDOGREL BISULFATE 75 MG/1
75 TABLET ORAL DAILY
Qty: 30 TABLET | Refills: 11 | Status: SHIPPED | OUTPATIENT
Start: 2019-10-27 | End: 2019-12-02 | Stop reason: SDUPTHER

## 2019-10-26 RX ADMIN — CLOPIDOGREL BISULFATE 75 MG: 75 TABLET ORAL at 01:10

## 2019-10-26 RX ADMIN — ACETAMINOPHEN 650 MG: 325 TABLET ORAL at 11:10

## 2019-10-26 RX ADMIN — ENOXAPARIN SODIUM 40 MG: 100 INJECTION SUBCUTANEOUS at 05:10

## 2019-10-26 RX ADMIN — ATORVASTATIN CALCIUM 20 MG: 20 TABLET, FILM COATED ORAL at 09:10

## 2019-10-26 RX ADMIN — ASPIRIN 81 MG: 81 TABLET, COATED ORAL at 09:10

## 2019-10-26 NOTE — ED NOTES
LOC: The patient is awake, alert, and oriented to place, time, situation. Affect is appropriate.  Speech is appropriate and clear. Reports expressive aphasia today at 5:30pm that has resolved.     APPEARANCE: Patient resting comfortably in no acute distress.  Patient is clean and well groomed.    SKIN: The skin is warm and dry; color consistent with ethnicity.  Patient has normal skin turgor and moist mucus membranes.  Skin intact; no breakdown, rash or bruising noted.     MUSCULOSKELETAL: Patient moving upper and lower extremities without difficulty.  Denies weakness and fatigue. Denies pain.    RESPIRATORY: Airway is open and patent. Respirations spontaneous, even, easy, and non-labored.  Patient has a normal effort and rate.  No accessory muscle use noted. Denies cough. Patient denies sob.    CARDIAC:  Normal rhythm and rate noted; NSR 75bpm with PAC's.  No peripheral edema noted. No complaints of chest pain.     ABDOMEN: Soft and non tender to palpation.  No distention noted. Patient denies n/v/d.     NEUROLOGIC: Eyes open spontaneously.  Behavior appropriate to situation.  Follows commands; facial expression symmetrical.  Purposeful motor response noted; normal sensation in all extremities. Reports intermittent B temporal head pressure. Denies blurry vision or dizziness.

## 2019-10-26 NOTE — PROGRESS NOTES
patient here for MRI, patient assisted to MRI table without difficulty, MRI safe cardiac monitor and O2 monitor applied, heart rate 81, O2 sat. 97% on RA, NAD noted at this time, will continue to monitor.

## 2019-10-26 NOTE — PROGRESS NOTES
MRI complete, patient tolerated MRI, heart rate 82, O2 sat. 97% on RA, patient assisted back to stretcher without difficulty, telemetry monitor reapplied, patient transported back to the E.R. per this writer.

## 2019-10-26 NOTE — PLAN OF CARE
Problem: SLP Goal  Goal: SLP Goal  Description  Speech Language Pathology Goals  Goals expected to be met by 11/02/19  1. Pt will tolerate a regular diet with thin liquids w/o overt S/s aspiration, I'ly  2. Pt will recall 2/3 unrelated items for immediate recall and post 3 minute filled delay, MOD I  3. Pt will answer functional problem solving questions with 90% accuracy, MOD I  4. Pt will complete further assessment of reading   5. Pt will complete higher-level verbal reasoning tasks with 90% accuracy, MOD I   6. Educate Pt and family on safety awareness and compensatory strategies for STM      Outcome: Ongoing, Progressing     SLP Evaluation completed. Patient presents with no overt S/S aspiration with lunch meal items for a regular diet with thin liquids. Pt with mild higher-level cognitive-linguistic deficits as c/b higher-level word-finding difficulty, decreased topic maintenance and decreased  STM.  REC: regular diet with thin liquids, medications whole ok, provided standard aspiration precautions and ST to continue to follow. She would benefit from ongoing OP ST and ongoing assistance with medications/time management tasks upon d/c from acute.  Findings reviewed with MD team.     PASHA Brown., [facility]-SLP  Speech-Language Pathology  Pager: [phone]  10/26/2019

## 2019-10-26 NOTE — SUBJECTIVE & OBJECTIVE
Neurologic Chief Complaint: resolved speech disturbance, TIA    Subjective:     Interval History: Patient is seen for follow-up neurological assessment and treatment recommendations:     MARCIAL. Work up complete. Will discharge home today with clinic appointment to have loop recorder replaced.     HPI, Past Medical, Family, and Social History remains the same as documented in the initial encounter.     Review of Systems   Constitutional: Positive for fatigue.   HENT: Negative for trouble swallowing and voice change.    Eyes: Negative for visual disturbance.   Respiratory: Negative for shortness of breath and wheezing.    Cardiovascular: Negative for chest pain and palpitations.   Gastrointestinal: Negative for diarrhea and vomiting.   Musculoskeletal: Negative for gait problem.   Neurological: Positive for facial asymmetry (mild nasolabial fold flattening). Negative for dizziness, speech difficulty, weakness, light-headedness and numbness.     Scheduled Meds:   aspirin  81 mg Oral Daily    atorvastatin  20 mg Oral Daily    enoxaparin  40 mg Subcutaneous Daily    latanoprost  1 drop Both Eyes Nightly     Continuous Infusions:   sodium chloride 0.9%       PRN Meds:acetaminophen, Dextrose 10% Bolus, Dextrose 10% Bolus, diazePAM, glucagon (human recombinant), glucose, glucose, insulin aspart U-100, ondansetron, rOPINIRole, sodium chloride 0.9%, sodium chloride 0.9%    Objective:     Vital Signs (Most Recent):  Temp: 97.7 °F (36.5 °C) (10/26/19 1101)  Pulse: 92 (10/26/19 1106)  Resp: 18 (10/26/19 1101)  BP: (!) 181/83 (10/26/19 1101)  SpO2: 96 % (10/26/19 1101)  BP Location: Right arm    Vital Signs Range (Last 24H):  Temp:  [97.5 °F (36.4 °C)-98.5 °F (36.9 °C)]   Pulse:  [72-96]   Resp:  [15-28]   BP: (140-181)/()   SpO2:  [95 %-99 %]   BP Location: Right arm    Physical Exam   Constitutional: She appears well-developed and well-nourished.   HENT:   Head: Normocephalic and atraumatic.   Right Ear: External ear  normal.   Left Ear: External ear normal.   Nose: Nose normal.   Mouth/Throat: Oropharynx is clear and moist.   Eyes: Pupils are equal, round, and reactive to light. Conjunctivae and EOM are normal.   Neck: Normal range of motion.   Cardiovascular: Normal rate.   Pulmonary/Chest: Effort normal.   Abdominal: Soft.   Musculoskeletal: Normal range of motion.   Neurological: She is alert. She has normal strength. No cranial nerve deficit or sensory deficit.   Skin: Skin is warm and dry. Capillary refill takes less than 2 seconds.   Psychiatric: She has a normal mood and affect. Her behavior is normal. Thought content normal.   Vitals reviewed.       Neurological Exam:   LOC: alert  Attention Span: Good   Language: No aphasia  Articulation: No dysarthria  Orientation: Person, Place, Time   Visual Fields: Full  EOM (CN III, IV, VI): Full/intact  Pupils (CN II, III): PERRL  Facial Sensation (CN V): Normal  Facial Movement (CN VII): questionable mild flattening of R nasolabial fold  Motor: strength 5/5 in all extremities  Cebellar: No evidence of appendicular or axial ataxia  Sensation: Intact to light touch, temperature and vibration  Tone: Normal tone throughout    Laboratory:  CMP:   Recent Labs   Lab 10/26/19  0627   CALCIUM 9.7   ALBUMIN 3.4*   PROT 7.0      K 3.9   CO2 24      BUN 15   CREATININE 1.1   ALKPHOS 102   ALT 15   AST 17   BILITOT 0.5     BMP:   Recent Labs   Lab 10/26/19  0627      K 3.9      CO2 24   BUN 15   CREATININE 1.1   CALCIUM 9.7     CBC:   Recent Labs   Lab 10/26/19  0804   WBC 10.05   RBC 4.50   HGB 12.4   HCT 40.9      MCV 91   MCH 27.6   MCHC 30.3*     Lipid Panel:   Recent Labs   Lab 10/25/19  1959   CHOL 109*   LDLCALC 40.4*   HDL 53   TRIG 78     Coagulation:   Recent Labs   Lab 10/26/19  0753   INR 1.0   APTT 27.3     Platelet Aggregation Study: No results for input(s): PLTAGG, PLTAGINTERP, PLTAGREGLACO, ADPPLTAGGREG in the last 168 hours.  Hgb A1C:   Recent  Labs   Lab 10/25/19  1959   HGBA1C 6.0*     TSH:   Recent Labs   Lab 10/25/19  1959   TSH 1.718       Diagnostic Results     Brain Imaging     MRI brain w/o contrast 10/25/2019  No acute intracranial abnormality.    Vessel Imaging     CTA head and neck 10/25/2019    No acute major vascular distribution infarct or hemorrhage.  No high-grade stenosis or major vessel occlusion.    Cardiac Imaging     TTE pending read

## 2019-10-26 NOTE — PT/OT/SLP EVAL
Speech Language Pathology Evaluation  Cognitive/Bedside Swallow    Patient Name:  Calli Jamison   MRN:  [id number]  Admitting Diagnosis: TIA (transient ischemic attack)    Recommendations:                  General Recommendations:  Cognitive-linguistic therapy  Diet recommendations:  Regular, Thin   Aspiration Precautions: Meds whole 1 at a time and Standard aspiration precautions   General Precautions: Standard, aspiration  Communication strategies:  provide increased time to answer    History:     Past Medical History:   Diagnosis Date    Abnormal Pap smear     After-cataract, unspecified - Both Eyes 11/5/2012    Arthritis     ASCUS (atypical squamous cells of undetermined significance) on Pap smear 7/1/2012    Asthma     Atrial fibrillation     Back pain 2009    Bullous pemphigoid     Carpal tunnel syndrome     Chronic back pain     COPD (chronic obstructive pulmonary disease)     Coronary artery disease     Crohn's disease     Depression     Diabetes mellitus     Discoid lupus     Diverticulosis     History of migraine headaches     Hyperlipidemia     Hypertension     Multiple thyroid nodules     Obesity     Open angle with borderline findings and high glaucoma risk in both eyes 9/11/2019    Personal history of colonic polyps     Pulmonary nodule     Renal manifestation of secondary diabetes mellitus     Restless legs syndrome     Sciatica     Right leg    Sleep apnea     Stricture and stenosis of esophagus     Stroke     TIA (transient ischemic attack)        Past Surgical History:   Procedure Laterality Date    BREAST BIOPSY Left     Breast: wire localization  1/2004    CATARACT EXTRACTION W/  INTRAOCULAR LENS IMPLANT Bilateral     Cataract left eye  1/2008    Cataract: right eye  1/2008    COLON SURGERY      ileocecectomy 2011    COLONOSCOPY  9/30/2008    Crohns in remission    ESOPHAGOGASTRODUODENOSCOPY N/A 7/24/2018    Procedure: EGD (ESOPHAGOGASTRODUODENOSCOPY);   "Surgeon: Du Robles MD;  Location: [facility] (4TH FLR);  Service: Endoscopy;  Laterality: N/A;  loop recoreder-battery dead per pt. (not active). corona dpt. to move to morning . unable to do so.EC    HYSTERECTOMY      ovaries remain    Iliocolic Crohns Disease with stricure  3/2011    LA iliocecectomy    Laparoscopic-assisted ileocecectomy.       Left carpal tunnel      Left carpal tunnel surgery      Rectal fistulae repair      TONSILLECTOMY      UPPER GASTROINTESTINAL ENDOSCOPY  11/2011    hiatal hernia, benign appearing esophageal  stricture dilated  prn repeat rec.       Social History: Patient lives alone in home, her son lives 3 blocks away per Pt.  She is a , mother and grandmother.     MRI: No acute intracranial abnormality.    Senescent changes.    Chest X-Rays: 10:25:19: No detrimental change or radiographic acute intrathoracic process seen.    Prior diet: reg/thin     Occupation/hobbies/homemaking: Retired, RN. Pt enjoys reading in free time.     Subjective     SLP reviewed Pt with RN, RN confirmed PT passed MARY and tolerating regular diet  SLP reviewed Pt with MD team  Pt presents calm  She explains, "I'm lonely, my children are grown and "    Pain/Comfort:  · Pain Rating 1: 0/10    Objective:   Pt seen across two sessions 2/2 eating lunch tray upon initial attempt.   Pt found sitting up on edge of bed both attempts, call light in reach.     Cognitive Status:    Attention No obvious deficits observed at the sustained level, further assessment of divided attention warranted   Perseveration Not present  Orientation: x4 to person, place, JUSTO/Year and situation.  Pt required cues to recall RUPAL x1  Memory: Pt with 90% accuracy on immediate recall tasks. Pt recalled 2 of 2 items post 1 minute filled delay with verbal prompt x1. Ongoing assessment of delayed STM warranted.  Pt with LTM intact.   Problem Solving:  Pt provided solutions for simple problem solving/household dilemmas 60% of " "attempts.  Pt with some circumlocution and required clarification for 2 of 5 responses.   Simple calculation : Pt completed simple addition task WNL. Function math/time: 75% accuracy across 4 attempts, I'ly      Receptive Language:   Comprehension:      Questions Complex yes/no 92% accuracy, I'ly   Commands  multistep basic commands WNL    Pragmatics:    Pt with appropraite eye contact and affect. Pt with some decreased topic maintenance.     Expressive Language:  Verbal:    Automatic Speech  Days of the week WNL  Naming Confrontation WNL, Convergent WNL and Single word responsive naming WNL  Sentence formulation : intact  Conversational speech : some word-findign difficulty noted at the struictured conversational level as c/b hesitations, interjections "oh what was I saying, I get distracted and talk to fast," and paraphasias ("IMR" (MRI)      Motor Speech:  WFL    Voice:   Pt with adequate vocal intensity and clear vocal quality    Visual-Spatial:  Pt completed clock drawing task with mildly rotated numbers on clock x1 equal length hands; however, was able to state which hand should be shorter for the hour hand when cued    Reading:   TBA    Written Expression:   Dominant hand: Left  Assessment hand: Left  Dictation : WNL for sentence to dictation and functional name writing task    Oral Musculature Evaluation  · Oral Musculature: right weakness  · Dentition: upper dentures  · Mucosal Quality: adequate  · Mandibular Strength and Mobility: WNL  · Oral Labial Strength and Mobility: WNL  · Lingual Strength and Mobility: WNL  · Volitional Swallow: elicited  · Voice Prior to PO Intake: clear     Bedside Swallow Eval:   Consistencies Assessed:  · Thin liquids : cup edge sips x2  · Solids : bites of pasta, brocolli x4 ea. from lunch meal items for regular tray      Oral Phase:   · WNL    Pharyngeal Phase:   · no overt clinical signs/symptoms of aspiration    Compensatory Strategies  · None    Treatment: Pt educated on " SLP Role, S/S aspiration, aspiration precautions, safety precautions, stroke awareness (FAST) and recommendations for ongoing OP ST and assistance with time/medication management upon d/c from acute. Pt at times hyperverbal and labile, expressed she was lonely. Active listening and encouragement provided. She v/u of SLP recommendations. MD notified of findings following sessions. No questions noted. Whiteboard updated.     Assessment:     Calli Jamison is a 88 y.o. female with an SLP diagnosis of functional oral and pharyngeal phases of swallow.  She presents with mild, higher-level cognitive deficits as c/b decreased STM, topic maintenance and higher-level word-finding difficulty. ST to continue to follow. She would benefit from ongoing assistance with time management/medication management tasks and OP ST upon d/c from acute.      Goals:   Multidisciplinary Problems     SLP Goals        Problem: SLP Goal    Goal Priority Disciplines Outcome   SLP Goal     SLP Ongoing, Progressing   Description:  Speech Language Pathology Goals  Goals expected to be met by 11/02/19  1. Pt will tolerate a regular diet with thin liquids w/o overt S/s aspiration, I'ly  2. Pt will recall 2/3 unrelated items for immediate recall and post 3 minute filled delay, MOD I  3. Pt will answer functional problem solving questions with 90% accuracy, MOD I  4. Pt will complete further assessment of reading   5. Pt will complete higher-level verbal reasoning tasks with 90% accuracy, MOD I   6. Educate Pt and family on safety awareness and compensatory strategies for STM                       Plan:     · Patient to be seen:  3 x/week   · Plan of Care expires:  11/25/19  · Plan of Care reviewed with:  patient   · SLP Follow-Up:  Yes       Discharge recommendations:  Discharge Facility/Level of Care Needs: outpatient speech therapy     Time Tracking:     SLP Treatment Date:   10/26/19  Speech Start Time:  1207 /15:15  Speech Stop Time:  1224   15:44   Speech Total Time (min):  17 min/ 29 min    Billable Minutes: Eval 17  and Eval Swallow and Oral Function 29    PASHA Brown., [facility]-SLP  Speech-Language Pathology  Pager: [phone]    10/26/2019

## 2019-10-26 NOTE — PROGRESS NOTES
"[facility]  Vascular Neurology  Comprehensive Stroke Center  Progress Note    Assessment/Plan:     * TIA (transient ischemic attack)  89 yo woman with PMHx HTN, DM, HLD, TIA, and remote tobacco abuse who presented with acute-onset speech difficulty which resolved prior to presentation to the ED. LKN ~2030 night prior to presentation. No tPA recommended as patient outside of treatment window and stroke symptoms are now resolved. Low suspicion for LVO based on exam. Admitted to Vascular Neurology for acute stroke workup.     NAEON. Work up complete. Will discharge home today with clinic appointment to have loop recorder replaced.     Antithrombotics for secondary stroke prevention: Antiplatelets: Aspirin: 81 mg daily , Plavix 75 mg for 30 days    Statins for secondary stroke prevention and hyperlipidemia, if present:   Statins: Atorvastatin- 20 mg daily  Aggressive risk factor modification: HTN, DM, HLD, Diet, Exercise, Obesity  Rehab efforts: The patient has been evaluated by a stroke team provider and the therapy needs have been fully considered based off the presenting complaints and exam findings. The following therapy evaluations are needed: PT evaluate and treat, OT evaluate and treat, SLP evaluate and treat  Diagnostics ordered/pending: TTE pending read    VTE prophylaxis: Enoxaparin 40 mg SQ every 24 hours  place SCDs  BP parameters: TIA: normotensive    Status post placement of implantable loop recorder  Loop recorder originally placed in 3/2014.   Pt last seen in EP clinic in 10/2017 -- At that time, had no AFib noted on ILR to date and device had reached RRT ("recommended replacement time".) Possibility of removal was discussed, however pt had elected to leave the divice in situ. Plan was for PCP follow-up with EP PRN.    MARCIAL. Work up complete. Will discharge home today with clinic appointment to have loop recorder replaced.       Other hyperlipidemia  Stroke risk factor  LDL " 40  Continue home statin    Class 1 obesity in adult  Stroke risk factor   pt on importance of diet, exercise, lifestyle modifications for secondary stroke prevention    Type 2 diabetes mellitus, without long-term current use of insulin  Stroke risk factor  A1c pending  Recommend tight glucose control  SSI while inpatient  Diabetic diet     Essential hypertension  Stroke risk factor  SBP <180  Home BP meds held  BP currently at goal will restart home meds at discharge         10/26/2019: MARCIAL. Work up complete. Will discharge home today with clinic appointment to have loop recorder replaced.     STROKE DOCUMENTATION        NIH Scale:  1a. Level of Consciousness: 0-->Alert, keenly responsive  1b. LOC Questions: 0-->Answers both questions correctly  1c. LOC Commands: 0-->Performs both tasks correctly  2. Best Gaze: 0-->Normal  3. Visual: 0-->No visual loss  4. Facial Palsy: 1-->Minor paralysis (flattened nasolabial fold, asymmetry on smiling)  5a. Motor Arm, Left: 0-->No drift, limb holds 90 (or 45) degrees for full 10 secs  5b. Motor Arm, Right: 0-->No drift, limb holds 90 (or 45) degrees for full 10 secs  6a. Motor Leg, Left: 0-->No drift, leg holds 30 degree position for full 5 secs  6b. Motor Leg, Right: 0-->No drift, leg holds 30 degree position for full 5 secs  7. Limb Ataxia: 0-->Absent  8. Sensory: 0-->Normal, no sensory loss  9. Best Language: 0-->No aphasia, normal  10. Dysarthria: 0-->Normal  11. Extinction and Inattention (formerly Neglect): 0-->No abnormality  Total (NIH Stroke Scale): 1       Modified Willy Score: 0  Chaka Coma Scale:    ABCD2 Score: 5  [id number] Score:   HAS -BLED Score:   ICH Score:   Hunt & Leon Classification:      Hemorrhagic change of an Ischemic Stroke: Does this patient have an ischemic stroke with hemorrhagic changes? No     Neurologic Chief Complaint: resolved speech disturbance, TIA    Subjective:     Interval History: Patient is seen for follow-up neurological  assessment and treatment recommendations:     MARCIAL. Work up complete. Will discharge home today with clinic appointment to have loop recorder replaced.     HPI, Past Medical, Family, and Social History remains the same as documented in the initial encounter.     Review of Systems   Constitutional: Positive for fatigue.   HENT: Negative for trouble swallowing and voice change.    Eyes: Negative for visual disturbance.   Respiratory: Negative for shortness of breath and wheezing.    Cardiovascular: Negative for chest pain and palpitations.   Gastrointestinal: Negative for diarrhea and vomiting.   Musculoskeletal: Negative for gait problem.   Neurological: Positive for facial asymmetry (mild nasolabial fold flattening). Negative for dizziness, speech difficulty, weakness, light-headedness and numbness.     Scheduled Meds:   aspirin  81 mg Oral Daily    atorvastatin  20 mg Oral Daily    enoxaparin  40 mg Subcutaneous Daily    latanoprost  1 drop Both Eyes Nightly     Continuous Infusions:   sodium chloride 0.9%       PRN Meds:acetaminophen, Dextrose 10% Bolus, Dextrose 10% Bolus, diazePAM, glucagon (human recombinant), glucose, glucose, insulin aspart U-100, ondansetron, rOPINIRole, sodium chloride 0.9%, sodium chloride 0.9%    Objective:     Vital Signs (Most Recent):  Temp: 97.7 °F (36.5 °C) (10/26/19 1101)  Pulse: 92 (10/26/19 1106)  Resp: 18 (10/26/19 1101)  BP: (!) 181/83 (10/26/19 1101)  SpO2: 96 % (10/26/19 1101)  BP Location: Right arm    Vital Signs Range (Last 24H):  Temp:  [97.5 °F (36.4 °C)-98.5 °F (36.9 °C)]   Pulse:  [72-96]   Resp:  [15-28]   BP: (140-181)/()   SpO2:  [95 %-99 %]   BP Location: Right arm    Physical Exam   Constitutional: She appears well-developed and well-nourished.   HENT:   Head: Normocephalic and atraumatic.   Right Ear: External ear normal.   Left Ear: External ear normal.   Nose: Nose normal.   Mouth/Throat: Oropharynx is clear and moist.   Eyes: Pupils are equal,  round, and reactive to light. Conjunctivae and EOM are normal.   Neck: Normal range of motion.   Cardiovascular: Normal rate.   Pulmonary/Chest: Effort normal.   Abdominal: Soft.   Musculoskeletal: Normal range of motion.   Neurological: She is alert. She has normal strength. No cranial nerve deficit or sensory deficit.   Skin: Skin is warm and dry. Capillary refill takes less than 2 seconds.   Psychiatric: She has a normal mood and affect. Her behavior is normal. Thought content normal.   Vitals reviewed.       Neurological Exam:   LOC: alert  Attention Span: Good   Language: No aphasia  Articulation: No dysarthria  Orientation: Person, Place, Time   Visual Fields: Full  EOM (CN III, IV, VI): Full/intact  Pupils (CN II, III): PERRL  Facial Sensation (CN V): Normal  Facial Movement (CN VII): questionable mild flattening of R nasolabial fold  Motor: strength 5/5 in all extremities  Cebellar: No evidence of appendicular or axial ataxia  Sensation: Intact to light touch, temperature and vibration  Tone: Normal tone throughout    Laboratory:  CMP:   Recent Labs   Lab 10/26/19  0627   CALCIUM 9.7   ALBUMIN 3.4*   PROT 7.0      K 3.9   CO2 24      BUN 15   CREATININE 1.1   ALKPHOS 102   ALT 15   AST 17   BILITOT 0.5     BMP:   Recent Labs   Lab 10/26/19  0627      K 3.9      CO2 24   BUN 15   CREATININE 1.1   CALCIUM 9.7     CBC:   Recent Labs   Lab 10/26/19  0804   WBC 10.05   RBC 4.50   HGB 12.4   HCT 40.9      MCV 91   MCH 27.6   MCHC 30.3*     Lipid Panel:   Recent Labs   Lab 10/25/19  1959   CHOL 109*   LDLCALC 40.4*   HDL 53   TRIG 78     Coagulation:   Recent Labs   Lab 10/26/19  0753   INR 1.0   APTT 27.3     Platelet Aggregation Study: No results for input(s): PLTAGG, PLTAGINTERP, PLTAGREGLACO, ADPPLTAGGREG in the last 168 hours.  Hgb A1C:   Recent Labs   Lab 10/25/19  1959   HGBA1C 6.0*     TSH:   Recent Labs   Lab 10/25/19  1959   TSH 1.718       Diagnostic Results     Brain  Imaging     MRI brain w/o contrast 10/25/2019  No acute intracranial abnormality.    Vessel Imaging     CTA head and neck 10/25/2019    No acute major vascular distribution infarct or hemorrhage.  No high-grade stenosis or major vessel occlusion.    Cardiac Imaging     TTE pending read      Sven Milan NP  [facility]  Department of Vascular Neurology   [facility]

## 2019-10-26 NOTE — SUBJECTIVE & OBJECTIVE
Past Medical History:   Diagnosis Date    Abnormal Pap smear     After-cataract, unspecified - Both Eyes 11/5/2012    Arthritis     ASCUS (atypical squamous cells of undetermined significance) on Pap smear 7/1/2012    Asthma     Atrial fibrillation     Back pain 2009    Bullous pemphigoid     Carpal tunnel syndrome     Chronic back pain     COPD (chronic obstructive pulmonary disease)     Coronary artery disease     Crohn's disease     Depression     Diabetes mellitus     Discoid lupus     Diverticulosis     History of migraine headaches     Hyperlipidemia     Hypertension     Multiple thyroid nodules     Obesity     Open angle with borderline findings and high glaucoma risk in both eyes 9/11/2019    Personal history of colonic polyps     Pulmonary nodule     Renal manifestation of secondary diabetes mellitus     Restless legs syndrome     Sciatica     Right leg    Sleep apnea     Stricture and stenosis of esophagus     Stroke     TIA (transient ischemic attack)      Past Surgical History:   Procedure Laterality Date    BREAST BIOPSY Left     Breast: wire localization  1/2004    CATARACT EXTRACTION W/  INTRAOCULAR LENS IMPLANT Bilateral     Cataract left eye  1/2008    Cataract: right eye  1/2008    COLON SURGERY      ileocecectomy 2011    COLONOSCOPY  9/30/2008    Crohns in remission    ESOPHAGOGASTRODUODENOSCOPY N/A 7/24/2018    Procedure: EGD (ESOPHAGOGASTRODUODENOSCOPY);  Surgeon: Du Robles MD;  Location: [address]);  Service: Endoscopy;  Laterality: N/A;  loop recoreder-battery dead per pt. (not active). corona dpt. to move to morning . unable to do so.EC    HYSTERECTOMY      ovaries remain    Iliocolic Crohns Disease with stricure  3/2011    LA iliocecectomy    Laparoscopic-assisted ileocecectomy.       Left carpal tunnel      Left carpal tunnel surgery      Rectal fistulae repair      TONSILLECTOMY      UPPER GASTROINTESTINAL ENDOSCOPY  11/2011     hiatal hernia, benign appearing esophageal  stricture dilated  prn repeat rec.     Family History   Problem Relation Age of Onset    Pneumonia Father     Cataracts Mother     Macular degeneration Mother     Hypertension Sister     Alkaptonuria Maternal Grandfather     Asthma Son     No Known Problems Son     No Known Problems Son     No Known Problems Son     Crohn's disease Neg Hx     Ulcerative colitis Neg Hx     Inflammatory bowel disease Neg Hx     Amblyopia Neg Hx     Blindness Neg Hx     Glaucoma Neg Hx     Retinal detachment Neg Hx     Strabismus Neg Hx      Social History     Tobacco Use    Smoking status: Former Smoker     Packs/day: 1.00     Years: 25.00     Pack years: 25.00     Types: Cigarettes     Last attempt to quit: 1980     Years since quittin.8    Smokeless tobacco: Never Used   Substance Use Topics    Alcohol use: No    Drug use: No     Review of patient's allergies indicates:   Allergen Reactions    Oxycodone Other (See Comments)       Medications: I have reviewed the current medication administration record.      (Not in a hospital admission)    Review of Systems   Constitutional: Negative for fatigue and fever.   HENT: Negative for nosebleeds and trouble swallowing.    Eyes: Negative for discharge and visual disturbance.   Respiratory: Negative for apnea and choking.    Cardiovascular: Negative for palpitations and leg swelling.   Gastrointestinal: Negative for nausea and vomiting.   Musculoskeletal: Negative for gait problem and neck stiffness.   Skin: Negative for rash and wound.   Neurological: Positive for speech difficulty and headaches. Negative for weakness and numbness.   Psychiatric/Behavioral: Negative for agitation and decreased concentration.     Objective:     Vital Signs (Most Recent):  Temp: 98.1 °F (36.7 °C) (10/25/19 1836)  Pulse: 75 (10/25/19 2130)  Resp: 18 (10/25/19 2130)  BP: (!) 161/72 (10/25/19 2130)  SpO2: 99 % (10/25/19 2130)    Vital  Signs Range (Last 24H):  Temp:  [98.1 °F (36.7 °C)]   Pulse:  [75-85]   Resp:  [15-28]   BP: (161-180)/()   SpO2:  [99 %]     Physical Exam   Constitutional: She is oriented to person, place, and time. She appears well-developed. No distress.   HENT:   Head: Normocephalic and atraumatic.   Eyes: Conjunctivae and EOM are normal.   Cardiovascular: Normal rate.   Pulmonary/Chest: Effort normal. No respiratory distress.   Musculoskeletal: Normal range of motion. She exhibits no edema or deformity.   Neurological: She is alert and oriented to person, place, and time. No sensory deficit. She exhibits normal muscle tone.   Skin: Skin is warm and dry. She is not diaphoretic.   Psychiatric: She has a normal mood and affect. Her behavior is normal.       Neurological Exam:   LOC: alert  Attention Span: Good   Language: No aphasia  Articulation: No dysarthria  Orientation: Person, Place, Time   Visual Fields: Full  EOM (CN III, IV, VI): Full/intact  Facial Movement (CN VII): Possible mild flattened R nasolabial fold vs baseline  Motor: Arm left  Normal 5/5  Leg left  Normal 5/5  Arm right  Normal 5/5  Leg right Normal 5/5  Cebellar: No evidence of appendicular or axial ataxia  Sensation: Intact to light touch, temperature and vibration  Tone: Normal tone throughout      Laboratory:  CMP:   Recent Labs   Lab 10/25/19  1959   CALCIUM 10.1   ALBUMIN 3.8   PROT 7.9      K 3.8   CO2 26      BUN 14   CREATININE 0.9   ALKPHOS 112   ALT 18   AST 18   BILITOT 0.6     CBC:   Recent Labs   Lab 10/25/19  1959   WBC 9.24   RBC 4.65   HGB 12.4   HCT 41.2      MCV 89   MCH 26.7*   MCHC 30.1*     Lipid Panel:   Recent Labs   Lab 10/25/19  1959   CHOL 109*   LDLCALC 40.4*   HDL 53   TRIG 78     Coagulation:   Recent Labs   Lab 10/25/19  1959   INR 1.1     Hgb A1C: No results for input(s): HGBA1C in the last 168 hours.  TSH:   Recent Labs   Lab 10/25/19  1959   TSH 1.718       Diagnostic Results:      Brain  imaging:    MRI Brain pending      Vessel Imaging:    CTA Head/Neck pending      Cardiac Evaluation:     Echo pending

## 2019-10-26 NOTE — ASSESSMENT & PLAN NOTE
87 yo woman with PMHx HTN, DM, HLD, TIA, and remote tobacco abuse who presented with acute-onset speech difficulty which resolved prior to presentation to the ED. LKN ~2030 night prior to presentation. No tPA recommended as patient outside of treatment window and stroke symptoms are now resolved. Low suspicion for LVO based on exam. Admitted to Vascular Neurology for acute stroke workup.       Antithrombotics for secondary stroke prevention: Antiplatelets: Aspirin: 81 mg daily -- Will discuss with staff possibility of 30 day course of DAPT.  Statins for secondary stroke prevention and hyperlipidemia, if present:   Statins: Atorvastatin- 20 mg daily  Aggressive risk factor modification: HTN, DM, HLD, Diet, Exercise, Obesity  Rehab efforts: The patient has been evaluated by a stroke team provider and the therapy needs have been fully considered based off the presenting complaints and exam findings. The following therapy evaluations are needed: PT evaluate and treat, OT evaluate and treat, SLP evaluate and treat  Diagnostics ordered/pending: CTA Head to assess vasculature , CTA Neck/Arch to assess vasculature, HgbA1C to assess blood glucose levels, MRI head without contrast to assess brain parenchyma, TTE to assess cardiac function/status   VTE prophylaxis: Enoxaparin 40 mg SQ every 24 hours  place [facility]  BP parameters: TIA: SBP <220 until imaging confirmation of no infarct

## 2019-10-26 NOTE — ASSESSMENT & PLAN NOTE
Stroke risk factor  SBP <180  Home BP meds held  BP currently at goal will restart home meds at discharge

## 2019-10-26 NOTE — ASSESSMENT & PLAN NOTE
"Loop recorder originally placed in 3/2014.   Pt last seen in EP clinic in 10/2017 -- At that time, had no AFib noted on ILR to date and device had reached RRT ("recommended replacement time".) Possibility of removal was discussed, however pt had elected to leave the divice in situ. Plan was for PCP follow-up with EP PRN.    Need to discuss with staff on 10/26 if we would elect to replace ILR to allow for further monitoring now in the setting of this new neurologic event.  Echo pending  "

## 2019-10-26 NOTE — HPI
Calli Jamison is a 88 y.o. female with PMHx HTN, DM, HLD, TIA, and remote tobacco abuse who is being evaluated by the Vascular Neurology service after developing acute-onset speech difficulty. Pt was LKN at approximately 2030 last night when patient saw family before going to bed. This morning, patient did not call her neighbor as is part of her usual routine; unclear why, however she does recall watching a live-streamed  on TV today and was emotional, understanding everything that was said. She then called her neighbor around 1730 and at that time was noted to be stating things that did not make sense. Patient states she knew what she wanted to say but just couldn't get the words out. Neighbor then called pt's son who presented to pt's house. Patient presented to ED however symptoms completely resolved prior to arrival. Pt also c/o bilateral temporal HA, but denies unilateral weakness, dizziness, numbness/tingling, vision changes, or N/V.  Pt denies personal hx blood clots, denies hx cardiac arrhythmia and takes ASA at home. She has had a loop recorder for several years which she reports never captured a run of AFib and is now no longer monitoring but she and EP team elected to leave it in place. Patient lives alone and performs all ADLs independently; only uses a walker when ambulating in public. Pt reports remote tobacco abuse but denies current tobacco, alcohol, or drug use.

## 2019-10-26 NOTE — ASSESSMENT & PLAN NOTE
89 yo woman with PMHx HTN, DM, HLD, TIA, and remote tobacco abuse who presented with acute-onset speech difficulty which resolved prior to presentation to the ED. LKN ~2030 night prior to presentation. No tPA recommended as patient outside of treatment window and stroke symptoms are now resolved. Low suspicion for LVO based on exam. Admitted to Vascular Neurology for acute stroke workup.     NAEON. Work up complete. Will discharge home today with clinic appointment to have loop recorder replaced.     Antithrombotics for secondary stroke prevention: Antiplatelets: Aspirin: 81 mg daily , Plavix 75 mg for 30 days    Statins for secondary stroke prevention and hyperlipidemia, if present:   Statins: Atorvastatin- 20 mg daily  Aggressive risk factor modification: HTN, DM, HLD, Diet, Exercise, Obesity  Rehab efforts: The patient has been evaluated by a stroke team provider and the therapy needs have been fully considered based off the presenting complaints and exam findings. The following therapy evaluations are needed: PT evaluate and treat, OT evaluate and treat, SLP evaluate and treat  Diagnostics ordered/pending: TTE pending read    VTE prophylaxis: Enoxaparin 40 mg SQ every 24 hours  place [facility]  BP parameters: TIA: normotensive

## 2019-10-26 NOTE — ASSESSMENT & PLAN NOTE
"Loop recorder originally placed in 3/2014.   Pt last seen in EP clinic in 10/2017 -- At that time, had no AFib noted on ILR to date and device had reached RRT ("recommended replacement time".) Possibility of removal was discussed, however pt had elected to leave the divice in situ. Plan was for PCP follow-up with EP PRNTyra CEJA. Work up complete. Will discharge home today with clinic appointment to have loop recorder replaced.     "

## 2019-10-26 NOTE — HOSPITAL COURSE
10/26/2019: MARCIAL. Work up complete. Will discharge home today with clinic appointment to have loop recorder replaced.

## 2019-10-26 NOTE — PLAN OF CARE
Problem: Fall Injury Risk  Goal: Absence of Fall and Fall-Related Injury  Outcome: Ongoing, Progressing     Problem: Adjustment to Illness (Stroke, Ischemic/Transient Ischemic Attack)  Goal: Optimal Coping  Outcome: Ongoing, Progressing     POC reviewed with patient and family.   Pt verbalized understanding. Questions and concerns addressed. No acute events today. VSS stable, no neuro status changes. Pt progressing toward goals.  Pt being d/teddy today. Will continue to monitor.

## 2019-10-26 NOTE — ASSESSMENT & PLAN NOTE
Stroke risk factor  A1c pending  Recommend tight glucose control  SSI while inpatient  Diabetic diet once passes MARY

## 2019-10-26 NOTE — ASSESSMENT & PLAN NOTE
Stroke risk factor  A1c pending  Recommend tight glucose control  SSI while inpatient  Diabetic diet

## 2019-10-26 NOTE — H&P
[facility]  Vascular Neurology  Comprehensive Stroke Center  History & Physical    Inpatient consult to Vascular (Stroke) Neurology  Consult performed by: Mery Mancuso PA-C  Consult ordered by: Maria G Gilmore MD        Assessment/Plan:     Patient is a 88 y.o. year old female with:    * TIA (transient ischemic attack)  87 yo woman with PMHx HTN, DM, HLD, TIA, and remote tobacco abuse who presented with acute-onset speech difficulty which resolved prior to presentation to the ED. LKN ~2030 night prior to presentation. No tPA recommended as patient outside of treatment window and stroke symptoms are now resolved. Low suspicion for LVO based on exam. Admitted to Vascular Neurology for acute stroke workup.       Antithrombotics for secondary stroke prevention: Antiplatelets: Aspirin: 81 mg daily -- Will discuss with staff possibility of 30 day course of DAPT.  Statins for secondary stroke prevention and hyperlipidemia, if present:   Statins: Atorvastatin- 20 mg daily  Aggressive risk factor modification: HTN, DM, HLD, Diet, Exercise, Obesity  Rehab efforts: The patient has been evaluated by a stroke team provider and the therapy needs have been fully considered based off the presenting complaints and exam findings. The following therapy evaluations are needed: PT evaluate and treat, OT evaluate and treat, SLP evaluate and treat  Diagnostics ordered/pending: CTA Head to assess vasculature , CTA Neck/Arch to assess vasculature, HgbA1C to assess blood glucose levels, MRI head without contrast to assess brain parenchyma, TTE to assess cardiac function/status   VTE prophylaxis: Enoxaparin 40 mg SQ every 24 hours  place [facility]  BP parameters: TIA: SBP <220 until imaging confirmation of no infarct     Status post placement of implantable loop recorder  Loop recorder originally placed in 3/2014.   Pt last seen in EP clinic in 10/2017 -- At that time, had no AFib noted on ILR to date and device had  "reached RRT ("recommended replacement time".) Possibility of removal was discussed, however pt had elected to leave the divice in situ. Plan was for PCP follow-up with EP PRN.    Need to discuss with staff on 10/26 if we would elect to replace ILR to allow for further monitoring now in the setting of this new neurologic event.  Echo pending    Type 2 diabetes mellitus, without long-term current use of insulin  Stroke risk factor  A1c pending  Recommend tight glucose control  SSI while inpatient  Diabetic diet once passes MARY    Essential hypertension  Stroke risk factor  SBP < 220 acutely until MRI confirms no stroke  Home BP meds held    Other hyperlipidemia  Stroke risk factor  LDL 40  Continue home statin    Class 1 obesity in adult  Stroke risk factor   pt on importance of diet, exercise, lifestyle modifications for secondary stroke prevention        STROKE DOCUMENTATION          NIH Scale:  1a. Level of Consciousness: 0-->Alert, keenly responsive  1b. LOC Questions: 0-->Answers both questions correctly  1c. LOC Commands: 0-->Performs both tasks correctly  2. Best Gaze: 0-->Normal  3. Visual: 0-->No visual loss  4. Facial Palsy: 1-->Minor paralysis (flattened nasolabial fold, asymmetry on smiling)  5a. Motor Arm, Left: 0-->No drift, limb holds 90 (or 45) degrees for full 10 secs  5b. Motor Arm, Right: 0-->No drift, limb holds 90 (or 45) degrees for full 10 secs  6a. Motor Leg, Left: 0-->No drift, leg holds 30 degree position for full 5 secs  6b. Motor Leg, Right: 0-->No drift, leg holds 30 degree position for full 5 secs  7. Limb Ataxia: 0-->Absent  8. Sensory: 0-->Normal, no sensory loss  9. Best Language: 0-->No aphasia, normal  10. Dysarthria: 0-->Normal  11. Extinction and Inattention (formerly Neglect): 0-->No abnormality  Total (NIH Stroke Scale): 1     Modified Mc[place] Score: 0  Chaka Coma Scale:    ABCD2 Score: 5  [id number] Score:   HAS -BLED Score:   ICH Score:   Hunt & Leon Classification:  "     Thrombolysis Candidate? No, Out of window     Delays to Thrombolysis?  No    Interventional Revascularization Candidate?   Is the patient eligible for mechanical endovascular reperfusion (DICK)?  No; No significant neurological deficit    Hemorrhagic change of an Ischemic Stroke: Does this patient have an ischemic stroke with hemorrhagic changes? No         Subjective:     History of Present Illness:  Calli Jamison is a 88 y.o. female with PMHx HTN, DM, HLD, TIA, and remote tobacco abuse who is being evaluated by the Vascular Neurology service after developing acute-onset speech difficulty. Pt was LKN at approximately 2030 last night when patient saw family before going to bed. This morning, patient did not call her neighbor as is part of her usual routine; unclear why, however she does recall watching a live-streamed  on TV today and was emotional, understanding everything that was said. She then called her neighbor around 1730 and at that time was noted to be stating things that did not make sense. Patient states she knew what she wanted to say but just couldn't get the words out. Neighbor then called pt's son who presented to pt's house. Patient presented to ED however symptoms completely resolved prior to arrival. Pt also c/o bilateral temporal HA, but denies unilateral weakness, dizziness, numbness/tingling, vision changes, or N/V.  Pt denies personal hx blood clots, denies hx cardiac arrhythmia and takes ASA at home. She has had a loop recorder for several years which she reports never captured a run of AFib and is now no longer monitoring but she and EP team elected to leave it in place. Patient lives alone and performs all ADLs independently; only uses a walker when ambulating in public. Pt reports remote tobacco abuse but denies current tobacco, alcohol, or drug use.        Past Medical History:   Diagnosis Date    Abnormal Pap smear     After-cataract, unspecified - Both Eyes 2012     Arthritis     ASCUS (atypical squamous cells of undetermined significance) on Pap smear 7/1/2012    Asthma     Atrial fibrillation     Back pain 2009    Bullous pemphigoid     Carpal tunnel syndrome     Chronic back pain     COPD (chronic obstructive pulmonary disease)     Coronary artery disease     Crohn's disease     Depression     Diabetes mellitus     Discoid lupus     Diverticulosis     History of migraine headaches     Hyperlipidemia     Hypertension     Multiple thyroid nodules     Obesity     Open angle with borderline findings and high glaucoma risk in both eyes 9/11/2019    Personal history of colonic polyps     Pulmonary nodule     Renal manifestation of secondary diabetes mellitus     Restless legs syndrome     Sciatica     Right leg    Sleep apnea     Stricture and stenosis of esophagus     Stroke     TIA (transient ischemic attack)      Past Surgical History:   Procedure Laterality Date    BREAST BIOPSY Left     Breast: wire localization  1/2004    CATARACT EXTRACTION W/  INTRAOCULAR LENS IMPLANT Bilateral     Cataract left eye  1/2008    Cataract: right eye  1/2008    COLON SURGERY      ileocecectomy 2011    COLONOSCOPY  9/30/2008    Crohns in remission    ESOPHAGOGASTRODUODENOSCOPY N/A 7/24/2018    Procedure: EGD (ESOPHAGOGASTRODUODENOSCOPY);  Surgeon: Du Robles MD;  Location: [address]);  Service: Endoscopy;  Laterality: N/A;  loop recoreder-battery dead per pt. (not active). corona dpt. to move to morning . unable to do so.EC    HYSTERECTOMY      ovaries remain    Iliocolic Crohns Disease with stricure  3/2011    LA iliocecectomy    Laparoscopic-assisted ileocecectomy.       Left carpal tunnel      Left carpal tunnel surgery      Rectal fistulae repair      TONSILLECTOMY      UPPER GASTROINTESTINAL ENDOSCOPY  11/2011    hiatal hernia, benign appearing esophageal  stricture dilated  prn repeat rec.     Family History   Problem Relation Age  of Onset    Pneumonia Father     Cataracts Mother     Macular degeneration Mother     Hypertension Sister     Alkaptonuria Maternal Grandfather     Asthma Son     No Known Problems Son     No Known Problems Son     No Known Problems Son     Crohn's disease Neg Hx     Ulcerative colitis Neg Hx     Inflammatory bowel disease Neg Hx     Amblyopia Neg Hx     Blindness Neg Hx     Glaucoma Neg Hx     Retinal detachment Neg Hx     Strabismus Neg Hx      Social History     Tobacco Use    Smoking status: Former Smoker     Packs/day: 1.00     Years: 25.00     Pack years: 25.00     Types: Cigarettes     Last attempt to quit: 1980     Years since quittin.8    Smokeless tobacco: Never Used   Substance Use Topics    Alcohol use: No    Drug use: No     Review of patient's allergies indicates:   Allergen Reactions    Oxycodone Other (See Comments)       Medications: I have reviewed the current medication administration record.      (Not in a hospital admission)    Review of Systems   Constitutional: Negative for fatigue and fever.   HENT: Negative for nosebleeds and trouble swallowing.    Eyes: Negative for discharge and visual disturbance.   Respiratory: Negative for apnea and choking.    Cardiovascular: Negative for palpitations and leg swelling.   Gastrointestinal: Negative for nausea and vomiting.   Musculoskeletal: Negative for gait problem and neck stiffness.   Skin: Negative for rash and wound.   Neurological: Positive for speech difficulty and headaches. Negative for weakness and numbness.   Psychiatric/Behavioral: Negative for agitation and decreased concentration.     Objective:     Vital Signs (Most Recent):  Temp: 98.1 °F (36.7 °C) (10/25/19 1836)  Pulse: 75 (10/25/19 2130)  Resp: 18 (10/25/19 2130)  BP: (!) 161/72 (10/25/19 2130)  SpO2: 99 % (10/25/19 2130)    Vital Signs Range (Last 24H):  Temp:  [98.1 °F (36.7 °C)]   Pulse:  [75-85]   Resp:  [15-28]   BP: (161-180)/()   SpO2:   [99 %]     Physical Exam   Constitutional: She is oriented to person, place, and time. She appears well-developed. No distress.   HENT:   Head: Normocephalic and atraumatic.   Eyes: Conjunctivae and EOM are normal.   Cardiovascular: Normal rate.   Pulmonary/Chest: Effort normal. No respiratory distress.   Musculoskeletal: Normal range of motion. She exhibits no edema or deformity.   Neurological: She is alert and oriented to person, place, and time. No sensory deficit. She exhibits normal muscle tone.   Skin: Skin is warm and dry. She is not diaphoretic.   Psychiatric: She has a normal mood and affect. Her behavior is normal.       Neurological Exam:   LOC: alert  Attention Span: Good   Language: No aphasia  Articulation: No dysarthria  Orientation: Person, Place, Time   Visual Fields: Full  EOM (CN III, IV, VI): Full/intact  Facial Movement (CN VII): Possible mild flattened R nasolabial fold vs baseline  Motor: Arm left  Normal 5/5  Leg left  Normal 5/5  Arm right  Normal 5/5  Leg right Normal 5/5  Cebellar: No evidence of appendicular or axial ataxia  Sensation: Intact to light touch, temperature and vibration  Tone: Normal tone throughout      Laboratory:  CMP:   Recent Labs   Lab 10/25/19  1959   CALCIUM 10.1   ALBUMIN 3.8   PROT 7.9      K 3.8   CO2 26      BUN 14   CREATININE 0.9   ALKPHOS 112   ALT 18   AST 18   BILITOT 0.6     CBC:   Recent Labs   Lab 10/25/19  1959   WBC 9.24   RBC 4.65   HGB 12.4   HCT 41.2      MCV 89   MCH 26.7*   MCHC 30.1*     Lipid Panel:   Recent Labs   Lab 10/25/19  1959   CHOL 109*   LDLCALC 40.4*   HDL 53   TRIG 78     Coagulation:   Recent Labs   Lab 10/25/19  1959   INR 1.1     Hgb A1C: No results for input(s): HGBA1C in the last 168 hours.  TSH:   Recent Labs   Lab 10/25/19  1959   TSH 1.718       Diagnostic Results:      Brain imaging:    MRI Brain pending      Vessel Imaging:    CTA Head/Neck pending      Cardiac Evaluation:     Echo pending        Mery  DEREK Mancuso PA-C  Com[facility] St[facility] Center  Department of Vascular Neurology   [facility]

## 2019-10-26 NOTE — ED TRIAGE NOTES
Pt reports expressive aphasia starting approx 5:30pm tonight. Neighbor called 911. Symptoms have resolved. Last seen normal 10/24 at 11:00pm. Patient lives alone and doesn't talk much throughout the day. Pt reports intermittent temporal pressure. Denies blurry vision. Denies numbness, tingling. Patient states she may have had TIA 5yrs ago.

## 2019-10-27 ENCOUNTER — NURSE TRIAGE (OUTPATIENT)
Dept: ADMINISTRATIVE | Facility: CLINIC | Age: 84
End: 2019-10-27

## 2019-10-27 DIAGNOSIS — G45.9 TIA (TRANSIENT ISCHEMIC ATTACK): Primary | ICD-10-CM

## 2019-10-27 NOTE — TELEPHONE ENCOUNTER
Reason for Disposition   Health Information question, no triage required and triager able to answer question    Additional Information   Negative: [1] Caller is not with the adult (patient) AND [2] reporting urgent symptoms   Negative: Lab result questions   Negative: Medication questions   Negative: Caller can't be reached by phone   Negative: Caller has already spoken to PCP or another triager   Negative: RN needs further essential information from caller in order to complete triage   Negative: Requesting regular office appointment   Negative: [1] Caller requesting NON-URGENT health information AND [2] PCP's office is the best resource    Protocols used: INFORMATION ONLY CALL-A-AH    Patient was d/c from hospital yesterday.  She did not realized she was suppose to stop her Metformin for 48hr.  She would like to know what she should do.   Patient advised to hold Metformin today as instructed and to check with Dr. Hurd on Monday for further advise.  Patient advised to check her blood sugar so we can see what her blood glucose is.     Patient states she is not able to get the glucometer to work.  I will send a message to Dr. Hurd to call patient regarding her metformin

## 2019-10-28 NOTE — ASSESSMENT & PLAN NOTE
87 yo woman with PMHx HTN, DM, HLD, TIA, and remote tobacco abuse who presented with acute-onset speech difficulty which resolved prior to presentation to the ED. LKN ~2030 night prior to presentation. No tPA recommended as patient outside of treatment window and stroke symptoms are now resolved. Low suspicion for LVO based on exam. Admitted to Vascular Neurology for acute stroke workup.      Work up complete. Will discharge home today with clinic appointment to have loop recorder replaced.     Antithrombotics for secondary stroke prevention: Antiplatelets: Aspirin: 81 mg daily , Plavix 75 mg for 30 days    Statins for secondary stroke prevention and hyperlipidemia, if present:   Statins: Atorvastatin- 20 mg daily  Aggressive risk factor modification: HTN, DM, HLD, Diet, Exercise, Obesity  Rehab efforts: The patient has been evaluated by a stroke team provider and the therapy needs have been fully considered based off the presenting complaints and exam findings. The following therapy evaluations are needed: PT evaluate and treat, OT evaluate and treat, SLP evaluate and treat  Diagnostics ordered/pending: TTE pending read    VTE prophylaxis: Enoxaparin 40 mg SQ every 24 hours  place [facility]  BP parameters: TIA: normotensive

## 2019-10-28 NOTE — DISCHARGE SUMMARY
[facility]  Vascular Neurology  Comprehensive Stroke Center  Discharge Summary     Summary:     Admit Date: 10/25/2019  7:27 PM    Discharge Date and Time:  10/26/2019 1400    Attending Physician:  Dr. Hernandez    Discharge Provider: Sven Milan NP    History of Present Illness: Calli Jamison is a 88 y.o. female with PMHx HTN, DM, HLD, TIA, and remote tobacco abuse who is being evaluated by the Vascular Neurology service after developing acute-onset speech difficulty. Pt was LKN at approximately 2030 last night when patient saw family before going to bed. This morning, patient did not call her neighbor as is part of her usual routine; unclear why, however she does recall watching a live-streamed  on TV today and was emotional, understanding everything that was said. She then called her neighbor around 1730 and at that time was noted to be stating things that did not make sense. Patient states she knew what she wanted to say but just couldn't get the words out. Neighbor then called pt's son who presented to pt's house. Patient presented to ED however symptoms completely resolved prior to arrival. Pt also c/o bilateral temporal HA, but denies unilateral weakness, dizziness, numbness/tingling, vision changes, or N/V.  Pt denies personal hx blood clots, denies hx cardiac arrhythmia and takes ASA at home. She has had a loop recorder for several years which she reports never captured a run of AFib and is now no longer monitoring but she and EP team elected to leave it in place. Patient lives alone and performs all ADLs independently; only uses a walker when ambulating in public. Pt reports remote tobacco abuse but denies current tobacco, alcohol, or drug use.    Hospital Course (synopsis of major diagnoses, care, treatment, and services provided during the course of the hospital stay):      Patient is a 87 yo woman with PMHx HTN, DM, HLD, TIA, and remote tobacco abuse who presented with  acute-onset speech difficulty which resolved prior to presentation to the ED. LKN ~2030 night prior to presentation. No tPA recommended as patient outside of treatment window and stroke symptoms are now resolved. Low suspicion for LVO based on exam. Admitted to Vascular Neurology for acute stroke workup.    Will discharge home today with clinic appointment to have loop recorder replaced. Patient discharged with 30 days of DAPT and long term ASA monotherapy. She will follow up in clinic in 4-6 weeks. Referral sent to outpatient SLP. Referral sent to neurology for cognitive, for suspected early dementia.            10/26/2019: MARCIAL. Work up complete. Will discharge home today with clinic appointment to have loop recorder replaced.     Stroke Etiology: Not Applicable/Not Ischemic Stroke    STROKE DOCUMENTATION          NIH Scale:  1a. Level of Consciousness: 0-->Alert, keenly responsive  1b. LOC Questions: 0-->Answers both questions correctly  1c. LOC Commands: 0-->Performs both tasks correctly  2. Best Gaze: 0-->Normal  3. Visual: 0-->No visual loss  4. Facial Palsy: 1-->Minor paralysis (flattened nasolabial fold, asymmetry on smiling)  5a. Motor Arm, Left: 0-->No drift, limb holds 90 (or 45) degrees for full 10 secs  5b. Motor Arm, Right: 0-->No drift, limb holds 90 (or 45) degrees for full 10 secs  6a. Motor Leg, Left: 0-->No drift, leg holds 30 degree position for full 5 secs  6b. Motor Leg, Right: 0-->No drift, leg holds 30 degree position for full 5 secs  7. Limb Ataxia: 0-->Absent  8. Sensory: 0-->Normal, no sensory loss  9. Best Language: 0-->No aphasia, normal  10. Dysarthria: 0-->Normal  11. Extinction and Inattention (formerly Neglect): 0-->No abnormality  Total (NIH Stroke Scale): 1         Modified Tripp Score: 1  Chaka Coma Scale:    ABCD2 Score: 5  [id number] Score:   HAS -BLED Score:   ICH Score:   Hunt & Leon Classification:       Assessment/Plan:     Diagnostic Results:  Brain Imaging      MRI  brain w/o contrast 10/25/2019  No acute intracranial abnormality.     Vessel Imaging      CTA head and neck 10/25/2019    No acute major vascular distribution infarct or hemorrhage.  No high-grade stenosis or major vessel occlusion.     Cardiac Imaging    TTE 10/26/2019  · Concentric left ventricular remodeling.  · Normal left ventricular systolic function. The estimated ejection fraction is 65%  · Normal LV diastolic function.  · Normal right ventricular systolic function.  · Normal central venous pressure (3 mm Hg).  · The estimated PA systolic pressure is 21 mm Hg      Interventions: None    Complications: None    Disposition: Home or Self Care    Final Active Diagnoses:    Diagnosis Date Noted POA    PRINCIPAL PROBLEM:  TIA (transient ischemic attack) [G45.9] 10/25/2019 Yes    Status post placement of implantable loop recorder [Z95.818] 10/25/2019 Yes    Other hyperlipidemia [E78.49] 07/31/2019 Yes    Class 1 obesity in adult [E66.9] 05/22/2018 Yes    Type 2 diabetes mellitus, without long-term current use of insulin [E11.9] 06/28/2017 Yes    Essential hypertension [I10] 04/11/2016 Yes      Problems Resolved During this Admission:     * TIA (transient ischemic attack)  87 yo woman with PMHx HTN, DM, HLD, TIA, and remote tobacco abuse who presented with acute-onset speech difficulty which resolved prior to presentation to the ED. LKN ~2030 night prior to presentation. No tPA recommended as patient outside of treatment window and stroke symptoms are now resolved. Low suspicion for LVO based on exam. Admitted to Vascular Neurology for acute stroke workup.      Work up complete. Will discharge home today with clinic appointment to have loop recorder replaced.     Antithrombotics for secondary stroke prevention: Antiplatelets: Aspirin: 81 mg daily , Plavix 75 mg for 30 days    Statins for secondary stroke prevention and hyperlipidemia, if present:   Statins: Atorvastatin- 20 mg daily  Aggressive risk factor  "modification: HTN, DM, HLD, Diet, Exercise, Obesity  Rehab efforts: The patient has been evaluated by a stroke team provider and the therapy needs have been fully considered based off the presenting complaints and exam findings. The following therapy evaluations are needed: PT evaluate and treat, OT evaluate and treat, SLP evaluate and treat  Diagnostics ordered/pending: TTE pending read    VTE prophylaxis: Enoxaparin 40 mg SQ every 24 hours  place SCDs  BP parameters: TIA: normotensive    Status post placement of implantable loop recorder  Loop recorder originally placed in 3/2014.   Pt last seen in EP clinic in 10/2017 -- At that time, had no AFib noted on ILR to date and device had reached RRT ("recommended replacement time".) Possibility of removal was discussed, however pt had elected to leave the divice in situ. Plan was for PCP follow-up with EP PRN.    NAEON. Work up complete. Will discharge home today with clinic appointment to have loop recorder replaced.       Other hyperlipidemia  Stroke risk factor  LDL 40  Continue home statin    Class 1 obesity in adult  Stroke risk factor  Counseled pt on importance of diet, exercise, lifestyle modifications for secondary stroke prevention at discharge    Type 2 diabetes mellitus, without long-term current use of insulin  Stroke risk factor  A1c pending  Recommend tight glucose control  SSI while inpatient  Diabetic diet       To restart metformin at discharge    Essential hypertension  Stroke risk factor  SBP <180  Home BP meds held  BP currently at goal will restart home meds at discharge      Recommendations:     Post-discharge complication risks: None    Stroke Education given to: patient and family    Follow-up in Stroke Clinic in 4-6 weeks     Discharge Plan:  Antithrombotics: Aspirin 81 mg, Clopidogrel 75mg  Statin: Atorvastatin 20 mg    Follow Up:  Follow-up Information     Sheridan Hurd MD In 1 week.    Specialty:  Internal Medicine  Why:  hospital " discharge follow up  Contact information:  [address]  [facility] [address]  [phone]             [facility] VASCULAR NEUROLOGY In 4 weeks.    Specialty:  Vascular Neurology  Why:  hospital dicharge follow up  Contact information:  [address]  [facility] [address]  [phone]                 Patient Instructions:      Referral to OutPatient Speech Therapy   Referral Priority: Routine Referral Type: Speech Therapy   Referral Reason: Specialty Services Required   Requested Specialty: Speech Pathology   Number of Visits Requested: 1     Ambulatory Referral to Electrophysiology   Referral Priority: Routine Referral Type: Consultation   Referral Reason: Specialty Services Required   Requested Specialty: Electrophysiology   Number of Visits Requested: 1     Ambulatory Referral to Neurology   Referral Priority: Routine Referral Type: Consultation   Referral Reason: Specialty Services Required   Requested Specialty: Neurology   Number of Visits Requested: 1     Ambulatory consult to Neurology   Referral Priority: Routine Referral Type: Consultation   Referral Reason: Specialty Services Required   Requested Specialty: Neurology   Number of Visits Requested: 1     Cardiac event monitor   Standing Status: Future Standing Exp. Date: 10/26/20     Order Specific Question Answer Comments   Cardiac Event Monitor Looping Recorder      Activity as tolerated       Medications:  Reconciled Home Medications:      Medication List      START taking these medications    clopidogrel 75 mg tablet  Commonly known as:  PLAVIX  Take 1 tablet (75 mg total) by mouth once daily.        CHANGE how you take these medications    metFORMIN 500 MG tablet  Commonly known as:  GLUCOPHAGE  Take 1 tablet (500 mg total) by mouth 2 (two) times daily.  What changed:    · when to take this  · additional instructions  · Another medication with the same name was removed. Continue taking this medication, and follow the directions you  see here.        CONTINUE taking these medications    ACCU-CHEK FASTCLIX LANCET DRUM [facility]  Generic drug:  lancets  ONE LANCET PER GLUCOSE TESTING TWICE A DAY: INSURANCE BRAND PREFERENCE     ACCU-CHEK SMARTVIEW TEST STRIP Str[facility]  Generic drug:  blood sugar diagnostic  ONE TEST STRIP PER GLUCOSE TESTING TWICE A DAY: INSURANCE BRAND PREFERENCE     acetaminophen 500 MG tablet  Commonly known as:  TYLENOL  Take 1-2 tablets (500-1,000 mg total) by mouth every 12 (twelve) hours as needed for Pain.     aspirin 81 MG EC tablet  Commonly known as:  ECOTRIN  Take 81 mg by mouth once daily.     atorvastatin 20 MG tablet  Commonly known as:  LIPITOR  TAKE 1 TABLET BY MOUTH EVERY DAY     blood-glucose meter Misc  Dispense one meter: Insurance Brand Preference     CALCIUM CITRATE + D 315-200 mg-unit per tablet  Generic drug:  calcium citrate-vitamin D3 315-200 mg  Take 2 tablets by mouth Daily.     conjugated estrogens vaginal cream  Commonly known as:  PREMARIN  fnger tip twice  Per week     flash glucose scanning reader Misc  Commonly known as:  FREESTYLE SHAYLA 14 DAY READER  1 Units by Misc.(Non-Drug; Combo Route) route 4 (four) times daily as needed.     flash glucose sensor Kit  Commonly known as:  FREESTYLE SHAYLA 14 DAY SENSOR  1 kit by Misc.(Non-Drug; Combo Route) route 4 (four) times daily as needed.     fluocinonide 0.05% 0.05 % cream  Commonly known as:  LIDEX  Apply topically 2 (two) times daily as needed. AAA scalp qd bid.     fluticasone propionate 110 mcg/actuation inhaler  Commonly known as:  FLOVENT HFA  USE 2 PUFFS BY MOUTH TWICE A DAY FOR ASTHMA     furosemide 20 MG tablet  Commonly known as:  LASIX  TAKE 1 TABLET BY MOUTH ONCE DAILY.     latanoprost 0.005 % ophthalmic solution  PLACE 1 DROP INTO BOTH EYES NIGHTLY.     omeprazole 20 MG capsule  Commonly known as:  PRILOSEC  Take 1 capsule by mouth daily as needed.     potassium chloride 10 MEQ Cpsr  Commonly known as:  MICRO-K  OPEN 1 CAPSULE AND SPRINKLE OVER  UNHEATED SOFT FOOD (APPLESAUCE) EVERY DAY. DO NOT CHEW OR CRUSH     rOPINIRole 0.25 MG tablet  Commonly known as:  REQUIP  Take 2 tablets (0.5 mg total) by mouth nightly as needed. As directed.     terazosin 1 MG capsule  Commonly known as:  HYTRIN  TAKE 1 CAPSULE (1 MG TOTAL) BY MOUTH EVERY EVENING.     valsartan 160 MG tablet  Commonly known as:  DIOVAN  TAKE 2 TABLETS (320 MG TOTAL) BY MOUTH ONCE DAILY.     VENTOLIN HFA 90 mcg/actuation inhaler  Generic drug:  albuterol  INHALE 2 PUFFS INTO THE LUNGS EVERY 4 TO 6 HOURS AS NEEDED. RESCUE     verapamil 360 MG C24p  Commonly known as:  VERELAN  Take 1 capsule (360 mg total) by mouth once daily.     VITAMIN B-12 500 MCG tablet  Generic drug:  cyanocobalamin  Take 1 tablet by mouth Daily.        STOP taking these medications    azithromycin 250 MG tablet  Commonly known as:  SATNAM Milan NP  [facility]  Department of Vascular Neurology   [facility]

## 2019-10-28 NOTE — ASSESSMENT & PLAN NOTE
Stroke risk factor  Counseled pt on importance of diet, exercise, lifestyle modifications for secondary stroke prevention at discharge

## 2019-10-28 NOTE — TELEPHONE ENCOUNTER
Patient is on metformin and given contrast dye for a TIA in the hospital. She should have had a BMP at 48 hours off of metformin and then restarted.    ? If she has home health coming out can they draw it.    OR  Can someone bring her to the lab today or tomorrow for a BMP.    GML

## 2019-10-28 NOTE — TELEPHONE ENCOUNTER
Spoke with pt. Called [facility] and they stated pt was discharged in February. Pt stated she would need to call the office back after she talks to her son to see when he can bring her.

## 2019-10-28 NOTE — ASSESSMENT & PLAN NOTE
Stroke risk factor  A1c pending  Recommend tight glucose control  SSI while inpatient  Diabetic diet       To restart metformin at discharge

## 2019-11-04 ENCOUNTER — NURSE TRIAGE (OUTPATIENT)
Dept: ADMINISTRATIVE | Facility: CLINIC | Age: 84
End: 2019-11-04

## 2019-11-04 DIAGNOSIS — E11.22 CONTROLLED TYPE 2 DIABETES MELLITUS WITH STAGE 3 CHRONIC KIDNEY DISEASE, WITHOUT LONG-TERM CURRENT USE OF INSULIN: ICD-10-CM

## 2019-11-04 DIAGNOSIS — N18.30 CONTROLLED TYPE 2 DIABETES MELLITUS WITH STAGE 3 CHRONIC KIDNEY DISEASE, WITHOUT LONG-TERM CURRENT USE OF INSULIN: ICD-10-CM

## 2019-11-05 RX ORDER — LANCETS
EACH MISCELLANEOUS
Qty: 204 EACH | Refills: 1 | Status: SHIPPED | OUTPATIENT
Start: 2019-11-05 | End: 2020-02-04 | Stop reason: SDUPTHER

## 2019-11-05 NOTE — TELEPHONE ENCOUNTER
Pt stated she needs refills on her diabetes supplies. Also reminded pt the last time we spoke she was supposed to call our office back when someone could bring her. Lab scheduled for Nov 12th. Pt uses [facility] on [address].

## 2019-11-05 NOTE — TELEPHONE ENCOUNTER
Reason for Disposition   Caller has already spoken with the PCP and has no further questions.    Additional Information   Negative: Caller is angry or rude (e.g., hangs up, verbally abusive, yelling)   Negative: Caller hangs up    Protocols used: NO CONTACT OR DUPLICATE CONTACT CALL-A-AH

## 2019-11-05 NOTE — TELEPHONE ENCOUNTER
"Pt has questions about what medicine she should be taking. Pt also wants refills on some of medicine. Please contact stan ortega    Reason for Disposition   Caller requesting a NON-URGENT new prescription or refill and triager unable to refill per unit policy    Additional Information   Negative: Drug overdose and nurse unable to answer question   Negative: Caller requesting information not related to medicine   Negative: Caller requesting a prescription for Strep throat and has a positive culture result   Negative: Rash while taking a medication or within 3 days of stopping it   Negative: Immunization reaction suspected   Negative: [1] Asthma AND [2] having symptoms of asthma (cough, wheezing, etc)   Negative: MORE THAN A DOUBLE DOSE of a prescription or over-the-counter (OTC) drug   Negative: [1] DOUBLE DOSE (an extra dose or lesser amount) of over-the-counter (OTC) drug AND [2] any symptoms (e.g., dizziness, nausea, pain, sleepiness)   Negative: [1] DOUBLE DOSE (an extra dose or lesser amount) of prescription drug AND [2] any symptoms (e.g., dizziness, nausea, pain, sleepiness)   Negative: Took another person's prescription drug   Negative: [1] DOUBLE DOSE (an extra dose or lesser amount) of prescription drug AND [2] NO symptoms (Exception: a double dose of antibiotics)   Negative: Diabetes drug error or overdose (e.g., insulin or extra dose)   Negative: [1] Request for URGENT new prescription or refill of "essential" medication (i.e., likelihood of harm to patient if not taken) AND [2] triager unable to fill per unit policy   Negative: [1] Prescription not at pharmacy AND [2] was prescribed today by PCP   Negative: Pharmacy calling with prescription questions and triager unable to answer question   Negative: Caller has urgent medication question about med that PCP prescribed and triager unable to answer question   Negative: Caller has NON-URGENT medication question about med that PCP prescribed " and triager unable to answer question    Protocols used: MEDICATION QUESTION CALL-A-AH

## 2019-11-14 DIAGNOSIS — H40.003 GLAUCOMA SUSPECT, BILATERAL: ICD-10-CM

## 2019-11-14 RX ORDER — LATANOPROST 50 UG/ML
SOLUTION/ DROPS OPHTHALMIC
Qty: 2.5 ML | Refills: 6 | Status: SHIPPED | OUTPATIENT
Start: 2019-11-14 | End: 2020-06-14

## 2019-11-27 ENCOUNTER — IMMUNIZATION (OUTPATIENT)
Dept: INTERNAL MEDICINE | Facility: CLINIC | Age: 84
End: 2019-11-27
Payer: MEDICARE

## 2019-11-27 ENCOUNTER — OFFICE VISIT (OUTPATIENT)
Dept: INTERNAL MEDICINE | Facility: CLINIC | Age: 84
End: 2019-11-27
Payer: MEDICARE

## 2019-11-27 ENCOUNTER — LAB VISIT (OUTPATIENT)
Dept: LAB | Facility: HOSPITAL | Age: 84
End: 2019-11-27
Attending: INTERNAL MEDICINE
Payer: MEDICARE

## 2019-11-27 VITALS
HEART RATE: 83 BPM | OXYGEN SATURATION: 97 % | BODY MASS INDEX: 35.13 KG/M2 | SYSTOLIC BLOOD PRESSURE: 138 MMHG | DIASTOLIC BLOOD PRESSURE: 82 MMHG | HEIGHT: 61 IN | WEIGHT: 186.06 LBS

## 2019-11-27 DIAGNOSIS — I10 ESSENTIAL HYPERTENSION: ICD-10-CM

## 2019-11-27 DIAGNOSIS — G45.9 TIA (TRANSIENT ISCHEMIC ATTACK): ICD-10-CM

## 2019-11-27 DIAGNOSIS — E11.42 DIABETIC POLYNEUROPATHY ASSOCIATED WITH TYPE 2 DIABETES MELLITUS: Primary | ICD-10-CM

## 2019-11-27 DIAGNOSIS — E78.49 OTHER HYPERLIPIDEMIA: ICD-10-CM

## 2019-11-27 LAB
ANION GAP SERPL CALC-SCNC: 8 MMOL/L (ref 8–16)
BUN SERPL-MCNC: 19 MG/DL (ref 8–23)
CALCIUM SERPL-MCNC: 10.8 MG/DL (ref 8.7–10.5)
CHLORIDE SERPL-SCNC: 105 MMOL/L (ref 95–110)
CO2 SERPL-SCNC: 29 MMOL/L (ref 23–29)
CREAT SERPL-MCNC: 1.1 MG/DL (ref 0.5–1.4)
EST. GFR  (AFRICAN AMERICAN): 52 ML/MIN/1.73 M^2
EST. GFR  (NON AFRICAN AMERICAN): 45 ML/MIN/1.73 M^2
GLUCOSE SERPL-MCNC: 97 MG/DL (ref 70–110)
POTASSIUM SERPL-SCNC: 3.8 MMOL/L (ref 3.5–5.1)
SODIUM SERPL-SCNC: 142 MMOL/L (ref 136–145)

## 2019-11-27 PROCEDURE — 36415 COLL VENOUS BLD VENIPUNCTURE: CPT | Mod: HCNC

## 2019-11-27 PROCEDURE — 1159F MED LIST DOCD IN RCRD: CPT | Mod: HCNC,S$GLB,, | Performed by: INTERNAL MEDICINE

## 2019-11-27 PROCEDURE — 90662 IIV NO PRSV INCREASED AG IM: CPT | Mod: HCNC,S$GLB,, | Performed by: INTERNAL MEDICINE

## 2019-11-27 PROCEDURE — 99214 OFFICE O/P EST MOD 30 MIN: CPT | Mod: 25,HCNC,S$GLB, | Performed by: INTERNAL MEDICINE

## 2019-11-27 PROCEDURE — 1126F PR PAIN SEVERITY QUANTIFIED, NO PAIN PRESENT: ICD-10-PCS | Mod: HCNC,S$GLB,, | Performed by: INTERNAL MEDICINE

## 2019-11-27 PROCEDURE — 90662 FLU VACCINE - HIGH DOSE (65+) PRESERVATIVE FREE IM: ICD-10-PCS | Mod: HCNC,S$GLB,, | Performed by: INTERNAL MEDICINE

## 2019-11-27 PROCEDURE — 1126F AMNT PAIN NOTED NONE PRSNT: CPT | Mod: HCNC,S$GLB,, | Performed by: INTERNAL MEDICINE

## 2019-11-27 PROCEDURE — 99214 PR OFFICE/OUTPT VISIT, EST, LEVL IV, 30-39 MIN: ICD-10-PCS | Mod: 25,HCNC,S$GLB, | Performed by: INTERNAL MEDICINE

## 2019-11-27 PROCEDURE — 1159F PR MEDICATION LIST DOCUMENTED IN MEDICAL RECORD: ICD-10-PCS | Mod: HCNC,S$GLB,, | Performed by: INTERNAL MEDICINE

## 2019-11-27 PROCEDURE — G0008 FLU VACCINE - HIGH DOSE (65+) PRESERVATIVE FREE IM: ICD-10-PCS | Mod: HCNC,S$GLB,, | Performed by: INTERNAL MEDICINE

## 2019-11-27 PROCEDURE — 99999 PR PBB SHADOW E&M-EST. PATIENT-LVL III: CPT | Mod: PBBFAC,HCNC,, | Performed by: INTERNAL MEDICINE

## 2019-11-27 PROCEDURE — 1101F PT FALLS ASSESS-DOCD LE1/YR: CPT | Mod: HCNC,CPTII,S$GLB, | Performed by: INTERNAL MEDICINE

## 2019-11-27 PROCEDURE — 99999 PR PBB SHADOW E&M-EST. PATIENT-LVL III: ICD-10-PCS | Mod: PBBFAC,HCNC,, | Performed by: INTERNAL MEDICINE

## 2019-11-27 PROCEDURE — 80048 BASIC METABOLIC PNL TOTAL CA: CPT | Mod: HCNC

## 2019-11-27 PROCEDURE — G0008 ADMIN INFLUENZA VIRUS VAC: HCPCS | Mod: HCNC,S$GLB,, | Performed by: INTERNAL MEDICINE

## 2019-11-27 PROCEDURE — 1101F PR PT FALLS ASSESS DOC 0-1 FALLS W/OUT INJ PAST YR: ICD-10-PCS | Mod: HCNC,CPTII,S$GLB, | Performed by: INTERNAL MEDICINE

## 2019-11-29 NOTE — PROGRESS NOTES
Subjective:      Patient ID: Calli Jamison is a 88 y.o. female.    Chief Complaint: Follow-up    HPI:  HPI   Patient comes in today with her son:  Diabetes Management Status    Statin: Taking  ACE/ARB: Taking    Screening or Prevention Patient's value Goal Complete/Controlled?   HgA1C Testing and Control   Lab Results   Component Value Date    HGBA1C 6.0 (H) 10/25/2019      Annually/Less than 8% Yes   Lipid profile : 10/25/2019 Annually Yes   LDL control Lab Results   Component Value Date    LDLCALC 40.4 (L) 10/25/2019    Annually/Less than 100 mg/dl  Yes   Nephropathy screening Lab Results   Component Value Date    LABMICR 37.0 11/11/2015     Lab Results   Component Value Date    PROTEINUA Negative 10/25/2019    Annually Yes   Blood pressure BP Readings from Last 1 Encounters:   11/27/19 138/82    Less than 140/90 Yes   Dilated retinal exam : 09/11/2019 Annually Yes   Foot exam   : 05/22/2018 Annually No       Patient Active Problem List   Diagnosis    Sleep apnea    Iron deficiency    Asthma with allergic rhinitis    Pemphigoid    Multiple thyroid nodules    Bilateral carpal tunnel syndrome    Diverticulosis    GERD (gastroesophageal reflux disease)    Personal history of solitary pulmonary nodule    Pseudophakia    Angina pectoris    COPD (chronic obstructive pulmonary disease)    Restless legs syndrome (RLS)    Cerebral microvascular disease    Refractive error    Adjustment disorder with mixed anxiety and depressed mood    Crohn's disease of both small and large intestine    Paroxysmal atrial fibrillation    Esophageal stricture    Essential hypertension    DM type 2 without retinopathy    Glaucoma suspect of both eyes    PCO (posterior capsular opacification), bilateral    History of TIA (transient ischemic attack)    Dry eye syndrome, bilateral    Type 2 diabetes mellitus, without long-term current use of insulin    Aortic atherosclerosis    Spinal stenosis of lumbar region:  CT 2017    Stage 2 chronic kidney disease    Class 1 obesity in adult    Lumbar facet arthropathy    Polyneuropathy in diseases classified elsewhere     Peripheral sensory neuropathy    Inflammatory spondylopathy of lumbar region    Diabetic polyneuropathy associated with type 2 diabetes mellitus    Other hyperlipidemia    Open angle with borderline findings and high glaucoma risk in both eyes    TIA (transient ischemic attack)    Status post placement of implantable loop recorder     Past Medical History:   Diagnosis Date    Abnormal Pap smear     After-cataract, unspecified - Both Eyes 11/5/2012    Arthritis     ASCUS (atypical squamous cells of undetermined significance) on Pap smear 7/1/2012    Asthma     Atrial fibrillation     Back pain 2009    Bullous pemphigoid     Carpal tunnel syndrome     Chronic back pain     COPD (chronic obstructive pulmonary disease)     Coronary artery disease     Crohn's disease     Depression     Diabetes mellitus     Discoid lupus     Diverticulosis     History of migraine headaches     Hyperlipidemia     Hypertension     Multiple thyroid nodules     Obesity     Open angle with borderline findings and high glaucoma risk in both eyes 9/11/2019    Personal history of colonic polyps     Pulmonary nodule     Renal manifestation of secondary diabetes mellitus     Restless legs syndrome     Sciatica     Right leg    Sleep apnea     Stricture and stenosis of esophagus     Stroke     TIA (transient ischemic attack)      Past Surgical History:   Procedure Laterality Date    BREAST BIOPSY Left     Breast: wire localization  1/2004    CATARACT EXTRACTION W/  INTRAOCULAR LENS IMPLANT Bilateral     Cataract left eye  1/2008    Cataract: right eye  1/2008    COLON SURGERY      ileocecectomy 2011    COLONOSCOPY  9/30/2008    Crohns in remission    ESOPHAGOGASTRODUODENOSCOPY N/A 7/24/2018    Procedure: EGD (ESOPHAGOGASTRODUODENOSCOPY);  Surgeon:  "Du Robles MD;  Location: [facility] (4TH FLR);  Service: Endoscopy;  Laterality: N/A;  loop recoreder-battery dead per pt. (not active). corona dpt. to move to morning . unable to do so.EC    HYSTERECTOMY      ovaries remain    Iliocolic Crohns Disease with stricure  3/2011    [place] iliocecectomy    Laparoscopic-assisted ileocecectomy.       Left carpal tunnel      Left carpal tunnel surgery      Rectal fistulae repair      TONSILLECTOMY      UPPER GASTROINTESTINAL ENDOSCOPY  11/2011    hiatal hernia, benign appearing esophageal  stricture dilated  prn repeat rec.     Family History   Problem Relation Age of Onset    Pneumonia Father     Cataracts Mother     Macular degeneration Mother     Hypertension Sister     Alkaptonuria Maternal Grandfather     Asthma Son     No Known Problems Son     No Known Problems Son     No Known Problems Son     Crohn's disease Neg Hx     Ulcerative colitis Neg Hx     Inflammatory bowel disease Neg Hx     Amblyopia Neg Hx     Blindness Neg Hx     Glaucoma Neg Hx     Retinal detachment Neg Hx     Strabismus Neg Hx      Review of Systems   Constitutional: Negative for chills, fever and unexpected weight change.   HENT: Negative for trouble swallowing.    Respiratory: Negative for cough, shortness of breath and wheezing.    Cardiovascular: Negative for chest pain and palpitations.   Gastrointestinal: Negative for abdominal distention, abdominal pain, blood in stool and vomiting.   Musculoskeletal: Negative for back pain.     Objective:     Vitals:    11/27/19 1407 11/27/19 1428   BP: (!) 152/82 138/82   Pulse: 83    SpO2: 97%    Weight: 84.4 kg (186 lb 1.1 oz)    Height: 5' 1" (1.549 m)    PainSc: 0-No pain      Body mass index is 35.16 kg/m².  Physical Exam   Constitutional: She is oriented to person, place, and time. She appears well-developed and well-nourished. No distress.   Neck: Carotid bruit is not present. No thyromegaly present.   Cardiovascular: Normal " rate, regular rhythm and normal heart sounds. PMI is not displaced.   Pulmonary/Chest: Effort normal and breath sounds normal. No respiratory distress.   Abdominal: Soft. Bowel sounds are normal. She exhibits no distension. There is no tenderness.   Musculoskeletal: She exhibits no edema.   Neurological: She is alert and oriented to person, place, and time.     Assessment:     1. Diabetic polyneuropathy associated with type 2 diabetes mellitus    2. Essential hypertension    3. Other hyperlipidemia      Plan:   Calli was seen today for follow-up.    Diagnoses and all orders for this visit:    Diabetic polyneuropathy associated with type 2 diabetes mellitus  Comments:  A1C will be checked today , no medication change  Orders:  -     CBC auto differential; Future  -     Comprehensive metabolic panel; Future  -     Hemoglobin A1c; Future  -     Lipid panel; Future    Essential hypertension  Comments:  Well controlled, same medications    Other hyperlipidemia  Comments:  Continue statin        Problem List Items Addressed This Visit     Essential hypertension    Diabetic polyneuropathy associated with type 2 diabetes mellitus - Primary    Overview     EMG 2019         Relevant Orders    CBC auto differential    Comprehensive metabolic panel    Hemoglobin A1c    Lipid panel    Other hyperlipidemia        Orders Placed This Encounter   Procedures    CBC auto differential     Standing Status:   Future     Standing Expiration Date:   1/27/2021    Comprehensive metabolic panel     Standing Status:   Future     Standing Expiration Date:   1/27/2021    Hemoglobin A1c     Standing Status:   Future     Standing Expiration Date:   1/27/2021    Lipid panel     Standing Status:   Future     Standing Expiration Date:   1/27/2021     Follow up in about 4 months (around 3/27/2020) for Follow up: cbc, cmp, lipid and A1C.     Medication List           Accurate as of November 27, 2019 11:59 PM. If you have any questions, ask your  nurse or doctor.               CHANGE how you take these medications    metFORMIN 500 MG tablet  Commonly known as:  GLUCOPHAGE  Take 1 tablet (500 mg total) by mouth 2 (two) times daily.  What changed:    · when to take this  · additional instructions        CONTINUE taking these medications    acetaminophen 500 MG tablet  Commonly known as:  TYLENOL  Take 1-2 tablets (500-1,000 mg total) by mouth every 12 (twelve) hours as needed for Pain.     aspirin 81 MG EC tablet  Commonly known as:  ECOTRIN     atorvastatin 20 MG tablet  Commonly known as:  LIPITOR  TAKE 1 TABLET BY MOUTH EVERY DAY     blood sugar diagnostic Strp  Commonly known as:  Accu-Chek SmartView Test Strip  ONE TEST STRIP PER GLUCOSE TESTING TWICE A DAY: INSURANCE BRAND PREFERENCE     blood-glucose meter Misc  Dispense one meter: Insurance Brand Preference     Calcium Citrate + D 315-200 mg-unit per tablet  Generic drug:  calcium citrate-vitamin D3 315-200 mg     clopidogrel 75 mg tablet  Commonly known as:  PLAVIX  Take 1 tablet (75 mg total) by mouth once daily.     conjugated estrogens vaginal cream  Commonly known as:  Premarin  fnger tip twice  Per week     flash glucose scanning reader Misc  Commonly known as:  FreeStyle Susana 14 Day [place]  1 Units by Misc.(Non-Drug; Combo Route) route 4 (four) times daily as needed.     flash glucose sensor Kit  Commonly known as:  FreeStyle Susana 14 Day Sensor  1 kit by Misc.(Non-Drug; Combo Route) route 4 (four) times daily as needed.     fluocinonide 0.05% 0.05 % cream  Commonly known as:  Lidex  Apply topically 2 (two) times daily as needed. AAA scalp qd bid.     fluticasone propionate 110 mcg/actuation inhaler  Commonly known as:  Flovent HFA  USE 2 PUFFS BY MOUTH TWICE A DAY FOR ASTHMA     furosemide 20 MG tablet  Commonly known as:  LASIX  TAKE 1 TABLET BY MOUTH ONCE DAILY.     lancets Misc  Commonly known as:  Accu-Chek Fastclix Lancet Drum  ONE LANCET PER GLUCOSE TESTING TWICE A DAY: INSURANCE BRAND  PREFERENCE     latanoprost 0.005 % ophthalmic solution  PLACE 1 DROP INTO BOTH EYES NIGHTLY.     omeprazole 20 MG capsule  Commonly known as:  PRILOSEC     potassium chloride 10 MEQ Cpsr  Commonly known as:  MICRO-K  OPEN 1 CAPSULE AND SPRINKLE OVER UNHEATED SOFT FOOD (APPLESAUCE) EVERY DAY. DO NOT CHEW OR CRUSH     rOPINIRole 0.25 MG tablet  Commonly known as:  REQUIP  Take 2 tablets (0.5 mg total) by mouth nightly as needed. As directed.     terazosin 1 MG capsule  Commonly known as:  HYTRIN  TAKE 1 CAPSULE (1 MG TOTAL) BY MOUTH EVERY EVENING.     valsartan 160 MG tablet  Commonly known as:  DIOVAN  TAKE 2 TABLETS (320 MG TOTAL) BY MOUTH ONCE DAILY.     Ventolin HFA 90 mcg/actuation inhaler  Generic drug:  albuterol  INHALE 2 PUFFS INTO THE LUNGS EVERY 4 TO 6 HOURS AS NEEDED. RESCUE     verapamil 360 MG C24p  Commonly known as:  VERELAN  Take 1 capsule (360 mg total) by mouth once daily.     Vitamin B-12 500 MCG tablet  Generic drug:  cyanocobalamin

## 2019-12-02 DIAGNOSIS — J45.21 MILD INTERMITTENT ASTHMA WITH EXACERBATION: Primary | ICD-10-CM

## 2019-12-02 DIAGNOSIS — G25.81 RESTLESS LEGS SYNDROME: ICD-10-CM

## 2019-12-02 RX ORDER — METFORMIN HYDROCHLORIDE 500 MG/1
500 TABLET ORAL
Qty: 90 TABLET | Refills: 3 | Status: SHIPPED | OUTPATIENT
Start: 2019-12-02 | End: 2020-09-11 | Stop reason: SDUPTHER

## 2019-12-02 RX ORDER — ALBUTEROL SULFATE 90 UG/1
AEROSOL, METERED RESPIRATORY (INHALATION)
Qty: 3 INHALER | Refills: 3 | Status: SHIPPED | OUTPATIENT
Start: 2019-12-02 | End: 2021-06-17

## 2019-12-02 RX ORDER — TERAZOSIN 1 MG/1
1 CAPSULE ORAL NIGHTLY
Qty: 90 CAPSULE | Refills: 3 | Status: SHIPPED | OUTPATIENT
Start: 2019-12-02 | End: 2020-07-22

## 2019-12-02 RX ORDER — ROPINIROLE 0.25 MG/1
0.5 TABLET, FILM COATED ORAL NIGHTLY PRN
Qty: 180 TABLET | Refills: 3 | Status: SHIPPED | OUTPATIENT
Start: 2019-12-02 | End: 2020-09-11 | Stop reason: SDUPTHER

## 2019-12-02 RX ORDER — POTASSIUM CHLORIDE 750 MG/1
CAPSULE, EXTENDED RELEASE ORAL
Qty: 90 CAPSULE | Refills: 3 | Status: SHIPPED | OUTPATIENT
Start: 2019-12-02 | End: 2020-11-19

## 2019-12-02 RX ORDER — CLOPIDOGREL BISULFATE 75 MG/1
75 TABLET ORAL DAILY
Qty: 90 TABLET | Refills: 3 | Status: SHIPPED | OUTPATIENT
Start: 2019-12-02 | End: 2020-11-19

## 2019-12-02 NOTE — TELEPHONE ENCOUNTER
----- Message from Jina Foley sent at 12/2/2019  8:23 AM CST -----  Contact: [facility] [phone]  Type: Rx    Name of medication(s): clopidogrel (PLAVIX) 75 mg tablet  metformin (GLUCOPHAGE) 500 MG tablet  potassium chloride (MICRO-K) 10 MEQ CpSR  rOPINIRole (REQUIP) 0.25 MG tablet  terazosin (HYTRIN) 1 MG capsule  albuterol (PROAIR HFA) 90 mcg/actuation inhaler     Is this a refill? New rx? Refill     Pharmacy Name, Phone, & Location:[facility] [phone]  FAX# [phone]  Comments:please advise, thanks

## 2020-01-27 ENCOUNTER — TELEPHONE (OUTPATIENT)
Dept: INTERNAL MEDICINE | Facility: CLINIC | Age: 85
End: 2020-01-27

## 2020-01-27 NOTE — TELEPHONE ENCOUNTER
----- Message from Mary Garcia sent at 1/27/2020  9:11 AM CST -----  Contact: Pt self Home [phone]   Patient would like a call back in regards to her wanting to know if she is still suppose to take her valsartan (DIOVAN) 160 MG tablet medication?

## 2020-02-04 DIAGNOSIS — E11.22 CONTROLLED TYPE 2 DIABETES MELLITUS WITH STAGE 3 CHRONIC KIDNEY DISEASE, WITHOUT LONG-TERM CURRENT USE OF INSULIN: ICD-10-CM

## 2020-02-04 DIAGNOSIS — N18.30 CONTROLLED TYPE 2 DIABETES MELLITUS WITH STAGE 3 CHRONIC KIDNEY DISEASE, WITHOUT LONG-TERM CURRENT USE OF INSULIN: ICD-10-CM

## 2020-02-04 RX ORDER — VALSARTAN 160 MG/1
320 TABLET ORAL DAILY
Qty: 60 TABLET | Refills: 12 | Status: SHIPPED | OUTPATIENT
Start: 2020-02-04 | End: 2020-12-31

## 2020-02-04 RX ORDER — DEXTROSE 4 G
TABLET,CHEWABLE ORAL
Qty: 1 EACH | Refills: 0 | Status: SHIPPED | OUTPATIENT
Start: 2020-02-04 | End: 2020-04-07

## 2020-02-04 RX ORDER — LANCETS
EACH MISCELLANEOUS
Qty: 204 EACH | Refills: 1 | Status: SHIPPED | OUTPATIENT
Start: 2020-02-04 | End: 2020-06-07

## 2020-02-04 NOTE — TELEPHONE ENCOUNTER
----- Message from Candis London sent at 2/4/2020  8:54 AM CST -----  Contact: [facility] [phone]  Prescription Request:     Name of medication:   valsartan (DIOVAN) 160 MG tablet  Accu-Chek [place] Plus Meter  Accu-Chek [place] Plus Test Strip  Accu-Chek Softclix Lancets  Accu-Chek [place] Control Solution  BD Single Use Swab    Reason for request: Refill    Pharmacy: [facility] - [facility] [address]    Please advise.    Thank You

## 2020-02-14 ENCOUNTER — TELEPHONE (OUTPATIENT)
Dept: OPHTHALMOLOGY | Facility: CLINIC | Age: 85
End: 2020-02-14

## 2020-02-14 NOTE — TELEPHONE ENCOUNTER
----- Message from Allison Serrato sent at 2/14/2020 11:04 AM CST -----  Contact: pt  Reason; Pt states she was told to come back for a f/u next week with provider.Please call to schedule (Nothing in Epic)    Communication; [phone]

## 2020-03-17 DIAGNOSIS — E78.5 HYPERLIPIDEMIA, UNSPECIFIED HYPERLIPIDEMIA TYPE: ICD-10-CM

## 2020-03-17 RX ORDER — ATORVASTATIN CALCIUM 20 MG/1
TABLET, FILM COATED ORAL
Qty: 90 TABLET | Refills: 2 | Status: SHIPPED | OUTPATIENT
Start: 2020-03-17 | End: 2020-12-17

## 2020-03-18 ENCOUNTER — TELEPHONE (OUTPATIENT)
Dept: OPHTHALMOLOGY | Facility: CLINIC | Age: 85
End: 2020-03-18

## 2020-03-28 ENCOUNTER — TELEPHONE (OUTPATIENT)
Dept: INTERNAL MEDICINE | Facility: CLINIC | Age: 85
End: 2020-03-28

## 2020-03-28 ENCOUNTER — PATIENT MESSAGE (OUTPATIENT)
Dept: INTERNAL MEDICINE | Facility: CLINIC | Age: 85
End: 2020-03-28

## 2020-03-28 NOTE — TELEPHONE ENCOUNTER
Call pt left  Detail message about a virtual visit and how to download the appt   Call back with question

## 2020-03-29 RX ORDER — FUROSEMIDE 20 MG/1
TABLET ORAL
Qty: 90 TABLET | Refills: 1 | Status: SHIPPED | OUTPATIENT
Start: 2020-03-29 | End: 2020-08-03

## 2020-03-30 ENCOUNTER — TELEPHONE (OUTPATIENT)
Dept: INTERNAL MEDICINE | Facility: CLINIC | Age: 85
End: 2020-03-30

## 2020-03-30 ENCOUNTER — OFFICE VISIT (OUTPATIENT)
Dept: INTERNAL MEDICINE | Facility: CLINIC | Age: 85
End: 2020-03-30
Payer: MEDICARE

## 2020-03-30 DIAGNOSIS — L98.9 SKIN LESION: Primary | ICD-10-CM

## 2020-03-30 DIAGNOSIS — I20.9 ANGINA PECTORIS: ICD-10-CM

## 2020-03-30 DIAGNOSIS — K50.80 CROHN'S DISEASE OF BOTH SMALL AND LARGE INTESTINE WITHOUT COMPLICATION: ICD-10-CM

## 2020-03-30 DIAGNOSIS — I70.0 AORTIC ATHEROSCLEROSIS: ICD-10-CM

## 2020-03-30 DIAGNOSIS — G63 POLYNEUROPATHY IN DISEASES CLASSIFIED ELSEWHERE: ICD-10-CM

## 2020-03-30 DIAGNOSIS — E11.42 DIABETIC POLYNEUROPATHY ASSOCIATED WITH TYPE 2 DIABETES MELLITUS: ICD-10-CM

## 2020-03-30 DIAGNOSIS — M46.96 INFLAMMATORY SPONDYLOPATHY OF LUMBAR REGION: ICD-10-CM

## 2020-03-30 DIAGNOSIS — E66.01 MORBID (SEVERE) OBESITY DUE TO EXCESS CALORIES: ICD-10-CM

## 2020-03-30 PROCEDURE — 99443 PR PHYSICIAN TELEPHONE EVALUATION 21-30 MIN: CPT | Mod: 95,,, | Performed by: INTERNAL MEDICINE

## 2020-03-30 PROCEDURE — 1159F PR MEDICATION LIST DOCUMENTED IN MEDICAL RECORD: ICD-10-PCS | Mod: 95,,, | Performed by: INTERNAL MEDICINE

## 2020-03-30 PROCEDURE — 1159F MED LIST DOCD IN RCRD: CPT | Mod: 95,,, | Performed by: INTERNAL MEDICINE

## 2020-03-30 PROCEDURE — 1101F PT FALLS ASSESS-DOCD LE1/YR: CPT | Mod: CPTII,95,, | Performed by: INTERNAL MEDICINE

## 2020-03-30 PROCEDURE — 99443 PR PHYSICIAN TELEPHONE EVALUATION 21-30 MIN: ICD-10-PCS | Mod: 95,,, | Performed by: INTERNAL MEDICINE

## 2020-03-30 PROCEDURE — 1101F PR PT FALLS ASSESS DOC 0-1 FALLS W/OUT INJ PAST YR: ICD-10-PCS | Mod: CPTII,95,, | Performed by: INTERNAL MEDICINE

## 2020-03-30 NOTE — PROGRESS NOTES
"Subjective:      Patient ID: Calli Jamison is a 89 y.o. female.    Chief Complaint: No chief complaint on file.    HPI:  HPI   The patient location is: home  The chief complaint leading to consultation is: follow up  Visit type: Virtual visit with synchronous audio and video: no visualization  Total time spent with patient: 30 minutes  Each patient to whom he or she provides medical services by telemedicine is:  (1) informed of the relationship between the physician and patient and the respective role of any other health care provider with respect to management of the patient; and (2) notified that he or she may decline to receive medical services by telemedicine and may withdraw from such care at any time.    Notes:   Skin disorder on the back: patient will send a picture    Patient scheduled for lab and will reschedule A1C.    Patient has trouble remembering about her medicine and Braden is "taking care of everything"    She seems like she is comfortable on the phone.        Patient Active Problem List   Diagnosis    Sleep apnea    Iron deficiency    Asthma with allergic rhinitis    Pemphigoid    Multiple thyroid nodules    Bilateral carpal tunnel syndrome    Diverticulosis    GERD (gastroesophageal reflux disease)    Personal history of solitary pulmonary nodule    Pseudophakia    Angina pectoris    COPD (chronic obstructive pulmonary disease)    Restless legs syndrome (RLS)    Cerebral microvascular disease    Refractive error    Adjustment disorder with mixed anxiety and depressed mood    Crohn's disease of both small and large intestine    Paroxysmal atrial fibrillation    Esophageal stricture    Essential hypertension    DM type 2 without retinopathy    Glaucoma suspect of both eyes    PCO (posterior capsular opacification), bilateral    History of TIA (transient ischemic attack)    Dry eye syndrome, bilateral    Type 2 diabetes mellitus, without long-term current use of insulin "    Aortic atherosclerosis    Spinal stenosis of lumbar region: CT 2017    Stage 2 chronic kidney disease    Class 1 obesity in adult    Lumbar facet arthropathy    Polyneuropathy in diseases classified elsewhere     Peripheral sensory neuropathy    Inflammatory spondylopathy of lumbar region    Diabetic polyneuropathy associated with type 2 diabetes mellitus    Other hyperlipidemia    Open angle with borderline findings and high glaucoma risk in both eyes    TIA (transient ischemic attack)    Status post placement of implantable loop recorder    Morbid (severe) obesity due to excess calories     Past Medical History:   Diagnosis Date    Abnormal Pap smear     After-cataract, unspecified - Both Eyes 11/5/2012    Arthritis     ASCUS (atypical squamous cells of undetermined significance) on Pap smear 7/1/2012    Asthma     Atrial fibrillation     Back pain 2009    Bullous pemphigoid     Carpal tunnel syndrome     Chronic back pain     COPD (chronic obstructive pulmonary disease)     Coronary artery disease     Crohn's disease     Depression     Diabetes mellitus     Discoid lupus     Diverticulosis     History of migraine headaches     Hyperlipidemia     Hypertension     Multiple thyroid nodules     Obesity     Open angle with borderline findings and high glaucoma risk in both eyes 9/11/2019    Personal history of colonic polyps     Pulmonary nodule     Renal manifestation of secondary diabetes mellitus     Restless legs syndrome     Sciatica     Right leg    Sleep apnea     Stricture and stenosis of esophagus     Stroke     TIA (transient ischemic attack)      Past Surgical History:   Procedure Laterality Date    BREAST BIOPSY Left     Breast: wire localization  1/2004    CATARACT EXTRACTION W/  INTRAOCULAR LENS IMPLANT Bilateral     Cataract left eye  1/2008    Cataract: right eye  1/2008    COLON SURGERY      ileocecectomy 2011    COLONOSCOPY  9/30/2008    Crohns  in remission    ESOPHAGOGASTRODUODENOSCOPY N/A 7/24/2018    Procedure: EGD (ESOPHAGOGASTRODUODENOSCOPY);  Surgeon: Du Robles MD;  Location: [facility] ([address] [place] [address]);  Service: Endoscopy;  Laterality: N/A;  loop recoreder-battery dead per pt. (not active). corona dpt. to move to morning . unable to do so.EC    HYSTERECTOMY      ovaries remain    Iliocolic Crohns Disease with stricure  3/2011    [place] iliocecectomy    Laparoscopic-assisted ileocecectomy.       Left carpal tunnel      Left carpal tunnel surgery      Rectal fistulae repair      TONSILLECTOMY      UPPER GASTROINTESTINAL ENDOSCOPY  11/2011    hiatal hernia, benign appearing esophageal  stricture dilated  prn repeat rec.     Family History   Problem Relation Age of Onset    Pneumonia Father     Cataracts Mother     Macular degeneration Mother     Hypertension Sister     Alkaptonuria Maternal Grandfather     Asthma Son     No Known Problems Son     No Known Problems Son     No Known Problems Son     Crohn's disease Neg Hx     Ulcerative colitis Neg Hx     Inflammatory bowel disease Neg Hx     Amblyopia Neg Hx     Blindness Neg Hx     Glaucoma Neg Hx     Retinal detachment Neg Hx     Strabismus Neg Hx      Review of Systems  Objective:   There were no vitals filed for this visit.  There is no height or weight on file to calculate BMI.  Physical Exam  Assessment:     1. Skin lesion    2. Diabetic polyneuropathy associated with type 2 diabetes mellitus    3. Morbid (severe) obesity due to excess calories    4. Crohn's disease of both small and large intestine without complication    5. Inflammatory spondylopathy of lumbar region    6. Polyneuropathy in diseases classified elsewhere    7. Angina pectoris    8. Aortic atherosclerosis      Plan:   Diagnoses and all orders for this visit:    Skin lesion  Comments:  Patient will send a picture    Diabetic polyneuropathy associated with type 2 diabetes mellitus  Comments:  Patient is uncertain  how often she is checking her sugars but cannot check her A1C at this time and will reschedule 2 mo    Morbid (severe) obesity due to excess calories  Comments:  She states her weight is good    Crohn's disease of both small and large intestine without complication  Comments:  stable    Inflammatory spondylopathy of lumbar region  Comments:  Back pain: intermittent    Polyneuropathy in diseases classified elsewhere    Angina pectoris    Aortic atherosclerosis        Problem List Items Addressed This Visit     Angina pectoris    Crohn's disease of both small and large intestine    Aortic atherosclerosis    Overview     Noted on imaging 6/28/2017.         Polyneuropathy in diseases classified elsewhere     Inflammatory spondylopathy of lumbar region    Diabetic polyneuropathy associated with type 2 diabetes mellitus    Overview     EMG 2019         Morbid (severe) obesity due to excess calories      Other Visit Diagnoses     Skin lesion    -  Primary    Patient will send a picture        No orders of the defined types were placed in this encounter.    Follow up in about 3 months (around 6/30/2020).     Medication List           Accurate as of March 30, 2020  2:37 PM. If you have any questions, ask your nurse or doctor.               CONTINUE taking these medications    acetaminophen 500 MG tablet  Commonly known as:  TYLENOL  Take 1-2 tablets (500-1,000 mg total) by mouth every 12 (twelve) hours as needed for Pain.     albuterol 90 mcg/actuation inhaler  Commonly known as:  VENTOLIN HFA  INHALE 2 PUFFS INTO THE LUNGS EVERY 4 TO 6 HOURS AS NEEDED. RESCUE     aspirin 81 MG EC tablet  Commonly known as:  ECOTRIN     atorvastatin 20 MG tablet  Commonly known as:  LIPITOR  TAKE 1 TABLET BY MOUTH EVERY DAY     blood sugar diagnostic Strp  Commonly known as:  ACCU-CHEK SMARTVIEW TEST STRIP  ONE TEST STRIP PER GLUCOSE TESTING TWICE A DAY: INSURANCE BRAND PREFERENCE     blood-glucose meter Misc  Dispense one meter: Insurance  Brand Preference     CALCIUM CITRATE + D 315-200 mg-unit per tablet  Generic drug:  calcium citrate-vitamin D3 315-200 mg     clopidogreL 75 mg tablet  Commonly known as:  PLAVIX  Take 1 tablet (75 mg total) by mouth once daily.     conjugated estrogens vaginal cream  Commonly known as:  PREMARIN  fnger tip twice  Per week     flash glucose scanning reader Misc  Commonly known as:  FREESTYLE SHAYLA 14 DAY [place]  1 Units by Misc.(Non-Drug; Combo Route) route 4 (four) times daily as needed.     flash glucose sensor Kit  Commonly known as:  FREESTYLE SHAYLA 14 DAY SENSOR  1 kit by Misc.(Non-Drug; Combo Route) route 4 (four) times daily as needed.     fluocinonide 0.05% 0.05 % cream  Commonly known as:  LIDEX  Apply topically 2 (two) times daily as needed. AAA scalp qd bid.     fluticasone propionate 110 mcg/actuation inhaler  Commonly known as:  FLOVENT HFA  USE 2 PUFFS BY MOUTH TWICE A DAY FOR ASTHMA     furosemide 20 MG tablet  Commonly known as:  LASIX  TAKE 1 TABLET BY MOUTH ONCE DAILY.     lancets Misc  Commonly known as:  ACCU-CHEK FASTCLIX LANCET DRUM  ONE LANCET PER GLUCOSE TESTING TWICE A DAY: INSURANCE BRAND PREFERENCE     latanoprost 0.005 % ophthalmic solution  PLACE 1 DROP INTO BOTH EYES NIGHTLY.     metFORMIN 500 MG tablet  Commonly known as:  GLUCOPHAGE  Take 1 tablet (500 mg total) by mouth daily with breakfast. Changed as patient is taking     omeprazole 20 MG capsule  Commonly known as:  PRILOSEC     potassium chloride 10 MEQ Cpsr  Commonly known as:  MICRO-K  OPEN 1 CAPSULE AND SPRINKLE OVER UNHEATED SOFT FOOD (APPLESAUCE) EVERY DAY. DO NOT CHEW OR CRUSH     rOPINIRole 0.25 MG tablet  Commonly known as:  REQUIP  Take 2 tablets (0.5 mg total) by mouth nightly as needed. As directed.     terazosin 1 MG capsule  Commonly known as:  HYTRIN  Take 1 capsule (1 mg total) by mouth every evening.     valsartan 160 MG tablet  Commonly known as:  DIOVAN  Take 2 tablets (320 mg total) by mouth once daily.      verapamiL 360 MG C24p  Commonly known as:  VERELAN  Take 1 capsule (360 mg total) by mouth once daily.     VITAMIN B-12 500 MCG tablet  Generic drug:  cyanocobalamin

## 2020-04-07 DIAGNOSIS — E11.22 CONTROLLED TYPE 2 DIABETES MELLITUS WITH STAGE 3 CHRONIC KIDNEY DISEASE, WITHOUT LONG-TERM CURRENT USE OF INSULIN: ICD-10-CM

## 2020-04-07 DIAGNOSIS — N18.30 CONTROLLED TYPE 2 DIABETES MELLITUS WITH STAGE 3 CHRONIC KIDNEY DISEASE, WITHOUT LONG-TERM CURRENT USE OF INSULIN: ICD-10-CM

## 2020-04-07 RX ORDER — DEXTROSE 4 G
TABLET,CHEWABLE ORAL
Qty: 1 EACH | Refills: 0 | Status: SHIPPED | OUTPATIENT
Start: 2020-04-07 | End: 2021-06-17

## 2020-05-28 ENCOUNTER — TELEPHONE (OUTPATIENT)
Dept: INTERNAL MEDICINE | Facility: CLINIC | Age: 85
End: 2020-05-28

## 2020-07-07 ENCOUNTER — TELEPHONE (OUTPATIENT)
Dept: OPHTHALMOLOGY | Facility: CLINIC | Age: 85
End: 2020-07-07

## 2020-07-22 ENCOUNTER — OFFICE VISIT (OUTPATIENT)
Dept: INTERNAL MEDICINE | Facility: CLINIC | Age: 85
End: 2020-07-22
Payer: MEDICARE

## 2020-07-22 DIAGNOSIS — J43.9 PULMONARY EMPHYSEMA, UNSPECIFIED EMPHYSEMA TYPE: ICD-10-CM

## 2020-07-22 DIAGNOSIS — G63 POLYNEUROPATHY IN DISEASES CLASSIFIED ELSEWHERE: ICD-10-CM

## 2020-07-22 DIAGNOSIS — E66.01 MORBID (SEVERE) OBESITY DUE TO EXCESS CALORIES: ICD-10-CM

## 2020-07-22 DIAGNOSIS — K50.819 CROHN'S DISEASE OF SMALL AND LARGE INTESTINES WITH COMPLICATION: ICD-10-CM

## 2020-07-22 DIAGNOSIS — E11.42 DIABETIC POLYNEUROPATHY ASSOCIATED WITH TYPE 2 DIABETES MELLITUS: ICD-10-CM

## 2020-07-22 DIAGNOSIS — I70.0 AORTIC ATHEROSCLEROSIS: ICD-10-CM

## 2020-07-22 DIAGNOSIS — E11.9 DM TYPE 2 WITHOUT RETINOPATHY: ICD-10-CM

## 2020-07-22 DIAGNOSIS — M46.96 INFLAMMATORY SPONDYLOPATHY OF LUMBAR REGION: ICD-10-CM

## 2020-07-22 DIAGNOSIS — F32.0 CURRENT MILD EPISODE OF MAJOR DEPRESSIVE DISORDER WITHOUT PRIOR EPISODE: Primary | ICD-10-CM

## 2020-07-22 DIAGNOSIS — I10 ESSENTIAL HYPERTENSION: ICD-10-CM

## 2020-07-22 DIAGNOSIS — I48.0 PAROXYSMAL ATRIAL FIBRILLATION: ICD-10-CM

## 2020-07-22 PROCEDURE — 99442 PR PHYSICIAN TELEPHONE EVALUATION 11-20 MIN: CPT | Mod: HCNC,95,, | Performed by: INTERNAL MEDICINE

## 2020-07-22 PROCEDURE — 99442 PR PHYSICIAN TELEPHONE EVALUATION 11-20 MIN: ICD-10-PCS | Mod: HCNC,95,, | Performed by: INTERNAL MEDICINE

## 2020-07-22 RX ORDER — TERAZOSIN 1 MG/1
2 CAPSULE ORAL NIGHTLY
Qty: 180 CAPSULE | Refills: 3 | Status: SHIPPED | OUTPATIENT
Start: 2020-07-22 | End: 2020-11-23

## 2020-07-22 NOTE — PROGRESS NOTES
Established Patient - Audio Only Telehealth Visit     The patient location is: home  The chief complaint leading to consultation is:   Visit type: Virtual visit with audio only (telephone)  Total time spent with patient: 15 min       The reason for the audio only service rather than synchronous audio and video virtual visit was related to technical difficulties or patient preference/necessity.     Each patient to whom I provide medical services by telemedicine is:  (1) informed of the relationship between the physician and patient and the respective role of any other health care provider with respect to management of the patient; and (2) notified that they may decline to receive medical services by telemedicine and may withdraw from such care at any time. Patient verbally consented to receive this service via voice-only telephone call.       HPI:   Rayshawn Jr: 5 week hospitalization for Covid 19 ( April through May)    Diabetes Management Status    Statin: Taking  ACE/ARB: Taking    Screening or Prevention Patient's value Goal Complete/Controlled?   HgA1C Testing and Control   Lab Results   Component Value Date    HGBA1C 6.0 (H) 10/25/2019      Annually/Less than 8% Yes   Lipid profile : 10/25/2019 Annually Yes   LDL control Lab Results   Component Value Date    LDLCALC 40.4 (L) 10/25/2019    Annually/Less than 100 mg/dl  Yes   Nephropathy screening Lab Results   Component Value Date    LABMICR 37.0 11/11/2015     Lab Results   Component Value Date    PROTEINUA Negative 10/25/2019    Annually Yes   Blood pressure BP Readings from Last 1 Encounters:   11/27/19 138/82    Less than 140/90 Yes   Dilated retinal exam : 09/11/2019 Annually Yes   Foot exam   : 05/22/2018 Annually No          Assessment and plan:      Diagnoses and all orders for this visit:    Current mild episode of major depressive disorder without prior episode  Comments:  Son with Covid 19 hospitalized doing better    Paroxysmal atrial  fibrillation  Comments:  Stable    Pulmonary emphysema, unspecified emphysema type    Crohn's disease of small and large intestines with complication  Comments:  Stable    DM type 2 without retinopathy  Comments:  Home glucoses: now under 120    Essential hypertension  Comments:  Patient states that it is elevated, our last BP good    Aortic atherosclerosis    Inflammatory spondylopathy of lumbar region    Morbid (severe) obesity due to excess calories  Comments:  Discussed diet:children obtain groceries    Polyneuropathy in diseases classified elsewhere   Comments:  stable    Diabetic polyneuropathy associated with type 2 diabetes mellitus                              This service was not originating from a related E/M service provided within the previous 7 days nor will  to an E/M service or procedure within the next 24 hours or my soonest available appointment.  Prevailing standard of care was able to be met in this audio-only visit.

## 2020-08-10 ENCOUNTER — PATIENT OUTREACH (OUTPATIENT)
Dept: ADMINISTRATIVE | Facility: HOSPITAL | Age: 85
End: 2020-08-10

## 2020-08-10 DIAGNOSIS — E11.9 DM TYPE 2 WITHOUT RETINOPATHY: Primary | ICD-10-CM

## 2020-08-10 DIAGNOSIS — Z12.31 ENCOUNTER FOR SCREENING MAMMOGRAM FOR MALIGNANT NEOPLASM OF BREAST: ICD-10-CM

## 2020-08-10 DIAGNOSIS — Z12.11 COLON CANCER SCREENING: Primary | ICD-10-CM

## 2020-08-10 NOTE — PROGRESS NOTES
Message left to call our office to schedule her MMG and Colonoscopy Due. Eye Exam Scheduled- Has upcoming Virtual Visit-reminded to call to schedule her A1C Lab.

## 2020-08-19 ENCOUNTER — TELEPHONE (OUTPATIENT)
Dept: INTERNAL MEDICINE | Facility: CLINIC | Age: 85
End: 2020-08-19

## 2020-08-19 NOTE — TELEPHONE ENCOUNTER
----- Message from Farida Alexnadra sent at 8/19/2020  3:50 PM CDT -----  Contact: self [phone]  Pt states she has not been able to take her metFORMIN (GLUCOPHAGE) 500 MG tablet due to problems swallowing the pill b/c of the size of the pill. Please call and advise.

## 2020-08-20 NOTE — TELEPHONE ENCOUNTER
"Spoke with pt .She has been for the last 2 weeks having problems swallowing her Metformin .She says it is not scored that is why she has not broken it in half. Start date for Metformin was 12.2/19 . Pt also mentioned speaking to a pharmacist at [facility] about wearing a patch to monitor her CBG but she was not  sure her insurance would cover it. I spoke with Dodie at [facility] she says the pt"s insurance would cover a portion of the Sensor called [employer] $122 for 2 sensors a months supply and 1 reader costing $89 . She says she has lesions on her esophogus.        "

## 2020-08-21 NOTE — TELEPHONE ENCOUNTER
It would probably be best for her to do the labs since it has now been almost a year since she has had labs done so I can see which she really needs.  The lab orders are in if her son will take her to any of our labs cites that she wishes.

## 2020-08-23 ENCOUNTER — PATIENT OUTREACH (OUTPATIENT)
Dept: ADMINISTRATIVE | Facility: OTHER | Age: 85
End: 2020-08-23

## 2020-08-23 NOTE — PROGRESS NOTES
Care Everywhere: updated  Immunization: updated  Health Maintenance: updated  Media Review:   Legacy Review:   Order placed:   Upcoming appts:ophthalmology 8/24/2020

## 2020-08-24 ENCOUNTER — NURSE TRIAGE (OUTPATIENT)
Dept: ADMINISTRATIVE | Facility: CLINIC | Age: 85
End: 2020-08-24

## 2020-08-25 NOTE — TELEPHONE ENCOUNTER
Pt wanted to talk a bit because she is lonely, she states she wishes she had someone to stay with her but her family is busy, she states her neighbor used to but she has been sick, asked if she could stay with her neighbor but she was pretty adamant she does not want to, she wants someone to stay with her, advised her to call one of her family member sand let them know what is going on, she agreed, advised her to call back for anymore needs or concerns, caller was thankful for conversation and agreed, she would like someone from Dr Hurd's office to call her    Reason for Disposition   Nursing judgment    Protocols used: ST NO GUIDELINE OR REFERENCE [id number]

## 2020-08-25 NOTE — TELEPHONE ENCOUNTER
Please see if some point she could have her laboratory studies done.  It has been about 9 months since her last labs may be 1 of her children can bring her in    Also at the last time I talked to her her son had COVID please make sure that he survives this    .

## 2020-08-26 ENCOUNTER — TELEPHONE (OUTPATIENT)
Dept: INTERNAL MEDICINE | Facility: CLINIC | Age: 85
End: 2020-08-26

## 2020-08-26 NOTE — TELEPHONE ENCOUNTER
This patient was a no show to her appt, please shannon as such I could not get her on the date she had an appt at home or mobile

## 2020-09-02 ENCOUNTER — OFFICE VISIT (OUTPATIENT)
Dept: HOME HEALTH SERVICES | Facility: CLINIC | Age: 85
End: 2020-09-02
Payer: MEDICARE

## 2020-09-02 VITALS
DIASTOLIC BLOOD PRESSURE: 95 MMHG | SYSTOLIC BLOOD PRESSURE: 168 MMHG | HEART RATE: 86 BPM | TEMPERATURE: 98 F | BODY MASS INDEX: 33.99 KG/M2 | OXYGEN SATURATION: 97 % | WEIGHT: 180 LBS | HEIGHT: 61 IN

## 2020-09-02 DIAGNOSIS — Z00.00 ENCOUNTER FOR PREVENTIVE HEALTH EXAMINATION: Primary | ICD-10-CM

## 2020-09-02 DIAGNOSIS — E11.42 DIABETIC POLYNEUROPATHY ASSOCIATED WITH TYPE 2 DIABETES MELLITUS: ICD-10-CM

## 2020-09-02 DIAGNOSIS — E11.22 CKD STAGE 3 DUE TO TYPE 2 DIABETES MELLITUS: ICD-10-CM

## 2020-09-02 DIAGNOSIS — N18.30 CKD STAGE 3 DUE TO TYPE 2 DIABETES MELLITUS: ICD-10-CM

## 2020-09-02 DIAGNOSIS — I70.0 AORTIC ATHEROSCLEROSIS: ICD-10-CM

## 2020-09-02 DIAGNOSIS — E78.49 OTHER HYPERLIPIDEMIA: ICD-10-CM

## 2020-09-02 DIAGNOSIS — M46.96 INFLAMMATORY SPONDYLOPATHY OF LUMBAR REGION: ICD-10-CM

## 2020-09-02 DIAGNOSIS — K50.819 CROHN'S DISEASE OF SMALL AND LARGE INTESTINES WITH COMPLICATION: ICD-10-CM

## 2020-09-02 DIAGNOSIS — Z99.89 DEPENDENCE ON OTHER ENABLING MACHINES AND DEVICES: ICD-10-CM

## 2020-09-02 DIAGNOSIS — E66.09 CLASS 1 OBESITY DUE TO EXCESS CALORIES WITHOUT SERIOUS COMORBIDITY WITH BODY MASS INDEX (BMI) OF 34.0 TO 34.9 IN ADULT: ICD-10-CM

## 2020-09-02 DIAGNOSIS — I48.0 PAROXYSMAL ATRIAL FIBRILLATION: ICD-10-CM

## 2020-09-02 DIAGNOSIS — I10 ESSENTIAL HYPERTENSION: ICD-10-CM

## 2020-09-02 DIAGNOSIS — J43.9 PULMONARY EMPHYSEMA, UNSPECIFIED EMPHYSEMA TYPE: ICD-10-CM

## 2020-09-02 PROBLEM — E66.01 MORBID (SEVERE) OBESITY DUE TO EXCESS CALORIES: Status: RESOLVED | Noted: 2020-03-30 | Resolved: 2020-09-02

## 2020-09-02 PROCEDURE — 99499 RISK ADDL DX/OHS AUDIT: ICD-10-PCS | Mod: S$GLB,,, | Performed by: NURSE PRACTITIONER

## 2020-09-02 PROCEDURE — 99499 UNLISTED E&M SERVICE: CPT | Mod: S$GLB,,, | Performed by: NURSE PRACTITIONER

## 2020-09-02 PROCEDURE — G0439 PPPS, SUBSEQ VISIT: HCPCS | Mod: S$GLB,,, | Performed by: NURSE PRACTITIONER

## 2020-09-02 PROCEDURE — G0439 PR MEDICARE ANNUAL WELLNESS SUBSEQUENT VISIT: ICD-10-PCS | Mod: S$GLB,,, | Performed by: NURSE PRACTITIONER

## 2020-09-02 NOTE — PROGRESS NOTES
"  Calli Jamison presented for a  Medicare AWV and comprehensive Health Risk Assessment today. The following components were reviewed and updated:    · Medical history  · Family History  · Social history  · Allergies and Current Medications  · Health Risk Assessment  · Health Maintenance  · Care Team     ** See Completed Assessments for Annual Wellness Visit within the encounter summary.**         The following assessments were completed:  · Living Situation  · CAGE  · Depression Screening  · Timed Get Up and Go  · Whisper Test  · Cognitive Function Screening  · Nutrition Screening  · ADL Screening  · PAQ Screening        Vitals:    09/02/20 1124   BP: (!) 168/95   Pulse: 86   Temp: 97.7 °F (36.5 °C)   SpO2: 97%   Weight: 81.6 kg (180 lb)   Height: 5' 1" (1.549 m)     Body mass index is 34.01 kg/m².  Physical Exam  Constitutional:       Appearance: Normal appearance. She is obese.   HENT:      Head: Normocephalic.   Eyes:      General: No scleral icterus.  Neck:      Musculoskeletal: Neck supple.   Cardiovascular:      Rate and Rhythm: Normal rate. Rhythm irregular.      Pulses:           Dorsalis pedis pulses are 2+ on the right side and 2+ on the left side.        Posterior tibial pulses are 2+ on the right side and 2+ on the left side.      Heart sounds: Normal heart sounds.   Pulmonary:      Effort: No respiratory distress.      Breath sounds: Normal breath sounds.   Abdominal:      General: Bowel sounds are normal.      Palpations: Abdomen is soft.   Musculoskeletal:      Right lower leg: No edema.      Left lower leg: No edema.      Right foot: Normal range of motion. No deformity, bunion, Charcot foot, foot drop or prominent metatarsal heads.      Left foot: Normal range of motion. No deformity, bunion, Charcot foot, foot drop or prominent metatarsal heads.   Feet:      Right foot:      Protective Sensation: 8 sites tested. 8 sites sensed.      Skin integrity: Skin integrity normal.      Toenail Condition: " Right toenails are abnormally thick. Fungal disease present.     Left foot:      Protective Sensation: 8 sites tested. 8 sites sensed.      Skin integrity: Skin integrity normal.      Toenail Condition: Left toenails are abnormally thick. Fungal disease present.  Neurological:      Mental Status: She is alert and oriented to person, place, and time.   Psychiatric:         Mood and Affect: Mood normal.         Behavior: Behavior normal.               Diagnoses and health risks identified today and associated recommendations/orders:    1. Encounter for preventive health examination  - Screenings performed, as noted above. Personal preventative testing needs reviewed.     2. Diabetic polyneuropathy associated with type 2 diabetes mellitus  - Stable, followed by PCP    3. Pulmonary emphysema, unspecified emphysema type  - Stable, followed by PCP    4. Paroxysmal atrial fibrillation  - Stable, followed by PCP and cardiology    5. Essential hypertension  - Elevated today, PCP notified. Had not taken medications prior to visit.     6. Other hyperlipidemia  - Stable, followed by PCP    7. Aortic atherosclerosis  - Stable, followed by PCP    8. CKD stage 3 due to type 2 diabetes mellitus  - Stable, followed by PCP    9. Class 1 obesity due to excess calories without serious comorbidity with body mass index (BMI) of 34.0 to 34.9 in adult  - Stable, followed by PCP    10. Crohn's disease of small and large intestines with complication  - Stable, followed by PCP and GI    11. Inflammatory spondylopathy of lumbar region  - Stable.    12. Dependence on other enabling machines and devices  - Stable, followed by PCP      Provided Calli with a 5-10 year written screening schedule and personal prevention plan. Recommendations were developed using the USPSTF age appropriate recommendations. Education, counseling, and referrals were provided as needed. After Visit Summary printed and given to patient which includes a list of  additional screenings\tests needed.    Follow up in about 1 year (around 9/2/2021) for your next annual wellness visit.    Bria Menon NP    I offered to discuss end of life issues, including information on how to make advance directives that the patient could use to name someone who would make medical decisions on their behalf if they became too ill to make themselves.  She reports that she has advanced directives on file with her  and [facility]

## 2020-09-02 NOTE — Clinical Note
Gualberto Hurd,   I saw Ms. Jamison today for her medicare AWV. Her BP was quite elevated (168/95 was the best reading I got, 198/106 was the first reading I got).  She had not taken her medication prior to my arrival, but I still felt I should notify you about this.     I hope you are well.   Bria

## 2020-09-02 NOTE — PATIENT INSTRUCTIONS
Counseling and Referral of Other Preventative  (Italic type indicates deductible and co-insurance are waived)    Patient Name: Calli Jamison  Today's Date: 9/2/2020    Health Maintenance       Date Due Completion Date    Shingles Vaccine (1 of 2) [birth year]06/1981 ---    Colonoscopy 07/12/2015 7/12/2012    Foot Exam 05/22/2019 5/22/2018    Override on 5/22/2018: Done    Override on 6/28/2017: Done    Hemoglobin A1c 04/25/2020 10/25/2019    Override on 5/22/2018: Done    Influenza Vaccine (1) 09/01/2020 11/27/2019    Eye Exam 09/11/2020 9/11/2019    Lipid Panel 10/25/2020 10/25/2019    TETANUS VACCINE 09/24/2022 9/24/2012        No orders of the defined types were placed in this encounter.    The following information is provided to all patients.  This information is to help you find resources for any of the problems found today that may be affecting your health:                Living healthy guide: www.[facility].[place].gov      Understanding Diabetes: www.diabetes.org      Eating healthy: www.cdc.gov/healthyweight      CDC home safety checklist: www.cdc.gov/steadi/patient.html      Agency on Aging: www.goea.[place].gov      Alcoholics anonymous (AA): www.aa.org      Physical Activity: www.franca.nih.gov/lo8zbzf      Tobacco use: www.quitwithusla.org

## 2020-09-08 ENCOUNTER — TELEPHONE (OUTPATIENT)
Dept: OPHTHALMOLOGY | Facility: CLINIC | Age: 85
End: 2020-09-08

## 2020-09-08 NOTE — TELEPHONE ENCOUNTER
----- Message from Mary Santamaria sent at 9/8/2020  8:34 AM CDT -----  Regarding: Sooner Appt  Contact: Patrice  Pt son calling to get mother scheduled appt due to failing vision. Pt was due back for a 3 mos fu with [facility] but did not come in and had a recent appt that was scheduled as well.  Son would like to speak to someone about getting her in sooner.  He can be reached at [phone]

## 2020-09-28 ENCOUNTER — TELEPHONE (OUTPATIENT)
Dept: INTERNAL MEDICINE | Facility: CLINIC | Age: 85
End: 2020-09-28

## 2020-09-28 NOTE — TELEPHONE ENCOUNTER
----- Message from Sheridan Hurd MD sent at 9/28/2020 11:50 AM CDT -----  Contact: self/ [phone]  yes  ----- Message -----  From: Bhavna Agarwal MA  Sent: 9/28/2020  10:15 AM CDT  To: Sheridan Hurd MD      ----- Message -----  From: Nena Miller  Sent: 9/28/2020   8:39 AM CDT  To: [facility] Staff    Patient says that she is having difficulty swallowing her valsartan (DIOVAN) 160 MG tablet and would like to know what she should do? Can the patient crush her medication? Please call and advise.

## 2020-09-29 ENCOUNTER — PATIENT MESSAGE (OUTPATIENT)
Dept: OTHER | Facility: OTHER | Age: 85
End: 2020-09-29

## 2020-10-01 ENCOUNTER — TELEPHONE (OUTPATIENT)
Dept: INTERNAL MEDICINE | Facility: CLINIC | Age: 85
End: 2020-10-01

## 2020-10-01 NOTE — TELEPHONE ENCOUNTER
----- Message from Radha iMller sent at 10/1/2020 12:39 PM CDT -----  Contact: Self [phone]  Would like to receive medical advice.    Would they like a call back or a response via [facility]:  call back    Additional information:  Calling to speak with the nurse regarding a podiatry visit and A1 c visit. Patient states a nurse cam to her home a recommend that the pt will need a foot exam. Patient would like to confirm if it's time for the annual foot exam and A1c visit. Patient is requesting a call back regarding message.

## 2020-10-02 NOTE — TELEPHONE ENCOUNTER
Call the patient on the number provided, no answer and unable to leave a message, message is full.

## 2020-10-08 ENCOUNTER — TELEPHONE (OUTPATIENT)
Dept: INTERNAL MEDICINE | Facility: CLINIC | Age: 85
End: 2020-10-08

## 2020-10-08 NOTE — TELEPHONE ENCOUNTER
----- Message from Tonya Moore sent at 10/8/2020  3:59 PM CDT -----  Contact: [phone]  Patient called requesting orders for AIC. Please call and advise.

## 2020-10-12 ENCOUNTER — TELEPHONE (OUTPATIENT)
Dept: GASTROENTEROLOGY | Facility: CLINIC | Age: 85
End: 2020-10-12

## 2020-10-12 NOTE — TELEPHONE ENCOUNTER
----- Message from Du Robles MD sent at 10/9/2020  1:39 PM CDT -----  Regarding: RE: PT  Contact: PT  No, she is 89 years old, she doesn't have to have one  ----- Message -----  From: Kathryn Lopez MA  Sent: 10/9/2020   9:49 AM CDT  To: Du Robles MD  Subject: FW: PT                                           Does she need a colon?  ----- Message -----  From: Beverly Mortensen MA  Sent: 10/8/2020   5:04 PM CDT  To: Kathryn Lopez MA  Subject: FW: PT                                             ----- Message -----  From: Saritha Stout  Sent: 10/8/2020   4:07 PM CDT  To: Travis Monzon  Subject: PT                                               PT called to speak to the doctor. Said she was called last week to schedule a colonoscopy but she wasn't able to talk so she's calling back now. Please call back     Callback: [phone]

## 2020-10-14 ENCOUNTER — TELEPHONE (OUTPATIENT)
Dept: GASTROENTEROLOGY | Facility: CLINIC | Age: 85
End: 2020-10-14

## 2020-10-14 DIAGNOSIS — K22.2 ESOPHAGEAL STRICTURE: Primary | ICD-10-CM

## 2020-10-14 NOTE — TELEPHONE ENCOUNTER
----- Message from Du Robles MD sent at 10/14/2020  2:02 PM CDT -----  Regarding: RE: PT  Contact: PT  Order placed  ----- Message -----  From: Kathryn Lopez MA  Sent: 10/14/2020   1:38 PM CDT  To: Du Robles MD  Subject: RE: PT                                           , I spoke with Mrs. Jamison.  She stated she needs an EGD.  Having difficulty getting her medication down.   She said to tell you she never forgets what you have taught her.   [employer]  ----- Message -----  From: Du Robles MD  Sent: 10/9/2020   1:39 PM CDT  To: Kathryn Lopez MA  Subject: RE: PT                                           No, she is 89 years old, she doesn't have to have one  ----- Message -----  From: Kathryn Lopez MA  Sent: 10/9/2020   9:49 AM CDT  To: Du Robles MD  Subject: FW: PT                                           Does she need a colon?  ----- Message -----  From: Beverly Mortensen MA  Sent: 10/8/2020   5:04 PM CDT  To: Kathryn Lopez MA  Subject: FW: PT                                             ----- Message -----  From: Saritha Stout  Sent: 10/8/2020   4:07 PM CDT  To: Travis Monzon  Subject: PT                                               PT called to speak to the doctor. Said she was called last week to schedule a colonoscopy but she wasn't able to talk so she's calling back now. Please call back     Callback: [phone]

## 2020-10-19 ENCOUNTER — HOSPITAL ENCOUNTER (OUTPATIENT)
Dept: RADIOLOGY | Facility: HOSPITAL | Age: 85
Discharge: HOME | End: 2020-10-19
Attending: INTERNAL MEDICINE
Payer: MEDICARE

## 2020-10-19 ENCOUNTER — IMMUNIZATION (OUTPATIENT)
Dept: PHARMACY | Facility: CLINIC | Age: 85
End: 2020-10-19
Payer: MEDICARE

## 2020-10-19 DIAGNOSIS — Z12.31 ENCOUNTER FOR SCREENING MAMMOGRAM FOR MALIGNANT NEOPLASM OF BREAST: ICD-10-CM

## 2020-10-19 PROCEDURE — 77067 SCR MAMMO BI INCL CAD: CPT | Mod: 26,HCNC,, | Performed by: RADIOLOGY

## 2020-10-19 PROCEDURE — 77067 MAMMO DIGITAL SCREENING BILAT: ICD-10-PCS | Mod: 26,HCNC,, | Performed by: RADIOLOGY

## 2020-10-19 PROCEDURE — 77067 SCR MAMMO BI INCL CAD: CPT | Mod: TC,HCNC

## 2020-10-20 ENCOUNTER — PATIENT OUTREACH (OUTPATIENT)
Dept: ADMINISTRATIVE | Facility: OTHER | Age: 85
End: 2020-10-20

## 2020-10-20 NOTE — PROGRESS NOTES
Care Everywhere: updated  Immunization: updated(delay in links)   Health Maintenance: updated  Media Review:   Legacy Review:   Order placed:   Upcoming appts:opthalmology 10/21, hemoglobin a1c 10/21

## 2020-10-21 ENCOUNTER — CLINICAL SUPPORT (OUTPATIENT)
Dept: OPHTHALMOLOGY | Facility: CLINIC | Age: 85
End: 2020-10-21
Payer: MEDICARE

## 2020-10-21 ENCOUNTER — LAB VISIT (OUTPATIENT)
Dept: LAB | Facility: HOSPITAL | Age: 85
End: 2020-10-21
Attending: INTERNAL MEDICINE
Payer: MEDICARE

## 2020-10-21 ENCOUNTER — OFFICE VISIT (OUTPATIENT)
Dept: OPHTHALMOLOGY | Facility: CLINIC | Age: 85
End: 2020-10-21
Payer: MEDICARE

## 2020-10-21 DIAGNOSIS — H40.023 OPEN ANGLE WITH BORDERLINE FINDINGS AND HIGH GLAUCOMA RISK IN BOTH EYES: Primary | ICD-10-CM

## 2020-10-21 DIAGNOSIS — E11.42 DIABETIC POLYNEUROPATHY ASSOCIATED WITH TYPE 2 DIABETES MELLITUS: ICD-10-CM

## 2020-10-21 DIAGNOSIS — H26.493 PCO (POSTERIOR CAPSULAR OPACIFICATION), BILATERAL: ICD-10-CM

## 2020-10-21 DIAGNOSIS — H04.123 DRY EYE SYNDROME, BILATERAL: ICD-10-CM

## 2020-10-21 DIAGNOSIS — H40.003 GLAUCOMA SUSPECT OF BOTH EYES: ICD-10-CM

## 2020-10-21 DIAGNOSIS — E11.9 DM TYPE 2 WITHOUT RETINOPATHY: ICD-10-CM

## 2020-10-21 DIAGNOSIS — Z96.1 PSEUDOPHAKIA: ICD-10-CM

## 2020-10-21 LAB
ALBUMIN SERPL BCP-MCNC: 4.1 G/DL (ref 3.5–5.2)
ALP SERPL-CCNC: 74 U/L (ref 55–135)
ALT SERPL W/O P-5'-P-CCNC: 19 U/L (ref 10–44)
ANION GAP SERPL CALC-SCNC: 11 MMOL/L (ref 8–16)
AST SERPL-CCNC: 23 U/L (ref 10–40)
BASOPHILS # BLD AUTO: 0.07 K/UL (ref 0–0.2)
BASOPHILS NFR BLD: 0.8 % (ref 0–1.9)
BILIRUB SERPL-MCNC: 0.6 MG/DL (ref 0.1–1)
BUN SERPL-MCNC: 16 MG/DL (ref 8–23)
CALCIUM SERPL-MCNC: 9.7 MG/DL (ref 8.7–10.5)
CHLORIDE SERPL-SCNC: 107 MMOL/L (ref 95–110)
CHOLEST SERPL-MCNC: 152 MG/DL (ref 120–199)
CHOLEST/HDLC SERPL: 2.5 {RATIO} (ref 2–5)
CO2 SERPL-SCNC: 24 MMOL/L (ref 23–29)
CREAT SERPL-MCNC: 1.1 MG/DL (ref 0.5–1.4)
DIFFERENTIAL METHOD: ABNORMAL
EOSINOPHIL # BLD AUTO: 0.3 K/UL (ref 0–0.5)
EOSINOPHIL NFR BLD: 3.9 % (ref 0–8)
ERYTHROCYTE [DISTWIDTH] IN BLOOD BY AUTOMATED COUNT: 16.8 % (ref 11.5–14.5)
EST. GFR  (AFRICAN AMERICAN): 51.4 ML/MIN/1.73 M^2
EST. GFR  (NON AFRICAN AMERICAN): 44.6 ML/MIN/1.73 M^2
ESTIMATED AVG GLUCOSE: 126 MG/DL (ref 68–131)
GLUCOSE SERPL-MCNC: 97 MG/DL (ref 70–110)
HBA1C MFR BLD HPLC: 6 % (ref 4–5.6)
HCT VFR BLD AUTO: 41.8 % (ref 37–48.5)
HDLC SERPL-MCNC: 62 MG/DL (ref 40–75)
HDLC SERPL: 40.8 % (ref 20–50)
HGB BLD-MCNC: 12.7 G/DL (ref 12–16)
IMM GRANULOCYTES # BLD AUTO: 0.02 K/UL (ref 0–0.04)
IMM GRANULOCYTES NFR BLD AUTO: 0.2 % (ref 0–0.5)
LDLC SERPL CALC-MCNC: 75.2 MG/DL (ref 63–159)
LYMPHOCYTES # BLD AUTO: 2.2 K/UL (ref 1–4.8)
LYMPHOCYTES NFR BLD: 26.9 % (ref 18–48)
MCH RBC QN AUTO: 27 PG (ref 27–31)
MCHC RBC AUTO-ENTMCNC: 30.4 G/DL (ref 32–36)
MCV RBC AUTO: 89 FL (ref 82–98)
MONOCYTES # BLD AUTO: 0.6 K/UL (ref 0.3–1)
MONOCYTES NFR BLD: 7 % (ref 4–15)
NEUTROPHILS # BLD AUTO: 5.1 K/UL (ref 1.8–7.7)
NEUTROPHILS NFR BLD: 61.2 % (ref 38–73)
NONHDLC SERPL-MCNC: 90 MG/DL
NRBC BLD-RTO: 0 /100 WBC
PLATELET # BLD AUTO: 326 K/UL (ref 150–350)
PMV BLD AUTO: 10.6 FL (ref 9.2–12.9)
POTASSIUM SERPL-SCNC: 3.7 MMOL/L (ref 3.5–5.1)
PROT SERPL-MCNC: 7.8 G/DL (ref 6–8.4)
RBC # BLD AUTO: 4.71 M/UL (ref 4–5.4)
SODIUM SERPL-SCNC: 142 MMOL/L (ref 136–145)
TRIGL SERPL-MCNC: 74 MG/DL (ref 30–150)
WBC # BLD AUTO: 8.29 K/UL (ref 3.9–12.7)

## 2020-10-21 PROCEDURE — 36415 COLL VENOUS BLD VENIPUNCTURE: CPT | Mod: HCNC,PO

## 2020-10-21 PROCEDURE — 92133 POSTERIOR SEGMENT OCT OPTIC NERVE(OCULAR COHERENCE TOMOGRAPHY) - OU - BOTH EYES: ICD-10-PCS | Mod: HCNC,S$GLB,, | Performed by: OPHTHALMOLOGY

## 2020-10-21 PROCEDURE — 92083 HUMPHREY VISUAL FIELD - OU - BOTH EYES: ICD-10-PCS | Mod: HCNC,S$GLB,, | Performed by: OPHTHALMOLOGY

## 2020-10-21 PROCEDURE — 92133 CPTRZD OPH DX IMG PST SGM ON: CPT | Mod: HCNC,S$GLB,, | Performed by: OPHTHALMOLOGY

## 2020-10-21 PROCEDURE — 80061 LIPID PANEL: CPT | Mod: HCNC

## 2020-10-21 PROCEDURE — 92014 PR EYE EXAM, EST PATIENT,COMPREHESV: ICD-10-PCS | Mod: HCNC,S$GLB,, | Performed by: OPHTHALMOLOGY

## 2020-10-21 PROCEDURE — 99999 PR PBB SHADOW E&M-EST. PATIENT-LVL II: ICD-10-PCS | Mod: PBBFAC,HCNC,, | Performed by: OPHTHALMOLOGY

## 2020-10-21 PROCEDURE — 80053 COMPREHEN METABOLIC PANEL: CPT | Mod: HCNC

## 2020-10-21 PROCEDURE — 99499 UNLISTED E&M SERVICE: CPT | Mod: S$GLB,,, | Performed by: OPHTHALMOLOGY

## 2020-10-21 PROCEDURE — 99999 PR PBB SHADOW E&M-EST. PATIENT-LVL II: CPT | Mod: PBBFAC,HCNC,, | Performed by: OPHTHALMOLOGY

## 2020-10-21 PROCEDURE — 92083 EXTENDED VISUAL FIELD XM: CPT | Mod: HCNC,S$GLB,, | Performed by: OPHTHALMOLOGY

## 2020-10-21 PROCEDURE — 83036 HEMOGLOBIN GLYCOSYLATED A1C: CPT | Mod: HCNC

## 2020-10-21 PROCEDURE — 92014 COMPRE OPH EXAM EST PT 1/>: CPT | Mod: HCNC,S$GLB,, | Performed by: OPHTHALMOLOGY

## 2020-10-21 PROCEDURE — 99499 RISK ADDL DX/OHS AUDIT: ICD-10-PCS | Mod: S$GLB,,, | Performed by: OPHTHALMOLOGY

## 2020-10-21 PROCEDURE — 85025 COMPLETE CBC W/AUTO DIFF WBC: CPT | Mod: HCNC

## 2020-10-21 NOTE — PROGRESS NOTES
Oct done ou     24-2 sf done ou     Rel & Fix =  Fair od                      Good os        Coop =   Good     PATIENT DENIES ANY ALLERGIES TO LATEX/ADHESIVES AT THIS TIME      JTHOMAS    MRX   PL  OD  -.50 + .50 X 5 ---- 2018            Cont latanoprost OU.  AT's.  Control DM.  RTC 6 mos for IOP's, HVF's & OCT's

## 2020-10-21 NOTE — PROGRESS NOTES
Subjective:       Patient ID: Calli Jamison is a 89 y.o. female.    Chief Complaint: Glaucoma Suspect    HPI     89 y.o. female is here for 6 months for IOP's, HVF's & OCT's. Denies eye   pain and f/f. No noticeable VA changes since last visit. Itching, tearing   and burning both eyes. Often when blinking the right lids get stuck maybe   due to dryness.     Eye Med's: Latanoprost qhs OU.                      Systane prn OU     Last edited by CUCA Millan on 10/21/2020 11:44 AM. (History)             Assessment:       1. Open angle with borderline findings and high glaucoma risk in both eyes    2. Glaucoma suspect of both eyes    3. PCO (posterior capsular opacification), bilateral    4. Dry eye syndrome, bilateral    5. DM type 2 without retinopathy    6. Pseudophakia        Plan:       Glaucoma suspect OU-IOP's are higher today OU & ON's appear stable OU. OCT's are WNL's OD & borderline OS. HVF's are WNL's OU with poor reliability OD>OS.  Early PCO OU- Not Visually Significant.     TRISTON-Needs more AT's.  DM-No NPDR OU.      Cont latanoprost OU.  AT's.  Control DM.  RTC 3 mos for IOP's.

## 2020-10-22 ENCOUNTER — TELEPHONE (OUTPATIENT)
Dept: INTERNAL MEDICINE | Facility: CLINIC | Age: 85
End: 2020-10-22

## 2020-10-22 NOTE — TELEPHONE ENCOUNTER
Please call the patient and tell her that her labs are stable and she can have a copy if she wishes.

## 2020-11-12 ENCOUNTER — TELEPHONE (OUTPATIENT)
Dept: INTERNAL MEDICINE | Facility: CLINIC | Age: 85
End: 2020-11-12

## 2020-11-12 NOTE — TELEPHONE ENCOUNTER
----- Message from Bhavna Agarwal MA sent at 11/12/2020  2:47 PM CST -----  Contact: Patient [phone]    ----- Message -----  From: Candis London  Sent: 11/12/2020  10:53 AM CST  To: [facility] Staff    Calling to get test results.  Name of test: Lab  Date of test: 10/21/2020  Where was the test performed: [place]  Comments:     Please call and advise.    Thank You

## 2020-12-11 ENCOUNTER — PATIENT MESSAGE (OUTPATIENT)
Dept: OTHER | Facility: OTHER | Age: 85
End: 2020-12-11

## 2020-12-15 ENCOUNTER — PATIENT MESSAGE (OUTPATIENT)
Dept: OTHER | Facility: OTHER | Age: 85
End: 2020-12-15

## 2020-12-28 DIAGNOSIS — H40.003 GLAUCOMA SUSPECT, BILATERAL: ICD-10-CM

## 2020-12-28 RX ORDER — LATANOPROST 50 UG/ML
1 SOLUTION/ DROPS OPHTHALMIC NIGHTLY
Qty: 7.5 ML | Refills: 1 | Status: SHIPPED | OUTPATIENT
Start: 2020-12-28 | End: 2021-05-03

## 2020-12-31 RX ORDER — VALSARTAN 160 MG/1
TABLET ORAL
Qty: 180 TABLET | Refills: 3 | Status: SHIPPED | OUTPATIENT
Start: 2020-12-31 | End: 2021-06-17

## 2021-01-13 ENCOUNTER — TELEPHONE (OUTPATIENT)
Dept: ELECTROPHYSIOLOGY | Facility: CLINIC | Age: 86
End: 2021-01-13

## 2021-01-13 ENCOUNTER — TELEPHONE (OUTPATIENT)
Dept: ENDOSCOPY | Facility: HOSPITAL | Age: 86
End: 2021-01-13

## 2021-01-16 ENCOUNTER — IMMUNIZATION (OUTPATIENT)
Dept: INTERNAL MEDICINE | Facility: CLINIC | Age: 86
End: 2021-01-16
Payer: MEDICARE

## 2021-01-16 DIAGNOSIS — Z23 NEED FOR VACCINATION: Primary | ICD-10-CM

## 2021-01-16 PROCEDURE — 91300 COVID-19, MRNA, LNP-S, PF, 30 MCG/0.3 ML DOSE VACCINE: CPT | Mod: PBBFAC | Performed by: FAMILY MEDICINE

## 2021-01-22 ENCOUNTER — TELEPHONE (OUTPATIENT)
Dept: ENDOSCOPY | Facility: HOSPITAL | Age: 86
End: 2021-01-22

## 2021-01-29 ENCOUNTER — TELEPHONE (OUTPATIENT)
Dept: INTERNAL MEDICINE | Facility: CLINIC | Age: 86
End: 2021-01-29

## 2021-02-03 ENCOUNTER — TELEPHONE (OUTPATIENT)
Dept: INTERNAL MEDICINE | Facility: CLINIC | Age: 86
End: 2021-02-03

## 2021-02-09 ENCOUNTER — TELEPHONE (OUTPATIENT)
Dept: INTERNAL MEDICINE | Facility: CLINIC | Age: 86
End: 2021-02-09

## 2021-02-13 ENCOUNTER — NURSE TRIAGE (OUTPATIENT)
Dept: ADMINISTRATIVE | Facility: CLINIC | Age: 86
End: 2021-02-13

## 2021-03-08 ENCOUNTER — OFFICE VISIT (OUTPATIENT)
Dept: OPHTHALMOLOGY | Facility: CLINIC | Age: 86
End: 2021-03-08
Payer: MEDICARE

## 2021-03-08 DIAGNOSIS — H26.493 PCO (POSTERIOR CAPSULAR OPACIFICATION), BILATERAL: ICD-10-CM

## 2021-03-08 DIAGNOSIS — H40.023 OPEN ANGLE WITH BORDERLINE FINDINGS AND HIGH GLAUCOMA RISK IN BOTH EYES: Primary | ICD-10-CM

## 2021-03-08 DIAGNOSIS — H40.003 GLAUCOMA SUSPECT OF BOTH EYES: ICD-10-CM

## 2021-03-08 DIAGNOSIS — Z96.1 PSEUDOPHAKIA: ICD-10-CM

## 2021-03-08 DIAGNOSIS — H04.123 DRY EYE SYNDROME, BILATERAL: ICD-10-CM

## 2021-03-08 PROCEDURE — 3288F PR FALLS RISK ASSESSMENT DOCUMENTED: ICD-10-PCS | Mod: CPTII,S$GLB,, | Performed by: OPHTHALMOLOGY

## 2021-03-08 PROCEDURE — 1101F PR PT FALLS ASSESS DOC 0-1 FALLS W/OUT INJ PAST YR: ICD-10-PCS | Mod: CPTII,S$GLB,, | Performed by: OPHTHALMOLOGY

## 2021-03-08 PROCEDURE — 1126F PR PAIN SEVERITY QUANTIFIED, NO PAIN PRESENT: ICD-10-PCS | Mod: S$GLB,,, | Performed by: OPHTHALMOLOGY

## 2021-03-08 PROCEDURE — 92012 PR EYE EXAM, EST PATIENT,INTERMED: ICD-10-PCS | Mod: S$GLB,,, | Performed by: OPHTHALMOLOGY

## 2021-03-08 PROCEDURE — 3288F FALL RISK ASSESSMENT DOCD: CPT | Mod: CPTII,S$GLB,, | Performed by: OPHTHALMOLOGY

## 2021-03-08 PROCEDURE — 1101F PT FALLS ASSESS-DOCD LE1/YR: CPT | Mod: CPTII,S$GLB,, | Performed by: OPHTHALMOLOGY

## 2021-03-08 PROCEDURE — 92012 INTRM OPH EXAM EST PATIENT: CPT | Mod: S$GLB,,, | Performed by: OPHTHALMOLOGY

## 2021-03-08 PROCEDURE — 1126F AMNT PAIN NOTED NONE PRSNT: CPT | Mod: S$GLB,,, | Performed by: OPHTHALMOLOGY

## 2021-03-08 PROCEDURE — 99999 PR PBB SHADOW E&M-EST. PATIENT-LVL IV: ICD-10-PCS | Mod: PBBFAC,,, | Performed by: OPHTHALMOLOGY

## 2021-03-08 PROCEDURE — 99999 PR PBB SHADOW E&M-EST. PATIENT-LVL IV: CPT | Mod: PBBFAC,,, | Performed by: OPHTHALMOLOGY

## 2021-03-08 RX ORDER — TIMOLOL MALEATE 5 MG/ML
1 SOLUTION/ DROPS OPHTHALMIC 2 TIMES DAILY
Qty: 5 ML | Refills: 6 | Status: SHIPPED | OUTPATIENT
Start: 2021-03-08 | End: 2021-05-17 | Stop reason: SDUPTHER

## 2021-03-11 ENCOUNTER — IMMUNIZATION (OUTPATIENT)
Dept: INTERNAL MEDICINE | Facility: CLINIC | Age: 86
End: 2021-03-11
Payer: MEDICARE

## 2021-03-11 DIAGNOSIS — Z23 NEED FOR VACCINATION: Primary | ICD-10-CM

## 2021-03-11 PROCEDURE — 91300 COVID-19, MRNA, LNP-S, PF, 30 MCG/0.3 ML DOSE VACCINE: CPT | Mod: PBBFAC | Performed by: FAMILY MEDICINE

## 2021-03-11 PROCEDURE — 0002A COVID-19, MRNA, LNP-S, PF, 30 MCG/0.3 ML DOSE VACCINE: CPT | Mod: PBBFAC | Performed by: FAMILY MEDICINE

## 2021-03-25 ENCOUNTER — NURSE TRIAGE (OUTPATIENT)
Dept: ADMINISTRATIVE | Facility: CLINIC | Age: 86
End: 2021-03-25

## 2021-03-25 ENCOUNTER — TELEPHONE (OUTPATIENT)
Dept: INTERNAL MEDICINE | Facility: CLINIC | Age: 86
End: 2021-03-25

## 2021-05-03 RX ORDER — FUROSEMIDE 20 MG/1
20 TABLET ORAL DAILY
Qty: 90 TABLET | Refills: 1 | Status: SHIPPED | OUTPATIENT
Start: 2021-05-03 | End: 2021-11-08

## 2021-05-04 DIAGNOSIS — E78.5 HYPERLIPIDEMIA, UNSPECIFIED HYPERLIPIDEMIA TYPE: ICD-10-CM

## 2021-05-04 RX ORDER — ATORVASTATIN CALCIUM 20 MG/1
20 TABLET, FILM COATED ORAL DAILY
Qty: 90 TABLET | Refills: 2 | Status: SHIPPED | OUTPATIENT
Start: 2021-05-04 | End: 2021-07-23 | Stop reason: SDUPTHER

## 2021-05-15 ENCOUNTER — PATIENT OUTREACH (OUTPATIENT)
Dept: ADMINISTRATIVE | Facility: OTHER | Age: 86
End: 2021-05-15

## 2021-05-15 DIAGNOSIS — E11.9 DM TYPE 2 WITHOUT RETINOPATHY: Primary | ICD-10-CM

## 2021-05-17 ENCOUNTER — OFFICE VISIT (OUTPATIENT)
Dept: OPHTHALMOLOGY | Facility: CLINIC | Age: 86
End: 2021-05-17
Payer: MEDICARE

## 2021-05-17 DIAGNOSIS — H40.023 OPEN ANGLE WITH BORDERLINE FINDINGS AND HIGH GLAUCOMA RISK IN BOTH EYES: Primary | ICD-10-CM

## 2021-05-17 DIAGNOSIS — H04.123 DRY EYE SYNDROME, BILATERAL: ICD-10-CM

## 2021-05-17 DIAGNOSIS — H26.493 PCO (POSTERIOR CAPSULAR OPACIFICATION), BILATERAL: ICD-10-CM

## 2021-05-17 DIAGNOSIS — H40.003 GLAUCOMA SUSPECT OF BOTH EYES: ICD-10-CM

## 2021-05-17 DIAGNOSIS — Z96.1 PSEUDOPHAKIA: ICD-10-CM

## 2021-05-17 PROCEDURE — 99999 PR PBB SHADOW E&M-EST. PATIENT-LVL IV: CPT | Mod: PBBFAC,,, | Performed by: OPHTHALMOLOGY

## 2021-05-17 PROCEDURE — 1126F AMNT PAIN NOTED NONE PRSNT: CPT | Mod: S$GLB,,, | Performed by: OPHTHALMOLOGY

## 2021-05-17 PROCEDURE — 3288F PR FALLS RISK ASSESSMENT DOCUMENTED: ICD-10-PCS | Mod: CPTII,S$GLB,, | Performed by: OPHTHALMOLOGY

## 2021-05-17 PROCEDURE — 92012 PR EYE EXAM, EST PATIENT,INTERMED: ICD-10-PCS | Mod: S$GLB,,, | Performed by: OPHTHALMOLOGY

## 2021-05-17 PROCEDURE — 1101F PT FALLS ASSESS-DOCD LE1/YR: CPT | Mod: CPTII,S$GLB,, | Performed by: OPHTHALMOLOGY

## 2021-05-17 PROCEDURE — 1126F PR PAIN SEVERITY QUANTIFIED, NO PAIN PRESENT: ICD-10-PCS | Mod: S$GLB,,, | Performed by: OPHTHALMOLOGY

## 2021-05-17 PROCEDURE — 99999 PR PBB SHADOW E&M-EST. PATIENT-LVL IV: ICD-10-PCS | Mod: PBBFAC,,, | Performed by: OPHTHALMOLOGY

## 2021-05-17 PROCEDURE — 3288F FALL RISK ASSESSMENT DOCD: CPT | Mod: CPTII,S$GLB,, | Performed by: OPHTHALMOLOGY

## 2021-05-17 PROCEDURE — 1101F PR PT FALLS ASSESS DOC 0-1 FALLS W/OUT INJ PAST YR: ICD-10-PCS | Mod: CPTII,S$GLB,, | Performed by: OPHTHALMOLOGY

## 2021-05-17 PROCEDURE — 92012 INTRM OPH EXAM EST PATIENT: CPT | Mod: S$GLB,,, | Performed by: OPHTHALMOLOGY

## 2021-05-17 RX ORDER — TIMOLOL MALEATE 5 MG/ML
1 SOLUTION/ DROPS OPHTHALMIC 2 TIMES DAILY
Qty: 15 ML | Refills: 2 | Status: SHIPPED | OUTPATIENT
Start: 2021-05-17 | End: 2021-07-15

## 2021-06-17 ENCOUNTER — TELEPHONE (OUTPATIENT)
Dept: INTERNAL MEDICINE | Facility: CLINIC | Age: 86
End: 2021-06-17

## 2021-06-17 DIAGNOSIS — E11.42 DIABETIC POLYNEUROPATHY ASSOCIATED WITH TYPE 2 DIABETES MELLITUS: Primary | ICD-10-CM

## 2021-06-17 DIAGNOSIS — I10 ESSENTIAL HYPERTENSION: ICD-10-CM

## 2021-06-17 DIAGNOSIS — E53.8 B12 NUTRITIONAL DEFICIENCY: ICD-10-CM

## 2021-06-17 RX ORDER — LANCETS
EACH MISCELLANEOUS
Qty: 180 EACH | Refills: 3 | Status: SHIPPED | OUTPATIENT
Start: 2021-06-17 | End: 2022-07-15

## 2021-06-17 RX ORDER — AMLODIPINE BESYLATE 5 MG/1
5 TABLET ORAL DAILY
Qty: 30 TABLET | Refills: 1 | Status: SHIPPED | OUTPATIENT
Start: 2021-06-17 | End: 2021-07-10

## 2021-06-17 RX ORDER — DEXTROSE 4 G
TABLET,CHEWABLE ORAL
Qty: 1 EACH | Refills: 0 | Status: SHIPPED | OUTPATIENT
Start: 2021-06-17 | End: 2022-12-07

## 2021-06-22 ENCOUNTER — LAB VISIT (OUTPATIENT)
Dept: LAB | Facility: HOSPITAL | Age: 86
End: 2021-06-22
Attending: INTERNAL MEDICINE
Payer: MEDICARE

## 2021-06-22 DIAGNOSIS — E11.42 DIABETIC POLYNEUROPATHY ASSOCIATED WITH TYPE 2 DIABETES MELLITUS: ICD-10-CM

## 2021-06-22 DIAGNOSIS — I10 ESSENTIAL HYPERTENSION: ICD-10-CM

## 2021-06-22 DIAGNOSIS — E53.8 B12 NUTRITIONAL DEFICIENCY: ICD-10-CM

## 2021-06-22 LAB
ALBUMIN SERPL BCP-MCNC: 3.7 G/DL (ref 3.5–5.2)
ALP SERPL-CCNC: 86 U/L (ref 55–135)
ALT SERPL W/O P-5'-P-CCNC: 16 U/L (ref 10–44)
ANION GAP SERPL CALC-SCNC: 9 MMOL/L (ref 8–16)
AST SERPL-CCNC: 20 U/L (ref 10–40)
BASOPHILS # BLD AUTO: 0.09 K/UL (ref 0–0.2)
BASOPHILS NFR BLD: 1 % (ref 0–1.9)
BILIRUB SERPL-MCNC: 0.6 MG/DL (ref 0.1–1)
BUN SERPL-MCNC: 19 MG/DL (ref 8–23)
CALCIUM SERPL-MCNC: 10.5 MG/DL (ref 8.7–10.5)
CHLORIDE SERPL-SCNC: 107 MMOL/L (ref 95–110)
CHOLEST SERPL-MCNC: 107 MG/DL (ref 120–199)
CHOLEST/HDLC SERPL: 1.9 {RATIO} (ref 2–5)
CO2 SERPL-SCNC: 24 MMOL/L (ref 23–29)
CREAT SERPL-MCNC: 1 MG/DL (ref 0.5–1.4)
DIFFERENTIAL METHOD: ABNORMAL
EOSINOPHIL # BLD AUTO: 0.4 K/UL (ref 0–0.5)
EOSINOPHIL NFR BLD: 4.8 % (ref 0–8)
ERYTHROCYTE [DISTWIDTH] IN BLOOD BY AUTOMATED COUNT: 16.1 % (ref 11.5–14.5)
EST. GFR  (AFRICAN AMERICAN): 57.3 ML/MIN/1.73 M^2
EST. GFR  (NON AFRICAN AMERICAN): 49.7 ML/MIN/1.73 M^2
ESTIMATED AVG GLUCOSE: 117 MG/DL (ref 68–131)
GLUCOSE SERPL-MCNC: 91 MG/DL (ref 70–110)
HBA1C MFR BLD: 5.7 % (ref 4–5.6)
HCT VFR BLD AUTO: 33.7 % (ref 37–48.5)
HDLC SERPL-MCNC: 55 MG/DL (ref 40–75)
HDLC SERPL: 51.4 % (ref 20–50)
HGB BLD-MCNC: 10.1 G/DL (ref 12–16)
IMM GRANULOCYTES # BLD AUTO: 0.02 K/UL (ref 0–0.04)
IMM GRANULOCYTES NFR BLD AUTO: 0.2 % (ref 0–0.5)
LDLC SERPL CALC-MCNC: 41.4 MG/DL (ref 63–159)
LYMPHOCYTES # BLD AUTO: 2.5 K/UL (ref 1–4.8)
LYMPHOCYTES NFR BLD: 27.4 % (ref 18–48)
MCH RBC QN AUTO: 24.8 PG (ref 27–31)
MCHC RBC AUTO-ENTMCNC: 30 G/DL (ref 32–36)
MCV RBC AUTO: 83 FL (ref 82–98)
MONOCYTES # BLD AUTO: 0.7 K/UL (ref 0.3–1)
MONOCYTES NFR BLD: 8.1 % (ref 4–15)
NEUTROPHILS # BLD AUTO: 5.3 K/UL (ref 1.8–7.7)
NEUTROPHILS NFR BLD: 58.5 % (ref 38–73)
NONHDLC SERPL-MCNC: 52 MG/DL
NRBC BLD-RTO: 0 /100 WBC
PLATELET # BLD AUTO: 355 K/UL (ref 150–450)
PMV BLD AUTO: 10.6 FL (ref 9.2–12.9)
POTASSIUM SERPL-SCNC: 3.7 MMOL/L (ref 3.5–5.1)
PROT SERPL-MCNC: 7.6 G/DL (ref 6–8.4)
RBC # BLD AUTO: 4.07 M/UL (ref 4–5.4)
SODIUM SERPL-SCNC: 140 MMOL/L (ref 136–145)
TRIGL SERPL-MCNC: 53 MG/DL (ref 30–150)
VIT B12 SERPL-MCNC: 479 PG/ML (ref 210–950)
WBC # BLD AUTO: 9.1 K/UL (ref 3.9–12.7)

## 2021-06-22 PROCEDURE — 83540 ASSAY OF IRON: CPT | Performed by: INTERNAL MEDICINE

## 2021-06-22 PROCEDURE — 85025 COMPLETE CBC W/AUTO DIFF WBC: CPT | Performed by: INTERNAL MEDICINE

## 2021-06-22 PROCEDURE — 36415 COLL VENOUS BLD VENIPUNCTURE: CPT | Performed by: INTERNAL MEDICINE

## 2021-06-22 PROCEDURE — 82728 ASSAY OF FERRITIN: CPT | Performed by: INTERNAL MEDICINE

## 2021-06-22 PROCEDURE — 83036 HEMOGLOBIN GLYCOSYLATED A1C: CPT | Performed by: INTERNAL MEDICINE

## 2021-06-22 PROCEDURE — 80061 LIPID PANEL: CPT | Performed by: INTERNAL MEDICINE

## 2021-06-22 PROCEDURE — 82607 VITAMIN B-12: CPT | Performed by: INTERNAL MEDICINE

## 2021-06-22 PROCEDURE — 80053 COMPREHEN METABOLIC PANEL: CPT | Performed by: INTERNAL MEDICINE

## 2021-06-23 ENCOUNTER — PATIENT MESSAGE (OUTPATIENT)
Dept: INTERNAL MEDICINE | Facility: CLINIC | Age: 86
End: 2021-06-23

## 2021-06-23 ENCOUNTER — TELEPHONE (OUTPATIENT)
Dept: INTERNAL MEDICINE | Facility: CLINIC | Age: 86
End: 2021-06-23

## 2021-06-23 DIAGNOSIS — D64.9 ANEMIA, UNSPECIFIED TYPE: Primary | ICD-10-CM

## 2021-06-23 LAB
FERRITIN SERPL-MCNC: 7 NG/ML (ref 20–300)
IRON SERPL-MCNC: 35 UG/DL (ref 30–160)
SATURATED IRON: 7 % (ref 20–50)
TOTAL IRON BINDING CAPACITY: 521 UG/DL (ref 250–450)
TRANSFERRIN SERPL-MCNC: 352 MG/DL (ref 200–375)

## 2021-07-09 ENCOUNTER — TELEPHONE (OUTPATIENT)
Dept: INTERNAL MEDICINE | Facility: CLINIC | Age: 86
End: 2021-07-09

## 2021-07-22 ENCOUNTER — PES CALL (OUTPATIENT)
Dept: ADMINISTRATIVE | Facility: CLINIC | Age: 86
End: 2021-07-22

## 2021-07-23 DIAGNOSIS — E78.5 HYPERLIPIDEMIA, UNSPECIFIED HYPERLIPIDEMIA TYPE: ICD-10-CM

## 2021-07-23 RX ORDER — ATORVASTATIN CALCIUM 20 MG/1
20 TABLET, FILM COATED ORAL DAILY
Qty: 90 TABLET | Refills: 0 | Status: SHIPPED | OUTPATIENT
Start: 2021-07-23 | End: 2021-10-08

## 2021-07-23 RX ORDER — AMLODIPINE BESYLATE 5 MG/1
5 TABLET ORAL DAILY
Qty: 90 TABLET | Refills: 0 | Status: SHIPPED | OUTPATIENT
Start: 2021-07-23 | End: 2021-09-23

## 2021-08-09 ENCOUNTER — LAB VISIT (OUTPATIENT)
Dept: LAB | Facility: HOSPITAL | Age: 86
End: 2021-08-09
Attending: INTERNAL MEDICINE
Payer: MEDICARE

## 2021-08-09 ENCOUNTER — OFFICE VISIT (OUTPATIENT)
Dept: INTERNAL MEDICINE | Facility: CLINIC | Age: 86
End: 2021-08-09
Payer: MEDICARE

## 2021-08-09 VITALS
BODY MASS INDEX: 35.4 KG/M2 | HEIGHT: 60 IN | HEART RATE: 85 BPM | DIASTOLIC BLOOD PRESSURE: 72 MMHG | WEIGHT: 180.31 LBS | SYSTOLIC BLOOD PRESSURE: 116 MMHG | OXYGEN SATURATION: 99 %

## 2021-08-09 DIAGNOSIS — E61.1 IRON DEFICIENCY: ICD-10-CM

## 2021-08-09 DIAGNOSIS — I63.81 LACUNAR INFARCTION: ICD-10-CM

## 2021-08-09 DIAGNOSIS — N18.30 CKD STAGE 3 DUE TO TYPE 2 DIABETES MELLITUS: ICD-10-CM

## 2021-08-09 DIAGNOSIS — E11.9 DM TYPE 2 WITHOUT RETINOPATHY: ICD-10-CM

## 2021-08-09 DIAGNOSIS — E87.6 LOW SERUM POTASSIUM: ICD-10-CM

## 2021-08-09 DIAGNOSIS — I70.0 AORTIC ATHEROSCLEROSIS: ICD-10-CM

## 2021-08-09 DIAGNOSIS — D64.9 ANEMIA, UNSPECIFIED TYPE: ICD-10-CM

## 2021-08-09 DIAGNOSIS — E11.42 DIABETIC POLYNEUROPATHY ASSOCIATED WITH TYPE 2 DIABETES MELLITUS: ICD-10-CM

## 2021-08-09 DIAGNOSIS — K50.819 CROHN'S DISEASE OF SMALL AND LARGE INTESTINES WITH COMPLICATION: ICD-10-CM

## 2021-08-09 DIAGNOSIS — E61.1 IRON DEFICIENCY: Primary | ICD-10-CM

## 2021-08-09 DIAGNOSIS — E11.22 CKD STAGE 3 DUE TO TYPE 2 DIABETES MELLITUS: ICD-10-CM

## 2021-08-09 LAB
BASOPHILS # BLD AUTO: 0.08 K/UL (ref 0–0.2)
BASOPHILS NFR BLD: 0.9 % (ref 0–1.9)
DIFFERENTIAL METHOD: ABNORMAL
EOSINOPHIL # BLD AUTO: 0.5 K/UL (ref 0–0.5)
EOSINOPHIL NFR BLD: 5 % (ref 0–8)
ERYTHROCYTE [DISTWIDTH] IN BLOOD BY AUTOMATED COUNT: 17 % (ref 11.5–14.5)
HCT VFR BLD AUTO: 35.5 % (ref 37–48.5)
HGB BLD-MCNC: 10.8 G/DL (ref 12–16)
IMM GRANULOCYTES # BLD AUTO: 0.02 K/UL (ref 0–0.04)
IMM GRANULOCYTES NFR BLD AUTO: 0.2 % (ref 0–0.5)
LYMPHOCYTES # BLD AUTO: 2.2 K/UL (ref 1–4.8)
LYMPHOCYTES NFR BLD: 24.3 % (ref 18–48)
MCH RBC QN AUTO: 23.6 PG (ref 27–31)
MCHC RBC AUTO-ENTMCNC: 30.4 G/DL (ref 32–36)
MCV RBC AUTO: 78 FL (ref 82–98)
MONOCYTES # BLD AUTO: 0.7 K/UL (ref 0.3–1)
MONOCYTES NFR BLD: 7.9 % (ref 4–15)
NEUTROPHILS # BLD AUTO: 5.5 K/UL (ref 1.8–7.7)
NEUTROPHILS NFR BLD: 61.7 % (ref 38–73)
NRBC BLD-RTO: 0 /100 WBC
PLATELET # BLD AUTO: 382 K/UL (ref 150–450)
PMV BLD AUTO: 10.7 FL (ref 9.2–12.9)
RBC # BLD AUTO: 4.57 M/UL (ref 4–5.4)
WBC # BLD AUTO: 8.97 K/UL (ref 3.9–12.7)

## 2021-08-09 PROCEDURE — 1126F AMNT PAIN NOTED NONE PRSNT: CPT | Mod: CPTII,S$GLB,, | Performed by: INTERNAL MEDICINE

## 2021-08-09 PROCEDURE — 3288F FALL RISK ASSESSMENT DOCD: CPT | Mod: CPTII,S$GLB,, | Performed by: INTERNAL MEDICINE

## 2021-08-09 PROCEDURE — 1126F PR PAIN SEVERITY QUANTIFIED, NO PAIN PRESENT: ICD-10-PCS | Mod: CPTII,S$GLB,, | Performed by: INTERNAL MEDICINE

## 2021-08-09 PROCEDURE — 83036 HEMOGLOBIN GLYCOSYLATED A1C: CPT | Performed by: INTERNAL MEDICINE

## 2021-08-09 PROCEDURE — 80048 BASIC METABOLIC PNL TOTAL CA: CPT | Performed by: INTERNAL MEDICINE

## 2021-08-09 PROCEDURE — 83540 ASSAY OF IRON: CPT | Performed by: INTERNAL MEDICINE

## 2021-08-09 PROCEDURE — 99499 UNLISTED E&M SERVICE: CPT | Mod: HCNC,S$GLB,, | Performed by: INTERNAL MEDICINE

## 2021-08-09 PROCEDURE — 99214 OFFICE O/P EST MOD 30 MIN: CPT | Mod: S$GLB,,, | Performed by: INTERNAL MEDICINE

## 2021-08-09 PROCEDURE — 99214 PR OFFICE/OUTPT VISIT, EST, LEVL IV, 30-39 MIN: ICD-10-PCS | Mod: S$GLB,,, | Performed by: INTERNAL MEDICINE

## 2021-08-09 PROCEDURE — 99499 RISK ADDL DX/OHS AUDIT: ICD-10-PCS | Mod: HCNC,S$GLB,, | Performed by: INTERNAL MEDICINE

## 2021-08-09 PROCEDURE — 3072F LOW RISK FOR RETINOPATHY: CPT | Mod: CPTII,S$GLB,, | Performed by: INTERNAL MEDICINE

## 2021-08-09 PROCEDURE — 99999 PR PBB SHADOW E&M-EST. PATIENT-LVL IV: CPT | Mod: PBBFAC,,, | Performed by: INTERNAL MEDICINE

## 2021-08-09 PROCEDURE — 99999 PR PBB SHADOW E&M-EST. PATIENT-LVL IV: ICD-10-PCS | Mod: PBBFAC,,, | Performed by: INTERNAL MEDICINE

## 2021-08-09 PROCEDURE — 36415 COLL VENOUS BLD VENIPUNCTURE: CPT | Performed by: INTERNAL MEDICINE

## 2021-08-09 PROCEDURE — 85025 COMPLETE CBC W/AUTO DIFF WBC: CPT | Performed by: INTERNAL MEDICINE

## 2021-08-09 PROCEDURE — 3072F PR LOW RISK FOR RETINOPATHY: ICD-10-PCS | Mod: CPTII,S$GLB,, | Performed by: INTERNAL MEDICINE

## 2021-08-09 PROCEDURE — 1101F PT FALLS ASSESS-DOCD LE1/YR: CPT | Mod: CPTII,S$GLB,, | Performed by: INTERNAL MEDICINE

## 2021-08-09 PROCEDURE — 3288F PR FALLS RISK ASSESSMENT DOCUMENTED: ICD-10-PCS | Mod: CPTII,S$GLB,, | Performed by: INTERNAL MEDICINE

## 2021-08-09 PROCEDURE — 1159F MED LIST DOCD IN RCRD: CPT | Mod: CPTII,S$GLB,, | Performed by: INTERNAL MEDICINE

## 2021-08-09 PROCEDURE — 1101F PR PT FALLS ASSESS DOC 0-1 FALLS W/OUT INJ PAST YR: ICD-10-PCS | Mod: CPTII,S$GLB,, | Performed by: INTERNAL MEDICINE

## 2021-08-09 PROCEDURE — 1159F PR MEDICATION LIST DOCUMENTED IN MEDICAL RECORD: ICD-10-PCS | Mod: CPTII,S$GLB,, | Performed by: INTERNAL MEDICINE

## 2021-08-10 PROBLEM — G63 POLYNEUROPATHY IN DISEASES CLASSIFIED ELSEWHERE: Status: RESOLVED | Noted: 2019-01-31 | Resolved: 2021-08-10

## 2021-08-10 LAB
ANION GAP SERPL CALC-SCNC: 13 MMOL/L (ref 8–16)
BUN SERPL-MCNC: 16 MG/DL (ref 8–23)
CALCIUM SERPL-MCNC: 10.6 MG/DL (ref 8.7–10.5)
CHLORIDE SERPL-SCNC: 104 MMOL/L (ref 95–110)
CO2 SERPL-SCNC: 22 MMOL/L (ref 23–29)
CREAT SERPL-MCNC: 1 MG/DL (ref 0.5–1.4)
EST. GFR  (AFRICAN AMERICAN): 57.3 ML/MIN/1.73 M^2
EST. GFR  (NON AFRICAN AMERICAN): 49.7 ML/MIN/1.73 M^2
ESTIMATED AVG GLUCOSE: 126 MG/DL (ref 68–131)
ESTIMATED AVG GLUCOSE: 126 MG/DL (ref 68–131)
GLUCOSE SERPL-MCNC: 93 MG/DL (ref 70–110)
HBA1C MFR BLD: 6 % (ref 4–5.6)
HBA1C MFR BLD: 6 % (ref 4–5.6)
IRON SERPL-MCNC: 32 UG/DL (ref 30–160)
POTASSIUM SERPL-SCNC: 4.1 MMOL/L (ref 3.5–5.1)
SATURATED IRON: 6 % (ref 20–50)
SODIUM SERPL-SCNC: 139 MMOL/L (ref 136–145)
TOTAL IRON BINDING CAPACITY: 564 UG/DL (ref 250–450)
TRANSFERRIN SERPL-MCNC: 381 MG/DL (ref 200–375)

## 2021-08-11 ENCOUNTER — TELEPHONE (OUTPATIENT)
Dept: INTERNAL MEDICINE | Facility: CLINIC | Age: 86
End: 2021-08-11

## 2021-08-11 DIAGNOSIS — K22.2 ESOPHAGEAL STRICTURE: Primary | ICD-10-CM

## 2021-09-03 ENCOUNTER — TELEPHONE (OUTPATIENT)
Dept: INTERNAL MEDICINE | Facility: CLINIC | Age: 86
End: 2021-09-03

## 2021-09-08 ENCOUNTER — TELEPHONE (OUTPATIENT)
Dept: INTERNAL MEDICINE | Facility: CLINIC | Age: 86
End: 2021-09-08

## 2021-09-11 ENCOUNTER — LAB VISIT (OUTPATIENT)
Dept: PRIMARY CARE CLINIC | Facility: OTHER | Age: 86
End: 2021-09-11
Payer: MEDICARE

## 2021-09-11 DIAGNOSIS — Z20.822 ENCOUNTER FOR LABORATORY TESTING FOR COVID-19 VIRUS: ICD-10-CM

## 2021-09-11 PROCEDURE — U0003 INFECTIOUS AGENT DETECTION BY NUCLEIC ACID (DNA OR RNA); SEVERE ACUTE RESPIRATORY SYNDROME CORONAVIRUS 2 (SARS-COV-2) (CORONAVIRUS DISEASE [COVID-19]), AMPLIFIED PROBE TECHNIQUE, MAKING USE OF HIGH THROUGHPUT TECHNOLOGIES AS DESCRIBED BY CMS-2020-01-R: HCPCS

## 2021-09-13 LAB
SARS-COV-2 RNA RESP QL NAA+PROBE: NOT DETECTED
SARS-COV-2- CYCLE NUMBER: NORMAL

## 2021-09-22 ENCOUNTER — TELEPHONE (OUTPATIENT)
Dept: GASTROENTEROLOGY | Facility: CLINIC | Age: 86
End: 2021-09-22

## 2021-09-23 ENCOUNTER — TELEPHONE (OUTPATIENT)
Dept: ENDOSCOPY | Facility: HOSPITAL | Age: 86
End: 2021-09-23

## 2021-09-23 RX ORDER — AMLODIPINE BESYLATE 5 MG/1
TABLET ORAL
Qty: 90 TABLET | Refills: 3 | Status: SHIPPED | OUTPATIENT
Start: 2021-09-23 | End: 2022-04-20

## 2021-09-24 ENCOUNTER — TELEPHONE (OUTPATIENT)
Dept: INTERNAL MEDICINE | Facility: CLINIC | Age: 86
End: 2021-09-24

## 2021-09-27 ENCOUNTER — TELEPHONE (OUTPATIENT)
Dept: INTERNAL MEDICINE | Facility: CLINIC | Age: 86
End: 2021-09-27

## 2021-10-06 DIAGNOSIS — E78.5 HYPERLIPIDEMIA, UNSPECIFIED HYPERLIPIDEMIA TYPE: ICD-10-CM

## 2021-10-07 ENCOUNTER — PES CALL (OUTPATIENT)
Dept: ADMINISTRATIVE | Facility: CLINIC | Age: 86
End: 2021-10-07

## 2021-10-08 ENCOUNTER — OFFICE VISIT (OUTPATIENT)
Dept: FAMILY MEDICINE | Facility: CLINIC | Age: 86
End: 2021-10-08
Payer: MEDICARE

## 2021-10-08 ENCOUNTER — TELEPHONE (OUTPATIENT)
Dept: FAMILY MEDICINE | Facility: CLINIC | Age: 86
End: 2021-10-08

## 2021-10-08 DIAGNOSIS — R94.120 ABNORMAL HEARING SCREEN: ICD-10-CM

## 2021-10-08 DIAGNOSIS — I70.0 AORTIC ATHEROSCLEROSIS: ICD-10-CM

## 2021-10-08 DIAGNOSIS — N18.30 CKD STAGE 3 DUE TO TYPE 2 DIABETES MELLITUS: ICD-10-CM

## 2021-10-08 DIAGNOSIS — E11.42 DIABETIC POLYNEUROPATHY ASSOCIATED WITH TYPE 2 DIABETES MELLITUS: ICD-10-CM

## 2021-10-08 DIAGNOSIS — E78.49 OTHER HYPERLIPIDEMIA: ICD-10-CM

## 2021-10-08 DIAGNOSIS — I10 ESSENTIAL HYPERTENSION: ICD-10-CM

## 2021-10-08 DIAGNOSIS — M46.96 INFLAMMATORY SPONDYLOPATHY OF LUMBAR REGION: ICD-10-CM

## 2021-10-08 DIAGNOSIS — Z00.00 ENCOUNTER FOR PREVENTIVE HEALTH EXAMINATION: Primary | ICD-10-CM

## 2021-10-08 DIAGNOSIS — E11.42 PERIPHERAL SENSORY NEUROPATHY DUE TO TYPE 2 DIABETES MELLITUS: ICD-10-CM

## 2021-10-08 DIAGNOSIS — I63.81 LACUNAR INFARCTION: ICD-10-CM

## 2021-10-08 DIAGNOSIS — E11.9 DM TYPE 2 WITHOUT RETINOPATHY: ICD-10-CM

## 2021-10-08 DIAGNOSIS — E11.22 CKD STAGE 3 DUE TO TYPE 2 DIABETES MELLITUS: ICD-10-CM

## 2021-10-08 DIAGNOSIS — E11.21 TYPE 2 DIABETES MELLITUS WITH DIABETIC NEPHROPATHY, WITHOUT LONG-TERM CURRENT USE OF INSULIN: ICD-10-CM

## 2021-10-08 DIAGNOSIS — R41.3 MEMORY IMPAIRMENT: ICD-10-CM

## 2021-10-08 DIAGNOSIS — K50.819 CROHN'S DISEASE OF SMALL AND LARGE INTESTINES WITH COMPLICATION: ICD-10-CM

## 2021-10-08 PROCEDURE — G0439 PR MEDICARE ANNUAL WELLNESS SUBSEQUENT VISIT: ICD-10-PCS | Mod: 95,,, | Performed by: NURSE PRACTITIONER

## 2021-10-08 PROCEDURE — 3288F PR FALLS RISK ASSESSMENT DOCUMENTED: ICD-10-PCS | Mod: CPTII,S$GLB,, | Performed by: NURSE PRACTITIONER

## 2021-10-08 PROCEDURE — 3072F PR LOW RISK FOR RETINOPATHY: ICD-10-PCS | Mod: CPTII,S$GLB,, | Performed by: NURSE PRACTITIONER

## 2021-10-08 PROCEDURE — 1159F MED LIST DOCD IN RCRD: CPT | Mod: CPTII,S$GLB,, | Performed by: NURSE PRACTITIONER

## 2021-10-08 PROCEDURE — 1160F RVW MEDS BY RX/DR IN RCRD: CPT | Mod: CPTII,S$GLB,, | Performed by: NURSE PRACTITIONER

## 2021-10-08 PROCEDURE — G0439 PPPS, SUBSEQ VISIT: HCPCS | Mod: 95,,, | Performed by: NURSE PRACTITIONER

## 2021-10-08 PROCEDURE — 1160F PR REVIEW ALL MEDS BY PRESCRIBER/CLIN PHARMACIST DOCUMENTED: ICD-10-PCS | Mod: CPTII,S$GLB,, | Performed by: NURSE PRACTITIONER

## 2021-10-08 PROCEDURE — 99499 UNLISTED E&M SERVICE: CPT | Mod: S$GLB,,, | Performed by: NURSE PRACTITIONER

## 2021-10-08 PROCEDURE — 1101F PT FALLS ASSESS-DOCD LE1/YR: CPT | Mod: CPTII,S$GLB,, | Performed by: NURSE PRACTITIONER

## 2021-10-08 PROCEDURE — 3288F FALL RISK ASSESSMENT DOCD: CPT | Mod: CPTII,S$GLB,, | Performed by: NURSE PRACTITIONER

## 2021-10-08 PROCEDURE — 1159F PR MEDICATION LIST DOCUMENTED IN MEDICAL RECORD: ICD-10-PCS | Mod: CPTII,S$GLB,, | Performed by: NURSE PRACTITIONER

## 2021-10-08 PROCEDURE — 3072F LOW RISK FOR RETINOPATHY: CPT | Mod: CPTII,S$GLB,, | Performed by: NURSE PRACTITIONER

## 2021-10-08 PROCEDURE — 1101F PR PT FALLS ASSESS DOC 0-1 FALLS W/OUT INJ PAST YR: ICD-10-PCS | Mod: CPTII,S$GLB,, | Performed by: NURSE PRACTITIONER

## 2021-10-08 PROCEDURE — 99499 RISK ADDL DX/OHS AUDIT: ICD-10-PCS | Mod: S$GLB,,, | Performed by: NURSE PRACTITIONER

## 2021-10-08 RX ORDER — ATORVASTATIN CALCIUM 20 MG/1
TABLET, FILM COATED ORAL
Qty: 90 TABLET | Refills: 2 | Status: SHIPPED | OUTPATIENT
Start: 2021-10-08 | End: 2022-04-20

## 2021-10-25 ENCOUNTER — TELEPHONE (OUTPATIENT)
Dept: OPHTHALMOLOGY | Facility: CLINIC | Age: 86
End: 2021-10-25
Payer: MEDICARE

## 2021-11-11 ENCOUNTER — IMMUNIZATION (OUTPATIENT)
Dept: INTERNAL MEDICINE | Facility: CLINIC | Age: 86
End: 2021-11-11
Payer: MEDICARE

## 2021-11-11 DIAGNOSIS — Z23 NEED FOR VACCINATION: Primary | ICD-10-CM

## 2021-11-11 PROCEDURE — 0004A COVID-19, MRNA, LNP-S, PF, 30 MCG/0.3 ML DOSE VACCINE: CPT | Mod: HCNC,CV19,PBBFAC | Performed by: INTERNAL MEDICINE

## 2021-12-12 ENCOUNTER — HOSPITAL ENCOUNTER (EMERGENCY)
Facility: HOSPITAL | Age: 86
Discharge: HOME | End: 2021-12-12
Attending: EMERGENCY MEDICINE
Payer: MEDICARE

## 2021-12-12 VITALS
HEIGHT: 60 IN | SYSTOLIC BLOOD PRESSURE: 143 MMHG | RESPIRATION RATE: 20 BRPM | BODY MASS INDEX: 34.36 KG/M2 | HEART RATE: 90 BPM | OXYGEN SATURATION: 100 % | DIASTOLIC BLOOD PRESSURE: 62 MMHG | WEIGHT: 175 LBS | TEMPERATURE: 98 F

## 2021-12-12 DIAGNOSIS — L50.9 HIVES: ICD-10-CM

## 2021-12-12 DIAGNOSIS — T78.40XA ALLERGIC REACTION, INITIAL ENCOUNTER: Primary | ICD-10-CM

## 2021-12-12 PROCEDURE — 63600175 PHARM REV CODE 636 W HCPCS: Mod: HCNC | Performed by: EMERGENCY MEDICINE

## 2021-12-12 PROCEDURE — 99284 PR EMERGENCY DEPT VISIT,LEVEL IV: ICD-10-PCS | Mod: HCNC,,, | Performed by: EMERGENCY MEDICINE

## 2021-12-12 PROCEDURE — 99284 EMERGENCY DEPT VISIT MOD MDM: CPT | Mod: HCNC,,, | Performed by: EMERGENCY MEDICINE

## 2021-12-12 PROCEDURE — 25000003 PHARM REV CODE 250: Mod: HCNC | Performed by: EMERGENCY MEDICINE

## 2021-12-12 PROCEDURE — 99284 EMERGENCY DEPT VISIT MOD MDM: CPT | Mod: 25,HCNC

## 2021-12-12 PROCEDURE — 96374 THER/PROPH/DIAG INJ IV PUSH: CPT | Mod: HCNC

## 2021-12-12 RX ORDER — PREDNISONE 20 MG/1
20 TABLET ORAL DAILY
Qty: 5 TABLET | Refills: 0 | Status: SHIPPED | OUTPATIENT
Start: 2021-12-12 | End: 2021-12-17

## 2021-12-12 RX ORDER — FAMOTIDINE 10 MG/ML
20 INJECTION INTRAVENOUS
Status: COMPLETED | OUTPATIENT
Start: 2021-12-12 | End: 2021-12-12

## 2021-12-12 RX ORDER — PREDNISONE 20 MG/1
40 TABLET ORAL DAILY
Qty: 10 TABLET | Refills: 0 | Status: SHIPPED | OUTPATIENT
Start: 2021-12-12 | End: 2021-12-12 | Stop reason: SDUPTHER

## 2021-12-12 RX ORDER — PREDNISONE 20 MG/1
40 TABLET ORAL
Status: COMPLETED | OUTPATIENT
Start: 2021-12-12 | End: 2021-12-12

## 2021-12-12 RX ADMIN — PREDNISONE 40 MG: 20 TABLET ORAL at 03:12

## 2021-12-12 RX ADMIN — FAMOTIDINE 20 MG: 10 INJECTION, SOLUTION INTRAVENOUS at 03:12

## 2021-12-13 ENCOUNTER — LAB VISIT (OUTPATIENT)
Dept: LAB | Facility: HOSPITAL | Age: 86
End: 2021-12-13
Attending: INTERNAL MEDICINE
Payer: MEDICARE

## 2021-12-13 ENCOUNTER — IMMUNIZATION (OUTPATIENT)
Dept: INTERNAL MEDICINE | Facility: CLINIC | Age: 86
End: 2021-12-13
Payer: MEDICARE

## 2021-12-13 ENCOUNTER — OFFICE VISIT (OUTPATIENT)
Dept: INTERNAL MEDICINE | Facility: CLINIC | Age: 86
End: 2021-12-13
Payer: MEDICARE

## 2021-12-13 VITALS
HEIGHT: 60 IN | SYSTOLIC BLOOD PRESSURE: 120 MMHG | HEART RATE: 100 BPM | DIASTOLIC BLOOD PRESSURE: 60 MMHG | OXYGEN SATURATION: 99 % | WEIGHT: 180.56 LBS | BODY MASS INDEX: 35.45 KG/M2

## 2021-12-13 DIAGNOSIS — E11.9 DM TYPE 2 WITHOUT RETINOPATHY: ICD-10-CM

## 2021-12-13 DIAGNOSIS — T78.40XS ALLERGY, SEQUELA: ICD-10-CM

## 2021-12-13 DIAGNOSIS — N18.30 CKD STAGE 3 DUE TO TYPE 2 DIABETES MELLITUS: ICD-10-CM

## 2021-12-13 DIAGNOSIS — E11.22 CKD STAGE 3 DUE TO TYPE 2 DIABETES MELLITUS: ICD-10-CM

## 2021-12-13 DIAGNOSIS — L29.9 PRURITUS: Primary | ICD-10-CM

## 2021-12-13 DIAGNOSIS — I10 ESSENTIAL HYPERTENSION: ICD-10-CM

## 2021-12-13 LAB
ALBUMIN SERPL BCP-MCNC: 3.5 G/DL (ref 3.5–5.2)
ALP SERPL-CCNC: 88 U/L (ref 55–135)
ALT SERPL W/O P-5'-P-CCNC: 22 U/L (ref 10–44)
ANION GAP SERPL CALC-SCNC: 13 MMOL/L (ref 8–16)
AST SERPL-CCNC: 49 U/L (ref 10–40)
BASOPHILS # BLD AUTO: 0 K/UL (ref 0–0.2)
BASOPHILS NFR BLD: 0 % (ref 0–1.9)
BILIRUB SERPL-MCNC: 0.6 MG/DL (ref 0.1–1)
BUN SERPL-MCNC: 25 MG/DL (ref 8–23)
CALCIUM SERPL-MCNC: 10 MG/DL (ref 8.7–10.5)
CHLORIDE SERPL-SCNC: 105 MMOL/L (ref 95–110)
CO2 SERPL-SCNC: 19 MMOL/L (ref 23–29)
CREAT SERPL-MCNC: 1.4 MG/DL (ref 0.5–1.4)
DIFFERENTIAL METHOD: ABNORMAL
EOSINOPHIL # BLD AUTO: 0 K/UL (ref 0–0.5)
EOSINOPHIL NFR BLD: 0.3 % (ref 0–8)
ERYTHROCYTE [DISTWIDTH] IN BLOOD BY AUTOMATED COUNT: 18.6 % (ref 11.5–14.5)
EST. GFR  (AFRICAN AMERICAN): 38.2 ML/MIN/1.73 M^2
EST. GFR  (NON AFRICAN AMERICAN): 33.1 ML/MIN/1.73 M^2
ESTIMATED AVG GLUCOSE: 126 MG/DL (ref 68–131)
GLUCOSE SERPL-MCNC: 117 MG/DL (ref 70–110)
HBA1C MFR BLD: 6 % (ref 4–5.6)
HCT VFR BLD AUTO: 31.3 % (ref 37–48.5)
HGB BLD-MCNC: 9.4 G/DL (ref 12–16)
IMM GRANULOCYTES # BLD AUTO: 0.01 K/UL (ref 0–0.04)
IMM GRANULOCYTES NFR BLD AUTO: 0.1 % (ref 0–0.5)
LYMPHOCYTES # BLD AUTO: 0.8 K/UL (ref 1–4.8)
LYMPHOCYTES NFR BLD: 10.6 % (ref 18–48)
MCH RBC QN AUTO: 23.4 PG (ref 27–31)
MCHC RBC AUTO-ENTMCNC: 30 G/DL (ref 32–36)
MCV RBC AUTO: 78 FL (ref 82–98)
MONOCYTES # BLD AUTO: 0.2 K/UL (ref 0.3–1)
MONOCYTES NFR BLD: 2.4 % (ref 4–15)
NEUTROPHILS # BLD AUTO: 6.4 K/UL (ref 1.8–7.7)
NEUTROPHILS NFR BLD: 86.6 % (ref 38–73)
NRBC BLD-RTO: 0 /100 WBC
PLATELET # BLD AUTO: 420 K/UL (ref 150–450)
PMV BLD AUTO: 10.6 FL (ref 9.2–12.9)
POTASSIUM SERPL-SCNC: 4.3 MMOL/L (ref 3.5–5.1)
PROT SERPL-MCNC: 7.5 G/DL (ref 6–8.4)
RBC # BLD AUTO: 4.01 M/UL (ref 4–5.4)
SODIUM SERPL-SCNC: 137 MMOL/L (ref 136–145)
TSH SERPL DL<=0.005 MIU/L-ACNC: 1.21 UIU/ML (ref 0.4–4)
WBC # BLD AUTO: 7.38 K/UL (ref 3.9–12.7)

## 2021-12-13 PROCEDURE — G0008 ADMIN INFLUENZA VIRUS VAC: HCPCS | Mod: HCNC,S$GLB,, | Performed by: INTERNAL MEDICINE

## 2021-12-13 PROCEDURE — 3072F PR LOW RISK FOR RETINOPATHY: ICD-10-PCS | Mod: HCNC,CPTII,S$GLB, | Performed by: INTERNAL MEDICINE

## 2021-12-13 PROCEDURE — 80053 COMPREHEN METABOLIC PANEL: CPT | Mod: HCNC | Performed by: INTERNAL MEDICINE

## 2021-12-13 PROCEDURE — 90694 VACC AIIV4 NO PRSRV 0.5ML IM: CPT | Mod: HCNC,S$GLB,, | Performed by: INTERNAL MEDICINE

## 2021-12-13 PROCEDURE — 99214 OFFICE O/P EST MOD 30 MIN: CPT | Mod: HCNC,25,S$GLB, | Performed by: INTERNAL MEDICINE

## 2021-12-13 PROCEDURE — 83036 HEMOGLOBIN GLYCOSYLATED A1C: CPT | Mod: HCNC | Performed by: INTERNAL MEDICINE

## 2021-12-13 PROCEDURE — 3072F LOW RISK FOR RETINOPATHY: CPT | Mod: HCNC,CPTII,S$GLB, | Performed by: INTERNAL MEDICINE

## 2021-12-13 PROCEDURE — 99999 PR PBB SHADOW E&M-EST. PATIENT-LVL IV: CPT | Mod: PBBFAC,HCNC,, | Performed by: INTERNAL MEDICINE

## 2021-12-13 PROCEDURE — G0008 FLU VACCINE - QUADRIVALENT - ADJUVANTED: ICD-10-PCS | Mod: HCNC,S$GLB,, | Performed by: INTERNAL MEDICINE

## 2021-12-13 PROCEDURE — 99999 PR PBB SHADOW E&M-EST. PATIENT-LVL IV: ICD-10-PCS | Mod: PBBFAC,HCNC,, | Performed by: INTERNAL MEDICINE

## 2021-12-13 PROCEDURE — 84443 ASSAY THYROID STIM HORMONE: CPT | Mod: HCNC | Performed by: INTERNAL MEDICINE

## 2021-12-13 PROCEDURE — 36415 COLL VENOUS BLD VENIPUNCTURE: CPT | Mod: HCNC | Performed by: INTERNAL MEDICINE

## 2021-12-13 PROCEDURE — 90694 FLU VACCINE - QUADRIVALENT - ADJUVANTED: ICD-10-PCS | Mod: HCNC,S$GLB,, | Performed by: INTERNAL MEDICINE

## 2021-12-13 PROCEDURE — 99214 PR OFFICE/OUTPT VISIT, EST, LEVL IV, 30-39 MIN: ICD-10-PCS | Mod: HCNC,25,S$GLB, | Performed by: INTERNAL MEDICINE

## 2021-12-13 PROCEDURE — 85025 COMPLETE CBC W/AUTO DIFF WBC: CPT | Mod: HCNC | Performed by: INTERNAL MEDICINE

## 2021-12-14 ENCOUNTER — TELEPHONE (OUTPATIENT)
Dept: INTERNAL MEDICINE | Facility: CLINIC | Age: 86
End: 2021-12-14
Payer: MEDICARE

## 2021-12-14 DIAGNOSIS — D64.9 ANEMIA, UNSPECIFIED TYPE: ICD-10-CM

## 2021-12-14 DIAGNOSIS — R94.4 DECREASED GFR: Primary | ICD-10-CM

## 2021-12-17 ENCOUNTER — TELEPHONE (OUTPATIENT)
Dept: INTERNAL MEDICINE | Facility: CLINIC | Age: 86
End: 2021-12-17
Payer: MEDICARE

## 2021-12-20 ENCOUNTER — PATIENT OUTREACH (OUTPATIENT)
Dept: ADMINISTRATIVE | Facility: OTHER | Age: 86
End: 2021-12-20
Payer: MEDICARE

## 2021-12-21 ENCOUNTER — TELEPHONE (OUTPATIENT)
Dept: INTERNAL MEDICINE | Facility: CLINIC | Age: 86
End: 2021-12-21
Payer: MEDICARE

## 2021-12-23 ENCOUNTER — OFFICE VISIT (OUTPATIENT)
Dept: OTOLARYNGOLOGY | Facility: CLINIC | Age: 86
End: 2021-12-23
Payer: MEDICARE

## 2021-12-23 ENCOUNTER — CLINICAL SUPPORT (OUTPATIENT)
Dept: AUDIOLOGY | Facility: CLINIC | Age: 86
End: 2021-12-23
Payer: MEDICARE

## 2021-12-23 VITALS — BODY MASS INDEX: 35.15 KG/M2 | WEIGHT: 180 LBS

## 2021-12-23 DIAGNOSIS — H90.3 SENSORINEURAL HEARING LOSS (SNHL) OF BOTH EARS: Primary | ICD-10-CM

## 2021-12-23 PROCEDURE — 99999 PR PBB SHADOW E&M-EST. PATIENT-LVL III: CPT | Mod: PBBFAC,HCNC,, | Performed by: OTOLARYNGOLOGY

## 2021-12-23 PROCEDURE — 99999 PR PBB SHADOW E&M-EST. PATIENT-LVL I: ICD-10-PCS | Mod: PBBFAC,HCNC,, | Performed by: AUDIOLOGIST

## 2021-12-23 PROCEDURE — 3072F PR LOW RISK FOR RETINOPATHY: ICD-10-PCS | Mod: HCNC,CPTII,S$GLB, | Performed by: OTOLARYNGOLOGY

## 2021-12-23 PROCEDURE — 3072F LOW RISK FOR RETINOPATHY: CPT | Mod: HCNC,CPTII,S$GLB, | Performed by: OTOLARYNGOLOGY

## 2021-12-23 PROCEDURE — 99999 PR PBB SHADOW E&M-EST. PATIENT-LVL III: ICD-10-PCS | Mod: PBBFAC,HCNC,, | Performed by: OTOLARYNGOLOGY

## 2021-12-23 PROCEDURE — 99203 PR OFFICE/OUTPT VISIT, NEW, LEVL III, 30-44 MIN: ICD-10-PCS | Mod: HCNC,S$GLB,, | Performed by: OTOLARYNGOLOGY

## 2021-12-23 PROCEDURE — 1159F MED LIST DOCD IN RCRD: CPT | Mod: HCNC,CPTII,S$GLB, | Performed by: OTOLARYNGOLOGY

## 2021-12-23 PROCEDURE — 92567 PR TYMPA2METRY: ICD-10-PCS | Mod: HCNC,S$GLB,, | Performed by: AUDIOLOGIST

## 2021-12-23 PROCEDURE — 1159F PR MEDICATION LIST DOCUMENTED IN MEDICAL RECORD: ICD-10-PCS | Mod: HCNC,CPTII,S$GLB, | Performed by: OTOLARYNGOLOGY

## 2021-12-23 PROCEDURE — 1126F PR PAIN SEVERITY QUANTIFIED, NO PAIN PRESENT: ICD-10-PCS | Mod: HCNC,CPTII,S$GLB, | Performed by: OTOLARYNGOLOGY

## 2021-12-23 PROCEDURE — 92557 COMPREHENSIVE HEARING TEST: CPT | Mod: HCNC,S$GLB,, | Performed by: AUDIOLOGIST

## 2021-12-23 PROCEDURE — 1101F PT FALLS ASSESS-DOCD LE1/YR: CPT | Mod: HCNC,CPTII,S$GLB, | Performed by: OTOLARYNGOLOGY

## 2021-12-23 PROCEDURE — 99203 OFFICE O/P NEW LOW 30 MIN: CPT | Mod: HCNC,S$GLB,, | Performed by: OTOLARYNGOLOGY

## 2021-12-23 PROCEDURE — 99999 PR PBB SHADOW E&M-EST. PATIENT-LVL I: CPT | Mod: PBBFAC,HCNC,, | Performed by: AUDIOLOGIST

## 2021-12-23 PROCEDURE — 3288F FALL RISK ASSESSMENT DOCD: CPT | Mod: HCNC,CPTII,S$GLB, | Performed by: OTOLARYNGOLOGY

## 2021-12-23 PROCEDURE — 3288F PR FALLS RISK ASSESSMENT DOCUMENTED: ICD-10-PCS | Mod: HCNC,CPTII,S$GLB, | Performed by: OTOLARYNGOLOGY

## 2021-12-23 PROCEDURE — 92567 TYMPANOMETRY: CPT | Mod: HCNC,S$GLB,, | Performed by: AUDIOLOGIST

## 2021-12-23 PROCEDURE — 1101F PR PT FALLS ASSESS DOC 0-1 FALLS W/OUT INJ PAST YR: ICD-10-PCS | Mod: HCNC,CPTII,S$GLB, | Performed by: OTOLARYNGOLOGY

## 2021-12-23 PROCEDURE — 92557 PR COMPREHENSIVE HEARING TEST: ICD-10-PCS | Mod: HCNC,S$GLB,, | Performed by: AUDIOLOGIST

## 2021-12-23 PROCEDURE — 1126F AMNT PAIN NOTED NONE PRSNT: CPT | Mod: HCNC,CPTII,S$GLB, | Performed by: OTOLARYNGOLOGY

## 2021-12-27 ENCOUNTER — PATIENT MESSAGE (OUTPATIENT)
Dept: INTERNAL MEDICINE | Facility: CLINIC | Age: 86
End: 2021-12-27
Payer: MEDICARE

## 2022-01-06 ENCOUNTER — OFFICE VISIT (OUTPATIENT)
Dept: HEMATOLOGY/ONCOLOGY | Facility: CLINIC | Age: 87
End: 2022-01-06
Payer: MEDICARE

## 2022-01-06 ENCOUNTER — LAB VISIT (OUTPATIENT)
Dept: LAB | Facility: HOSPITAL | Age: 87
End: 2022-01-06
Payer: MEDICARE

## 2022-01-06 VITALS
HEART RATE: 80 BPM | BODY MASS INDEX: 35.39 KG/M2 | WEIGHT: 180.25 LBS | SYSTOLIC BLOOD PRESSURE: 153 MMHG | RESPIRATION RATE: 16 BRPM | DIASTOLIC BLOOD PRESSURE: 77 MMHG | HEIGHT: 60 IN | OXYGEN SATURATION: 98 %

## 2022-01-06 DIAGNOSIS — D50.0 IRON DEFICIENCY ANEMIA DUE TO CHRONIC BLOOD LOSS: Primary | ICD-10-CM

## 2022-01-06 DIAGNOSIS — D64.9 ANEMIA, UNSPECIFIED TYPE: ICD-10-CM

## 2022-01-06 LAB
FERRITIN SERPL-MCNC: 5 NG/ML (ref 20–300)
IRON SERPL-MCNC: 24 UG/DL (ref 30–160)
SATURATED IRON: 4 % (ref 20–50)
TOTAL IRON BINDING CAPACITY: 555 UG/DL (ref 250–450)
TRANSFERRIN SERPL-MCNC: 375 MG/DL (ref 200–375)

## 2022-01-06 PROCEDURE — 1101F PT FALLS ASSESS-DOCD LE1/YR: CPT | Mod: HCNC,CPTII,GC,S$GLB | Performed by: INTERNAL MEDICINE

## 2022-01-06 PROCEDURE — 1160F PR REVIEW ALL MEDS BY PRESCRIBER/CLIN PHARMACIST DOCUMENTED: ICD-10-PCS | Mod: HCNC,CPTII,GC,S$GLB | Performed by: INTERNAL MEDICINE

## 2022-01-06 PROCEDURE — 83540 ASSAY OF IRON: CPT | Mod: HCNC | Performed by: INTERNAL MEDICINE

## 2022-01-06 PROCEDURE — 99204 OFFICE O/P NEW MOD 45 MIN: CPT | Mod: HCNC,GC,S$GLB, | Performed by: INTERNAL MEDICINE

## 2022-01-06 PROCEDURE — 99204 PR OFFICE/OUTPT VISIT, NEW, LEVL IV, 45-59 MIN: ICD-10-PCS | Mod: HCNC,GC,S$GLB, | Performed by: INTERNAL MEDICINE

## 2022-01-06 PROCEDURE — 3288F PR FALLS RISK ASSESSMENT DOCUMENTED: ICD-10-PCS | Mod: HCNC,CPTII,GC,S$GLB | Performed by: INTERNAL MEDICINE

## 2022-01-06 PROCEDURE — 99999 PR PBB SHADOW E&M-EST. PATIENT-LVL V: CPT | Mod: PBBFAC,HCNC,GC, | Performed by: INTERNAL MEDICINE

## 2022-01-06 PROCEDURE — 1159F PR MEDICATION LIST DOCUMENTED IN MEDICAL RECORD: ICD-10-PCS | Mod: HCNC,CPTII,GC,S$GLB | Performed by: INTERNAL MEDICINE

## 2022-01-06 PROCEDURE — 1160F RVW MEDS BY RX/DR IN RCRD: CPT | Mod: HCNC,CPTII,GC,S$GLB | Performed by: INTERNAL MEDICINE

## 2022-01-06 PROCEDURE — 1126F AMNT PAIN NOTED NONE PRSNT: CPT | Mod: HCNC,CPTII,GC,S$GLB | Performed by: INTERNAL MEDICINE

## 2022-01-06 PROCEDURE — 36415 COLL VENOUS BLD VENIPUNCTURE: CPT | Mod: HCNC | Performed by: INTERNAL MEDICINE

## 2022-01-06 PROCEDURE — 3288F FALL RISK ASSESSMENT DOCD: CPT | Mod: HCNC,CPTII,GC,S$GLB | Performed by: INTERNAL MEDICINE

## 2022-01-06 PROCEDURE — 1126F PR PAIN SEVERITY QUANTIFIED, NO PAIN PRESENT: ICD-10-PCS | Mod: HCNC,CPTII,GC,S$GLB | Performed by: INTERNAL MEDICINE

## 2022-01-06 PROCEDURE — 99999 PR PBB SHADOW E&M-EST. PATIENT-LVL V: ICD-10-PCS | Mod: PBBFAC,HCNC,GC, | Performed by: INTERNAL MEDICINE

## 2022-01-06 PROCEDURE — 1159F MED LIST DOCD IN RCRD: CPT | Mod: HCNC,CPTII,GC,S$GLB | Performed by: INTERNAL MEDICINE

## 2022-01-06 PROCEDURE — 1101F PR PT FALLS ASSESS DOC 0-1 FALLS W/OUT INJ PAST YR: ICD-10-PCS | Mod: HCNC,CPTII,GC,S$GLB | Performed by: INTERNAL MEDICINE

## 2022-01-06 PROCEDURE — 82728 ASSAY OF FERRITIN: CPT | Mod: HCNC | Performed by: INTERNAL MEDICINE

## 2022-01-06 RX ORDER — FERROUS SULFATE 325(65) MG
325 TABLET ORAL EVERY OTHER DAY
Qty: 45 TABLET | Refills: 0 | Status: SHIPPED | OUTPATIENT
Start: 2022-01-06 | End: 2022-04-04 | Stop reason: SDUPTHER

## 2022-01-06 NOTE — PROGRESS NOTES
[facility]  [facility]  Hematology/Medical Oncology Clinic       PATIENT: Calli Jamison  MRN: [id number]  DATE: 1/6/2022    Reason for referral: Anemia    Initial History:  Patient is a 89-year-old female with history of IBD-Crohn's disease s/p ileocecal resection in 2011, HTN, HLD, controlled T2DM, CAD . She last saw GI in 2017.     Patient denies bleeding incl epistaxis, hemoptysis, melena, hematochezia, hematuria.  Denies taking iron tablets. Denies fatigue. Reports forgetfulness and slowed cognition.     Ileum 2012:  Focal active ileitis w/ villous flattening  Gastric body biopsy 2014:  Mild superficial chronic gastritis with hemorrhage and vascular congestion.    Labs with microcytic anemia.  Normal platelets.  Normal white count.  Iron panel 06/2021:  iron deficiency anemia.    Past Medical History:   Past Medical History:   Diagnosis Date    Abnormal Pap smear     After-cataract, unspecified - Both Eyes 11/5/2012    Arthritis     ASCUS (atypical squamous cells of undetermined significance) on Pap smear 7/1/2012    Asthma     Atrial fibrillation     Back pain 2009    Bullous pemphigoid     Carpal tunnel syndrome     Chronic back pain     COPD (chronic obstructive pulmonary disease)     Coronary artery disease     Crohn's disease     Depression     Diabetes mellitus     Discoid lupus     Diverticulosis     History of migraine headaches     Hyperlipidemia     Hypertension     Multiple thyroid nodules     Obesity     Open angle with borderline findings and high glaucoma risk in both eyes 9/11/2019    Personal history of colonic polyps     Pulmonary nodule     Renal manifestation of secondary diabetes mellitus     Restless legs syndrome     Sciatica     Right leg    Sleep apnea     Stricture and stenosis of esophagus     Stroke     TIA (transient ischemic attack)        Past Surgical HIstory:   Past Surgical History:   Procedure Laterality Date     BREAST BIOPSY Left     Breast: wire localization  1/2004    CATARACT EXTRACTION W/  INTRAOCULAR LENS IMPLANT Bilateral     Cataract left eye  1/2008    Cataract: right eye  1/2008    COLON SURGERY      ileocecectomy 2011    COLONOSCOPY  9/30/2008    Crohns in remission    ESOPHAGOGASTRODUODENOSCOPY N/A 7/24/2018    Procedure: EGD (ESOPHAGOGASTRODUODENOSCOPY);  Surgeon: Du Robles MD;  Location: [facility] ([address] [place] [address]);  Service: Endoscopy;  Laterality: N/A;  loop recoreder-battery dead per pt. (not active). corona dpt. to move to morning . unable to do so.EC    HYSTERECTOMY      ovaries remain    Iliocolic Crohns Disease with stricure  3/2011    LA iliocecectomy    Laparoscopic-assisted ileocecectomy.       Left carpal tunnel      Left carpal tunnel surgery      Rectal fistulae repair      TONSILLECTOMY      UPPER GASTROINTESTINAL ENDOSCOPY  11/2011    hiatal hernia, benign appearing esophageal  stricture dilated  prn repeat rec.       Family History:   Family History   Problem Relation Age of Onset    Pneumonia Father     Cataracts Mother     Macular degeneration Mother     Hypertension Sister     Alkaptonuria Maternal Grandfather     Asthma Son     No Known Problems Son     No Known Problems Son     No Known Problems Son     Crohn's disease Neg Hx     Ulcerative colitis Neg Hx     Inflammatory bowel disease Neg Hx     Amblyopia Neg Hx     Blindness Neg Hx     Glaucoma Neg Hx     Retinal detachment Neg Hx     Strabismus Neg Hx        Social History:  reports that she quit smoking about 42 years ago. Her smoking use included cigarettes. She has a 25.00 pack-year smoking history. She has never used smokeless tobacco. She reports that she does not drink alcohol and does not use drugs.    Allergies:  Review of patient's allergies indicates:   Allergen Reactions    Oxycodone Other (See Comments)    Oxycodone hcl-oxycodone-asa Hallucinations     Other reaction(s):  Hallucinations  Other reaction(s): Hallucinations       Medications:  Current Outpatient Medications   Medication Sig Dispense Refill    amLODIPine (NORVASC) 5 MG tablet TAKE 1 TABLET EVERY DAY FOR BLOOD PRESSURE 90 tablet 3    aspirin (ECOTRIN) 81 MG EC tablet Take 81 mg by mouth once daily.      atorvastatin (LIPITOR) 20 MG tablet TAKE 1 TABLET EVERY DAY 90 tablet 2    blood sugar diagnostic (BLOOD GLUCOSE TEST) Strp One test strip per glucose testing daily: Accu-Chek Guide strip 90 strip 3    blood-glucose meter Misc Dispense one meter: Insurance Brand Preference 1 each 0    calcium citrate-vitamin D3 315-200 mg (CITRACAL+D) 315 mg-5 mcg (200 unit) per tablet Take 2 tablets by mouth Daily.      clopidogreL (PLAVIX) 75 mg tablet TAKE 1 TABLET EVERY DAY 90 tablet 3    cyanocobalamin 500 MCG tablet Take 1 tablet by mouth Daily.      furosemide (LASIX) 20 MG tablet TAKE 1 TABLET EVERY DAY 90 tablet 1    lancets Misc One lancet per glucose testing twice a day: Insurance Brand Preference 180 each 3    latanoprost 0.005 % ophthalmic solution INSTILL 1 DROP INTO BOTH EYES EVERY NIGHT 7.5 mL 2    omeprazole (PRILOSEC) 20 MG capsule Take 1 capsule by mouth daily as needed.       potassium chloride (MICRO-K) 10 MEQ CpSR OPEN 1 CAPSULE AND SPRINKLE OVER UNHEATED SOFT FOOD (APPLESAUCE) AND TAKE EVERY DAY. DO NOT CHEW OR CRUSH 90 capsule 1    rOPINIRole (REQUIP) 0.25 MG tablet TAKE 2 TABLETS EVERY NIGHT AS NEEDED AS DIRECTED 180 tablet 3    terazosin (HYTRIN) 1 MG capsule TAKE 1 CAPSULE EVERY EVENING 90 capsule 3    timolol maleate 0.5% (TIMOPTIC) 0.5 % Drop INSTILL 1 DROP INTO BOTH EYES TWICE DAILY 15 mL 1     No current facility-administered medications for this visit.       Review of Systems   Constitutional: Negative for fatigue. Negative for activity change, appetite change, chills, fever and unexpected weight change.   HENT: Negative for nosebleeds and sore throat.    Respiratory: Negative for cough,  chest tightness, shortness of breath and wheezing.    Cardiovascular: Negative for leg swelling. Negative for palpitations.   Gastrointestinal: Negative for constipation. Negative for abdominal pain, diarrhea, nausea and vomiting.       ECOG Performance Status: 1   Objective:      Vitals:   Vitals:    01/06/22 1507   BP: (!) 153/77   Pulse: 80   Resp: 16   SpO2: 98%   Weight: 81.8 kg (180 lb 3.6 oz)   Height: 5' (1.524 m)       Physical Exam  Constitutional:       Appearance: Normal appearance.   HENT:      Head: Normocephalic and atraumatic.      Mouth/Throat:      Mouth: Mucous membranes are moist.   Eyes:      Extraocular Movements: Extraocular movements intact.   Cardiovascular:      Rate and Rhythm: Normal rate and regular rhythm.      Pulses: Normal pulses.      Heart sounds: Normal heart sounds.   Pulmonary:      Effort: Pulmonary effort is normal.      Breath sounds: Normal breath sounds.   Abdominal:      Palpations: Abdomen is soft.   Musculoskeletal:      Cervical back: Normal range of motion and neck supple.   Skin:     General: Skin is warm.   Neurological:      General: No focal deficit present.      Mental Status: She is alert.    Laboratory Data:  No visits with results within 1 Week(s) from this visit.   Latest known visit with results is:   Lab Visit on 12/13/2021   Component Date Value Ref Range Status    WBC 12/13/2021 7.38  3.90 - 12.70 K/uL Final    RBC 12/13/2021 4.01  4.00 - 5.40 M/uL Final    Hemoglobin 12/13/2021 9.4* 12.0 - 16.0 g/dL Final    Hematocrit 12/13/2021 31.3* 37.0 - 48.5 % Final    MCV 12/13/2021 78* 82 - 98 fL Final    MCH 12/13/2021 23.4* 27.0 - 31.0 pg Final    MCHC 12/13/2021 30.0* 32.0 - 36.0 g/dL Final    RDW 12/13/2021 18.6* 11.5 - 14.5 % Final    Platelets 12/13/2021 420  150 - 450 K/uL Final    MPV 12/13/2021 10.6  9.2 - 12.9 fL Final    Immature Granulocytes 12/13/2021 0.1  0.0 - 0.5 % Final    Gran # (ANC) 12/13/2021 6.4  1.8 - 7.7 K/uL Final    Immature  Grans (Abs) 12/13/2021 0.01  0.00 - 0.04 K/uL Final    Comment: Mild elevation in immature granulocytes is non specific and   can be seen in a variety of conditions including stress response,   acute inflammation, trauma and pregnancy. Correlation with other   laboratory and clinical findings is essential.      Lymph # 12/13/2021 0.8* 1.0 - 4.8 K/uL Final    Mono # 12/13/2021 0.2* 0.3 - 1.0 K/uL Final    Eos # 12/13/2021 0.0  0.0 - 0.5 K/uL Final    Baso # 12/13/2021 0.00  0.00 - 0.20 K/uL Final    nRBC 12/13/2021 0  0 /100 WBC Final    Gran % 12/13/2021 86.6* 38.0 - 73.0 % Final    Lymph % 12/13/2021 10.6* 18.0 - 48.0 % Final    Mono % 12/13/2021 2.4* 4.0 - 15.0 % Final    Eosinophil % 12/13/2021 0.3  0.0 - 8.0 % Final    Basophil % 12/13/2021 0.0  0.0 - 1.9 % Final    Differential Method 12/13/2021 Automated   Final    Sodium 12/13/2021 137  136 - 145 mmol/L Final    Potassium 12/13/2021 4.3  3.5 - 5.1 mmol/L Final    Chloride 12/13/2021 105  95 - 110 mmol/L Final    CO2 12/13/2021 19* 23 - 29 mmol/L Final    Glucose 12/13/2021 117* 70 - 110 mg/dL Final    BUN 12/13/2021 25* 8 - 23 mg/dL Final    Creatinine 12/13/2021 1.4  0.5 - 1.4 mg/dL Final    Calcium 12/13/2021 10.0  8.7 - 10.5 mg/dL Final    Total Protein 12/13/2021 7.5  6.0 - 8.4 g/dL Final    Albumin 12/13/2021 3.5  3.5 - 5.2 g/dL Final    Total Bilirubin 12/13/2021 0.6  0.1 - 1.0 mg/dL Final    Comment: For infants and newborns, interpretation of results should be based  on gestational age, weight and in agreement with clinical  observations.    Premature Infant recommended reference ranges:  Up to 24 hours.............<8.0 mg/dL  Up to 48 hours............<12.0 mg/dL  3-5 days..................<15.0 mg/dL  6-29 days.................<15.0 mg/dL      Alkaline Phosphatase 12/13/2021 88  55 - 135 U/L Final    AST 12/13/2021 49* 10 - 40 U/L Final    ALT 12/13/2021 22  10 - 44 U/L Final    Anion Gap 12/13/2021 13  8 - 16 mmol/L Final     eGFR if  12/13/2021 38.2* >60 mL/min/1.73 m^2 Final    eGFR if non  12/13/2021 33.1* >60 mL/min/1.73 m^2 Final    Comment: Calculation used to obtain the estimated glomerular filtration  rate (eGFR) is the CKD-EPI equation.       TSH 12/13/2021 1.205  0.400 - 4.000 uIU/mL Final    Hemoglobin A1C 12/13/2021 6.0* 4.0 - 5.6 % Final    Comment: ADA Screening Guidelines:  5.7-6.4%  Consistent with prediabetes  >or=6.5%  Consistent with diabetes    High levels of fetal hemoglobin interfere with the HbA1C  assay. Heterozygous hemoglobin variants (HbS, HgC, etc)do  not significantly interfere with this assay.   However, presence of multiple variants may affect accuracy.      Estimated Avg Glucose 12/13/2021 126  68 - 131 mg/dL Final         Imaging: All pertinent imaging reviewed    Assessment and Plan        1. Anemia, unspecified type      Iron deficiency anemia  -patient has iron deficiency anemia.  Could be secondary to blood loss anemia.  Will place referral for Gastroenterology evaluation for endoscopy.  Will start ferrous sulfate 325 mg on the other day.  Iron panel in 3 months.  Follow-up in 2-3 months.       Taylor Hanson MD  Hematology/Oncology Fellow PGY IV  [facility]

## 2022-01-06 NOTE — Clinical Note
Iron panel labs today at 3rd floor. Follow up in 2-3 month.  Please help patient schedule an apt with GI.

## 2022-01-07 ENCOUNTER — OFFICE VISIT (OUTPATIENT)
Dept: NEPHROLOGY | Facility: CLINIC | Age: 87
End: 2022-01-07
Payer: MEDICARE

## 2022-01-07 ENCOUNTER — LAB VISIT (OUTPATIENT)
Dept: LAB | Facility: HOSPITAL | Age: 87
End: 2022-01-07
Attending: INTERNAL MEDICINE
Payer: MEDICARE

## 2022-01-07 VITALS
DIASTOLIC BLOOD PRESSURE: 66 MMHG | BODY MASS INDEX: 34.44 KG/M2 | HEART RATE: 93 BPM | WEIGHT: 176.38 LBS | SYSTOLIC BLOOD PRESSURE: 144 MMHG | OXYGEN SATURATION: 98 %

## 2022-01-07 DIAGNOSIS — R94.4 DECREASED GFR: ICD-10-CM

## 2022-01-07 DIAGNOSIS — N25.81 SECONDARY HYPERPARATHYROIDISM OF RENAL ORIGIN: ICD-10-CM

## 2022-01-07 DIAGNOSIS — N18.31 CHRONIC RENAL FAILURE, STAGE 3A: Primary | ICD-10-CM

## 2022-01-07 PROCEDURE — 1101F PT FALLS ASSESS-DOCD LE1/YR: CPT | Mod: HCNC,CPTII,S$GLB, | Performed by: INTERNAL MEDICINE

## 2022-01-07 PROCEDURE — 1160F PR REVIEW ALL MEDS BY PRESCRIBER/CLIN PHARMACIST DOCUMENTED: ICD-10-PCS | Mod: HCNC,CPTII,S$GLB, | Performed by: INTERNAL MEDICINE

## 2022-01-07 PROCEDURE — 84550 ASSAY OF BLOOD/URIC ACID: CPT | Mod: HCNC | Performed by: INTERNAL MEDICINE

## 2022-01-07 PROCEDURE — 83880 ASSAY OF NATRIURETIC PEPTIDE: CPT | Mod: HCNC | Performed by: INTERNAL MEDICINE

## 2022-01-07 PROCEDURE — 99999 PR PBB SHADOW E&M-EST. PATIENT-LVL III: CPT | Mod: PBBFAC,HCNC,, | Performed by: INTERNAL MEDICINE

## 2022-01-07 PROCEDURE — 99499 RISK ADDL DX/OHS AUDIT: ICD-10-PCS | Mod: S$GLB,,, | Performed by: INTERNAL MEDICINE

## 2022-01-07 PROCEDURE — 86235 NUCLEAR ANTIGEN ANTIBODY: CPT | Mod: 59,HCNC | Performed by: INTERNAL MEDICINE

## 2022-01-07 PROCEDURE — 99203 PR OFFICE/OUTPT VISIT, NEW, LEVL III, 30-44 MIN: ICD-10-PCS | Mod: HCNC,S$GLB,, | Performed by: INTERNAL MEDICINE

## 2022-01-07 PROCEDURE — 3288F FALL RISK ASSESSMENT DOCD: CPT | Mod: HCNC,CPTII,S$GLB, | Performed by: INTERNAL MEDICINE

## 2022-01-07 PROCEDURE — 1126F AMNT PAIN NOTED NONE PRSNT: CPT | Mod: HCNC,CPTII,S$GLB, | Performed by: INTERNAL MEDICINE

## 2022-01-07 PROCEDURE — 1159F PR MEDICATION LIST DOCUMENTED IN MEDICAL RECORD: ICD-10-PCS | Mod: HCNC,CPTII,S$GLB, | Performed by: INTERNAL MEDICINE

## 2022-01-07 PROCEDURE — 99999 PR PBB SHADOW E&M-EST. PATIENT-LVL III: ICD-10-PCS | Mod: PBBFAC,HCNC,, | Performed by: INTERNAL MEDICINE

## 2022-01-07 PROCEDURE — 83970 ASSAY OF PARATHORMONE: CPT | Mod: HCNC | Performed by: INTERNAL MEDICINE

## 2022-01-07 PROCEDURE — 1159F MED LIST DOCD IN RCRD: CPT | Mod: HCNC,CPTII,S$GLB, | Performed by: INTERNAL MEDICINE

## 2022-01-07 PROCEDURE — 36415 COLL VENOUS BLD VENIPUNCTURE: CPT | Mod: HCNC | Performed by: INTERNAL MEDICINE

## 2022-01-07 PROCEDURE — 99499 UNLISTED E&M SERVICE: CPT | Mod: S$GLB,,, | Performed by: INTERNAL MEDICINE

## 2022-01-07 PROCEDURE — 1160F RVW MEDS BY RX/DR IN RCRD: CPT | Mod: HCNC,CPTII,S$GLB, | Performed by: INTERNAL MEDICINE

## 2022-01-07 PROCEDURE — 1101F PR PT FALLS ASSESS DOC 0-1 FALLS W/OUT INJ PAST YR: ICD-10-PCS | Mod: HCNC,CPTII,S$GLB, | Performed by: INTERNAL MEDICINE

## 2022-01-07 PROCEDURE — 86038 ANTINUCLEAR ANTIBODIES: CPT | Mod: HCNC | Performed by: INTERNAL MEDICINE

## 2022-01-07 PROCEDURE — 80069 RENAL FUNCTION PANEL: CPT | Mod: HCNC | Performed by: INTERNAL MEDICINE

## 2022-01-07 PROCEDURE — 3288F PR FALLS RISK ASSESSMENT DOCUMENTED: ICD-10-PCS | Mod: HCNC,CPTII,S$GLB, | Performed by: INTERNAL MEDICINE

## 2022-01-07 PROCEDURE — 99203 OFFICE O/P NEW LOW 30 MIN: CPT | Mod: HCNC,S$GLB,, | Performed by: INTERNAL MEDICINE

## 2022-01-07 PROCEDURE — 86039 ANTINUCLEAR ANTIBODIES (ANA): CPT | Mod: HCNC | Performed by: INTERNAL MEDICINE

## 2022-01-07 PROCEDURE — 1126F PR PAIN SEVERITY QUANTIFIED, NO PAIN PRESENT: ICD-10-PCS | Mod: HCNC,CPTII,S$GLB, | Performed by: INTERNAL MEDICINE

## 2022-01-07 PROCEDURE — 82550 ASSAY OF CK (CPK): CPT | Mod: HCNC | Performed by: INTERNAL MEDICINE

## 2022-01-07 NOTE — PROGRESS NOTES
REASON FOR CONSULT/CHIEF COMPLAIN: Renal dysfunction    REFERRING PHYSICIAN: Sheridan Hurd MD    HISTORY OF PRESENT ILLNESS: 89 y.o. female who is new to me  has a past medical history of Abnormal Pap smear, After-cataract, unspecified - Both Eyes (11/5/2012), Arthritis, ASCUS (atypical squamous cells of undetermined significance) on Pap smear (7/1/2012), Asthma, Atrial fibrillation, Back pain (2009), Bullous pemphigoid, Carpal tunnel syndrome, Chronic back pain, COPD (chronic obstructive pulmonary disease), Coronary artery disease, Crohn's disease, Depression, Diabetes mellitus, Discoid lupus, Diverticulosis, History of migraine headaches, Hyperlipidemia, Hypertension, Multiple thyroid nodules, Obesity, Open angle with borderline findings and high glaucoma risk in both eyes (9/11/2019), Personal history of colonic polyps, Pulmonary nodule, Renal manifestation of secondary diabetes mellitus, Restless legs syndrome, Sciatica, Sleep apnea, Stricture and stenosis of esophagus, Stroke, and TIA (transient ischemic attack). patient was referred here for abnormal renal function.   No chronic NSAID use, no known exposure to lithium, lead. No family history of Lupus, kidney disease or deafness. No ingestion of licorice. No kidney stones. No smoking.   Denied any issues with urination including dysuria, hematuria, urgency, hesitancy, nocturia, incomplete voiding. Denied chest pain, nausea, vomiting, abd pain, nausea, vomiting, diarrhea, shortness of breath, pedal edema, Orthopnea, PND.      ROS:  General: negative for chills, or fatigue  Respiratory: No cough, shortness of breath, or wheezing  Cardiovascular: No chest pain or dyspnea   Gastrointestinal: No abdominal pain, change in bowel habits  Neurological: No new focal weakness, no numbness  Dermatological: No rash or ulcers.    PAST MEDICAL HISTORY:  Past Medical History:   Diagnosis Date    Abnormal Pap smear     After-cataract, unspecified - Both Eyes 11/5/2012     Arthritis     ASCUS (atypical squamous cells of undetermined significance) on Pap smear 7/1/2012    Asthma     Atrial fibrillation     Back pain 2009    Bullous pemphigoid     Carpal tunnel syndrome     Chronic back pain     COPD (chronic obstructive pulmonary disease)     Coronary artery disease     Crohn's disease     Depression     Diabetes mellitus     Discoid lupus     Diverticulosis     History of migraine headaches     Hyperlipidemia     Hypertension     Multiple thyroid nodules     Obesity     Open angle with borderline findings and high glaucoma risk in both eyes 9/11/2019    Personal history of colonic polyps     Pulmonary nodule     Renal manifestation of secondary diabetes mellitus     Restless legs syndrome     Sciatica     Right leg    Sleep apnea     Stricture and stenosis of esophagus     Stroke     TIA (transient ischemic attack)        PAST SURGICAL HISTORY:  Past Surgical History:   Procedure Laterality Date    BREAST BIOPSY Left     Breast: wire localization  1/2004    CATARACT EXTRACTION W/  INTRAOCULAR LENS IMPLANT Bilateral     Cataract left eye  1/2008    Cataract: right eye  1/2008    COLON SURGERY      ileocecectomy 2011    COLONOSCOPY  9/30/2008    Crohns in remission    ESOPHAGOGASTRODUODENOSCOPY N/A 7/24/2018    Procedure: EGD (ESOPHAGOGASTRODUODENOSCOPY);  Surgeon: Du Robles MD;  Location: [address]);  Service: Endoscopy;  Laterality: N/A;  loop recoreder-battery dead per pt. (not active). corona dpt. to move to morning . unable to do so.EC    HYSTERECTOMY      ovaries remain    Iliocolic Crohns Disease with stricure  3/2011    LA iliocecectomy    Laparoscopic-assisted ileocecectomy.       Left carpal tunnel      Left carpal tunnel surgery      Rectal fistulae repair      TONSILLECTOMY      UPPER GASTROINTESTINAL ENDOSCOPY  11/2011    hiatal hernia, benign appearing esophageal  stricture dilated  prn repeat rec.       FAMILY  HISTORY:   Family History   Problem Relation Age of Onset    Pneumonia Father     Cataracts Mother     Macular degeneration Mother     Hypertension Sister     Alkaptonuria Maternal Grandfather     Asthma Son     No Known Problems Son     No Known Problems Son     No Known Problems Son     Crohn's disease Neg Hx     Ulcerative colitis Neg Hx     Inflammatory bowel disease Neg Hx     Amblyopia Neg Hx     Blindness Neg Hx     Glaucoma Neg Hx     Retinal detachment Neg Hx     Strabismus Neg Hx        SOCIAL HISTORY:  Social History     Socioeconomic History    Marital status:    Tobacco Use    Smoking status: Former Smoker     Packs/day: 1.00     Years: 25.00     Pack years: 25.00     Types: Cigarettes     Quit date: 1980     Years since quittin.0    Smokeless tobacco: Never Used   Substance and Sexual Activity    Alcohol use: No    Drug use: No    Sexual activity: Never       ALLERGIES:  Review of patient's allergies indicates:   Allergen Reactions    Oxycodone Other (See Comments)    Oxycodone hcl-oxycodone-asa Hallucinations     Other reaction(s): Hallucinations  Other reaction(s): Hallucinations       MEDICATIONS:    Current Outpatient Medications:     amLODIPine (NORVASC) 5 MG tablet, TAKE 1 TABLET EVERY DAY FOR BLOOD PRESSURE, Disp: 90 tablet, Rfl: 3    aspirin (ECOTRIN) 81 MG EC tablet, Take 81 mg by mouth once daily., Disp: , Rfl:     atorvastatin (LIPITOR) 20 MG tablet, TAKE 1 TABLET EVERY DAY, Disp: 90 tablet, Rfl: 2    blood sugar diagnostic (BLOOD GLUCOSE TEST) Strp, One test strip per glucose testing daily: Accu-Chek Guide strip, Disp: 90 strip, Rfl: 3    blood-glucose meter Misc, Dispense one meter: Insurance Brand Preference, Disp: 1 each, Rfl: 0    calcium citrate-vitamin D3 315-200 mg (CITRACAL+D) 315 mg-5 mcg (200 unit) per tablet, Take 2 tablets by mouth Daily., Disp: , Rfl:     clopidogreL (PLAVIX) 75 mg tablet, TAKE 1 TABLET EVERY DAY, Disp: 90  tablet, Rfl: 3    cyanocobalamin 500 MCG tablet, Take 1 tablet by mouth Daily., Disp: , Rfl:     ferrous sulfate (FEOSOL) 325 mg (65 mg iron) Tab tablet, Take 1 tablet (325 mg total) by mouth every other day. Take one tablet every other day for iron deficiency anemia, Disp: 45 tablet, Rfl: 0    furosemide (LASIX) 20 MG tablet, TAKE 1 TABLET EVERY DAY, Disp: 90 tablet, Rfl: 1    lancets Misc, One lancet per glucose testing twice a day: Insurance Brand Preference, Disp: 180 each, Rfl: 3    latanoprost 0.005 % ophthalmic solution, INSTILL 1 DROP INTO BOTH EYES EVERY NIGHT, Disp: 7.5 mL, Rfl: 2    omeprazole (PRILOSEC) 20 MG capsule, Take 1 capsule by mouth daily as needed. , Disp: , Rfl:     potassium chloride (MICRO-K) 10 MEQ CpSR, OPEN 1 CAPSULE AND SPRINKLE OVER UNHEATED SOFT FOOD (APPLESAUCE) AND TAKE EVERY DAY. DO NOT CHEW OR CRUSH, Disp: 90 capsule, Rfl: 1    rOPINIRole (REQUIP) 0.25 MG tablet, TAKE 2 TABLETS EVERY NIGHT AS NEEDED AS DIRECTED, Disp: 180 tablet, Rfl: 3    terazosin (HYTRIN) 1 MG capsule, TAKE 1 CAPSULE EVERY EVENING, Disp: 90 capsule, Rfl: 3    timolol maleate 0.5% (TIMOPTIC) 0.5 % Drop, INSTILL 1 DROP INTO BOTH EYES TWICE DAILY, Disp: 15 mL, Rfl: 1   Medication List with Changes/Refills   Current Medications    AMLODIPINE (NORVASC) 5 MG TABLET    TAKE 1 TABLET EVERY DAY FOR BLOOD PRESSURE    ASPIRIN (ECOTRIN) 81 MG EC TABLET    Take 81 mg by mouth once daily.    ATORVASTATIN (LIPITOR) 20 MG TABLET    TAKE 1 TABLET EVERY DAY    BLOOD SUGAR DIAGNOSTIC (BLOOD GLUCOSE TEST) STRP    One test strip per glucose testing daily: Accu-Chek Guide strip    BLOOD-GLUCOSE METER MISC    Dispense one meter: Insurance Brand Preference    CALCIUM CITRATE-VITAMIN D3 315-200 MG (CITRACAL+D) 315 MG-5 MCG (200 UNIT) PER TABLET    Take 2 tablets by mouth Daily.    CLOPIDOGREL (PLAVIX) 75 MG TABLET    TAKE 1 TABLET EVERY DAY    CYANOCOBALAMIN 500 MCG TABLET    Take 1 tablet by mouth Daily.    FERROUS SULFATE  (FEOSOL) 325 MG (65 MG IRON) TAB TABLET    Take 1 tablet (325 mg total) by mouth every other day. Take one tablet every other day for iron deficiency anemia    FUROSEMIDE (LASIX) 20 MG TABLET    TAKE 1 TABLET EVERY DAY    LANCETS MISC    One lancet per glucose testing twice a day: Insurance Brand Preference    LATANOPROST 0.005 % OPHTHALMIC SOLUTION    INSTILL 1 DROP INTO BOTH EYES EVERY NIGHT    OMEPRAZOLE (PRILOSEC) 20 MG CAPSULE    Take 1 capsule by mouth daily as needed.     POTASSIUM CHLORIDE (MICRO-K) 10 MEQ CPSR    OPEN 1 CAPSULE AND SPRINKLE OVER UNHEATED SOFT FOOD (APPLESAUCE) AND TAKE EVERY DAY. DO NOT CHEW OR CRUSH    ROPINIROLE (REQUIP) 0.25 MG TABLET    TAKE 2 TABLETS EVERY NIGHT AS NEEDED AS DIRECTED    TERAZOSIN (HYTRIN) 1 MG CAPSULE    TAKE 1 CAPSULE EVERY EVENING    TIMOLOL MALEATE 0.5% (TIMOPTIC) 0.5 % DROP    INSTILL 1 DROP INTO BOTH EYES TWICE DAILY        PHYSICAL EXAM:  BP (!) 144/66   Pulse 93   Wt 80 kg (176 lb 5.9 oz)   SpO2 98%   BMI 34.44 kg/m²     General: No distress, No fever or chills  Neck: No adenopathy,no carotid bruit,no JVD  Lungs:Clear to auscultation bilaterally, No Crackles  Heart: Regular rate and rhythm, no murmur, gallops or rubs  Abdomen: Soft, no tenderness, bowel sounds normal  Extremities: Atraumatic, no edema in LE  Skin: Turgor normal. No rashes  Neurologic: No focal weakness, oriented.  Dialysis Access: Non applicable        LABS:  Lab Results   Component Value Date    HGB 9.4 (L) 12/13/2021        Lab Results   Component Value Date    CREATININE 1.4 12/13/2021       No results found for: UTPCR    Lab Results   Component Value Date     12/13/2021    K 4.3 12/13/2021    CO2 19 (L) 12/13/2021       last PTH   Lab Results   Component Value Date    CALCIUM 10.0 12/13/2021    PHOS 3.3 10/26/2019       Lab Results   Component Value Date    HGBA1C 6.0 (H) 12/13/2021       Lab Results   Component Value Date    LDLCALC 41.4 (L) 06/22/2021         Creatinine, Urine    Date Value Ref Range Status   11/11/2015 32.0 15.0 - 325.0 mg/dL Final     Comment:     The random urine reference ranges provided were established   for 24 hour urine collections.  No reference ranges exist for  random urine specimens.  Correlate clinically.     11/06/2014 62.0 15.0 - 325.0 mg/dL Final     Comment:     The random urine reference ranges provided were established   for 24 hour urine collections.  No reference ranges exist for  random urine specimens.  Correlate clinically.     07/25/2013 77 15 - 325 mg/dl Final       No results found for: PROTEINURINE    No results found for: UTPCR      ASSESSMENT:    1) Chronic Kidney Disease 3b  2) Secondary hyperparathyroidism  2) Metabolic Acidosis - Resolved   Patients baseline creatinine has been between 1.0-1.1 mg/dl for many years, however it was reported as 1.4 mg/dl with accompanying acidosis. Could not identify a precipitating factor, but it seems like she has been started on a diuretic in the recent past and this could have been the inciting factor. Either way her creatinine is not far away from baseline. Urine analysis did not show any hematuria or proteinuria. Will get a renal ultrasound and some serological work up to see if anything else is playing a role.   Electrolytes, Acid Base, Hemoglobin, Bone Mineral in acceptable range.    - Educated about CKD  - Educated about low sodium, low phos  diet.  - Renal ultrasound.      RTC in 2 months    Diagnosis and plan of care explained to the patient.  Pt Verbalized understanding. Answered all questions.  Thanks for allowing me to participate in the care of this patient.     2:21 PM    Olvin Ha MD  Nephrology & Critical Care

## 2022-01-08 PROBLEM — N18.31 CHRONIC RENAL FAILURE, STAGE 3A: Status: ACTIVE | Noted: 2022-01-08

## 2022-01-08 PROBLEM — N25.81 SECONDARY HYPERPARATHYROIDISM OF RENAL ORIGIN: Status: ACTIVE | Noted: 2022-01-08

## 2022-01-08 LAB
ALBUMIN SERPL BCP-MCNC: 3.8 G/DL (ref 3.5–5.2)
ANION GAP SERPL CALC-SCNC: 9 MMOL/L (ref 8–16)
BNP SERPL-MCNC: 13 PG/ML (ref 0–99)
BUN SERPL-MCNC: 19 MG/DL (ref 8–23)
CALCIUM SERPL-MCNC: 9.9 MG/DL (ref 8.7–10.5)
CHLORIDE SERPL-SCNC: 107 MMOL/L (ref 95–110)
CK SERPL-CCNC: 326 U/L (ref 20–180)
CO2 SERPL-SCNC: 26 MMOL/L (ref 23–29)
CREAT SERPL-MCNC: 1.2 MG/DL (ref 0.5–1.4)
EST. GFR  (AFRICAN AMERICAN): 46 ML/MIN/1.73 M^2
EST. GFR  (NON AFRICAN AMERICAN): 39.9 ML/MIN/1.73 M^2
GLUCOSE SERPL-MCNC: 93 MG/DL (ref 70–110)
PHOSPHATE SERPL-MCNC: 3.1 MG/DL (ref 2.7–4.5)
POTASSIUM SERPL-SCNC: 4.4 MMOL/L (ref 3.5–5.1)
PTH-INTACT SERPL-MCNC: 158.2 PG/ML (ref 9–77)
SODIUM SERPL-SCNC: 142 MMOL/L (ref 136–145)
URATE SERPL-MCNC: 7.3 MG/DL (ref 2.4–5.7)

## 2022-01-10 LAB
ANA PATTERN 1: NORMAL
ANA SER QL IF: POSITIVE
ANA TITR SER IF: NORMAL {TITER}

## 2022-01-11 ENCOUNTER — TELEPHONE (OUTPATIENT)
Dept: GASTROENTEROLOGY | Facility: CLINIC | Age: 87
End: 2022-01-11
Payer: MEDICARE

## 2022-01-11 LAB
ANTI SM ANTIBODY: 0.23 RATIO (ref 0–0.99)
ANTI SM/RNP ANTIBODY: 2.58 RATIO (ref 0–0.99)
ANTI-SM INTERPRETATION: NEGATIVE
ANTI-SM/RNP INTERPRETATION: POSITIVE
ANTI-SSA ANTIBODY: 0.09 RATIO (ref 0–0.99)
ANTI-SSA INTERPRETATION: NEGATIVE
ANTI-SSB ANTIBODY: 0.09 RATIO (ref 0–0.99)
ANTI-SSB INTERPRETATION: NEGATIVE
DSDNA AB SER-ACNC: ABNORMAL [IU]/ML

## 2022-01-11 NOTE — TELEPHONE ENCOUNTER
----- Message from Du Robles MD sent at 1/10/2022  7:52 AM CST -----  Thanks Berry  We will see her in the office and decide the best approach  I think we stop doing routine scopes on her a few years ago just because of her age  ----- Message -----  From: Kathryn Lopez MA  Sent: 1/10/2022   6:36 AM CST  To: Du Robles MD      ----- Message -----  From: Beverly Mortensen MA  Sent: 1/7/2022  12:03 PM CST  To: Kathryn Lopez MA      ----- Message -----  From: Du Robles MD  Sent: 1/7/2022  11:41 AM CST  To: Travis DC Staff    Schedule f/u appt with me  ----- Message -----  From: Berry York MD  Sent: 1/7/2022   7:52 AM CST  To: Du Robles MD

## 2022-01-21 ENCOUNTER — TELEPHONE (OUTPATIENT)
Dept: NEPHROLOGY | Facility: CLINIC | Age: 87
End: 2022-01-21
Payer: MEDICARE

## 2022-01-21 NOTE — TELEPHONE ENCOUNTER
----- Message from Dale Odom sent at 1/21/2022  2:11 PM CST -----  Contact: Patient son  Patient son requesting call back to speak with someone about having patient labs reschedule for 01/24.      Patient son @[phone]

## 2022-01-24 ENCOUNTER — HOSPITAL ENCOUNTER (OUTPATIENT)
Dept: RADIOLOGY | Facility: HOSPITAL | Age: 87
Discharge: HOME | End: 2022-01-24
Attending: INTERNAL MEDICINE
Payer: MEDICARE

## 2022-01-24 DIAGNOSIS — R94.4 DECREASED GFR: ICD-10-CM

## 2022-01-24 PROCEDURE — 76770 US RETROPERITONEAL COMPLETE: ICD-10-PCS | Mod: 26,HCNC,, | Performed by: RADIOLOGY

## 2022-01-24 PROCEDURE — 76770 US EXAM ABDO BACK WALL COMP: CPT | Mod: 26,HCNC,, | Performed by: RADIOLOGY

## 2022-01-24 PROCEDURE — 76770 US EXAM ABDO BACK WALL COMP: CPT | Mod: TC,HCNC

## 2022-01-26 ENCOUNTER — PATIENT MESSAGE (OUTPATIENT)
Dept: INTERNAL MEDICINE | Facility: CLINIC | Age: 87
End: 2022-01-26
Payer: MEDICARE

## 2022-01-27 ENCOUNTER — TELEPHONE (OUTPATIENT)
Dept: INTERNAL MEDICINE | Facility: CLINIC | Age: 87
End: 2022-01-27
Payer: MEDICARE

## 2022-01-27 NOTE — TELEPHONE ENCOUNTER
Called pts son, lvm with call back number. Dr Velazquez is taking new patients. Please schedule patient with Dr Velazquez for new patient appt upon CB

## 2022-01-27 NOTE — TELEPHONE ENCOUNTER
----- Message from Swapna Campuzano sent at 1/27/2022  1:14 PM CST -----  Contact: [phone]  Patient is returning a phone call.  Who left a message for the patient: Dr Velazquez's office  Does patient know what this is regarding:  no  Would you like a call back, or a response through your [facility] portal?:     Comments:

## 2022-02-23 DIAGNOSIS — D84.9 IMMUNOSUPPRESSED STATUS: ICD-10-CM

## 2022-03-16 DIAGNOSIS — D50.0 IRON DEFICIENCY ANEMIA DUE TO CHRONIC BLOOD LOSS: Primary | ICD-10-CM

## 2022-03-17 ENCOUNTER — OFFICE VISIT (OUTPATIENT)
Dept: HEMATOLOGY/ONCOLOGY | Facility: CLINIC | Age: 87
End: 2022-03-17
Payer: MEDICARE

## 2022-03-17 ENCOUNTER — LAB VISIT (OUTPATIENT)
Dept: LAB | Facility: HOSPITAL | Age: 87
End: 2022-03-17
Payer: MEDICARE

## 2022-03-17 VITALS
HEART RATE: 83 BPM | TEMPERATURE: 99 F | WEIGHT: 182.13 LBS | RESPIRATION RATE: 16 BRPM | OXYGEN SATURATION: 97 % | SYSTOLIC BLOOD PRESSURE: 176 MMHG | DIASTOLIC BLOOD PRESSURE: 80 MMHG | BODY MASS INDEX: 35.76 KG/M2 | HEIGHT: 60 IN

## 2022-03-17 DIAGNOSIS — D50.0 IRON DEFICIENCY ANEMIA DUE TO CHRONIC BLOOD LOSS: Primary | ICD-10-CM

## 2022-03-17 DIAGNOSIS — D50.0 IRON DEFICIENCY ANEMIA DUE TO CHRONIC BLOOD LOSS: ICD-10-CM

## 2022-03-17 LAB
ALBUMIN SERPL BCP-MCNC: 3.9 G/DL (ref 3.5–5.2)
ALP SERPL-CCNC: 122 U/L (ref 55–135)
ALT SERPL W/O P-5'-P-CCNC: 22 U/L (ref 10–44)
ANION GAP SERPL CALC-SCNC: 10 MMOL/L (ref 8–16)
AST SERPL-CCNC: 21 U/L (ref 10–40)
BASOPHILS # BLD AUTO: 0.07 K/UL (ref 0–0.2)
BASOPHILS NFR BLD: 0.9 % (ref 0–1.9)
BILIRUB SERPL-MCNC: 0.4 MG/DL (ref 0.1–1)
BUN SERPL-MCNC: 12 MG/DL (ref 10–30)
CALCIUM SERPL-MCNC: 10.2 MG/DL (ref 8.7–10.5)
CHLORIDE SERPL-SCNC: 104 MMOL/L (ref 95–110)
CO2 SERPL-SCNC: 27 MMOL/L (ref 23–29)
CREAT SERPL-MCNC: 0.9 MG/DL (ref 0.5–1.4)
DIFFERENTIAL METHOD: ABNORMAL
EOSINOPHIL # BLD AUTO: 0.5 K/UL (ref 0–0.5)
EOSINOPHIL NFR BLD: 6 % (ref 0–8)
ERYTHROCYTE [DISTWIDTH] IN BLOOD BY AUTOMATED COUNT: 23.2 % (ref 11.5–14.5)
EST. GFR  (AFRICAN AMERICAN): >60 ML/MIN/1.73 M^2
EST. GFR  (NON AFRICAN AMERICAN): 56.1 ML/MIN/1.73 M^2
FERRITIN SERPL-MCNC: 34 NG/ML (ref 20–300)
GLUCOSE SERPL-MCNC: 99 MG/DL (ref 70–110)
HCT VFR BLD AUTO: 35.9 % (ref 37–48.5)
HGB BLD-MCNC: 11.1 G/DL (ref 12–16)
IMM GRANULOCYTES # BLD AUTO: 0.03 K/UL (ref 0–0.04)
IMM GRANULOCYTES NFR BLD AUTO: 0.4 % (ref 0–0.5)
IRON SERPL-MCNC: 123 UG/DL (ref 30–160)
LYMPHOCYTES # BLD AUTO: 1.7 K/UL (ref 1–4.8)
LYMPHOCYTES NFR BLD: 22.2 % (ref 18–48)
MCH RBC QN AUTO: 25.1 PG (ref 27–31)
MCHC RBC AUTO-ENTMCNC: 30.9 G/DL (ref 32–36)
MCV RBC AUTO: 81 FL (ref 82–98)
MONOCYTES # BLD AUTO: 0.8 K/UL (ref 0.3–1)
MONOCYTES NFR BLD: 10.4 % (ref 4–15)
NEUTROPHILS # BLD AUTO: 4.6 K/UL (ref 1.8–7.7)
NEUTROPHILS NFR BLD: 60.1 % (ref 38–73)
NRBC BLD-RTO: 0 /100 WBC
PLATELET # BLD AUTO: 316 K/UL (ref 150–450)
PMV BLD AUTO: 9.9 FL (ref 9.2–12.9)
POTASSIUM SERPL-SCNC: 3.8 MMOL/L (ref 3.5–5.1)
PROT SERPL-MCNC: 7.8 G/DL (ref 6–8.4)
RBC # BLD AUTO: 4.42 M/UL (ref 4–5.4)
SATURATED IRON: 28 % (ref 20–50)
SODIUM SERPL-SCNC: 141 MMOL/L (ref 136–145)
TOTAL IRON BINDING CAPACITY: 432 UG/DL (ref 250–450)
TRANSFERRIN SERPL-MCNC: 292 MG/DL (ref 200–375)
WBC # BLD AUTO: 7.62 K/UL (ref 3.9–12.7)

## 2022-03-17 PROCEDURE — 84466 ASSAY OF TRANSFERRIN: CPT | Performed by: INTERNAL MEDICINE

## 2022-03-17 PROCEDURE — 99215 OFFICE O/P EST HI 40 MIN: CPT | Mod: GC,S$GLB,, | Performed by: INTERNAL MEDICINE

## 2022-03-17 PROCEDURE — 99999 PR PBB SHADOW E&M-EST. PATIENT-LVL IV: ICD-10-PCS | Mod: PBBFAC,GC,, | Performed by: INTERNAL MEDICINE

## 2022-03-17 PROCEDURE — 36415 COLL VENOUS BLD VENIPUNCTURE: CPT | Performed by: INTERNAL MEDICINE

## 2022-03-17 PROCEDURE — 82728 ASSAY OF FERRITIN: CPT | Performed by: INTERNAL MEDICINE

## 2022-03-17 PROCEDURE — 99999 PR PBB SHADOW E&M-EST. PATIENT-LVL IV: CPT | Mod: PBBFAC,GC,, | Performed by: INTERNAL MEDICINE

## 2022-03-17 PROCEDURE — 85025 COMPLETE CBC W/AUTO DIFF WBC: CPT | Performed by: INTERNAL MEDICINE

## 2022-03-17 PROCEDURE — 99215 PR OFFICE/OUTPT VISIT, EST, LEVL V, 40-54 MIN: ICD-10-PCS | Mod: GC,S$GLB,, | Performed by: INTERNAL MEDICINE

## 2022-03-17 PROCEDURE — 80053 COMPREHEN METABOLIC PANEL: CPT | Performed by: INTERNAL MEDICINE

## 2022-03-17 NOTE — PROGRESS NOTES
[facility]  [facility]  Hematology/Medical Oncology Clinic       PATIENT: Calli Jamison  MRN: [id number]  DATE: 3/16/2022    Reason for referral: Iron def anemia    Initial History: Patient is a 89-year-old female with history of IBD-Crohn's disease s/p ileocecal resection in 2011, HTN, HLD, controlled T2DM, CAD . She last saw GI in 2017.      Patient denies bleeding incl epistaxis, hemoptysis, melena, hematochezia, hematuria.  Denies taking iron tablets. Denies fatigue. Reports forgetfulness and slowed cognition.      Ileum 2012:  Focal active ileitis w/ villous flattening  Gastric body biopsy 2014:  Mild superficial chronic gastritis with hemorrhage and vascular congestion.     Labs with microcytic anemia.  Normal platelets.  Normal white count.  Iron panel 06/2021:  iron deficiency anemia.    Interval History:   Patient reports having some forgetfulness. Some fatigue but still preforming ADLs.  She is taking ferrous sulfate 325mg qod w/o GI side effects. Hgb increase 1-2 g/dL. Iron panel with improved iron saturation, ferritin, and iron levels.      Patient has an apt with GI Dr. Robles on 3/22/22.     Past Medical History:   Past Medical History:   Diagnosis Date    Abnormal Pap smear     After-cataract, unspecified - Both Eyes 11/5/2012    Arthritis     ASCUS (atypical squamous cells of undetermined significance) on Pap smear 7/1/2012    Asthma     Atrial fibrillation     Back pain 2009    Bullous pemphigoid     Carpal tunnel syndrome     Chronic back pain     COPD (chronic obstructive pulmonary disease)     Coronary artery disease     Crohn's disease     Depression     Diabetes mellitus     Discoid lupus     Diverticulosis     History of migraine headaches     Hyperlipidemia     Hypertension     Multiple thyroid nodules     Obesity     Open angle with borderline findings and high glaucoma risk in both eyes 9/11/2019    Personal history of colonic  polyps     Pulmonary nodule     Renal manifestation of secondary diabetes mellitus     Restless legs syndrome     Sciatica     Right leg    Sleep apnea     Stricture and stenosis of esophagus     Stroke     TIA (transient ischemic attack)        Past Surgical HIstory:   Past Surgical History:   Procedure Laterality Date    BREAST BIOPSY Left     Breast: wire localization  1/2004    CATARACT EXTRACTION W/  INTRAOCULAR LENS IMPLANT Bilateral     Cataract left eye  1/2008    Cataract: right eye  1/2008    COLON SURGERY      ileocecectomy 2011    COLONOSCOPY  9/30/2008    Crohns in remission    ESOPHAGOGASTRODUODENOSCOPY N/A 7/24/2018    Procedure: EGD (ESOPHAGOGASTRODUODENOSCOPY);  Surgeon: Du Robles MD;  Location: [facility] ([address] [place] [address]);  Service: Endoscopy;  Laterality: N/A;  loop recoreder-battery dead per pt. (not active). corona dpt. to move to morning . unable to do so.EC    HYSTERECTOMY      ovaries remain    Iliocolic Crohns Disease with stricure  3/2011    LA iliocecectomy    Laparoscopic-assisted ileocecectomy.       Left carpal tunnel      Left carpal tunnel surgery      Rectal fistulae repair      TONSILLECTOMY      UPPER GASTROINTESTINAL ENDOSCOPY  11/2011    hiatal hernia, benign appearing esophageal  stricture dilated  prn repeat rec.       Family History:   Family History   Problem Relation Age of Onset    Pneumonia Father     Cataracts Mother     Macular degeneration Mother     Hypertension Sister     Alkaptonuria Maternal Grandfather     Asthma Son     No Known Problems Son     No Known Problems Son     No Known Problems Son     Crohn's disease Neg Hx     Ulcerative colitis Neg Hx     Inflammatory bowel disease Neg Hx     Amblyopia Neg Hx     Blindness Neg Hx     Glaucoma Neg Hx     Retinal detachment Neg Hx     Strabismus Neg Hx        Social History:  reports that she quit smoking about 42 years ago. Her smoking use included cigarettes. She has a 25.00  pack-year smoking history. She has never used smokeless tobacco. She reports that she does not drink alcohol and does not use drugs.    Allergies:  Review of patient's allergies indicates:   Allergen Reactions    Oxycodone Other (See Comments)    Oxycodone hcl-oxycodone-asa Hallucinations     Other reaction(s): Hallucinations  Other reaction(s): Hallucinations       Medications:  Current Outpatient Medications   Medication Sig Dispense Refill    amLODIPine (NORVASC) 5 MG tablet TAKE 1 TABLET EVERY DAY FOR BLOOD PRESSURE 90 tablet 3    aspirin (ECOTRIN) 81 MG EC tablet Take 81 mg by mouth once daily.      atorvastatin (LIPITOR) 20 MG tablet TAKE 1 TABLET EVERY DAY 90 tablet 2    blood sugar diagnostic (BLOOD GLUCOSE TEST) Strp One test strip per glucose testing daily: Accu-Chek Guide strip 90 strip 3    blood-glucose meter Misc Dispense one meter: Insurance Brand Preference 1 each 0    calcium citrate-vitamin D3 315-200 mg (CITRACAL+D) 315 mg-5 mcg (200 unit) per tablet Take 2 tablets by mouth Daily.      clopidogreL (PLAVIX) 75 mg tablet TAKE 1 TABLET EVERY DAY 90 tablet 3    cyanocobalamin 500 MCG tablet Take 1 tablet by mouth Daily.      ferrous sulfate (FEOSOL) 325 mg (65 mg iron) Tab tablet Take 1 tablet (325 mg total) by mouth every other day. Take one tablet every other day for iron deficiency anemia 45 tablet 0    furosemide (LASIX) 20 MG tablet TAKE 1 TABLET EVERY DAY 90 tablet 1    lancets Misc One lancet per glucose testing twice a day: Insurance Brand Preference 180 each 3    latanoprost 0.005 % ophthalmic solution INSTILL 1 DROP INTO BOTH EYES EVERY NIGHT 7.5 mL 1    omeprazole (PRILOSEC) 20 MG capsule Take 1 capsule by mouth daily as needed.       potassium chloride (MICRO-K) 10 MEQ CpSR OPEN 1 CAPSULE AND SPRINKLE OVER UNHEATED SOFT FOOD (APPLESAUCE) AND TAKE EVERY DAY. DO NOT CHEW OR CRUSH 90 capsule 1    rOPINIRole (REQUIP) 0.25 MG tablet TAKE 2 TABLETS EVERY NIGHT AS NEEDED AS  DIRECTED 180 tablet 3    terazosin (HYTRIN) 1 MG capsule TAKE 1 CAPSULE EVERY EVENING 90 capsule 3    timolol maleate 0.5% (TIMOPTIC) 0.5 % Drop INSTILL 1 DROP INTO BOTH EYES TWICE DAILY 15 mL 1     No current facility-administered medications for this visit.       Review of Systems  Constitutional: Mild fatigue. Negative for activity change, appetite change, chills, fever and unexpected weight change.   HENT: Negative for nosebleeds and sore throat.    Respiratory: Negative for cough, chest tightness, shortness of breath and wheezing.    Cardiovascular: Negative for leg swelling. Negative for palpitations.   Gastrointestinal: Negative for constipation. Negative for abdominal pain, diarrhea, nausea and vomiting.     ECOG Performance Status: 1  Objective:      Vitals:   Vitals:    03/17/22 1427   BP: (!) 176/80   BP Location: Left arm   Patient Position: Sitting   BP Method: Large (Automatic)   Pulse: 83   Resp: 16   Temp: 98.9 °F (37.2 °C)   TempSrc: Oral   SpO2: 97%   Weight: 82.6 kg (182 lb 1.6 oz)   Height: 5' (1.524 m)       Physical Exam  Constitutional:       Appearance: Normal appearance.   HENT:      Head: Normocephalic and atraumatic.      Mouth/Throat:      Mouth: Mucous membranes are moist.   Eyes:      Extraocular Movements: Extraocular movements intact.   Cardiovascular:      Rate and Rhythm: Normal rate and regular rhythm.      Pulses: Normal pulses.      Heart sounds: Normal heart sounds.      2-3+ pitting edema to mid shin.  Pulmonary:      Effort: Pulmonary effort is normal.      Breath sounds: Normal breath sounds.   Abdominal:      Palpations: Abdomen is soft.   Musculoskeletal:      Cervical back: Normal range of motion and neck supple.   Skin:     General: Skin is warm.   Neurological:      General: No focal deficit present.      Mental Status: She is alert.    Laboratory Data:  No visits with results within 1 Week(s) from this visit.   Latest known visit with results is:   Lab Visit on  01/07/2022   Component Date Value Ref Range Status    Specimen UA 01/07/2022 Urine, Unspecified   Final    Color, UA 01/07/2022 Yellow  Yellow, Straw, Kelle Final    Appearance, UA 01/07/2022 Clear  Clear Final    pH, UA 01/07/2022 5.0  5.0 - 8.0 Final    Specific Gravity, UA 01/07/2022 1.010  1.005 - 1.030 Final    Protein, UA 01/07/2022 Negative  Negative Final    Comment: Recommend a 24 hour urine protein or a urine   protein/creatinine ratio if globulin induced proteinuria is  clinically suspected.      Glucose, UA 01/07/2022 Negative  Negative Final    Ketones, UA 01/07/2022 Negative  Negative Final    Bilirubin (UA) 01/07/2022 Negative  Negative Final    Occult Blood UA 01/07/2022 Negative  Negative Final    Nitrite, UA 01/07/2022 Negative  Negative Final    Leukocytes, UA 01/07/2022 Negative  Negative Final    Protein, Urine Random 01/07/2022 <7  0 - 15 mg/dL Final    Creatinine, Urine 01/07/2022 60.0  15.0 - 325.0 mg/dL Final    Prot/Creat Ratio, Urine 01/07/2022 Unable to calculate  0.00 - 0.20 Final         Imaging: All pertinent imaging reviewed    Assessment and Plan        Iron deficiency anemia  -patient has iron deficiency anemia that has responded well to oral iron supplementation. Would recommend to continue with repeat iron panel in 3 months. It is important to see GI and she has a visit scheduled.     Follow up: PRN. Iron panel in 3 months.    Taylor Hanson MD  Hematology/Oncology Fellow PGY IV  [facility]

## 2022-04-04 ENCOUNTER — TELEPHONE (OUTPATIENT)
Dept: GASTROENTEROLOGY | Facility: CLINIC | Age: 87
End: 2022-04-04
Payer: MEDICARE

## 2022-04-04 DIAGNOSIS — D50.0 IRON DEFICIENCY ANEMIA DUE TO CHRONIC BLOOD LOSS: ICD-10-CM

## 2022-04-04 RX ORDER — FERROUS SULFATE 325(65) MG
325 TABLET ORAL EVERY OTHER DAY
Qty: 45 TABLET | Refills: 0 | Status: SHIPPED | OUTPATIENT
Start: 2022-04-04 | End: 2022-07-03

## 2022-04-04 NOTE — TELEPHONE ENCOUNTER
----- Message from Lashell Donovan sent at 4/4/2022  4:37 PM CDT -----  Who Called: SonRayshawn     What is the reqeust in detail: Requested call back to schedule EP appointment to be seen for abdominal pain, diarrhea and to find out if she is bleeding inside. Please advise.     Can the clinic reply by [facility]? No     Best Call Back Number: [phone]    Additional Information:

## 2022-04-04 NOTE — TELEPHONE ENCOUNTER
Spoke with Rayshawn.  Mother scheduled to see  tomorrow 4/5 for 1:00.  He will call me back if she cannot make the appointment.   [place]

## 2022-04-14 ENCOUNTER — IMMUNIZATION (OUTPATIENT)
Dept: INTERNAL MEDICINE | Facility: CLINIC | Age: 87
End: 2022-04-14
Payer: MEDICARE

## 2022-04-14 DIAGNOSIS — Z23 NEED FOR VACCINATION: Primary | ICD-10-CM

## 2022-04-14 PROCEDURE — 91305 COVID-19, MRNA, LNP-S, PF, 30 MCG/0.3 ML DOSE VACCINE (PFIZER): CPT | Mod: PBBFAC | Performed by: INTERNAL MEDICINE

## 2022-04-20 ENCOUNTER — OFFICE VISIT (OUTPATIENT)
Dept: NEPHROLOGY | Facility: CLINIC | Age: 87
End: 2022-04-20
Payer: MEDICARE

## 2022-04-20 VITALS
HEART RATE: 89 BPM | HEIGHT: 60 IN | SYSTOLIC BLOOD PRESSURE: 150 MMHG | DIASTOLIC BLOOD PRESSURE: 88 MMHG | WEIGHT: 183.19 LBS | OXYGEN SATURATION: 95 % | BODY MASS INDEX: 35.96 KG/M2

## 2022-04-20 DIAGNOSIS — I10 HYPERTENSION, UNSPECIFIED TYPE: Primary | ICD-10-CM

## 2022-04-20 PROCEDURE — 1160F PR REVIEW ALL MEDS BY PRESCRIBER/CLIN PHARMACIST DOCUMENTED: ICD-10-PCS | Mod: CPTII,S$GLB,, | Performed by: INTERNAL MEDICINE

## 2022-04-20 PROCEDURE — 99999 PR PBB SHADOW E&M-EST. PATIENT-LVL IV: ICD-10-PCS | Mod: PBBFAC,,, | Performed by: INTERNAL MEDICINE

## 2022-04-20 PROCEDURE — 1101F PR PT FALLS ASSESS DOC 0-1 FALLS W/OUT INJ PAST YR: ICD-10-PCS | Mod: CPTII,S$GLB,, | Performed by: INTERNAL MEDICINE

## 2022-04-20 PROCEDURE — 1159F MED LIST DOCD IN RCRD: CPT | Mod: CPTII,S$GLB,, | Performed by: INTERNAL MEDICINE

## 2022-04-20 PROCEDURE — 1160F RVW MEDS BY RX/DR IN RCRD: CPT | Mod: CPTII,S$GLB,, | Performed by: INTERNAL MEDICINE

## 2022-04-20 PROCEDURE — 99499 RISK ADDL DX/OHS AUDIT: ICD-10-PCS | Mod: S$GLB,,, | Performed by: INTERNAL MEDICINE

## 2022-04-20 PROCEDURE — 1159F PR MEDICATION LIST DOCUMENTED IN MEDICAL RECORD: ICD-10-PCS | Mod: CPTII,S$GLB,, | Performed by: INTERNAL MEDICINE

## 2022-04-20 PROCEDURE — 3288F PR FALLS RISK ASSESSMENT DOCUMENTED: ICD-10-PCS | Mod: CPTII,S$GLB,, | Performed by: INTERNAL MEDICINE

## 2022-04-20 PROCEDURE — 1126F AMNT PAIN NOTED NONE PRSNT: CPT | Mod: CPTII,S$GLB,, | Performed by: INTERNAL MEDICINE

## 2022-04-20 PROCEDURE — 3288F FALL RISK ASSESSMENT DOCD: CPT | Mod: CPTII,S$GLB,, | Performed by: INTERNAL MEDICINE

## 2022-04-20 PROCEDURE — 99215 PR OFFICE/OUTPT VISIT, EST, LEVL V, 40-54 MIN: ICD-10-PCS | Mod: S$GLB,,, | Performed by: INTERNAL MEDICINE

## 2022-04-20 PROCEDURE — 99499 UNLISTED E&M SERVICE: CPT | Mod: S$GLB,,, | Performed by: INTERNAL MEDICINE

## 2022-04-20 PROCEDURE — 1126F PR PAIN SEVERITY QUANTIFIED, NO PAIN PRESENT: ICD-10-PCS | Mod: CPTII,S$GLB,, | Performed by: INTERNAL MEDICINE

## 2022-04-20 PROCEDURE — 99215 OFFICE O/P EST HI 40 MIN: CPT | Mod: S$GLB,,, | Performed by: INTERNAL MEDICINE

## 2022-04-20 PROCEDURE — 99999 PR PBB SHADOW E&M-EST. PATIENT-LVL IV: CPT | Mod: PBBFAC,,, | Performed by: INTERNAL MEDICINE

## 2022-04-20 PROCEDURE — 1101F PT FALLS ASSESS-DOCD LE1/YR: CPT | Mod: CPTII,S$GLB,, | Performed by: INTERNAL MEDICINE

## 2022-04-20 RX ORDER — AMLODIPINE BESYLATE 10 MG/1
10 TABLET ORAL DAILY
Qty: 90 TABLET | Refills: 0 | Status: SHIPPED | OUTPATIENT
Start: 2022-04-20 | End: 2022-05-25

## 2022-04-20 NOTE — PATIENT INSTRUCTIONS
- Please start taking amlodipine 5 mg two pills a day instead of one a day.  - Please take all your pill bottles to your primary care doctor appointment.  - Please void at regular intervals.

## 2022-04-21 NOTE — PROGRESS NOTES
REASON FOR CONSULT/CHIEF COMPLAIN: Renal dysfunction    REFERRING PHYSICIAN: Sheridan Hurd MD (Inactive)    HISTORY OF PRESENT ILLNESS: 89 y.o. female  has a past medical history of Abnormal Pap smear, After-cataract, unspecified - Both Eyes (11/5/2012), Arthritis, ASCUS (atypical squamous cells of undetermined significance) on Pap smear (7/1/2012), Asthma, Atrial fibrillation, Back pain (2009), Bullous pemphigoid, Carpal tunnel syndrome, Chronic back pain, COPD (chronic obstructive pulmonary disease), Coronary artery disease, Crohn's disease, Depression, Diabetes mellitus, Discoid lupus, Diverticulosis, History of migraine headaches, Hyperlipidemia, Hypertension, Multiple thyroid nodules, Obesity, Open angle with borderline findings and high glaucoma risk in both eyes (9/11/2019), Personal history of colonic polyps, Pulmonary nodule, Renal manifestation of secondary diabetes mellitus, Restless legs syndrome, Sciatica, Sleep apnea, Stricture and stenosis of esophagus, Stroke, and TIA (transient ischemic attack). patient was referred here for abnormal renal function.   No chronic NSAID use, no known exposure to lithium, lead. No family history of Lupus, kidney disease or deafness. No ingestion of licorice. No kidney stones. No smoking.   Denied any issues with urination including dysuria, hematuria, urgency, hesitancy, nocturia, incomplete voiding. Denied chest pain, nausea, vomiting, abd pain, nausea, vomiting, diarrhea, shortness of breath, pedal edema, Orthopnea, PND.      ROS:  General: negative for chills, or fatigue  Respiratory: No cough, shortness of breath, or wheezing  Cardiovascular: No chest pain or dyspnea   Gastrointestinal: No abdominal pain, change in bowel habits  Neurological: No new focal weakness, no numbness  Dermatological: No rash or ulcers.    PAST MEDICAL HISTORY:  Past Medical History:   Diagnosis Date    Abnormal Pap smear     After-cataract, unspecified - Both Eyes 11/5/2012     Arthritis     ASCUS (atypical squamous cells of undetermined significance) on Pap smear 7/1/2012    Asthma     Atrial fibrillation     Back pain 2009    Bullous pemphigoid     Carpal tunnel syndrome     Chronic back pain     COPD (chronic obstructive pulmonary disease)     Coronary artery disease     Crohn's disease     Depression     Diabetes mellitus     Discoid lupus     Diverticulosis     History of migraine headaches     Hyperlipidemia     Hypertension     Multiple thyroid nodules     Obesity     Open angle with borderline findings and high glaucoma risk in both eyes 9/11/2019    Personal history of colonic polyps     Pulmonary nodule     Renal manifestation of secondary diabetes mellitus     Restless legs syndrome     Sciatica     Right leg    Sleep apnea     Stricture and stenosis of esophagus     Stroke     TIA (transient ischemic attack)        PAST SURGICAL HISTORY:  Past Surgical History:   Procedure Laterality Date    BREAST BIOPSY Left     Breast: wire localization  1/2004    CATARACT EXTRACTION W/  INTRAOCULAR LENS IMPLANT Bilateral     Cataract left eye  1/2008    Cataract: right eye  1/2008    COLON SURGERY      ileocecectomy 2011    COLONOSCOPY  9/30/2008    Crohns in remission    ESOPHAGOGASTRODUODENOSCOPY N/A 7/24/2018    Procedure: EGD (ESOPHAGOGASTRODUODENOSCOPY);  Surgeon: Du Robles MD;  Location: [address]);  Service: Endoscopy;  Laterality: N/A;  loop recoreder-battery dead per pt. (not active). corona dpt. to move to morning . unable to do so.EC    HYSTERECTOMY      ovaries remain    Iliocolic Crohns Disease with stricure  3/2011    LA iliocecectomy    Laparoscopic-assisted ileocecectomy.       Left carpal tunnel      Left carpal tunnel surgery      Rectal fistulae repair      TONSILLECTOMY      UPPER GASTROINTESTINAL ENDOSCOPY  11/2011    hiatal hernia, benign appearing esophageal  stricture dilated  prn repeat rec.       FAMILY  HISTORY:   Family History   Problem Relation Age of Onset    Pneumonia Father     Cataracts Mother     Macular degeneration Mother     Hypertension Sister     Alkaptonuria Maternal Grandfather     Asthma Son     No Known Problems Son     No Known Problems Son     No Known Problems Son     Crohn's disease Neg Hx     Ulcerative colitis Neg Hx     Inflammatory bowel disease Neg Hx     Amblyopia Neg Hx     Blindness Neg Hx     Glaucoma Neg Hx     Retinal detachment Neg Hx     Strabismus Neg Hx        SOCIAL HISTORY:  Social History     Socioeconomic History    Marital status:    Tobacco Use    Smoking status: Former Smoker     Packs/day: 1.00     Years: 25.00     Pack years: 25.00     Types: Cigarettes     Quit date: 1980     Years since quittin.2    Smokeless tobacco: Never Used   Substance and Sexual Activity    Alcohol use: No    Drug use: No    Sexual activity: Never       ALLERGIES:  Review of patient's allergies indicates:   Allergen Reactions    Oxycodone Other (See Comments)    Oxycodone hcl-oxycodone-asa Hallucinations     Other reaction(s): Hallucinations  Other reaction(s): Hallucinations       MEDICATIONS:    Current Outpatient Medications:     aspirin (ECOTRIN) 81 MG EC tablet, Take 81 mg by mouth once daily., Disp: , Rfl:     calcium citrate-vitamin D3 315-200 mg (CITRACAL+D) 315 mg-5 mcg (200 unit) per tablet, Take 2 tablets by mouth Daily., Disp: , Rfl:     clopidogreL (PLAVIX) 75 mg tablet, TAKE 1 TABLET EVERY DAY, Disp: 90 tablet, Rfl: 3    cyanocobalamin 500 MCG tablet, Take 1 tablet by mouth Daily., Disp: , Rfl:     ferrous sulfate (FEOSOL) 325 mg (65 mg iron) Tab tablet, Take 1 tablet (325 mg total) by mouth every other day. Take one tablet every other day for iron deficiency anemia, Disp: 45 tablet, Rfl: 0    terazosin (HYTRIN) 1 MG capsule, TAKE 1 CAPSULE EVERY EVENING, Disp: 90 capsule, Rfl: 3    amLODIPine (NORVASC) 10 MG tablet, Take 1 tablet (10  mg total) by mouth once daily., Disp: 90 tablet, Rfl: 0    blood sugar diagnostic (BLOOD GLUCOSE TEST) Strp, One test strip per glucose testing daily: Accu-Chek Guide strip (Patient not taking: No sig reported), Disp: 90 strip, Rfl: 3    blood-glucose meter Misc, Dispense one meter: Insurance Brand Preference (Patient not taking: No sig reported), Disp: 1 each, Rfl: 0    lancets Misc, One lancet per glucose testing twice a day: Insurance Brand Preference (Patient not taking: No sig reported), Disp: 180 each, Rfl: 3    latanoprost 0.005 % ophthalmic solution, INSTILL 1 DROP INTO BOTH EYES EVERY NIGHT (Patient not taking: No sig reported), Disp: 7.5 mL, Rfl: 1    omeprazole (PRILOSEC) 20 MG capsule, Take 1 capsule by mouth daily as needed. , Disp: , Rfl:     potassium chloride (MICRO-K) 10 MEQ CpSR, OPEN 1 CAPSULE AND SPRINKLE OVER UNHEATED SOFT FOOD (APPLESAUCE) AND TAKE EVERY DAY. DO NOT CHEW OR CRUSH, Disp: 90 capsule, Rfl: 1    timolol maleate 0.5% (TIMOPTIC) 0.5 % Drop, INSTILL 1 DROP INTO BOTH EYES TWICE DAILY (Patient not taking: No sig reported), Disp: 15 mL, Rfl: 1   Medication List with Changes/Refills   Current Medications    ASPIRIN (ECOTRIN) 81 MG EC TABLET    Take 81 mg by mouth once daily.    BLOOD SUGAR DIAGNOSTIC (BLOOD GLUCOSE TEST) STRP    One test strip per glucose testing daily: Accu-Chek Guide strip    BLOOD-GLUCOSE METER MISC    Dispense one meter: Insurance Brand Preference    CALCIUM CITRATE-VITAMIN D3 315-200 MG (CITRACAL+D) 315 MG-5 MCG (200 UNIT) PER TABLET    Take 2 tablets by mouth Daily.    CLOPIDOGREL (PLAVIX) 75 MG TABLET    TAKE 1 TABLET EVERY DAY    CYANOCOBALAMIN 500 MCG TABLET    Take 1 tablet by mouth Daily.    FERROUS SULFATE (FEOSOL) 325 MG (65 MG IRON) TAB TABLET    Take 1 tablet (325 mg total) by mouth every other day. Take one tablet every other day for iron deficiency anemia    LANCETS MISC    One lancet per glucose testing twice a day: Insurance Brand Preference     LATANOPROST 0.005 % OPHTHALMIC SOLUTION    INSTILL 1 DROP INTO BOTH EYES EVERY NIGHT    OMEPRAZOLE (PRILOSEC) 20 MG CAPSULE    Take 1 capsule by mouth daily as needed.     POTASSIUM CHLORIDE (MICRO-K) 10 MEQ CPSR    OPEN 1 CAPSULE AND SPRINKLE OVER UNHEATED SOFT FOOD (APPLESAUCE) AND TAKE EVERY DAY. DO NOT CHEW OR CRUSH    TERAZOSIN (HYTRIN) 1 MG CAPSULE    TAKE 1 CAPSULE EVERY EVENING    TIMOLOL MALEATE 0.5% (TIMOPTIC) 0.5 % DROP    INSTILL 1 DROP INTO BOTH EYES TWICE DAILY   Changed and/or Refilled Medications    Modified Medication Previous Medication    AMLODIPINE (NORVASC) 10 MG TABLET amLODIPine (NORVASC) 5 MG tablet       Take 1 tablet (10 mg total) by mouth once daily.    TAKE 1 TABLET EVERY DAY FOR BLOOD PRESSURE   Discontinued Medications    ATORVASTATIN (LIPITOR) 20 MG TABLET    TAKE 1 TABLET EVERY DAY    FUROSEMIDE (LASIX) 20 MG TABLET    TAKE 1 TABLET EVERY DAY    ROPINIROLE (REQUIP) 0.25 MG TABLET    TAKE 2 TABLETS EVERY NIGHT AS NEEDED AS DIRECTED        PHYSICAL EXAM:  BP (!) 150/88   Pulse 89   Ht 5' (1.524 m)   Wt 83.1 kg (183 lb 3.2 oz)   SpO2 95%   BMI 35.78 kg/m²     General: No distress, No fever or chills  Neck: No adenopathy,no carotid bruit,no JVD  Lungs:Clear to auscultation bilaterally, No Crackles  Heart: Regular rate and rhythm, no murmur, gallops or rubs  Abdomen: Soft, no tenderness, bowel sounds normal  Extremities: Atraumatic, no edema in LE  Skin: Turgor normal. No rashes  Neurologic: No focal weakness, oriented.  Dialysis Access: Non applicable        LABS:  Lab Results   Component Value Date    HGB 11.1 (L) 03/17/2022        Lab Results   Component Value Date    CREATININE 0.9 03/17/2022       Prot/Creat Ratio, Urine   Date Value Ref Range Status   01/07/2022 Unable to calculate 0.00 - 0.20 Final       Lab Results   Component Value Date     03/17/2022    K 3.8 03/17/2022    CO2 27 03/17/2022       last PTH   Lab Results   Component Value Date    .2 (H) 01/07/2022     CALCIUM 10.2 03/17/2022    PHOS 3.1 01/07/2022       Lab Results   Component Value Date    HGBA1C 6.0 (H) 12/13/2021       Lab Results   Component Value Date    LDLCALC 41.4 (L) 06/22/2021         Creatinine, Urine   Date Value Ref Range Status   01/07/2022 60.0 15.0 - 325.0 mg/dL Final   11/11/2015 32.0 15.0 - 325.0 mg/dL Final     Comment:     The random urine reference ranges provided were established   for 24 hour urine collections.  No reference ranges exist for  random urine specimens.  Correlate clinically.     11/06/2014 62.0 15.0 - 325.0 mg/dL Final     Comment:     The random urine reference ranges provided were established   for 24 hour urine collections.  No reference ranges exist for  random urine specimens.  Correlate clinically.         Protein, Urine Random   Date Value Ref Range Status   01/07/2022 <7 0 - 15 mg/dL Final       Prot/Creat Ratio, Urine   Date Value Ref Range Status   01/07/2022 Unable to calculate 0.00 - 0.20 Final         ASSESSMENT:    1) Chronic Kidney Disease 3b  2) Secondary hyperparathyroidism   3) Metabolic Acidosis - Resolved  4) Hypertension   Patients baseline creatinine has been between 1.0-1.1 mg/dl for many years, however it was reported as 1.4 mg/dl with accompanying acidosis. Could not identify a precipitating factor, but it seems like she has been started on a diuretic during that time and this could have been the inciting factor. Renal ultrasound also showed distended bladder (11.2 and 9.8 cm kidneys). Post void scan during clinic visit on 4/20/22 showed 3x6x6 cm bladder. Urine analysis did not show any hematuria or proteinuria. Creatinine trended back to baseline now as the patient stopped lasix by herself and bladder retention likely improved..   Electrolytes, Acid Base, Hemoglobin, Bone Mineral in acceptable range, continue vitamin D.    - Please start taking amlodipine 5 mg two pills a day instead of one a day. (Her med reconciliation is not similar to what she  is really taking at home. She is supposed to be on atorvastatin but not taking now)  - Please take all your pill bottles to your primary care doctor appointment.  - Please void at regular intervals.      RTC in 1 year    Diagnosis and plan of care explained to the patient.  Pt Verbalized understanding. Answered all questions.  Thanks for allowing me to participate in the care of this patient.     2:21 PM    Olvin Ha MD  Nephrology & Critical Care    32 minutes of total time spent on the encounter, which includes face to face time and non-face to face time preparing to see the patient (eg, review of tests), Obtaining and/or reviewing separately obtained history, documenting clinical information in the electronic or other health record, independently interpreting results (not separately reported) and communicating results to the patient/family/caregiver, or Care coordination (not separately reported).

## 2022-05-11 ENCOUNTER — TELEPHONE (OUTPATIENT)
Dept: INTERNAL MEDICINE | Facility: CLINIC | Age: 87
End: 2022-05-11
Payer: MEDICARE

## 2022-05-11 NOTE — TELEPHONE ENCOUNTER
----- Message from Sarahy Taveras sent at 5/11/2022 12:17 PM CDT -----  Contact: [phone]  Rayshawn Jamison son of pt wants a call back on [phone] about getting his mother in for an appt to [facility].

## 2022-05-11 NOTE — TELEPHONE ENCOUNTER
Pt son was contacted and got scheduled to establish care with . I did inform pt's son that  will be leaving and it's best to get put in with another provider but he insisted on seeing .

## 2022-05-25 ENCOUNTER — OFFICE VISIT (OUTPATIENT)
Dept: INTERNAL MEDICINE | Facility: CLINIC | Age: 87
End: 2022-05-25
Payer: MEDICARE

## 2022-05-25 VITALS
OXYGEN SATURATION: 97 % | WEIGHT: 187.06 LBS | BODY MASS INDEX: 36.73 KG/M2 | SYSTOLIC BLOOD PRESSURE: 164 MMHG | HEIGHT: 60 IN | DIASTOLIC BLOOD PRESSURE: 86 MMHG | HEART RATE: 78 BPM

## 2022-05-25 DIAGNOSIS — E04.2 MULTIPLE THYROID NODULES: ICD-10-CM

## 2022-05-25 DIAGNOSIS — I48.0 PAROXYSMAL ATRIAL FIBRILLATION: ICD-10-CM

## 2022-05-25 DIAGNOSIS — N18.30 CKD STAGE 3 DUE TO TYPE 2 DIABETES MELLITUS: ICD-10-CM

## 2022-05-25 DIAGNOSIS — K21.9 GASTROESOPHAGEAL REFLUX DISEASE, UNSPECIFIED WHETHER ESOPHAGITIS PRESENT: ICD-10-CM

## 2022-05-25 DIAGNOSIS — I10 HYPERTENSION, UNSPECIFIED TYPE: ICD-10-CM

## 2022-05-25 DIAGNOSIS — I10 ESSENTIAL HYPERTENSION: ICD-10-CM

## 2022-05-25 DIAGNOSIS — E11.22 CKD STAGE 3 DUE TO TYPE 2 DIABETES MELLITUS: ICD-10-CM

## 2022-05-25 DIAGNOSIS — Z76.89 ENCOUNTER TO ESTABLISH CARE WITH NEW DOCTOR: Primary | ICD-10-CM

## 2022-05-25 PROCEDURE — 99499 UNLISTED E&M SERVICE: CPT | Mod: S$GLB,,, | Performed by: INTERNAL MEDICINE

## 2022-05-25 PROCEDURE — 1160F PR REVIEW ALL MEDS BY PRESCRIBER/CLIN PHARMACIST DOCUMENTED: ICD-10-PCS | Mod: CPTII,S$GLB,, | Performed by: INTERNAL MEDICINE

## 2022-05-25 PROCEDURE — 1159F MED LIST DOCD IN RCRD: CPT | Mod: CPTII,S$GLB,, | Performed by: INTERNAL MEDICINE

## 2022-05-25 PROCEDURE — 1126F AMNT PAIN NOTED NONE PRSNT: CPT | Mod: CPTII,S$GLB,, | Performed by: INTERNAL MEDICINE

## 2022-05-25 PROCEDURE — 1159F PR MEDICATION LIST DOCUMENTED IN MEDICAL RECORD: ICD-10-PCS | Mod: CPTII,S$GLB,, | Performed by: INTERNAL MEDICINE

## 2022-05-25 PROCEDURE — 3288F FALL RISK ASSESSMENT DOCD: CPT | Mod: CPTII,S$GLB,, | Performed by: INTERNAL MEDICINE

## 2022-05-25 PROCEDURE — 99214 PR OFFICE/OUTPT VISIT, EST, LEVL IV, 30-39 MIN: ICD-10-PCS | Mod: S$GLB,,, | Performed by: INTERNAL MEDICINE

## 2022-05-25 PROCEDURE — 3288F PR FALLS RISK ASSESSMENT DOCUMENTED: ICD-10-PCS | Mod: CPTII,S$GLB,, | Performed by: INTERNAL MEDICINE

## 2022-05-25 PROCEDURE — 99999 PR PBB SHADOW E&M-EST. PATIENT-LVL V: ICD-10-PCS | Mod: PBBFAC,,, | Performed by: INTERNAL MEDICINE

## 2022-05-25 PROCEDURE — 1101F PR PT FALLS ASSESS DOC 0-1 FALLS W/OUT INJ PAST YR: ICD-10-PCS | Mod: CPTII,S$GLB,, | Performed by: INTERNAL MEDICINE

## 2022-05-25 PROCEDURE — 1160F RVW MEDS BY RX/DR IN RCRD: CPT | Mod: CPTII,S$GLB,, | Performed by: INTERNAL MEDICINE

## 2022-05-25 PROCEDURE — 1126F PR PAIN SEVERITY QUANTIFIED, NO PAIN PRESENT: ICD-10-PCS | Mod: CPTII,S$GLB,, | Performed by: INTERNAL MEDICINE

## 2022-05-25 PROCEDURE — 1101F PT FALLS ASSESS-DOCD LE1/YR: CPT | Mod: CPTII,S$GLB,, | Performed by: INTERNAL MEDICINE

## 2022-05-25 PROCEDURE — 99999 PR PBB SHADOW E&M-EST. PATIENT-LVL V: CPT | Mod: PBBFAC,,, | Performed by: INTERNAL MEDICINE

## 2022-05-25 PROCEDURE — 99499 RISK ADDL DX/OHS AUDIT: ICD-10-PCS | Mod: S$GLB,,, | Performed by: INTERNAL MEDICINE

## 2022-05-25 PROCEDURE — 99214 OFFICE O/P EST MOD 30 MIN: CPT | Mod: S$GLB,,, | Performed by: INTERNAL MEDICINE

## 2022-05-25 RX ORDER — PNEUMOCOCCAL 20-VALENT CONJUGATE VACCINE 2.2; 2.2; 2.2; 2.2; 2.2; 2.2; 2.2; 2.2; 2.2; 2.2; 2.2; 2.2; 2.2; 2.2; 2.2; 2.2; 4.4; 2.2; 2.2; 2.2 UG/.5ML; UG/.5ML; UG/.5ML; UG/.5ML; UG/.5ML; UG/.5ML; UG/.5ML; UG/.5ML; UG/.5ML; UG/.5ML; UG/.5ML; UG/.5ML; UG/.5ML; UG/.5ML; UG/.5ML; UG/.5ML; UG/.5ML; UG/.5ML; UG/.5ML; UG/.5ML
INJECTION, SUSPENSION INTRAMUSCULAR
COMMUNITY
Start: 2022-04-05 | End: 2022-12-07 | Stop reason: ALTCHOICE

## 2022-05-25 RX ORDER — INFLUENZA VACCINE, ADJUVANTED 15; 15; 15; 15 UG/.5ML; UG/.5ML; UG/.5ML; UG/.5ML
INJECTION, SUSPENSION INTRAMUSCULAR
COMMUNITY
Start: 2021-12-13 | End: 2022-12-07 | Stop reason: ALTCHOICE

## 2022-05-25 RX ORDER — AMLODIPINE BESYLATE 5 MG/1
5 TABLET ORAL 2 TIMES DAILY
Qty: 180 TABLET | Refills: 2 | Status: SHIPPED | OUTPATIENT
Start: 2022-05-25 | End: 2022-12-07 | Stop reason: SDUPTHER

## 2022-05-25 NOTE — PROGRESS NOTES
INTERNAL MEDICINE INITIAL VISIT NOTE      CHIEF COMPLAINT     Chief Complaint   Patient presents with    Establish Care       HPI     Calli KARRI Jamison is a 89 y.o. female with Afib s/p loop recorder, TIA, lumbar spinal stenosis, T2DM (12/2021 HgbA1C 6.0), adjustment disorder, glaucoma, pemphigoid, asthma, COPD, HLD, HTN, CKD IIIa, Fe deficiency, secondary HPT, obesity, GERD, esophageal stricture, thyroid nodules, Crohn's disease, LULY, here today to establish care. Transferring care from Dr. Hurd. Accompanied by son today, Rayshawn.      4/2022 Nephrology - recent increase in baseline Crt from 1 to 1.4 likely diuretic induced; improved once stopping Lasix. Start Norvasc 5 BID.     Past Medical History:  Past Medical History:   Diagnosis Date    Abnormal Pap smear     After-cataract, unspecified - Both Eyes 11/5/2012    Arthritis     ASCUS (atypical squamous cells of undetermined significance) on Pap smear 7/1/2012    Asthma     Atrial fibrillation     Back pain 2009    Bullous pemphigoid     Carpal tunnel syndrome     Chronic back pain     COPD (chronic obstructive pulmonary disease)     Coronary artery disease     Crohn's disease     Depression     Diabetes mellitus     Discoid lupus     Diverticulosis     History of migraine headaches     Hyperlipidemia     Hypertension     Multiple thyroid nodules     Obesity     Open angle with borderline findings and high glaucoma risk in both eyes 9/11/2019    Personal history of colonic polyps     Pulmonary nodule     Renal manifestation of secondary diabetes mellitus     Restless legs syndrome     Sciatica     Right leg    Sleep apnea     Stricture and stenosis of esophagus     Stroke     TIA (transient ischemic attack)        Review of Systems:  Review of Systems   Constitutional: Negative for chills and fever.   HENT: Negative for congestion.    Respiratory: Negative for cough and shortness of breath.    Cardiovascular: Negative for chest  pain.   Gastrointestinal: Positive for abdominal pain. Negative for constipation, nausea and vomiting.   Genitourinary: Negative for hematuria and urgency.   Musculoskeletal: Positive for joint pain. Negative for falls.   Skin: Negative for rash.   Neurological: Negative for dizziness and loss of consciousness.       Health Maintenance:   Immunizations:   Influenza - fall 2022  Tdap - 9/2012  Covid 19 - complete  HPV  Prevnar rec at 65 - 11/2015, Pneumovax 6/1996  Shingrix rec at 50 - discussed     Cancer Screening:  PAP: s/ hys  Mammogram: defer due to age  Colonoscopy:  repeat  DEXA:  2/2016 nl BMD      PHYSICAL EXAM     BP (!) 164/86 (BP Location: Left arm, Patient Position: Sitting, BP Method: Medium (Manual))   Pulse 78   Ht 5' (1.524 m)   Wt 84.8 kg (187 lb 1 oz)   SpO2 97%   BMI 36.53 kg/m²     GEN - A+OX4, NAD   HEENT - PERRL, EOMI,   Neck -  Palpable L goiter.  No cervical lymphadenopathy appreciated.  CV - RRR, no m/r/g  Chest - CTAB, no wheezing, crackles, or rhonchi  Abd - S/NT/ND/+BS.   Ext - 2+BDP. Bilateral pedal edema.  Skin - Normal color and texture, no rash, no skin lesions.    ASSESSMENT/PLAN     Calli Jamison is a 89 y.o. female with conditions as above.     Encounter to establish care with new doctor  Prior notes/labs reviewed    Essential hypertension  Elevated, on Norvasc 5 BID  Advised to check BP at home, to bring log to follow-up visit    CKD stage 3 due to type 2 diabetes mellitus  Stable, Crt 1    Multiple thyroid nodules  Palpable goiter on exam, no recent imaging  -     US Soft Tissue Head Neck Thyroid; Future; Expected date: 05/25/2022    Gastroesophageal reflux disease, unspecified whether esophagitis present  On PPI PRN, recent episodes of abdominal discomfort  Advised to make appt with GI  EGD 7/2018 - Esophageal stenosis, dilated    Paroxysmal atrial fibrillation  Resolved    Hypertension, unspecified type  -     amLODIPine (NORVASC) 5 MG tablet; Take 1 tablet (5 mg  total) by mouth 2 (two) times daily.  Dispense: 180 tablet; Refill: 2    HM as above    RTC in 3 mo, sooner if needed and depending on labs.    Sydni Clark MD  Department of Internal Medicine - [facility]  06/05/2022

## 2022-05-25 NOTE — PATIENT INSTRUCTIONS
Please make the appointment with GI, Dr. Du Robles.     Start checking BP regularly using arm BP cuff, bring log to next appointment. Goal blood pressure 140/90.     Schedule thyroid ultrasound.

## 2022-06-05 NOTE — PROGRESS NOTES
Patient, Calli Jamison (MRN #[id number]), presented with a recorded BMI of 36.53 kg/m^2 and a documented comorbidity(s):  - Diabetes Mellitus Type 2  - Hypertension  - Atrial Fibrillation  to which the severe obesity is a contributing factor. This is consistent with the definition of severe obesity (BMI 35.0-39.9) with comorbidity (ICD-10 E66.01, Z68.35). The patient's severe obesity was monitored, evaluated, addressed and/or treated. This addendum to the medical record is made on 06/05/2022.

## 2022-06-08 ENCOUNTER — HOSPITAL ENCOUNTER (OUTPATIENT)
Dept: RADIOLOGY | Facility: HOSPITAL | Age: 87
Discharge: HOME | End: 2022-06-08
Attending: INTERNAL MEDICINE
Payer: MEDICARE

## 2022-06-08 DIAGNOSIS — E04.2 MULTIPLE THYROID NODULES: ICD-10-CM

## 2022-06-08 PROCEDURE — 76536 US EXAM OF HEAD AND NECK: CPT | Mod: 26,,, | Performed by: RADIOLOGY

## 2022-06-08 PROCEDURE — 76536 US SOFT TISSUE HEAD NECK THYROID: ICD-10-PCS | Mod: 26,,, | Performed by: RADIOLOGY

## 2022-06-08 PROCEDURE — 76536 US EXAM OF HEAD AND NECK: CPT | Mod: TC

## 2022-06-15 ENCOUNTER — PATIENT MESSAGE (OUTPATIENT)
Dept: INTERNAL MEDICINE | Facility: CLINIC | Age: 87
End: 2022-06-15
Payer: MEDICARE

## 2022-06-15 ENCOUNTER — TELEPHONE (OUTPATIENT)
Dept: INTERNAL MEDICINE | Facility: CLINIC | Age: 87
End: 2022-06-15

## 2022-06-15 DIAGNOSIS — E04.1 LEFT THYROID NODULE: Primary | ICD-10-CM

## 2022-06-15 NOTE — TELEPHONE ENCOUNTER
Pt did not answer the phone. I will send a Portal message too since she is current in the use of the system.

## 2022-06-15 NOTE — TELEPHONE ENCOUNTER
Results reviewed. Please call and inform pt of following:    Thyroid US reviewed - large L nodule meeting criteria for FNA. Endocrinology referral placed.

## 2022-06-17 ENCOUNTER — TELEPHONE (OUTPATIENT)
Dept: INTERNAL MEDICINE | Facility: CLINIC | Age: 87
End: 2022-06-17
Payer: MEDICARE

## 2022-06-17 NOTE — TELEPHONE ENCOUNTER
Spoke with pt's son and informed him that Dr. Clark is currently out of office. Once she has returned to office with more advising will give patient a call.

## 2022-06-17 NOTE — TELEPHONE ENCOUNTER
----- Message from Candace Merino sent at 6/17/2022 10:11 AM CDT -----  Regarding: Test  Results  Contact: Manual (son) @ ([phone])  Pt's son is calling to get test results, asking office to call back so that they can see what is the next step after results.

## 2022-07-06 ENCOUNTER — OFFICE VISIT (OUTPATIENT)
Dept: ENDOCRINOLOGY | Facility: CLINIC | Age: 87
End: 2022-07-06
Payer: MEDICARE

## 2022-07-06 VITALS
HEART RATE: 86 BPM | OXYGEN SATURATION: 96 % | SYSTOLIC BLOOD PRESSURE: 148 MMHG | WEIGHT: 186.63 LBS | HEIGHT: 60 IN | DIASTOLIC BLOOD PRESSURE: 74 MMHG | BODY MASS INDEX: 36.64 KG/M2 | RESPIRATION RATE: 18 BRPM

## 2022-07-06 DIAGNOSIS — E04.1 LEFT THYROID NODULE: ICD-10-CM

## 2022-07-06 DIAGNOSIS — E04.2 MULTIPLE THYROID NODULES: ICD-10-CM

## 2022-07-06 DIAGNOSIS — R22.1 MASS OF RIGHT SIDE OF NECK: ICD-10-CM

## 2022-07-06 DIAGNOSIS — K22.2 ESOPHAGEAL STRICTURE: Primary | ICD-10-CM

## 2022-07-06 DIAGNOSIS — R22.1 NECK SWELLING: Primary | ICD-10-CM

## 2022-07-06 PROCEDURE — 99999 PR PBB SHADOW E&M-EST. PATIENT-LVL IV: CPT | Mod: PBBFAC,,, | Performed by: INTERNAL MEDICINE

## 2022-07-06 PROCEDURE — 1159F MED LIST DOCD IN RCRD: CPT | Mod: CPTII,S$GLB,, | Performed by: INTERNAL MEDICINE

## 2022-07-06 PROCEDURE — 1126F PR PAIN SEVERITY QUANTIFIED, NO PAIN PRESENT: ICD-10-PCS | Mod: CPTII,S$GLB,, | Performed by: INTERNAL MEDICINE

## 2022-07-06 PROCEDURE — 3288F FALL RISK ASSESSMENT DOCD: CPT | Mod: CPTII,S$GLB,, | Performed by: INTERNAL MEDICINE

## 2022-07-06 PROCEDURE — 1101F PT FALLS ASSESS-DOCD LE1/YR: CPT | Mod: CPTII,S$GLB,, | Performed by: INTERNAL MEDICINE

## 2022-07-06 PROCEDURE — 99999 PR PBB SHADOW E&M-EST. PATIENT-LVL IV: ICD-10-PCS | Mod: PBBFAC,,, | Performed by: INTERNAL MEDICINE

## 2022-07-06 PROCEDURE — 1101F PR PT FALLS ASSESS DOC 0-1 FALLS W/OUT INJ PAST YR: ICD-10-PCS | Mod: CPTII,S$GLB,, | Performed by: INTERNAL MEDICINE

## 2022-07-06 PROCEDURE — 3288F PR FALLS RISK ASSESSMENT DOCUMENTED: ICD-10-PCS | Mod: CPTII,S$GLB,, | Performed by: INTERNAL MEDICINE

## 2022-07-06 PROCEDURE — 99204 PR OFFICE/OUTPT VISIT, NEW, LEVL IV, 45-59 MIN: ICD-10-PCS | Mod: S$GLB,,, | Performed by: INTERNAL MEDICINE

## 2022-07-06 PROCEDURE — 1126F AMNT PAIN NOTED NONE PRSNT: CPT | Mod: CPTII,S$GLB,, | Performed by: INTERNAL MEDICINE

## 2022-07-06 PROCEDURE — 1159F PR MEDICATION LIST DOCUMENTED IN MEDICAL RECORD: ICD-10-PCS | Mod: CPTII,S$GLB,, | Performed by: INTERNAL MEDICINE

## 2022-07-06 PROCEDURE — 99204 OFFICE O/P NEW MOD 45 MIN: CPT | Mod: S$GLB,,, | Performed by: INTERNAL MEDICINE

## 2022-07-06 NOTE — ASSESSMENT & PLAN NOTE
I have reviewed management options including observation or FNA.  All of the patients questions were answered.     None of the nodules meet criteria for FNA or follow-up by my review. I do not recommend any additional ultrasound surveillance for these nodules.

## 2022-07-06 NOTE — ASSESSMENT & PLAN NOTE
Reassured that this is not related to the thyroid gland. Suspect either lipoma or prominent parotid gland. Low suspicion for malignancy based on the freely mobile/soft nature of the mass. Recommended non-urgent follow-up with ENT to assess the need for any additional workup.

## 2022-07-06 NOTE — PROGRESS NOTES
NEW PATIENT VISIT    Subjective:      Chief Complaint: Thyroid nodules    HPI: Calli Jamison is a 89 y.o. female who is here for an initial evaluation for thyroid nodules      Past Medical History:   Diagnosis Date    Abnormal Pap smear     After-cataract, unspecified - Both Eyes 11/5/2012    Arthritis     ASCUS (atypical squamous cells of undetermined significance) on Pap smear 7/1/2012    Asthma     Atrial fibrillation     Back pain 2009    Bullous pemphigoid     Carpal tunnel syndrome     Chronic back pain     COPD (chronic obstructive pulmonary disease)     Coronary artery disease     Crohn's disease     Depression     Diabetes mellitus     Discoid lupus     Diverticulosis     History of migraine headaches     Hyperlipidemia     Hypertension     Multiple thyroid nodules     Obesity     Open angle with borderline findings and high glaucoma risk in both eyes 9/11/2019    Personal history of colonic polyps     Pulmonary nodule     Renal manifestation of secondary diabetes mellitus     Restless legs syndrome     Sciatica     Right leg    Sleep apnea     Stricture and stenosis of esophagus     Stroke     TIA (transient ischemic attack)          With regards to the thyroid nodule:    Patient reports a longstanding history of thyroid nodules dating back to 2004. She had her last U/S in 2004 and was told that none of the nodules were worrisome or needed follow-up.    About 1 year ago she noted a painless swelling in the right submandibular area. She does not think it has changed in size much lately. She denies any pain, fever or headaches. She does recall having some fluid in the right ear at one point.    Last ultrasound in 6/2022 was independently reviewed:      1.5 x 1.1 x 1.2 cm Left lower lobe nodule. The nodule is solid with hyperechoic echotexture. Vascularity is Grade 3 (penetrating). Borders are regular. Microcalcifications are not present. On the previous ultrasound in  2004 this nodule measured 0.6 x 0.8 cm (Axial only measurements).  This is a TIRADS category 3 by my review.   Several other subcentimeter cystic nodules present throughout both lobes. Similar to the prior study in 2004.        No   Yes  [x]    [] Preexisting thyroid disease    Compressive Symptoms:  No  Yes  [x]    [] Anterior neck pressure  [x]    [] Dysphagia  [x]    [] Voice changes    Risk Factors:  No   Yes  [x]    [] Radiation to head or neck for any treatment of cancer or exposure to radiation  [x]    [] Personal history of colon or breast cancer  []    [x] Tobacco use - quit in 1980  [x]    [] Family history of thyroid cancer    Previous biopsy results: None      Lab Results   Component Value Date    TSH 1.205 12/13/2021    TSH 1.718 10/25/2019    TSH 1.695 06/28/2017    TSH 1.307 02/19/2016    TSH 2.391 08/06/2014           Reviewed past medical, family, social history and updated as appropriate.    Objective:     Vitals:    07/06/22 0804   BP: (!) 148/74   Pulse: 86   Resp: 18         BP Readings from Last 5 Encounters:   07/06/22 (!) 148/74   05/25/22 (!) 164/86   04/20/22 (!) 150/88   03/17/22 (!) 176/80   01/07/22 (!) 144/66         Physical Exam  Vitals and nursing note reviewed.   Constitutional:       General: She is not in acute distress.     Appearance: She is well-developed.   HENT:      Head: Normocephalic and atraumatic.   Eyes:      General:         Right eye: No discharge.         Left eye: No discharge.      Conjunctiva/sclera: Conjunctivae normal.   Neck:      Thyroid: No thyromegaly.      Trachea: No tracheal deviation.     Cardiovascular:      Rate and Rhythm: Normal rate.   Pulmonary:      Effort: Pulmonary effort is normal. No respiratory distress.   Musculoskeletal:      Comments: No digital clubbing or extremity cyanosis   Lymphadenopathy:      Cervical: No cervical adenopathy.   Neurological:      Mental Status: She is alert and oriented to person, place, and time.       Coordination: Coordination normal.   Psychiatric:         Behavior: Behavior normal.           Wt Readings from Last 30 Encounters:   07/06/22 0804 84.6 kg (186 lb 9.9 oz)   05/25/22 1107 84.8 kg (187 lb 1 oz)   04/20/22 1040 83.1 kg (183 lb 3.2 oz)   03/17/22 1427 82.6 kg (182 lb 1.6 oz)   01/07/22 1359 80 kg (176 lb 5.9 oz)   01/06/22 1507 81.8 kg (180 lb 3.6 oz)   12/23/21 1257 81.6 kg (180 lb)   12/13/21 1423 81.9 kg (180 lb 8.9 oz)   12/12/21 0243 79.4 kg (175 lb)   08/09/21 1519 81.8 kg (180 lb 5.4 oz)   09/02/20 1124 81.6 kg (180 lb)   11/27/19 1407 84.4 kg (186 lb 1.1 oz)   10/26/19 0109 83.9 kg (185 lb)   10/26/19 0102 83.9 kg (185 lb)   10/25/19 1836 83.5 kg (184 lb)   07/31/19 1413 83.6 kg (184 lb 4.9 oz)   02/18/19 1436 83.8 kg (184 lb 11.9 oz)   01/31/19 1029 87.8 kg (193 lb 9 oz)   10/30/18 1432 86.7 kg (191 lb 2.2 oz)   08/29/18 1419 86.8 kg (191 lb 5.8 oz)   07/12/18 1449 85.3 kg (188 lb 0.8 oz)   06/07/18 1432 85 kg (187 lb 6.3 oz)   05/22/18 1301 83 kg (182 lb 15.7 oz)   05/22/18 1305 83 kg (183 lb)   04/03/18 0925 84.1 kg (185 lb 6.5 oz)   02/02/18 1317 86.2 kg (190 lb)   02/02/18 1256 86.6 kg (190 lb 14.7 oz)   01/22/18 1309 87 kg (191 lb 12.8 oz)   10/09/17 1307 88 kg (194 lb 0.1 oz)   09/19/17 1401 86.6 kg (191 lb)   06/28/17 0940 88.3 kg (194 lb 10.7 oz)   06/28/17 0743 88.3 kg (194 lb 10.7 oz)       Lab Results   Component Value Date    HGBA1C 6.0 (H) 12/13/2021     Lab Results   Component Value Date    CHOL 107 (L) 06/22/2021    HDL 55 06/22/2021    LDLCALC 41.4 (L) 06/22/2021    TRIG 53 06/22/2021    CHOLHDL 51.4 (H) 06/22/2021     Lab Results   Component Value Date     03/17/2022    K 3.8 03/17/2022     03/17/2022    CO2 27 03/17/2022    GLU 99 03/17/2022    BUN 12 03/17/2022    CREATININE 0.9 03/17/2022    CALCIUM 10.2 03/17/2022    PROT 7.8 03/17/2022    ALBUMIN 3.9 03/17/2022    BILITOT 0.4 03/17/2022    ALKPHOS 122 03/17/2022    AST 21 03/17/2022    ALT 22 03/17/2022     ANIONGAP 10 03/17/2022    ESTGFRAFRICA >60.0 03/17/2022    EGFRNONAA 56.1 (A) 03/17/2022    TSH 1.205 12/13/2021      Lab Results   Component Value Date    MICALBCREAT 115.6 (H) 11/11/2015       Assessment/Plan:     Multiple thyroid nodules  I have reviewed management options including observation or FNA.  All of the patients questions were answered.     None of the nodules meet criteria for FNA or follow-up by my review. I do not recommend any additional ultrasound surveillance for these nodules.    Mass of right side of neck  Reassured that this is not related to the thyroid gland. Suspect either lipoma or prominent parotid gland. Low suspicion for malignancy based on the freely mobile/soft nature of the mass. Recommended non-urgent follow-up with ENT to assess the need for any additional workup.      No follow-up necessary

## 2022-07-07 ENCOUNTER — TELEPHONE (OUTPATIENT)
Dept: GASTROENTEROLOGY | Facility: CLINIC | Age: 87
End: 2022-07-07
Payer: MEDICARE

## 2022-07-07 NOTE — TELEPHONE ENCOUNTER
----- Message from Du Robles MD sent at 7/6/2022 12:06 PM CDT -----  Regarding: RE: appt  Contact: Rayshawn (son) @ [phone]  I have placed an order, I put down high priority if the schedulers can help with that  ----- Message -----  From: Kathryn Lopez MA  Sent: 7/6/2022  10:58 AM CDT  To: Du Robles MD  Subject: FW: appt                                         Spoke with patients son.  Stated her PCP would like for her to have an EGD for anemia.  She is presently on iron.  He stated she has an appointment to see her PCP again in August and that MD was hoping to have the results of EGD by then.   I offered 8/4 to see you in the office but she cannot come that day and they were hoping to have EGD by then.   [place]  ----- Message -----  From: Maryse Jimenez  Sent: 7/6/2022  10:32 AM CDT  To: Travis DC Staff  Subject: appt                                             Caller asking to speak with someone in Dr. Robles's office regarding getting an appt for the patient, says patient is also needing to get an endoscopy ordered. Please call.

## 2022-07-07 NOTE — TELEPHONE ENCOUNTER
----- Message from Erich York sent at 7/7/2022  8:04 AM CDT -----  Regarding: advise  Contact: Rayshawn @ [phone]  Caller, Rayshawn (son) is returning a missed call from Kathryn this morning at 7:30am. I did reach out to Kathryn and she did ask that I do take a message. Caller states that it is in regards to scheduling an appt. Please call.

## 2022-07-07 NOTE — TELEPHONE ENCOUNTER
----- Message from Du Robles MD sent at 7/6/2022 12:06 PM CDT -----  Regarding: RE: appt  Contact: Rayshawn (son) @ [phone]  I have placed an order, I put down high priority if the schedulers can help with that  ----- Message -----  From: Kathryn Lopez MA  Sent: 7/6/2022  10:58 AM CDT  To: Du Robles MD  Subject: FW: appt                                         Spoke with patients son.  Stated her PCP would like for her to have an EGD for anemia.  She is presently on iron.  He stated she has an appointment to see her PCP again in August and that MD was hoping to have the results of EGD by then.   I offered 8/4 to see you in the office but she cannot come that day and they were hoping to have EGD by then.   [place]  ----- Message -----  From: Maryse Jimenez  Sent: 7/6/2022  10:32 AM CDT  To: [facility] Staff  Subject: appt                                             Caller asking to speak with someone in Dr. Robles's office regarding getting an appt for the patient, says patient is also needing to get an endoscopy ordered. Please call.

## 2022-07-08 ENCOUNTER — TELEPHONE (OUTPATIENT)
Dept: ENDOSCOPY | Facility: HOSPITAL | Age: 87
End: 2022-07-08
Payer: MEDICARE

## 2022-07-08 NOTE — TELEPHONE ENCOUNTER
Ok to hold Plavix 5 days prior to EGD. Pt should continue ASA 81. Restart Plavix upon completion of procedure and appropriate hemostasis achieved.

## 2022-07-11 ENCOUNTER — TELEPHONE (OUTPATIENT)
Dept: ENDOSCOPY | Facility: HOSPITAL | Age: 87
End: 2022-07-11
Payer: MEDICARE

## 2022-07-11 NOTE — TELEPHONE ENCOUNTER
Patient Calls  Angel Clark MD routed conversation to You 3 days ago     Angel Clark MD 3 days ago        Ok to hold Plavix 5 days prior to EGD. Pt should continue ASA 81. Restart Plavix upon completion of procedure and appropriate hemostasis achieved.           Documentation        You routed conversation to Angel Clark MD 3 days ago     You 3 days ago   TB      Pt has order for EGD. Can we hold plavix x5 days per protocol? Please advise.          Documentation

## 2022-07-12 DIAGNOSIS — Z86.73 HISTORY OF TIA (TRANSIENT ISCHEMIC ATTACK): Primary | ICD-10-CM

## 2022-07-12 DIAGNOSIS — E11.42 DIABETIC POLYNEUROPATHY ASSOCIATED WITH TYPE 2 DIABETES MELLITUS: ICD-10-CM

## 2022-07-12 RX ORDER — ATORVASTATIN CALCIUM 20 MG/1
TABLET, FILM COATED ORAL
Qty: 90 TABLET | Refills: 0 | OUTPATIENT
Start: 2022-07-12

## 2022-07-12 NOTE — TELEPHONE ENCOUNTER
No new care gaps identified.  [facility] Embedded Care Gaps. Reference number: [id number]. 7/12/2022   8:15:26 AM CDT

## 2022-07-12 NOTE — TELEPHONE ENCOUNTER
No new care gaps identified.  [facility] Embedded Care Gaps. Reference number: [id number]. 7/12/2022   8:07:23 AM CDT

## 2022-07-12 NOTE — TELEPHONE ENCOUNTER
No new care gaps identified.  [facility] Embedded Care Gaps. Reference number: [id number]. 7/12/2022   8:06:56 AM MAYAT

## 2022-07-12 NOTE — TELEPHONE ENCOUNTER
No new care gaps identified.  [facility] Embedded Care Gaps. Reference number: [id number]. 7/12/2022   8:11:08 AM CDT

## 2022-07-12 NOTE — TELEPHONE ENCOUNTER
No new care gaps identified.  [facility] Embedded Care Gaps. Reference number: [id number]. 7/12/2022   8:07:52 AM CDT

## 2022-07-13 NOTE — TELEPHONE ENCOUNTER
Refill Decision Note   Calli Jamison  is requesting a refill authorization.  Brief Assessment and Rationale for Refill:  Quick Discontinue     Medication Therapy Plan:  Med d/c on 04/20/22    Medication Reconciliation Completed: No   Comments:     No Care Gaps recommended.     Note composed:11:14 PM 07/12/2022

## 2022-07-13 NOTE — TELEPHONE ENCOUNTER
Refill Routing Note   Medication(s) are not appropriate for processing by [facility] for the following reason(s):      - contraindicated proton pump inhibitors on active medication list  - Medication not previously prescribed by PCP    [facility] action(s):  Defer          Medication reconciliation completed: No     Appointments  past 12m or future 3m with PCP    Date Provider   Last Visit   5/25/2022 Angel Clark MD   Next Visit   7/12/2022 Angel Clark MD   ED visits in past 90 days: 0        Note composed:1:14 PM 07/13/2022

## 2022-07-13 NOTE — TELEPHONE ENCOUNTER
Refill Routing Note   Medication(s) are not appropriate for processing by [facility] for the following reason(s):      - Medication not previously prescribed by PCP    [facility] action(s):  Defer          Medication reconciliation completed: No     Appointments  past 12m or future 3m with PCP    Date Provider   Last Visit   5/25/2022 Angel Clark MD   Next Visit   7/12/2022 Angel Clark MD   ED visits in past 90 days: 0        Note composed:10:29 AM 07/13/2022

## 2022-07-13 NOTE — TELEPHONE ENCOUNTER
Refill Routing Note   Medication(s) are not appropriate for processing by [facility] for the following reason(s):      - Required vitals are abnormal  - Medication not previously prescribed by PCP    [facility] action(s):  Defer          Medication reconciliation completed: No     Appointments  past 12m or future 3m with PCP    Date Provider   Last Visit   5/25/2022 Angel Clark MD   Next Visit   7/12/2022 Angel Clark MD   ED visits in past 90 days: 0        Note composed:1:12 PM 07/13/2022

## 2022-07-13 NOTE — TELEPHONE ENCOUNTER
Refill Routing Note   Medication(s) are not appropriate for processing by [facility] for the following reason(s):      - Medication not previously prescribed by PCP    [facility] action(s):  Defer          Medication reconciliation completed: No     Appointments  past 12m or future 3m with PCP    Date Provider   Last Visit   5/25/2022 Angel Clark MD   Next Visit   7/12/2022 Angel Clark MD   ED visits in past 90 days: 0        Note composed:10:32 AM 07/13/2022

## 2022-07-15 ENCOUNTER — TELEPHONE (OUTPATIENT)
Dept: GASTROENTEROLOGY | Facility: CLINIC | Age: 87
End: 2022-07-15
Payer: MEDICARE

## 2022-07-15 ENCOUNTER — TELEPHONE (OUTPATIENT)
Dept: OPHTHALMOLOGY | Facility: CLINIC | Age: 87
End: 2022-07-15
Payer: MEDICARE

## 2022-07-15 RX ORDER — FUROSEMIDE 20 MG/1
TABLET ORAL
Qty: 90 TABLET | Refills: 0 | OUTPATIENT
Start: 2022-07-15

## 2022-07-15 RX ORDER — LANCETS
EACH MISCELLANEOUS
Qty: 200 EACH | Refills: 3 | Status: SHIPPED | OUTPATIENT
Start: 2022-07-15 | End: 2022-12-07

## 2022-07-15 RX ORDER — BLOOD SUGAR DIAGNOSTIC
STRIP MISCELLANEOUS
Qty: 200 STRIP | Refills: 3 | Status: SHIPPED | OUTPATIENT
Start: 2022-07-15 | End: 2022-12-07

## 2022-07-15 RX ORDER — CLOPIDOGREL BISULFATE 75 MG/1
TABLET ORAL
Qty: 90 TABLET | Refills: 3 | Status: SHIPPED | OUTPATIENT
Start: 2022-07-15 | End: 2022-12-07 | Stop reason: SDUPTHER

## 2022-07-15 NOTE — TELEPHONE ENCOUNTER
Spoke to pt's oldest son to schedule annual glaucoma eye exam + testing with Dr. Head at [facility] location; pt's last visit 05/17/2021. Pt's son requested to call back next week to schedule appt. Provided son with [facility] dept # to call back to schedule appt.

## 2022-07-15 NOTE — TELEPHONE ENCOUNTER
----- Message from Melody Francois sent at 7/15/2022 11:39 AM CDT -----  Contact: [phone]  Pt is requesting a call back in regards to the meds she was supposed to be prescribed for her endoscopy. Please f/u

## 2022-07-25 ENCOUNTER — TELEPHONE (OUTPATIENT)
Dept: OPHTHALMOLOGY | Facility: CLINIC | Age: 87
End: 2022-07-25
Payer: MEDICARE

## 2022-07-25 NOTE — TELEPHONE ENCOUNTER
Clinic spoke to pt's son, Patrice Blair (MRN: [id number]) to schedule he and his mother on the same day for appts with Dr. Head. Pt's son scheduled at [facility] location 08/17/2022 at 1:45 pm. Pt Mrs. Calli Jamison scheduled 08/17/2022 and 1:30 pm (glaucoma testing) and 2:30 pm (visit with Dr. Head).

## 2022-07-27 ENCOUNTER — TELEPHONE (OUTPATIENT)
Dept: ENDOSCOPY | Facility: HOSPITAL | Age: 87
End: 2022-07-27
Payer: MEDICARE

## 2022-08-01 ENCOUNTER — ANESTHESIA (OUTPATIENT)
Dept: ENDOSCOPY | Facility: HOSPITAL | Age: 87
End: 2022-08-01
Payer: MEDICARE

## 2022-08-01 ENCOUNTER — ANESTHESIA EVENT (OUTPATIENT)
Dept: ENDOSCOPY | Facility: HOSPITAL | Age: 87
End: 2022-08-01
Payer: MEDICARE

## 2022-08-01 ENCOUNTER — HOSPITAL ENCOUNTER (OUTPATIENT)
Facility: HOSPITAL | Age: 87
Discharge: HOME | End: 2022-08-01
Attending: INTERNAL MEDICINE | Admitting: INTERNAL MEDICINE
Payer: MEDICARE

## 2022-08-01 VITALS
SYSTOLIC BLOOD PRESSURE: 173 MMHG | DIASTOLIC BLOOD PRESSURE: 76 MMHG | HEART RATE: 90 BPM | RESPIRATION RATE: 16 BRPM | WEIGHT: 185 LBS | HEIGHT: 60 IN | OXYGEN SATURATION: 95 % | BODY MASS INDEX: 36.32 KG/M2 | TEMPERATURE: 98 F

## 2022-08-01 DIAGNOSIS — K22.2 ESOPHAGEAL STRICTURE: ICD-10-CM

## 2022-08-01 LAB — POCT GLUCOSE: 114 MG/DL (ref 70–110)

## 2022-08-01 PROCEDURE — E9220 PRA ENDO ANESTHESIA: ICD-10-PCS | Mod: ,,, | Performed by: NURSE ANESTHETIST, CERTIFIED REGISTERED

## 2022-08-01 PROCEDURE — 43249 PR EGD, FLEX, W/BALL DILATION, < 30MM: ICD-10-PCS | Mod: 59,,, | Performed by: INTERNAL MEDICINE

## 2022-08-01 PROCEDURE — 43249 ESOPH EGD DILATION <30 MM: CPT | Performed by: INTERNAL MEDICINE

## 2022-08-01 PROCEDURE — 43248 PR EGD, FLEX, W/DILATION OVER GUIDEWIRE: ICD-10-PCS | Mod: ,,, | Performed by: INTERNAL MEDICINE

## 2022-08-01 PROCEDURE — 43248 EGD GUIDE WIRE INSERTION: CPT | Mod: ,,, | Performed by: INTERNAL MEDICINE

## 2022-08-01 PROCEDURE — 43248 EGD GUIDE WIRE INSERTION: CPT | Performed by: INTERNAL MEDICINE

## 2022-08-01 PROCEDURE — C1726 CATH, BAL DIL, NON-VASCULAR: HCPCS | Performed by: INTERNAL MEDICINE

## 2022-08-01 PROCEDURE — E9220 PRA ENDO ANESTHESIA: HCPCS | Mod: ,,, | Performed by: NURSE ANESTHETIST, CERTIFIED REGISTERED

## 2022-08-01 PROCEDURE — 25000003 PHARM REV CODE 250: Performed by: NURSE ANESTHETIST, CERTIFIED REGISTERED

## 2022-08-01 PROCEDURE — 37000008 HC ANESTHESIA 1ST 15 MINUTES: Performed by: INTERNAL MEDICINE

## 2022-08-01 PROCEDURE — 43249 ESOPH EGD DILATION <30 MM: CPT | Mod: 59,,, | Performed by: INTERNAL MEDICINE

## 2022-08-01 PROCEDURE — 37000009 HC ANESTHESIA EA ADD 15 MINS: Performed by: INTERNAL MEDICINE

## 2022-08-01 PROCEDURE — 63600175 PHARM REV CODE 636 W HCPCS: Performed by: NURSE ANESTHETIST, CERTIFIED REGISTERED

## 2022-08-01 RX ORDER — PROPOFOL 10 MG/ML
INJECTION, EMULSION INTRAVENOUS
Status: DISCONTINUED | OUTPATIENT
Start: 2022-08-01 | End: 2022-08-01

## 2022-08-01 RX ORDER — LIDOCAINE HCL/PF 100 MG/5ML
SYRINGE (ML) INTRAVENOUS
Status: DISCONTINUED | OUTPATIENT
Start: 2022-08-01 | End: 2022-08-01

## 2022-08-01 RX ORDER — PROPOFOL 10 MG/ML
INJECTION, EMULSION INTRAVENOUS CONTINUOUS PRN
Status: DISCONTINUED | OUTPATIENT
Start: 2022-08-01 | End: 2022-08-01

## 2022-08-01 RX ORDER — SODIUM CHLORIDE 9 MG/ML
INJECTION, SOLUTION INTRAVENOUS CONTINUOUS
Status: CANCELLED | OUTPATIENT
Start: 2022-08-01

## 2022-08-01 RX ADMIN — PROPOFOL 50 MG: 10 INJECTION, EMULSION INTRAVENOUS at 07:08

## 2022-08-01 RX ADMIN — Medication 100 MG: at 07:08

## 2022-08-01 RX ADMIN — PROPOFOL 150 MCG/KG/MIN: 10 INJECTION, EMULSION INTRAVENOUS at 07:08

## 2022-08-01 RX ADMIN — SODIUM CHLORIDE: 0.9 INJECTION, SOLUTION INTRAVENOUS at 07:08

## 2022-08-01 NOTE — ANESTHESIA POSTPROCEDURE EVALUATION
Anesthesia Post Evaluation    Patient: Calli Jamison    Procedure(s) Performed: Procedure(s) (LRB):  EGD (ESOPHAGOGASTRODUODENOSCOPY) (N/A)    Final Anesthesia Type: general      Patient location during evaluation: GI PACU  Patient participation: Yes- Able to Participate  Level of consciousness: awake and alert, awake and oriented  Post-procedure vital signs: reviewed and stable  Pain management: adequate  Airway patency: patent    PONV status at discharge: No PONV  Anesthetic complications: no      Cardiovascular status: blood pressure returned to baseline, stable and hemodynamically stable  Respiratory status: unassisted, spontaneous ventilation and room air  Hydration status: euvolemic  Follow-up not needed.          Vitals Value Taken Time   /76 08/01/22 0832   Temp 36.6 °C (97.9 °F) 08/01/22 0801   Pulse 90 08/01/22 0832   Resp 16 08/01/22 0832   SpO2 95 % 08/01/22 0832         Event Time   Out of Recovery 08:33:25         Pain/Marie Score: Marie Score: 10 (8/1/2022  8:32 AM)

## 2022-08-01 NOTE — PROVATION PATIENT INSTRUCTIONS
Discharge Summary/Instructions after an Endoscopic Procedure  Patient Name: Calli Jamison  Patient MRN: [id number]  Patient YOB: 1931  Monday, August 1, 2022  Du Robles MD  Dear patient,  As a result of recent federal legislation (The Federal Cures Act), you may   receive lab or pathology results from your procedure in your [facility]   account before your physician is able to contact you. Your physician or   their representative will relay the results to you with their   recommendations at their soonest availability.  Thank you,  RESTRICTIONS:  During your procedure today, you received medications for sedation.  These   medications may affect your judgment, balance and coordination.  Therefore,   for 24 hours, you have the following restrictions:   - DO NOT drive a car, operate machinery, make legal/financial decisions,   sign important papers or drink alcohol.    ACTIVITY:  Today: no heavy lifting, straining or running due to procedural   sedation/anesthesia.  The following day: return to full activity including work.  DIET:  Eat and drink normally unless instructed otherwise.     TREATMENT FOR COMMON SIDE EFFECTS:  - Mild abdominal pain, nausea, belching, bloating or excessive gas:  rest,   eat lightly and use a heating pad.  - Sore Throat: treat with throat lozenges and/or gargle with warm salt   water.  - Because air was used during the procedure, expelling large amounts of air   from your rectum or belching is normal.  - If a bowel prep was taken, you may not have a bowel movement for 1-3 days.    This is normal.  SYMPTOMS TO WATCH FOR AND REPORT TO YOUR PHYSICIAN:  1. Abdominal pain or bloating, other than gas cramps.  2. Chest pain.  3. Back pain.  4. Signs of infection such as: chills or fever occurring within 24 hours   after the procedure.  5. Rectal bleeding, which would show as bright red, maroon, or black stools.   (A tablespoon of blood from the rectum is not serious, especially if    hemorrhoids are present.)  6. Vomiting.  7. Weakness or dizziness.  GO DIRECTLY TO THE NEAREST EMERGENCY ROOM IF YOU HAVE ANY OF THE FOLLOWING:      Difficulty breathing              Chills and/or fever over 101 F   Persistent vomiting and/or vomiting blood   Severe abdominal pain   Severe chest pain   Black, tarry stools   Bleeding- more than one tablespoon   Any other symptom or condition that you feel may need urgent attention  Your doctor recommends these additional instructions:  If any biopsies were taken, your doctors clinic will contact you in 1 to 2   weeks with any results.  - Patient has a contact number available for emergencies.  The signs and   symptoms of potential delayed complications were discussed with the   patient.  Return to normal activities tomorrow.  Written discharge   instructions were provided to the patient.   - Discharge patient to home (ambulatory).   - Resume previous diet.   - Continue present medications.   - Resume Plavix (clopidogrel) at prior dose today.   - Repeat upper endoscopy PRN for retreatment.  For questions, problems or results please call your physician - Du Robles MD at Work:  [phone].  [facility], EMERGENCY ROOM PHONE NUMBER: [phone]  IF A COMPLICATION OR EMERGENCY SITUATION ARISES AND YOU ARE UNABLE TO REACH   YOUR PHYSICIAN - GO DIRECTLY TO THE EMERGENCY ROOM.  Du Robles MD  8/1/2022 7:58:03 AM  This report has been verified and signed electronically.  Dear patient,  As a result of recent federal legislation (The Federal Cures Act), you may   receive lab or pathology results from your procedure in your [facility]   account before your physician is able to contact you. Your physician or   their representative will relay the results to you with their   recommendations at their soonest availability.  Thank you,  PROVATION

## 2022-08-01 NOTE — ANESTHESIA PREPROCEDURE EVALUATION
08/01/2022  Calli Jamison is a 89 y.o., female.    2D ECHO 2019  · Concentric left ventricular remodeling.  · Normal left ventricular systolic function. The estimated ejection fraction is 65%  · Normal LV diastolic function.  · Normal right ventricular systolic function.  · Normal central venous pressure (3 mm Hg).  · The estimated PA systolic pressure is 21 mm Hg         Pre-op Assessment    I have reviewed the Patient Summary Reports.     I have reviewed the Nursing Notes. I have reviewed the NPO Status.   I have reviewed the Medications.     Review of Systems  Anesthesia Hx:  Denies Family Hx of Anesthesia complications.   Denies Personal Hx of Anesthesia complications.   Hematology/Oncology:  Hematology Normal   Oncology Normal     EENT/Dental:EENT/Dental Normal   Cardiovascular:   Hypertension, well controlled CAD asymptomatic  Angina (denies chest pain), with exertion    Pulmonary:   COPD, mild Asthma mild Sleep Apnea (no CPAP), CPAP    Education provided regarding risk of obstructive sleep apnea  Medical reason for not educating patient about risks of obstructive sleep apnea   Renal/:   Chronic Renal Disease, CRI    Hepatic/GI:   GERD    Musculoskeletal:  Musculoskeletal Normal    Neurological:   TIA, CVA, residual symptoms Neuromuscular Disease,    Endocrine:   Diabetes, well controlled, type 2    Dermatological:  Skin Normal    Psych:   depression          Physical Exam  General: Well nourished, Cooperative, Alert and Oriented    Airway:  Mallampati: II / II  Mouth Opening: Normal  TM Distance: Normal  Tongue: Normal  Neck ROM: Normal ROM    Dental:    Chest/Lungs:  Clear to auscultation, Normal Respiratory Rate    Heart:  Rate: Normal  Rhythm: Regular Rhythm  Sounds: Normal        Anesthesia Plan  Type of Anesthesia, risks & benefits discussed:    Anesthesia Type: Gen Natural Airway  Intra-op  Monitoring Plan: Standard ASA Monitors  Post Op Pain Control Plan: multimodal analgesia and IV/PO Opioids PRN  Induction:  IV  Informed Consent: Informed consent signed with the Patient and all parties understand the risks and agree with anesthesia plan.  All questions answered. Patient consented to blood products? No  ASA Score: 3  Day of Surgery Review of History & Physical: H&P Update referred to the surgeon/provider.    Ready For Surgery From Anesthesia Perspective.     .

## 2022-08-01 NOTE — PLAN OF CARE
Procedure completed. Pt to be discharged with son. In no acute distress. Discharge instructions given. Verbalizes understanding of post procedure restrictions and instructions.

## 2022-08-01 NOTE — TRANSFER OF CARE
Anesthesia Transfer of Care Note    Patient: Calli Jamison    Procedure(s) Performed: Procedure(s) (LRB):  EGD (ESOPHAGOGASTRODUODENOSCOPY) (N/A)    Patient location: GI    Anesthesia Type: general    Transport from OR: Transported from OR on room air with adequate spontaneous ventilation    Post pain: adequate analgesia    Post assessment: no apparent anesthetic complications    Post vital signs: stable    Level of consciousness: awake    Nausea/Vomiting: no nausea/vomiting    Complications: none    Transfer of care protocol was followed      Last vitals:   Visit Vitals  BP (!) 108/52 (BP Location: Left arm, Patient Position: Lying)   Pulse 83   Temp 36.6 °C (97.9 °F) (Tympanic)   Resp 16   Ht 5' (1.524 m)   Wt 83.9 kg (185 lb)   SpO2 95%   BMI 36.13 kg/m²

## 2022-09-28 ENCOUNTER — IMMUNIZATION (OUTPATIENT)
Dept: INTERNAL MEDICINE | Facility: CLINIC | Age: 87
End: 2022-09-28
Payer: MEDICARE

## 2022-09-28 DIAGNOSIS — Z23 NEED FOR VACCINATION: Primary | ICD-10-CM

## 2022-09-28 PROCEDURE — 0124A PR IMMUNIZ ADMIN, SARS-COV- 2 COVID-19 VACCINE, 30MCG/0.3ML, BIVALENT, BOOSTER DOSE: ICD-10-PCS | Mod: S$GLB,,, | Performed by: INTERNAL MEDICINE

## 2022-09-28 PROCEDURE — 91312 COVID-19, MRNA, LNP-S, BIVALENT BOOSTER, PF, 30 MCG/0.3 ML DOSE: CPT | Mod: S$GLB,,, | Performed by: INTERNAL MEDICINE

## 2022-09-28 PROCEDURE — 0124A PR IMMUNIZ ADMIN, SARS-COV- 2 COVID-19 VACCINE, 30MCG/0.3ML, BIVALENT, BOOSTER DOSE: CPT | Mod: S$GLB,,, | Performed by: INTERNAL MEDICINE

## 2022-09-28 PROCEDURE — 0124A COVID-19, MRNA, LNP-S, BIVALENT BOOSTER, PF, 30 MCG/0.3 ML DOSE: CPT | Mod: CV19,PBBFAC | Performed by: INTERNAL MEDICINE

## 2022-09-28 PROCEDURE — 91312 COVID-19, MRNA, LNP-S, BIVALENT BOOSTER, PF, 30 MCG/0.3 ML DOSE: ICD-10-PCS | Mod: S$GLB,,, | Performed by: INTERNAL MEDICINE

## 2022-10-03 ENCOUNTER — PES CALL (OUTPATIENT)
Dept: ADMINISTRATIVE | Facility: CLINIC | Age: 87
End: 2022-10-03
Payer: MEDICARE

## 2022-11-06 PROBLEM — N18.31 CHRONIC RENAL FAILURE, STAGE 3A: Status: RESOLVED | Noted: 2022-01-08 | Resolved: 2022-11-06

## 2022-11-07 ENCOUNTER — PES CALL (OUTPATIENT)
Dept: ADMINISTRATIVE | Facility: CLINIC | Age: 87
End: 2022-11-07
Payer: MEDICARE

## 2022-11-14 ENCOUNTER — TELEPHONE (OUTPATIENT)
Dept: OPHTHALMOLOGY | Facility: CLINIC | Age: 87
End: 2022-11-14
Payer: MEDICARE

## 2022-11-14 ENCOUNTER — PES CALL (OUTPATIENT)
Dept: ADMINISTRATIVE | Facility: CLINIC | Age: 87
End: 2022-11-14
Payer: MEDICARE

## 2022-11-14 NOTE — TELEPHONE ENCOUNTER
----- Message from Mansi Thrasher sent at 11/11/2022  5:02 PM CST -----  Regarding: FW: Appt Request    ----- Message -----  From: Radha Valverde  Sent: 11/11/2022  11:17 AM CST  To: [facility] Staff  Subject: Appt Request                                     Pts son called to r/s pts missed appt on 10/31 for RNFL OCT/24-2 HVF/annual glaucoma eye exam. No solutions found for provider.     Call back :[phone] Patrice

## 2022-11-14 NOTE — TELEPHONE ENCOUNTER
Left VM with pt's son Patrice to schedule RNFL OCT/24-2 HVF/annual glaucoma eye exam at [facility] location with Dr. Head. Left dept # for pt's son to call back to schedule appt.

## 2022-11-30 ENCOUNTER — LAB VISIT (OUTPATIENT)
Dept: LAB | Facility: HOSPITAL | Age: 87
End: 2022-11-30
Payer: MEDICARE

## 2022-11-30 DIAGNOSIS — D50.0 IRON DEFICIENCY ANEMIA DUE TO CHRONIC BLOOD LOSS: ICD-10-CM

## 2022-11-30 DIAGNOSIS — E78.49 OTHER HYPERLIPIDEMIA: Primary | ICD-10-CM

## 2022-11-30 DIAGNOSIS — I10 ESSENTIAL HYPERTENSION: ICD-10-CM

## 2022-11-30 LAB
FERRITIN SERPL-MCNC: 40 NG/ML (ref 20–300)
IRON SERPL-MCNC: 62 UG/DL (ref 30–160)
SATURATED IRON: 16 % (ref 20–50)
TOTAL IRON BINDING CAPACITY: 386 UG/DL (ref 250–450)
TRANSFERRIN SERPL-MCNC: 261 MG/DL (ref 200–375)

## 2022-11-30 PROCEDURE — 84466 ASSAY OF TRANSFERRIN: CPT | Performed by: INTERNAL MEDICINE

## 2022-11-30 PROCEDURE — 36415 COLL VENOUS BLD VENIPUNCTURE: CPT | Performed by: INTERNAL MEDICINE

## 2022-11-30 PROCEDURE — 82728 ASSAY OF FERRITIN: CPT | Performed by: INTERNAL MEDICINE

## 2022-11-30 NOTE — TELEPHONE ENCOUNTER
No new care gaps identified.  [facility] Embedded Care Gaps. Reference number: [id number]. 11/30/2022   1:28:25 PM CST

## 2022-11-30 NOTE — TELEPHONE ENCOUNTER
Called pt; pt's son answered    Son reports that pt was previously on Furosemide and Atorvastatin and is currently taking, but pt needs refill on both    Call was initiated based on a fax received from [facility]     Pended Rx based on prior scripts written by Dr Hurd      Please advise

## 2022-12-01 RX ORDER — FUROSEMIDE 20 MG/1
20 TABLET ORAL 2 TIMES DAILY
Qty: 60 TABLET | Refills: 11 | OUTPATIENT
Start: 2022-12-01 | End: 2023-12-01

## 2022-12-01 RX ORDER — ATORVASTATIN CALCIUM 20 MG/1
20 TABLET, FILM COATED ORAL DAILY
Qty: 90 TABLET | Refills: 3 | Status: SHIPPED | OUTPATIENT
Start: 2022-12-01 | End: 2022-12-07 | Stop reason: SDUPTHER

## 2022-12-01 NOTE — TELEPHONE ENCOUNTER
Called pt; pt did not answer    Left voicemail for pt to call us back regarding Rx    Intent to find out if pt has been taking furosemide

## 2022-12-02 ENCOUNTER — TELEPHONE (OUTPATIENT)
Dept: INTERNAL MEDICINE | Facility: CLINIC | Age: 87
End: 2022-12-02
Payer: MEDICARE

## 2022-12-02 NOTE — TELEPHONE ENCOUNTER
Called pt to see if pt was taking furosemide     Pt did not answer    Left message asking pt to call back

## 2022-12-06 ENCOUNTER — TELEPHONE (OUTPATIENT)
Dept: INTERNAL MEDICINE | Facility: CLINIC | Age: 87
End: 2022-12-06
Payer: MEDICARE

## 2022-12-06 NOTE — TELEPHONE ENCOUNTER
----- Message from Yasmin Fisher sent at 12/6/2022  9:27 AM CST -----  Contact: [phone]  [facility] pharmacy is calling for the pts lasix I do not see this medication in the chart lease advise she states he is needing a refill

## 2022-12-06 NOTE — TELEPHONE ENCOUNTER
From another encounter:   Dr gruber wanted to know if pt was taking furosemide/lasix. Previously unable to get in touch with pt.     Called pt's son (Rayshawn); someone answered the phone  Unable to communicate; background noise and low voice made it impossible.     Called pt's son (Sara); Sara answered  Inquired about pt taking lasix, sara said she has not been      Pt has not been taking lasix

## 2022-12-07 ENCOUNTER — LAB VISIT (OUTPATIENT)
Dept: LAB | Facility: HOSPITAL | Age: 87
End: 2022-12-07
Payer: MEDICARE

## 2022-12-07 ENCOUNTER — IMMUNIZATION (OUTPATIENT)
Dept: INTERNAL MEDICINE | Facility: CLINIC | Age: 87
End: 2022-12-07
Payer: MEDICARE

## 2022-12-07 ENCOUNTER — PATIENT MESSAGE (OUTPATIENT)
Dept: INTERNAL MEDICINE | Facility: CLINIC | Age: 87
End: 2022-12-07

## 2022-12-07 ENCOUNTER — OFFICE VISIT (OUTPATIENT)
Dept: INTERNAL MEDICINE | Facility: CLINIC | Age: 87
End: 2022-12-07
Payer: MEDICARE

## 2022-12-07 VITALS
OXYGEN SATURATION: 97 % | DIASTOLIC BLOOD PRESSURE: 74 MMHG | SYSTOLIC BLOOD PRESSURE: 154 MMHG | HEART RATE: 100 BPM | BODY MASS INDEX: 36.55 KG/M2 | WEIGHT: 186.19 LBS | HEIGHT: 60 IN

## 2022-12-07 DIAGNOSIS — I10 ESSENTIAL HYPERTENSION: Primary | ICD-10-CM

## 2022-12-07 DIAGNOSIS — E78.49 OTHER HYPERLIPIDEMIA: ICD-10-CM

## 2022-12-07 DIAGNOSIS — E11.22 CKD STAGE 3 DUE TO TYPE 2 DIABETES MELLITUS: ICD-10-CM

## 2022-12-07 DIAGNOSIS — I87.2 VENOUS INSUFFICIENCY: ICD-10-CM

## 2022-12-07 DIAGNOSIS — N18.30 CKD STAGE 3 DUE TO TYPE 2 DIABETES MELLITUS: ICD-10-CM

## 2022-12-07 DIAGNOSIS — K21.9 GASTROESOPHAGEAL REFLUX DISEASE, UNSPECIFIED WHETHER ESOPHAGITIS PRESENT: ICD-10-CM

## 2022-12-07 DIAGNOSIS — E04.2 MULTIPLE THYROID NODULES: ICD-10-CM

## 2022-12-07 DIAGNOSIS — E11.9 DM TYPE 2 WITHOUT RETINOPATHY: ICD-10-CM

## 2022-12-07 DIAGNOSIS — Z86.73 HISTORY OF TIA (TRANSIENT ISCHEMIC ATTACK): ICD-10-CM

## 2022-12-07 DIAGNOSIS — E61.1 IRON DEFICIENCY: ICD-10-CM

## 2022-12-07 LAB
ANION GAP SERPL CALC-SCNC: 11 MMOL/L (ref 8–16)
BUN SERPL-MCNC: 10 MG/DL (ref 10–30)
CALCIUM SERPL-MCNC: 9.6 MG/DL (ref 8.7–10.5)
CHLORIDE SERPL-SCNC: 105 MMOL/L (ref 95–110)
CHOLEST SERPL-MCNC: 115 MG/DL (ref 120–199)
CHOLEST/HDLC SERPL: 2.1 {RATIO} (ref 2–5)
CO2 SERPL-SCNC: 24 MMOL/L (ref 23–29)
CREAT SERPL-MCNC: 0.9 MG/DL (ref 0.5–1.4)
EST. GFR  (NO RACE VARIABLE): >60 ML/MIN/1.73 M^2
ESTIMATED AVG GLUCOSE: 126 MG/DL (ref 68–131)
GLUCOSE SERPL-MCNC: 93 MG/DL (ref 70–110)
HBA1C MFR BLD: 6 % (ref 4–5.6)
HDLC SERPL-MCNC: 56 MG/DL (ref 40–75)
HDLC SERPL: 48.7 % (ref 20–50)
LDLC SERPL CALC-MCNC: 47.6 MG/DL (ref 63–159)
NONHDLC SERPL-MCNC: 59 MG/DL
POTASSIUM SERPL-SCNC: 3.7 MMOL/L (ref 3.5–5.1)
SODIUM SERPL-SCNC: 140 MMOL/L (ref 136–145)
TRIGL SERPL-MCNC: 57 MG/DL (ref 30–150)

## 2022-12-07 PROCEDURE — 90694 VACC AIIV4 NO PRSRV 0.5ML IM: CPT | Mod: S$GLB,,, | Performed by: INTERNAL MEDICINE

## 2022-12-07 PROCEDURE — 99215 PR OFFICE/OUTPT VISIT, EST, LEVL V, 40-54 MIN: ICD-10-PCS | Mod: 25,S$GLB,, | Performed by: INTERNAL MEDICINE

## 2022-12-07 PROCEDURE — 99499 RISK ADDL DX/OHS AUDIT: ICD-10-PCS | Mod: HCNC,S$GLB,, | Performed by: INTERNAL MEDICINE

## 2022-12-07 PROCEDURE — 3288F FALL RISK ASSESSMENT DOCD: CPT | Mod: CPTII,S$GLB,, | Performed by: INTERNAL MEDICINE

## 2022-12-07 PROCEDURE — 1126F AMNT PAIN NOTED NONE PRSNT: CPT | Mod: CPTII,S$GLB,, | Performed by: INTERNAL MEDICINE

## 2022-12-07 PROCEDURE — 99999 PR PBB SHADOW E&M-EST. PATIENT-LVL IV: ICD-10-PCS | Mod: PBBFAC,,, | Performed by: INTERNAL MEDICINE

## 2022-12-07 PROCEDURE — 80048 BASIC METABOLIC PNL TOTAL CA: CPT | Performed by: INTERNAL MEDICINE

## 2022-12-07 PROCEDURE — 1159F MED LIST DOCD IN RCRD: CPT | Mod: CPTII,S$GLB,, | Performed by: INTERNAL MEDICINE

## 2022-12-07 PROCEDURE — G0008 FLU VACCINE - QUADRIVALENT - ADJUVANTED: ICD-10-PCS | Mod: S$GLB,,, | Performed by: INTERNAL MEDICINE

## 2022-12-07 PROCEDURE — 83036 HEMOGLOBIN GLYCOSYLATED A1C: CPT | Performed by: INTERNAL MEDICINE

## 2022-12-07 PROCEDURE — 99999 PR PBB SHADOW E&M-EST. PATIENT-LVL IV: CPT | Mod: PBBFAC,,, | Performed by: INTERNAL MEDICINE

## 2022-12-07 PROCEDURE — 3288F PR FALLS RISK ASSESSMENT DOCUMENTED: ICD-10-PCS | Mod: CPTII,S$GLB,, | Performed by: INTERNAL MEDICINE

## 2022-12-07 PROCEDURE — 80061 LIPID PANEL: CPT | Performed by: INTERNAL MEDICINE

## 2022-12-07 PROCEDURE — 1101F PR PT FALLS ASSESS DOC 0-1 FALLS W/OUT INJ PAST YR: ICD-10-PCS | Mod: CPTII,S$GLB,, | Performed by: INTERNAL MEDICINE

## 2022-12-07 PROCEDURE — 99499 UNLISTED E&M SERVICE: CPT | Mod: HCNC,S$GLB,, | Performed by: INTERNAL MEDICINE

## 2022-12-07 PROCEDURE — G0008 ADMIN INFLUENZA VIRUS VAC: HCPCS | Mod: S$GLB,,, | Performed by: INTERNAL MEDICINE

## 2022-12-07 PROCEDURE — 1160F PR REVIEW ALL MEDS BY PRESCRIBER/CLIN PHARMACIST DOCUMENTED: ICD-10-PCS | Mod: CPTII,S$GLB,, | Performed by: INTERNAL MEDICINE

## 2022-12-07 PROCEDURE — 1159F PR MEDICATION LIST DOCUMENTED IN MEDICAL RECORD: ICD-10-PCS | Mod: CPTII,S$GLB,, | Performed by: INTERNAL MEDICINE

## 2022-12-07 PROCEDURE — 99215 OFFICE O/P EST HI 40 MIN: CPT | Mod: 25,S$GLB,, | Performed by: INTERNAL MEDICINE

## 2022-12-07 PROCEDURE — 1101F PT FALLS ASSESS-DOCD LE1/YR: CPT | Mod: CPTII,S$GLB,, | Performed by: INTERNAL MEDICINE

## 2022-12-07 PROCEDURE — 1160F RVW MEDS BY RX/DR IN RCRD: CPT | Mod: CPTII,S$GLB,, | Performed by: INTERNAL MEDICINE

## 2022-12-07 PROCEDURE — 90694 FLU VACCINE - QUADRIVALENT - ADJUVANTED: ICD-10-PCS | Mod: S$GLB,,, | Performed by: INTERNAL MEDICINE

## 2022-12-07 PROCEDURE — 36415 COLL VENOUS BLD VENIPUNCTURE: CPT | Performed by: INTERNAL MEDICINE

## 2022-12-07 PROCEDURE — 1126F PR PAIN SEVERITY QUANTIFIED, NO PAIN PRESENT: ICD-10-PCS | Mod: CPTII,S$GLB,, | Performed by: INTERNAL MEDICINE

## 2022-12-07 RX ORDER — TERAZOSIN 1 MG/1
1 CAPSULE ORAL NIGHTLY
Qty: 90 CAPSULE | Refills: 3 | Status: SHIPPED | OUTPATIENT
Start: 2022-12-07

## 2022-12-07 RX ORDER — AMLODIPINE BESYLATE 5 MG/1
5 TABLET ORAL 2 TIMES DAILY
Qty: 180 TABLET | Refills: 3 | Status: SHIPPED | OUTPATIENT
Start: 2022-12-07 | End: 2023-12-27 | Stop reason: SDUPTHER

## 2022-12-07 RX ORDER — ATORVASTATIN CALCIUM 20 MG/1
20 TABLET, FILM COATED ORAL DAILY
Qty: 90 TABLET | Refills: 3 | Status: SHIPPED | OUTPATIENT
Start: 2022-12-07 | End: 2023-12-27 | Stop reason: SDUPTHER

## 2022-12-07 RX ORDER — CLOPIDOGREL BISULFATE 75 MG/1
75 TABLET ORAL DAILY
Qty: 90 TABLET | Refills: 3 | Status: SHIPPED | OUTPATIENT
Start: 2022-12-07

## 2022-12-07 NOTE — PROGRESS NOTES
INTERNAL MEDICINE ESTABLISHED PATIENT VISIT NOTE    Subjective:     Chief Complaint: Follow-up       Patient ID: Calli Jamison is a 89 y.o. female with Afib s/p loop recorder, TIA, lumbar spinal stenosis, T2DM (12/2021 HgbA1C 6.0), adjustment disorder, glaucoma, pemphigoid, asthma, COPD, HLD, HTN, CKD IIIa, Fe deficiency, secondary HPT, obesity, GERD, esophageal stricture, thyroid nodules, Crohn's disease, LULY, last seen by me 5/2022, here today for follow-up. Accompanied by eldest son.     8/2022 EGD - Benign-appearing esophageal stenosis. Dilated.     Checks BP at home 130-150/70-90.     Notes bilateral ankle swelling, worse at end of day. Improves by morning. Previously on Lasix for ankle swelling, not presently taking as no refills.     Not taking Omeprazole PRN, no heartburn or reflux issues.     Past Medical History:  Past Medical History:   Diagnosis Date    Abnormal Pap smear     After-cataract, unspecified - Both Eyes 11/5/2012    Arthritis     ASCUS (atypical squamous cells of undetermined significance) on Pap smear 7/1/2012    Asthma     Atrial fibrillation     Back pain 2009    Bullous pemphigoid     Carpal tunnel syndrome     Chronic back pain     COPD (chronic obstructive pulmonary disease)     Coronary artery disease     Crohn's disease     Depression     Diabetes mellitus     Discoid lupus     Diverticulosis     History of migraine headaches     Hyperlipidemia     Hypertension     Multiple thyroid nodules     Obesity     Open angle with borderline findings and high glaucoma risk in both eyes 9/11/2019    Personal history of colonic polyps     Pulmonary nodule     Renal manifestation of secondary diabetes mellitus     Restless legs syndrome     Sciatica     Right leg    Sleep apnea     Stricture and stenosis of esophagus     Stroke     TIA (transient ischemic attack)        Review of Systems:  Review of Systems   Constitutional:  Negative for chills and fever.   HENT:  Negative for congestion.     Respiratory:  Negative for cough and shortness of breath.    Cardiovascular:  Negative for chest pain.   Gastrointestinal:  Negative for constipation, nausea and vomiting.   Genitourinary:  Negative for hematuria and urgency.   Musculoskeletal:  Negative for falls.   Skin:  Negative for rash.   Neurological:  Negative for dizziness and loss of consciousness.     Health Maintenance:   Immunizations:   Influenza - today  Tdap - next visit  Covid 19 - complete  HPV  Prevnar rec at 65 - 11/2015, Pneumovax 6/1996  Shingrix rec at 50 - discussed      Cancer Screening:  PAP: s/ hys  Mammogram: defer due to age  Colonoscopy:  next visit  DEXA:  2/2016 nl BMD    Objective:   BP (!) 154/74 (BP Location: Left arm, Patient Position: Sitting, BP Method: Medium (Manual))   Pulse 100   Ht 5' (1.524 m)   Wt 84.5 kg (186 lb 2.9 oz)   SpO2 97%   BMI 36.36 kg/m²        Labs:       Assessment/Plan     Essential hypertension  Elevated; pt prescribed Norvasc 5 BID, unable to verify if anti-hypertensive taken today  Advised pt to take Norvasc BID as prescribed, continue monitoring BP and bring log to follow-up  -     terazosin (HYTRIN) 1 MG capsule; Take 1 capsule (1 mg total) by mouth every evening.  Dispense: 90 capsule; Refill: 3  -     amLODIPine (NORVASC) 5 MG tablet; Take 1 tablet (5 mg total) by mouth 2 (two) times daily.  Dispense: 180 tablet; Refill: 3    CKD stage 3 due to type 2 diabetes mellitus  Follows with Nephrology; Crt 0.9, avoid nephrotoxins  Previously on Lasix, discontinued by Nephrology due to worsening kidney function  Advised pt to continue holding Lasix; repeat panel, will advise if potassium supplement necessary  -     BASIC METABOLIC PANEL; Future; Expected date: 12/07/2022    Other hyperlipidemia  Controlled, continue current regimen  -     atorvastatin (LIPITOR) 20 MG tablet; Take 1 tablet (20 mg total) by mouth once daily.  Dispense: 90 tablet; Refill: 3  -     Lipid Panel; Future; Expected date:  12/07/2022    History of TIA (transient ischemic attack)  Continue DAPT, statin   -     clopidogreL (PLAVIX) 75 mg tablet; Take 1 tablet (75 mg total) by mouth once daily.  Dispense: 90 tablet; Refill: 3    Gastroesophageal reflux disease, unspecified whether esophagitis present  Asymptomatic, off treatment    DM type 2 without retinopathy  Diet-controlled; repeat lab  -     Hemoglobin A1C; Future; Expected date: 12/07/2022    Iron deficiency  Follows with Hematology-Oncology; Fe supplement    Multiple thyroid nodules  Follows with Endocrinology  8/2022 US Thyroid - Normal sized multinodular thyroid gland.   No additional surveillance recommended    Venous insufficiency  Counseled on leg elevation, compression stocking use  -     COMPRESSION STOCKINGS      HM as above. Extensive time spent completing medicine reconciliation with son and pt. Updated medication list with relevant notations provided.    Sydni Clark MD  Department of Internal Medicine - [facility]  12/07/2022

## 2022-12-08 NOTE — TELEPHONE ENCOUNTER
Pt's son is asking if pt can use gummies for calcium,Vit D, and other otc supplementary products.     Please advise

## 2022-12-16 ENCOUNTER — TELEPHONE (OUTPATIENT)
Dept: INTERNAL MEDICINE | Facility: CLINIC | Age: 87
End: 2022-12-16
Payer: MEDICARE

## 2022-12-20 ENCOUNTER — TELEPHONE (OUTPATIENT)
Dept: INTERNAL MEDICINE | Facility: CLINIC | Age: 87
End: 2022-12-20
Payer: MEDICARE

## 2023-01-06 ENCOUNTER — TELEPHONE (OUTPATIENT)
Dept: INTERNAL MEDICINE | Facility: CLINIC | Age: 88
End: 2023-01-06
Payer: MEDICARE

## 2023-01-06 NOTE — TELEPHONE ENCOUNTER
""Please call Aaron, cancel potassium prescription. "  - per Eduardo     Called aaron ; was able to cancel potassium prescription   "

## 2023-02-07 DIAGNOSIS — Z00.00 ENCOUNTER FOR MEDICARE ANNUAL WELLNESS EXAM: ICD-10-CM

## 2023-02-09 DIAGNOSIS — Z00.00 ENCOUNTER FOR MEDICARE ANNUAL WELLNESS EXAM: ICD-10-CM

## 2023-03-15 ENCOUNTER — OFFICE VISIT (OUTPATIENT)
Dept: INTERNAL MEDICINE | Facility: CLINIC | Age: 88
End: 2023-03-15
Payer: MEDICARE

## 2023-03-15 ENCOUNTER — LAB VISIT (OUTPATIENT)
Dept: LAB | Facility: HOSPITAL | Age: 88
End: 2023-03-15
Attending: STUDENT IN AN ORGANIZED HEALTH CARE EDUCATION/TRAINING PROGRAM
Payer: MEDICARE

## 2023-03-15 VITALS
WEIGHT: 185.88 LBS | SYSTOLIC BLOOD PRESSURE: 143 MMHG | HEART RATE: 93 BPM | HEIGHT: 60 IN | BODY MASS INDEX: 36.49 KG/M2 | TEMPERATURE: 99 F | DIASTOLIC BLOOD PRESSURE: 78 MMHG | OXYGEN SATURATION: 93 %

## 2023-03-15 DIAGNOSIS — Z86.73 HISTORY OF TIA (TRANSIENT ISCHEMIC ATTACK): ICD-10-CM

## 2023-03-15 DIAGNOSIS — E11.22 CKD STAGE 3 DUE TO TYPE 2 DIABETES MELLITUS: ICD-10-CM

## 2023-03-15 DIAGNOSIS — K50.819 CROHN'S DISEASE OF SMALL AND LARGE INTESTINES WITH COMPLICATION: ICD-10-CM

## 2023-03-15 DIAGNOSIS — G47.30 SLEEP APNEA, UNSPECIFIED TYPE: ICD-10-CM

## 2023-03-15 DIAGNOSIS — N25.81 SECONDARY HYPERPARATHYROIDISM OF RENAL ORIGIN: ICD-10-CM

## 2023-03-15 DIAGNOSIS — I10 ESSENTIAL HYPERTENSION: ICD-10-CM

## 2023-03-15 DIAGNOSIS — L29.9 PRURITUS: ICD-10-CM

## 2023-03-15 DIAGNOSIS — E78.49 OTHER HYPERLIPIDEMIA: ICD-10-CM

## 2023-03-15 DIAGNOSIS — Z76.89 ESTABLISHING CARE WITH NEW DOCTOR, ENCOUNTER FOR: Primary | ICD-10-CM

## 2023-03-15 DIAGNOSIS — N18.30 CKD STAGE 3 DUE TO TYPE 2 DIABETES MELLITUS: ICD-10-CM

## 2023-03-15 DIAGNOSIS — E04.2 MULTIPLE THYROID NODULES: ICD-10-CM

## 2023-03-15 DIAGNOSIS — R41.3 MEMORY CHANGES: ICD-10-CM

## 2023-03-15 DIAGNOSIS — E11.22 TYPE 2 DIABETES MELLITUS WITH STAGE 3A CHRONIC KIDNEY DISEASE, WITHOUT LONG-TERM CURRENT USE OF INSULIN: ICD-10-CM

## 2023-03-15 DIAGNOSIS — L82.1 SEBORRHEIC KERATOSIS: ICD-10-CM

## 2023-03-15 DIAGNOSIS — N18.31 TYPE 2 DIABETES MELLITUS WITH STAGE 3A CHRONIC KIDNEY DISEASE, WITHOUT LONG-TERM CURRENT USE OF INSULIN: ICD-10-CM

## 2023-03-15 DIAGNOSIS — K21.9 GASTROESOPHAGEAL REFLUX DISEASE, UNSPECIFIED WHETHER ESOPHAGITIS PRESENT: ICD-10-CM

## 2023-03-15 DIAGNOSIS — I87.2 VENOUS INSUFFICIENCY: ICD-10-CM

## 2023-03-15 DIAGNOSIS — E11.42 DIABETIC POLYNEUROPATHY ASSOCIATED WITH TYPE 2 DIABETES MELLITUS: ICD-10-CM

## 2023-03-15 LAB
ALBUMIN SERPL BCP-MCNC: 4.1 G/DL (ref 3.5–5.2)
ALP SERPL-CCNC: 101 U/L (ref 55–135)
ALT SERPL W/O P-5'-P-CCNC: 16 U/L (ref 10–44)
ANION GAP SERPL CALC-SCNC: 8 MMOL/L (ref 8–16)
AST SERPL-CCNC: 18 U/L (ref 10–40)
BILIRUB SERPL-MCNC: 0.6 MG/DL (ref 0.1–1)
BUN SERPL-MCNC: 11 MG/DL (ref 10–30)
CALCIUM SERPL-MCNC: 10.2 MG/DL (ref 8.7–10.5)
CHLORIDE SERPL-SCNC: 106 MMOL/L (ref 95–110)
CO2 SERPL-SCNC: 28 MMOL/L (ref 23–29)
CREAT SERPL-MCNC: 0.9 MG/DL (ref 0.5–1.4)
EST. GFR  (NO RACE VARIABLE): 60 ML/MIN/1.73 M^2
GLUCOSE SERPL-MCNC: 99 MG/DL (ref 70–110)
POTASSIUM SERPL-SCNC: 3.8 MMOL/L (ref 3.5–5.1)
PROT SERPL-MCNC: 8.4 G/DL (ref 6–8.4)
SODIUM SERPL-SCNC: 142 MMOL/L (ref 136–145)

## 2023-03-15 PROCEDURE — 36415 COLL VENOUS BLD VENIPUNCTURE: CPT | Mod: HCNC | Performed by: STUDENT IN AN ORGANIZED HEALTH CARE EDUCATION/TRAINING PROGRAM

## 2023-03-15 PROCEDURE — 99499 UNLISTED E&M SERVICE: CPT | Mod: HCNC,S$GLB,, | Performed by: STUDENT IN AN ORGANIZED HEALTH CARE EDUCATION/TRAINING PROGRAM

## 2023-03-15 PROCEDURE — 1126F AMNT PAIN NOTED NONE PRSNT: CPT | Mod: HCNC,CPTII,S$GLB, | Performed by: STUDENT IN AN ORGANIZED HEALTH CARE EDUCATION/TRAINING PROGRAM

## 2023-03-15 PROCEDURE — 1126F PR PAIN SEVERITY QUANTIFIED, NO PAIN PRESENT: ICD-10-PCS | Mod: HCNC,CPTII,S$GLB, | Performed by: STUDENT IN AN ORGANIZED HEALTH CARE EDUCATION/TRAINING PROGRAM

## 2023-03-15 PROCEDURE — 1160F PR REVIEW ALL MEDS BY PRESCRIBER/CLIN PHARMACIST DOCUMENTED: ICD-10-PCS | Mod: HCNC,CPTII,S$GLB, | Performed by: STUDENT IN AN ORGANIZED HEALTH CARE EDUCATION/TRAINING PROGRAM

## 2023-03-15 PROCEDURE — 1159F PR MEDICATION LIST DOCUMENTED IN MEDICAL RECORD: ICD-10-PCS | Mod: HCNC,CPTII,S$GLB, | Performed by: STUDENT IN AN ORGANIZED HEALTH CARE EDUCATION/TRAINING PROGRAM

## 2023-03-15 PROCEDURE — 99214 PR OFFICE/OUTPT VISIT, EST, LEVL IV, 30-39 MIN: ICD-10-PCS | Mod: HCNC,S$GLB,, | Performed by: STUDENT IN AN ORGANIZED HEALTH CARE EDUCATION/TRAINING PROGRAM

## 2023-03-15 PROCEDURE — 80053 COMPREHEN METABOLIC PANEL: CPT | Mod: HCNC | Performed by: STUDENT IN AN ORGANIZED HEALTH CARE EDUCATION/TRAINING PROGRAM

## 2023-03-15 PROCEDURE — 1159F MED LIST DOCD IN RCRD: CPT | Mod: HCNC,CPTII,S$GLB, | Performed by: STUDENT IN AN ORGANIZED HEALTH CARE EDUCATION/TRAINING PROGRAM

## 2023-03-15 PROCEDURE — 99214 OFFICE O/P EST MOD 30 MIN: CPT | Mod: HCNC,S$GLB,, | Performed by: STUDENT IN AN ORGANIZED HEALTH CARE EDUCATION/TRAINING PROGRAM

## 2023-03-15 PROCEDURE — 99499 RISK ADDL DX/OHS AUDIT: ICD-10-PCS | Mod: HCNC,S$GLB,, | Performed by: STUDENT IN AN ORGANIZED HEALTH CARE EDUCATION/TRAINING PROGRAM

## 2023-03-15 PROCEDURE — 1160F RVW MEDS BY RX/DR IN RCRD: CPT | Mod: HCNC,CPTII,S$GLB, | Performed by: STUDENT IN AN ORGANIZED HEALTH CARE EDUCATION/TRAINING PROGRAM

## 2023-03-15 PROCEDURE — 99999 PR PBB SHADOW E&M-EST. PATIENT-LVL V: CPT | Mod: PBBFAC,HCNC,, | Performed by: STUDENT IN AN ORGANIZED HEALTH CARE EDUCATION/TRAINING PROGRAM

## 2023-03-15 PROCEDURE — 99999 PR PBB SHADOW E&M-EST. PATIENT-LVL V: ICD-10-PCS | Mod: PBBFAC,HCNC,, | Performed by: STUDENT IN AN ORGANIZED HEALTH CARE EDUCATION/TRAINING PROGRAM

## 2023-03-15 RX ORDER — TRIAMCINOLONE ACETONIDE 1 MG/G
OINTMENT TOPICAL 2 TIMES DAILY
Qty: 80 G | Refills: 0 | Status: SHIPPED | OUTPATIENT
Start: 2023-03-15 | End: 2023-10-02

## 2023-03-15 RX ORDER — FUROSEMIDE 20 MG/1
20 TABLET ORAL DAILY
Qty: 90 TABLET | Refills: 3 | Status: SHIPPED | OUTPATIENT
Start: 2023-03-15 | End: 2024-01-02

## 2023-03-15 RX ORDER — DONEPEZIL HYDROCHLORIDE 5 MG/1
5 TABLET, FILM COATED ORAL NIGHTLY
Qty: 90 TABLET | Refills: 3 | Status: SHIPPED | OUTPATIENT
Start: 2023-03-15 | End: 2024-01-03

## 2023-03-15 NOTE — PROGRESS NOTES
SUBJECTIVE     Chief Complaint   Patient presents with    Follow-up     Est care       HPI  Calli KARRI Jamison is a 89 y.o. female with  Afib s/p loop recorder, TIA, lumbar spinal stenosis, T2DM (12/2022 HgbA1C 6.0), adjustment disorder, glaucoma, pemphigoid, asthma, COPD, HLD, HTN, CKD IIIa, Fe deficiency, secondary HPT, obesity, GERD, esophageal stricture, thyroid nodules, Crohn's disease, LULY, menous insufficiency  that presents for establishment of care.     Today she has a complaint of itching all over body over the past several weeks. No relief with benadryl.     Patient describes skin lesions that she characterizes as pimples that have appeared over back.  Sometimes itch, but other times are bothersome as they rub up against her bra and closed.    Patient concerned about memory.  Son is accompanying her today also raises concern.  Patient states that she has increased difficulty remembering names, conversations, and passwords to the computer.    Blood pressure mildly elevated.  Patient also with complaints of lower extremity swelling.  Has documented history of venous insufficiency, for which she is prescribed Lasix 20 mg q.d. however, patient states that she is not taking the Lasix.  No chest pain, shortness a breath, PND, orthopnea, palpitations.    PAST MEDICAL HISTORY:  Past Medical History:   Diagnosis Date    Abnormal Pap smear     After-cataract, unspecified - Both Eyes 11/5/2012    Arthritis     ASCUS (atypical squamous cells of undetermined significance) on Pap smear 7/1/2012    Asthma     Atrial fibrillation     Back pain 2009    Bullous pemphigoid     Carpal tunnel syndrome     Chronic back pain     COPD (chronic obstructive pulmonary disease)     Coronary artery disease     Crohn's disease     Depression     Diabetes mellitus     Discoid lupus     Diverticulosis     History of migraine headaches     Hyperlipidemia     Hypertension     Multiple thyroid nodules     Obesity     Open angle with  borderline findings and high glaucoma risk in both eyes 9/11/2019    Personal history of colonic polyps     Pulmonary nodule     Renal manifestation of secondary diabetes mellitus     Restless legs syndrome     Sciatica     Right leg    Sleep apnea     Stricture and stenosis of esophagus     Stroke     TIA (transient ischemic attack)        PAST SURGICAL HISTORY:  Past Surgical History:   Procedure Laterality Date    BREAST BIOPSY Left     Breast: wire localization  1/2004    CATARACT EXTRACTION W/  INTRAOCULAR LENS IMPLANT Bilateral     Cataract left eye  1/2008    Cataract: right eye  1/2008    COLON SURGERY      ileocecectomy 2011    COLONOSCOPY  9/30/2008    Crohns in remission    ESOPHAGOGASTRODUODENOSCOPY N/A 7/24/2018    Procedure: EGD (ESOPHAGOGASTRODUODENOSCOPY);  Surgeon: Du Robles MD;  Location: [facility] (4TH FLR);  Service: Endoscopy;  Laterality: N/A;  loop recoreder-battery dead per pt. (not active). corona dpt. to move to morning . unable to do so.EC    ESOPHAGOGASTRODUODENOSCOPY N/A 8/1/2022    Procedure: EGD (ESOPHAGOGASTRODUODENOSCOPY);  Surgeon: Du Robles MD;  [facility]  Service: Endoscopy;  Laterality: N/A;  plavix hold ok see te 7/8-tb-clears 4 hrs prior-inst mail and verbal to son abdiaziz-tb.Fully vaccinated.EC    HYSTERECTOMY      ovaries remain    Iliocolic Crohns Disease with stricure  3/2011    LA iliocecectomy    Laparoscopic-assisted ileocecectomy.       Left carpal tunnel      Left carpal tunnel surgery      Rectal fistulae repair      TONSILLECTOMY      UPPER GASTROINTESTINAL ENDOSCOPY  11/2011    hiatal hernia, benign appearing esophageal  stricture dilated  prn repeat rec.       FAMILY HISTORY:  Family History   Problem Relation Age of Onset    Pneumonia Father     Cataracts Mother     Macular degeneration Mother     Hypertension Sister     Alkaptonuria Maternal Grandfather     Asthma Son     No Known Problems Son     No Known Problems Son     No Known  Problems Son     Crohn's disease Neg Hx     Ulcerative colitis Neg Hx     Inflammatory bowel disease Neg Hx     Amblyopia Neg Hx     Blindness Neg Hx     Glaucoma Neg Hx     Retinal detachment Neg Hx     Strabismus Neg Hx        ALLERGIES AND MEDICATIONS: updated and reviewed.  Review of patient's allergies indicates:   Allergen Reactions    Oxycodone Other (See Comments)    Oxycodone hcl-oxycodone-asa Hallucinations     Other reaction(s): Hallucinations  Other reaction(s): Hallucinations     Current Outpatient Medications   Medication Sig Dispense Refill    amLODIPine (NORVASC) 5 MG tablet Take 1 tablet (5 mg total) by mouth 2 (two) times daily. 180 tablet 3    aspirin (ECOTRIN) 81 MG EC tablet Take 81 mg by mouth once daily.      atorvastatin (LIPITOR) 20 MG tablet Take 1 tablet (20 mg total) by mouth once daily. 90 tablet 3    calcium citrate-vitamin D3 315-200 mg (CITRACAL+D) 315 mg-5 mcg (200 unit) per tablet Take 2 tablets by mouth Daily.      clopidogreL (PLAVIX) 75 mg tablet Take 1 tablet (75 mg total) by mouth once daily. 90 tablet 3    cyanocobalamin 500 MCG tablet Take 1 tablet by mouth Daily.      latanoprost 0.005 % ophthalmic solution INSTILL 1 DROP INTO BOTH EYES EVERY NIGHT 10 mL 1    omeprazole (PRILOSEC) 20 MG capsule Take 1 capsule by mouth daily as needed.       terazosin (HYTRIN) 1 MG capsule Take 1 capsule (1 mg total) by mouth every evening. 90 capsule 3    timolol maleate 0.5% (TIMOPTIC) 0.5 % Drop INSTILL 1 DROP INTO BOTH EYES TWICE DAILY 20 mL 1     No current facility-administered medications for this visit.       ROS  Review of Systems   Constitutional:  Negative for activity change, chills and fever.   HENT:  Negative for congestion and hearing loss.    Eyes:  Negative for pain and visual disturbance.   Respiratory:  Negative for cough and shortness of breath.    Cardiovascular:  Negative for chest pain and palpitations.   Gastrointestinal:  Negative for abdominal pain, constipation,  diarrhea, nausea and vomiting.   Endocrine: Negative.    Genitourinary: Negative.    Musculoskeletal:  Negative for arthralgias and myalgias.   Skin: Negative.    Allergic/Immunologic: Negative.    Neurological:  Negative for dizziness, light-headedness and headaches.   Hematological: Negative.        OBJECTIVE     Physical Exam  Vitals:    03/15/23 1455   BP: (!) 143/78   Pulse:    Temp:     Body mass index is 36.3 kg/m².            Physical Exam  Vitals reviewed.   Constitutional:       General: She is not in acute distress.     Appearance: Normal appearance. She is obese.      Comments: Pleasant, conversant elderly female.   HENT:      Head: Normocephalic and atraumatic.      Mouth/Throat:      Mouth: Mucous membranes are moist.      Pharynx: Oropharynx is clear.   Eyes:      Extraocular Movements: Extraocular movements intact.      Conjunctiva/sclera: Conjunctivae normal.      Pupils: Pupils are equal, round, and reactive to light.   Cardiovascular:      Rate and Rhythm: Normal rate and regular rhythm.      Pulses: Normal pulses.      Heart sounds: Normal heart sounds.   Pulmonary:      Effort: Pulmonary effort is normal.      Breath sounds: Normal breath sounds.   Abdominal:      General: Bowel sounds are normal. There is no distension.      Palpations: Abdomen is soft. There is no mass.      Tenderness: There is no abdominal tenderness. There is no guarding.   Musculoskeletal:         General: Normal range of motion.      Cervical back: Normal range of motion and neck supple. No rigidity or tenderness.      Right lower leg: No edema.      Left lower leg: No edema.   Lymphadenopathy:      Cervical: No cervical adenopathy.   Skin:     General: Skin is warm and dry.      Comments: Multiple dark raised hyperpigmented papules with waxy, stuck on appearance scattered on back and on left forearm.   Neurological:      General: No focal deficit present.      Mental Status: She is alert.   Psychiatric:         Mood and  Affect: Mood normal.         Behavior: Behavior normal.         Thought Content: Thought content normal.         Health Maintenance         Date Due Completion Date    Shingles Vaccine (1 of 2) Never done ---    Colonoscopy 07/12/2015 7/12/2012    Diabetes Urine Screening 11/11/2016 11/11/2015    Eye Exam 05/17/2022 5/17/2021    TETANUS VACCINE 09/24/2022 9/24/2012    Hemoglobin A1c 06/07/2023 12/7/2022    Override on 5/22/2018: Done    Lipid Panel 12/07/2023 12/7/2022              ASSESSMENT     89 y.o. female with     1. Establishing care with new doctor, encounter for    2. Essential hypertension    3. Other hyperlipidemia    4. Type 2 diabetes mellitus with stage 3a chronic kidney disease, without long-term current use of insulin    5. CKD stage 3 due to type 2 diabetes mellitus    6. Multiple thyroid nodules    7. Secondary hyperparathyroidism of renal origin    8. Gastroesophageal reflux disease, unspecified whether esophagitis present    9. Crohn's disease of small and large intestines with complication    10. Sleep apnea, unspecified type    11. History of TIA (transient ischemic attack)    12. Diabetic polyneuropathy associated with type 2 diabetes mellitus    13. Pruritus    14. Memory changes    15. Venous insufficiency    16. Seborrheic keratosis        PLAN:     1. Establishing care with new doctor, encounter for  - Prior notes/labs/imaging reviewed.  - Reviewed patient's medical, surgical, family, and social history; chart updated.       2. Essential hypertension  - Mildly elevated. Encouraged to take her Lasix daily.  Rest of blood pressure regimen to stay as is.    3. Other hyperlipidemia  - stable on statin and ASA.  Continue.    4. Type 2 diabetes mellitus with stage 3a chronic kidney disease, without long-term current use of insulin  -diet controlled.  Continue to monitor diet.    5. CKD stage 3 due to type 2 diabetes mellitus  - avoid nephrotoxic agents.      6. Multiple thyroid nodules  - TSH  normal in 12/2021.    7. Secondary hyperparathyroidism of renal origin  - calcium level in 12/2022 was 9.6.     8. Gastroesophageal reflux disease, unspecified whether esophagitis present  - Controlled on current regimen. Continue.     9. Crohn's disease of small and large intestines with complication  - Controlled on current regimen. Continue.     10. Sleep apnea, unspecified type  - not on CPAP, previously tried and was intolerant of.  Symptoms otherwise controlled without treatment.    11. History of TIA (transient ischemic attack)  - neurologically nonfocal on exam today, stable on Plavix and ASA.  Continue.    12. Diabetic polyneuropathy associated with type 2 diabetes mellitus  - no symptoms currently off of medications.  Continue to monitor.    13. Pruritus  - COMPREHENSIVE METABOLIC PANEL; Future  - triamcinolone acetonide 0.1% (KENALOG) 0.1 % ointment; Apply topically 2 (two) times daily.  Dispense: 80 g; Refill: 0  - Ambulatory referral/consult to Dermatology; Future    14. Memory changes  - mood stable.  Trial of Aricept. Counseled patient on safe and effective use of new medication, including common adverse effects. Patient verbalized understanding.   - donepeziL (ARICEPT) 5 MG tablet; Take 1 tablet (5 mg total) by mouth every evening.  Dispense: 90 tablet; Refill: 3    15. Venous insufficiency  - Elevate legs, monitor sodium intake, compression stockings.   - furosemide (LASIX) 20 MG tablet; Take 1 tablet (20 mg total) by mouth once daily.  Dispense: 90 tablet; Refill: 3    16. Seborrheic keratosis  - Ambulatory referral/consult to Dermatology; Future        RTC in 3 months.      Suresh Jeronimo MD  03/15/2023 1:27 PM        No follow-ups on file.

## 2023-03-15 NOTE — PATIENT INSTRUCTIONS
Your diabetes is well controlled, no need to check you blood sugar twice a day. Can check 3 times a week in the morning.     Put on compression stockings in the morning. Elevate feet above heart 4 times a day for 30 minutes at a time. Take Furosemide 20 mg daily to help take fluid off.     Hydroxyzine for skin itching. Can take 3 times a day as needed. Can make you sleepy.     Start Donepezil 5 mg daily for memory.     Referred to Dermatology for itching skin and seborrheic keratosis.

## 2023-03-16 ENCOUNTER — TELEPHONE (OUTPATIENT)
Dept: INTERNAL MEDICINE | Facility: CLINIC | Age: 88
End: 2023-03-16
Payer: MEDICARE

## 2023-03-16 NOTE — TELEPHONE ENCOUNTER
----- Message from Suresh Jeronimo MD sent at 3/16/2023 10:14 AM CDT -----  Please call patient and let her know that her metabolic panel was normal.     Thanks,   TP

## 2023-03-17 NOTE — TELEPHONE ENCOUNTER
Spoke to patient's son and advised of lab results.  Son verbalized understanding, and has no further questions.

## 2023-05-05 DIAGNOSIS — H40.003 GLAUCOMA SUSPECT, BILATERAL: ICD-10-CM

## 2023-05-05 RX ORDER — LATANOPROST 50 UG/ML
SOLUTION/ DROPS OPHTHALMIC
Qty: 7.5 ML | Refills: 0 | Status: SHIPPED | OUTPATIENT
Start: 2023-05-05 | End: 2023-09-20

## 2023-05-08 ENCOUNTER — CLINICAL SUPPORT (OUTPATIENT)
Dept: OPHTHALMOLOGY | Facility: CLINIC | Age: 88
End: 2023-05-08
Payer: MEDICARE

## 2023-05-08 ENCOUNTER — OFFICE VISIT (OUTPATIENT)
Dept: OPHTHALMOLOGY | Facility: CLINIC | Age: 88
End: 2023-05-08
Payer: MEDICARE

## 2023-05-08 DIAGNOSIS — H04.123 DRY EYE SYNDROME, BILATERAL: ICD-10-CM

## 2023-05-08 DIAGNOSIS — Z96.1 PSEUDOPHAKIA: ICD-10-CM

## 2023-05-08 DIAGNOSIS — H52.7 REFRACTIVE ERROR: ICD-10-CM

## 2023-05-08 DIAGNOSIS — H26.493 PCO (POSTERIOR CAPSULAR OPACIFICATION), BILATERAL: ICD-10-CM

## 2023-05-08 DIAGNOSIS — E11.9 DM TYPE 2 WITHOUT RETINOPATHY: ICD-10-CM

## 2023-05-08 DIAGNOSIS — H40.1121 PRIMARY OPEN ANGLE GLAUCOMA (POAG) OF LEFT EYE, MILD STAGE: Primary | ICD-10-CM

## 2023-05-08 PROCEDURE — 92133 POSTERIOR SEGMENT OCT OPTIC NERVE(OCULAR COHERENCE TOMOGRAPHY) - OU - BOTH EYES: ICD-10-PCS | Mod: HCNC,S$GLB,, | Performed by: OPHTHALMOLOGY

## 2023-05-08 PROCEDURE — 1100F PTFALLS ASSESS-DOCD GE2>/YR: CPT | Mod: HCNC,CPTII,S$GLB, | Performed by: OPHTHALMOLOGY

## 2023-05-08 PROCEDURE — 1159F PR MEDICATION LIST DOCUMENTED IN MEDICAL RECORD: ICD-10-PCS | Mod: HCNC,CPTII,S$GLB, | Performed by: OPHTHALMOLOGY

## 2023-05-08 PROCEDURE — 1159F MED LIST DOCD IN RCRD: CPT | Mod: HCNC,CPTII,S$GLB, | Performed by: OPHTHALMOLOGY

## 2023-05-08 PROCEDURE — 2023F DILAT RTA XM W/O RTNOPTHY: CPT | Mod: HCNC,CPTII,S$GLB, | Performed by: OPHTHALMOLOGY

## 2023-05-08 PROCEDURE — 92083 HUMPHREY VISUAL FIELD - OU - BOTH EYES: ICD-10-PCS | Mod: HCNC,S$GLB,, | Performed by: OPHTHALMOLOGY

## 2023-05-08 PROCEDURE — 3288F FALL RISK ASSESSMENT DOCD: CPT | Mod: HCNC,CPTII,S$GLB, | Performed by: OPHTHALMOLOGY

## 2023-05-08 PROCEDURE — 2023F PR DILATED RETINAL EXAM W/O EVID OF RETINOPATHY: ICD-10-PCS | Mod: HCNC,CPTII,S$GLB, | Performed by: OPHTHALMOLOGY

## 2023-05-08 PROCEDURE — 99499 RISK ADDL DX/OHS AUDIT: ICD-10-PCS | Mod: S$GLB,,, | Performed by: OPHTHALMOLOGY

## 2023-05-08 PROCEDURE — 1160F PR REVIEW ALL MEDS BY PRESCRIBER/CLIN PHARMACIST DOCUMENTED: ICD-10-PCS | Mod: HCNC,CPTII,S$GLB, | Performed by: OPHTHALMOLOGY

## 2023-05-08 PROCEDURE — 92014 COMPRE OPH EXAM EST PT 1/>: CPT | Mod: HCNC,S$GLB,, | Performed by: OPHTHALMOLOGY

## 2023-05-08 PROCEDURE — 92133 CPTRZD OPH DX IMG PST SGM ON: CPT | Mod: HCNC,S$GLB,, | Performed by: OPHTHALMOLOGY

## 2023-05-08 PROCEDURE — 1100F PR PT FALLS ASSESS DOC 2+ FALLS/FALL W/INJURY/YR: ICD-10-PCS | Mod: HCNC,CPTII,S$GLB, | Performed by: OPHTHALMOLOGY

## 2023-05-08 PROCEDURE — 99499 UNLISTED E&M SERVICE: CPT | Mod: S$GLB,,, | Performed by: OPHTHALMOLOGY

## 2023-05-08 PROCEDURE — 92014 PR EYE EXAM, EST PATIENT,COMPREHESV: ICD-10-PCS | Mod: HCNC,S$GLB,, | Performed by: OPHTHALMOLOGY

## 2023-05-08 PROCEDURE — 99999 PR PBB SHADOW E&M-EST. PATIENT-LVL III: CPT | Mod: PBBFAC,HCNC,, | Performed by: OPHTHALMOLOGY

## 2023-05-08 PROCEDURE — 99999 PR PBB SHADOW E&M-EST. PATIENT-LVL III: ICD-10-PCS | Mod: PBBFAC,HCNC,, | Performed by: OPHTHALMOLOGY

## 2023-05-08 PROCEDURE — 1126F PR PAIN SEVERITY QUANTIFIED, NO PAIN PRESENT: ICD-10-PCS | Mod: HCNC,CPTII,S$GLB, | Performed by: OPHTHALMOLOGY

## 2023-05-08 PROCEDURE — 3288F PR FALLS RISK ASSESSMENT DOCUMENTED: ICD-10-PCS | Mod: HCNC,CPTII,S$GLB, | Performed by: OPHTHALMOLOGY

## 2023-05-08 PROCEDURE — 92083 EXTENDED VISUAL FIELD XM: CPT | Mod: HCNC,S$GLB,, | Performed by: OPHTHALMOLOGY

## 2023-05-08 PROCEDURE — 1160F RVW MEDS BY RX/DR IN RCRD: CPT | Mod: HCNC,CPTII,S$GLB, | Performed by: OPHTHALMOLOGY

## 2023-05-08 PROCEDURE — 1126F AMNT PAIN NOTED NONE PRSNT: CPT | Mod: HCNC,CPTII,S$GLB, | Performed by: OPHTHALMOLOGY

## 2023-05-08 NOTE — PROGRESS NOTES
VERY LATE FOR HVF APPT     OCT DONE OU      POOR FIXATION WITH OS     24-2  SF DONE OU     REL & FIX =   POOR OU       COOP=       POOR     PATIENT HAS NO ALLERGIES TO LATEX OR ADHESIVES AT THIS TIME    PATIENT HAD POOR FIXATION WITH OU   PATIENT KEPT MOVING HEAD AROUND   ALSO WAS FOLLOWING FLASHING LIGHTS   WITH OU PAUSED TEST SEVERAL TIME ON   BOTH EYES TO GO OVER INSTRUCTIONS   FOR TEST TODAY       JTHOMAS    MRX    +.25      OD     +.75 + .75 X  68  OS

## 2023-05-08 NOTE — PROGRESS NOTES
"Subjective:       Patient ID: Calli Jamison is a 89 y.o. female.    Chief Complaint: Glaucoma (Patient Calli Jamison is a 89 year old female.) and Diabetic Eye Exam    HPI     Glaucoma     Additional comments: Patient Calli Jamison is a 89 year old female.           Comments    Pt here for annual glaucoma and diabetic eye exam.  Pt states that distance and near [facility] is good in glasses.  Pt states that her eyes "are aware" that gtts are being put in, possibly   due to dry eyes.  Pt states that she has been using her drops as directed but may sometimes   miss using drops..  Patient denies diplopia, headaches, flashes/floaters, and pain.    DLS: 05/17/2021 with Dr. Head    Gtts:  1. Latanoprost qhs OU, needs refills  2. Timolol 0.5% BID OU, needs refills    POHx:  1. Open angle with borderline findings and high glaucoma risk in both eyes     2. Glaucoma suspect of both eyes   3. PCO (posterior capsular opacification), bilateral   4. Dry eye syndrome, bilateral   5. Pseudophakia     (+)DM  Hemoglobin A1C       Date                     Value               Ref Range             Status                12/07/2022               6.0 (H)             4.0 - 5.6 %           Final                      12/13/2021               6.0 (H)             4.0 - 5.6 %           Final                      08/09/2021               6.0 (H)             4.0 - 5.6 %           Final                   08/09/2021               6.0 (H)             4.0 - 5.6 %           Final                    Last edited by Allison Wren on 5/8/2023 10:11 AM.             Assessment:       1. Primary open angle glaucoma (POAG) of left eye, mild stage    2. PCO (posterior capsular opacification), bilateral    3. Dry eye syndrome, bilateral    4. DM type 2 without retinopathy    5. Refractive error    6. Pseudophakia        Plan:       Mild POAG OS-IOP's are acceptable OU. ON's appear stable OU. OCT's are WNL's OD & abnl OS with mild worsening OS. " HVF's are unreliable OU.  Early PCO OU- Not Visually Significant.     TRISTON-Stable OU.  DM-No NPDR OU.  RE-Pt does not want MRx.      CPM OU.  AT's.  Control DM.  RTC 6 mos for IOP's.

## 2023-05-28 NOTE — TELEPHONE ENCOUNTER
No care due was identified.  [facility] Embedded Care Due Messages. Reference number: [id number].   5/28/2023 12:36:00 AM CDT

## 2023-05-29 NOTE — TELEPHONE ENCOUNTER
Refill Routing Note   Medication(s) are not appropriate for processing by [facility] for the following reason(s):      New or recently adjusted medication    [facility] action(s):  Defer Care Due:  None identified          Appointments  past 12m or future 3m with PCP    Date Provider   Last Visit   3/15/2023 Suresh Jeronimo MD   Next Visit   6/14/2023 Suresh Jeronimo MD   ED visits in past 90 days: 0        Note composed:8:23 AM 05/29/2023

## 2023-05-30 RX ORDER — POTASSIUM CHLORIDE 750 MG/1
CAPSULE, EXTENDED RELEASE ORAL
Qty: 90 CAPSULE | Refills: 3 | Status: SHIPPED | OUTPATIENT
Start: 2023-05-30

## 2023-06-14 ENCOUNTER — LAB VISIT (OUTPATIENT)
Dept: LAB | Facility: HOSPITAL | Age: 88
End: 2023-06-14
Attending: STUDENT IN AN ORGANIZED HEALTH CARE EDUCATION/TRAINING PROGRAM
Payer: MEDICARE

## 2023-06-14 ENCOUNTER — OFFICE VISIT (OUTPATIENT)
Dept: INTERNAL MEDICINE | Facility: CLINIC | Age: 88
End: 2023-06-14
Payer: MEDICARE

## 2023-06-14 VITALS
BODY MASS INDEX: 35.23 KG/M2 | OXYGEN SATURATION: 95 % | SYSTOLIC BLOOD PRESSURE: 138 MMHG | WEIGHT: 179.44 LBS | DIASTOLIC BLOOD PRESSURE: 72 MMHG | HEIGHT: 60 IN | TEMPERATURE: 99 F | HEART RATE: 91 BPM

## 2023-06-14 DIAGNOSIS — E55.9 VITAMIN D DEFICIENCY: ICD-10-CM

## 2023-06-14 DIAGNOSIS — R60.0 BILATERAL LOWER EXTREMITY EDEMA: Primary | ICD-10-CM

## 2023-06-14 DIAGNOSIS — N18.31 TYPE 2 DIABETES MELLITUS WITH STAGE 3A CHRONIC KIDNEY DISEASE, WITHOUT LONG-TERM CURRENT USE OF INSULIN: ICD-10-CM

## 2023-06-14 DIAGNOSIS — E11.22 TYPE 2 DIABETES MELLITUS WITH STAGE 3A CHRONIC KIDNEY DISEASE, WITHOUT LONG-TERM CURRENT USE OF INSULIN: ICD-10-CM

## 2023-06-14 DIAGNOSIS — I10 ESSENTIAL HYPERTENSION: ICD-10-CM

## 2023-06-14 DIAGNOSIS — R04.0 EPISTAXIS: ICD-10-CM

## 2023-06-14 DIAGNOSIS — R41.3 MEMORY CHANGES: ICD-10-CM

## 2023-06-14 DIAGNOSIS — R41.3 OTHER AMNESIA: ICD-10-CM

## 2023-06-14 LAB
ANION GAP SERPL CALC-SCNC: 10 MMOL/L (ref 8–16)
BUN SERPL-MCNC: 9 MG/DL (ref 10–30)
CALCIUM SERPL-MCNC: 10.3 MG/DL (ref 8.7–10.5)
CHLORIDE SERPL-SCNC: 102 MMOL/L (ref 95–110)
CO2 SERPL-SCNC: 27 MMOL/L (ref 23–29)
CREAT SERPL-MCNC: 0.9 MG/DL (ref 0.5–1.4)
EST. GFR  (NO RACE VARIABLE): 60 ML/MIN/1.73 M^2
ESTIMATED AVG GLUCOSE: 120 MG/DL (ref 68–131)
FOLATE SERPL-MCNC: 7.3 NG/ML (ref 4–24)
GLUCOSE SERPL-MCNC: 90 MG/DL (ref 70–110)
HBA1C MFR BLD: 5.8 % (ref 4–5.6)
POTASSIUM SERPL-SCNC: 3.5 MMOL/L (ref 3.5–5.1)
SODIUM SERPL-SCNC: 139 MMOL/L (ref 136–145)
T4 FREE SERPL-MCNC: 0.85 NG/DL (ref 0.71–1.51)
TSH SERPL DL<=0.005 MIU/L-ACNC: 2.38 UIU/ML (ref 0.4–4)
VIT B12 SERPL-MCNC: 619 PG/ML (ref 210–950)

## 2023-06-14 PROCEDURE — 99999 PR PBB SHADOW E&M-EST. PATIENT-LVL V: CPT | Mod: PBBFAC,,, | Performed by: STUDENT IN AN ORGANIZED HEALTH CARE EDUCATION/TRAINING PROGRAM

## 2023-06-14 PROCEDURE — 1159F PR MEDICATION LIST DOCUMENTED IN MEDICAL RECORD: ICD-10-PCS | Mod: CPTII,S$GLB,, | Performed by: STUDENT IN AN ORGANIZED HEALTH CARE EDUCATION/TRAINING PROGRAM

## 2023-06-14 PROCEDURE — 83036 HEMOGLOBIN GLYCOSYLATED A1C: CPT | Performed by: STUDENT IN AN ORGANIZED HEALTH CARE EDUCATION/TRAINING PROGRAM

## 2023-06-14 PROCEDURE — 84439 ASSAY OF FREE THYROXINE: CPT | Performed by: STUDENT IN AN ORGANIZED HEALTH CARE EDUCATION/TRAINING PROGRAM

## 2023-06-14 PROCEDURE — 82652 VIT D 1 25-DIHYDROXY: CPT | Performed by: STUDENT IN AN ORGANIZED HEALTH CARE EDUCATION/TRAINING PROGRAM

## 2023-06-14 PROCEDURE — 86592 SYPHILIS TEST NON-TREP QUAL: CPT | Performed by: STUDENT IN AN ORGANIZED HEALTH CARE EDUCATION/TRAINING PROGRAM

## 2023-06-14 PROCEDURE — 82746 ASSAY OF FOLIC ACID SERUM: CPT | Performed by: STUDENT IN AN ORGANIZED HEALTH CARE EDUCATION/TRAINING PROGRAM

## 2023-06-14 PROCEDURE — 80048 BASIC METABOLIC PNL TOTAL CA: CPT | Performed by: STUDENT IN AN ORGANIZED HEALTH CARE EDUCATION/TRAINING PROGRAM

## 2023-06-14 PROCEDURE — 1159F MED LIST DOCD IN RCRD: CPT | Mod: CPTII,S$GLB,, | Performed by: STUDENT IN AN ORGANIZED HEALTH CARE EDUCATION/TRAINING PROGRAM

## 2023-06-14 PROCEDURE — 1126F PR PAIN SEVERITY QUANTIFIED, NO PAIN PRESENT: ICD-10-PCS | Mod: CPTII,S$GLB,, | Performed by: STUDENT IN AN ORGANIZED HEALTH CARE EDUCATION/TRAINING PROGRAM

## 2023-06-14 PROCEDURE — 99214 PR OFFICE/OUTPT VISIT, EST, LEVL IV, 30-39 MIN: ICD-10-PCS | Mod: S$GLB,,, | Performed by: STUDENT IN AN ORGANIZED HEALTH CARE EDUCATION/TRAINING PROGRAM

## 2023-06-14 PROCEDURE — 1160F PR REVIEW ALL MEDS BY PRESCRIBER/CLIN PHARMACIST DOCUMENTED: ICD-10-PCS | Mod: CPTII,S$GLB,, | Performed by: STUDENT IN AN ORGANIZED HEALTH CARE EDUCATION/TRAINING PROGRAM

## 2023-06-14 PROCEDURE — 84425 ASSAY OF VITAMIN B-1: CPT | Performed by: STUDENT IN AN ORGANIZED HEALTH CARE EDUCATION/TRAINING PROGRAM

## 2023-06-14 PROCEDURE — 99499 UNLISTED E&M SERVICE: CPT | Mod: HCNC,S$GLB,, | Performed by: STUDENT IN AN ORGANIZED HEALTH CARE EDUCATION/TRAINING PROGRAM

## 2023-06-14 PROCEDURE — 99214 OFFICE O/P EST MOD 30 MIN: CPT | Mod: S$GLB,,, | Performed by: STUDENT IN AN ORGANIZED HEALTH CARE EDUCATION/TRAINING PROGRAM

## 2023-06-14 PROCEDURE — 1126F AMNT PAIN NOTED NONE PRSNT: CPT | Mod: CPTII,S$GLB,, | Performed by: STUDENT IN AN ORGANIZED HEALTH CARE EDUCATION/TRAINING PROGRAM

## 2023-06-14 PROCEDURE — 84443 ASSAY THYROID STIM HORMONE: CPT | Performed by: STUDENT IN AN ORGANIZED HEALTH CARE EDUCATION/TRAINING PROGRAM

## 2023-06-14 PROCEDURE — 82607 VITAMIN B-12: CPT | Performed by: STUDENT IN AN ORGANIZED HEALTH CARE EDUCATION/TRAINING PROGRAM

## 2023-06-14 PROCEDURE — 99999 PR PBB SHADOW E&M-EST. PATIENT-LVL V: ICD-10-PCS | Mod: PBBFAC,,, | Performed by: STUDENT IN AN ORGANIZED HEALTH CARE EDUCATION/TRAINING PROGRAM

## 2023-06-14 PROCEDURE — 99499 RISK ADDL DX/OHS AUDIT: ICD-10-PCS | Mod: HCNC,S$GLB,, | Performed by: STUDENT IN AN ORGANIZED HEALTH CARE EDUCATION/TRAINING PROGRAM

## 2023-06-14 PROCEDURE — 1160F RVW MEDS BY RX/DR IN RCRD: CPT | Mod: CPTII,S$GLB,, | Performed by: STUDENT IN AN ORGANIZED HEALTH CARE EDUCATION/TRAINING PROGRAM

## 2023-06-14 NOTE — PROGRESS NOTES
SUBJECTIVE     Chief Complaint   Patient presents with    Follow-up       HPI  Calli KARRI Jamison is a 89 y.o. female with  Afib s/p loop recorder, TIA, lumbar spinal stenosis, T2DM (12/2022 HgbA1C 6.0), adjustment disorder, glaucoma, pemphigoid, asthma, COPD, HLD, HTN, CKD IIIa, Fe deficiency, secondary HPT, obesity, GERD, esophageal stricture, thyroid nodules, Crohn's disease, LULY, venous insufficiency    that presents for follow-up. LOV 3/15/23.     Patient accompanied by her son today.    Patient started on donepezil for memory during last visit.  Has not noted any improvement.  Son does not note any improvement either.  Patient's feels as though she is more disorganized.  MRI in 10/2019 showing chronic microvascular ischemic changes, senescent changes.    Complaints of epistaxis; 3 episodes less than 5 minutes resolved with pressure. Picks nose frequently, has dry nares.     Concerned about swelling in legs. Somewhat improved with Furosemide 20 mg qd. Worse at end of the day. No chest pain, SOB, orthopnea.     PAST MEDICAL HISTORY:  Past Medical History:   Diagnosis Date    Abnormal Pap smear     After-cataract, unspecified - Both Eyes 11/5/2012    Arthritis     ASCUS (atypical squamous cells of undetermined significance) on Pap smear 7/1/2012    Asthma     Atrial fibrillation     Back pain 2009    Bullous pemphigoid     Carpal tunnel syndrome     Chronic back pain     COPD (chronic obstructive pulmonary disease)     Coronary artery disease     Crohn's disease     Depression     Diabetes mellitus     Discoid lupus     Diverticulosis     History of migraine headaches     Hyperlipidemia     Hypertension     Multiple thyroid nodules     Obesity     Open angle with borderline findings and high glaucoma risk in both eyes 9/11/2019    Personal history of colonic polyps     Pulmonary nodule     Renal manifestation of secondary diabetes mellitus     Restless legs syndrome     Sciatica     Right leg    Sleep apnea      Stricture and stenosis of esophagus     Stroke     TIA (transient ischemic attack)        PAST SURGICAL HISTORY:  Past Surgical History:   Procedure Laterality Date    BREAST BIOPSY Left     Breast: wire localization  1/2004    CATARACT EXTRACTION W/  INTRAOCULAR LENS IMPLANT Bilateral     Cataract left eye  1/2008    Cataract: right eye  1/2008    COLON SURGERY      ileocecectomy 2011    COLONOSCOPY  9/30/2008    Crohns in remission    ESOPHAGOGASTRODUODENOSCOPY N/A 7/24/2018    Procedure: EGD (ESOPHAGOGASTRODUODENOSCOPY);  Surgeon: Du Robles MD;  Location: [facility] (4TH FLR);  Service: Endoscopy;  Laterality: N/A;  loop recoreder-battery dead per pt. (not active). corona dpt. to move to morning . unable to do so.EC    ESOPHAGOGASTRODUODENOSCOPY N/A 8/1/2022    Procedure: EGD (ESOPHAGOGASTRODUODENOSCOPY);  Surgeon: Du Robles MD;  Location: [facility] (4TH FLR);  Service: Endoscopy;  Laterality: N/A;  plavix hold ok see te 7/8-tb-clears 4 hrs prior-inst mail and verbal to son abdiaziz-tb.Fully vaccinated.EC    HYSTERECTOMY      ovaries remain    Iliocolic Crohns Disease with stricure  3/2011    LA iliocecectomy    Laparoscopic-assisted ileocecectomy.       Left carpal tunnel      Left carpal tunnel surgery      Rectal fistulae repair      TONSILLECTOMY      UPPER GASTROINTESTINAL ENDOSCOPY  11/2011    hiatal hernia, benign appearing esophageal  stricture dilated  prn repeat rec.       FAMILY HISTORY:  Family History   Problem Relation Age of Onset    Pneumonia Father     Cataracts Mother     Macular degeneration Mother     Hypertension Sister     Alkaptonuria Maternal Grandfather     Asthma Son     No Known Problems Son     No Known Problems Son     No Known Problems Son     Crohn's disease Neg Hx     Ulcerative colitis Neg Hx     Inflammatory bowel disease Neg Hx     Amblyopia Neg Hx     Blindness Neg Hx     Glaucoma Neg Hx     Retinal detachment Neg Hx     Strabismus Neg Hx        ALLERGIES AND MEDICATIONS:  updated and reviewed.  Review of patient's allergies indicates:   Allergen Reactions    Oxycodone Other (See Comments)    Oxycodone hcl-oxycodone-asa Hallucinations     Other reaction(s): Hallucinations  Other reaction(s): Hallucinations     Current Outpatient Medications   Medication Sig Dispense Refill    amLODIPine (NORVASC) 5 MG tablet Take 1 tablet (5 mg total) by mouth 2 (two) times daily. 180 tablet 3    aspirin (ECOTRIN) 81 MG EC tablet Take 81 mg by mouth once daily.      atorvastatin (LIPITOR) 20 MG tablet Take 1 tablet (20 mg total) by mouth once daily. 90 tablet 3    calcium citrate-vitamin D3 315-200 mg (CITRACAL+D) 315 mg-5 mcg (200 unit) per tablet Take 2 tablets by mouth Daily.      clopidogreL (PLAVIX) 75 mg tablet Take 1 tablet (75 mg total) by mouth once daily. 90 tablet 3    cyanocobalamin 500 MCG tablet Take 1 tablet by mouth Daily.      donepeziL (ARICEPT) 5 MG tablet Take 1 tablet (5 mg total) by mouth every evening. 90 tablet 3    furosemide (LASIX) 20 MG tablet Take 1 tablet (20 mg total) by mouth once daily. 90 tablet 3    latanoprost 0.005 % ophthalmic solution INSTILL 1 DROP INTO BOTH EYES EVERY NIGHT 7.5 mL 0    omeprazole (PRILOSEC) 20 MG capsule Take 1 capsule by mouth daily as needed.       potassium chloride (MICRO-K) 10 MEQ CpSR OPEN 1 CAPSULE AND SPRINKLE OVER UNHEATED SOFT FOOD (APPLESAUCE) AND TAKE EVERY DAY. DO NOT CHEW OR CRUSH 90 capsule 3    terazosin (HYTRIN) 1 MG capsule Take 1 capsule (1 mg total) by mouth every evening. 90 capsule 3    timolol maleate 0.5% (TIMOPTIC) 0.5 % Drop INSTILL 1 DROP INTO BOTH EYES TWICE DAILY 20 mL 1    triamcinolone acetonide 0.1% (KENALOG) 0.1 % ointment Apply topically 2 (two) times daily. 80 g 0     No current facility-administered medications for this visit.       ROS  Review of Systems   Constitutional:  Negative for activity change, chills and fever.   HENT:  Negative for congestion and hearing loss.    Eyes:  Negative for pain and visual  disturbance.   Respiratory:  Negative for cough and shortness of breath.    Cardiovascular:  Positive for leg swelling. Negative for chest pain and palpitations.   Gastrointestinal:  Negative for abdominal pain, constipation, diarrhea, nausea and vomiting.   Endocrine: Negative.    Genitourinary: Negative.    Musculoskeletal:  Negative for arthralgias and myalgias.   Skin: Negative.    Allergic/Immunologic: Negative.    Neurological:  Negative for dizziness, light-headedness and headaches.   Hematological: Negative.        OBJECTIVE     Physical Exam  Vitals:    06/14/23 1457   BP: 138/72   Pulse: 91   Temp: 99 °F (37.2 °C)    Body mass index is 35.05 kg/m².  Weight: 81.4 kg (179 lb 7.3 oz)   Height: 5' (152.4 cm)     Physical Exam  Vitals reviewed.   Constitutional:       General: She is not in acute distress.     Appearance: Normal appearance.   HENT:      Head: Normocephalic and atraumatic.      Mouth/Throat:      Mouth: Mucous membranes are moist.      Pharynx: Oropharynx is clear.   Eyes:      Extraocular Movements: Extraocular movements intact.      Conjunctiva/sclera: Conjunctivae normal.      Pupils: Pupils are equal, round, and reactive to light.   Cardiovascular:      Rate and Rhythm: Normal rate and regular rhythm.      Pulses: Normal pulses.      Heart sounds: Normal heart sounds.   Pulmonary:      Effort: Pulmonary effort is normal.      Breath sounds: Normal breath sounds.   Abdominal:      General: Bowel sounds are normal. There is no distension.      Palpations: Abdomen is soft. There is no mass.      Tenderness: There is no abdominal tenderness. There is no guarding.   Musculoskeletal:         General: Normal range of motion.      Cervical back: Normal range of motion and neck supple. No rigidity or tenderness.      Right lower leg: No edema.      Left lower leg: No edema.   Lymphadenopathy:      Cervical: No cervical adenopathy.   Skin:     General: Skin is warm and dry.   Neurological:       General: No focal deficit present.      Mental Status: She is alert.   Psychiatric:         Mood and Affect: Mood normal.         Behavior: Behavior normal.         Health Maintenance         Date Due Completion Date    Shingles Vaccine (1 of 2) Never done ---    Colonoscopy 07/12/2015 7/12/2012    Diabetes Urine Screening 11/11/2016 11/11/2015    TETANUS VACCINE 09/24/2022 9/24/2012    COVID-19 Vaccine (6 - Pfizer series) 01/28/2023 9/28/2022    Hemoglobin A1c 06/07/2023 12/7/2022    Override on 5/22/2018: Done    Lipid Panel 12/07/2023 12/7/2022    Eye Exam 05/08/2024 5/8/2023              ASSESSMENT     89 y.o. female with     1. Bilateral lower extremity edema    2. Epistaxis    3. Essential hypertension    4. Memory changes    5. Vitamin D deficiency    6. Other amnesia    7. Type 2 diabetes mellitus with stage 3a chronic kidney disease, without long-term current use of insulin        PLAN:     1. Bilateral lower extremity edema  - Continue Lasix PRN.   - Wear compression stockings. Elevate legs PRN.   - US Lower Extremity Veins Bilateral; Future    2. Epistaxis  - Avoid picking at nose.   - Saline nasal spray recommended.     3. Essential hypertension  - Controlled on current regimen. Continue.   - BASIC METABOLIC PANEL; Future    4. Memory changes  - Ambulatory referral/consult to Neuropsychology; Future  - Vitamin B1; Future  - Vitamin B12; Future  - Folate; Future  - TSH; Future  - T4, Free; Future  - RPR; Future    5. Vitamin D deficiency  - Calcitriol; Future    6. Other amnesia  - MRI Brain Without Contrast; Future    7. Type 2 diabetes mellitus with stage 3a chronic kidney disease, without long-term current use of insulin  - Stable on current regimen. Continue.   - HEMOGLOBIN A1C; Future  - Microalbumin/Creatinine Ratio, Urine; Future        RTC in 3 months.      Suresh Jeronimo MD  Family Medicine  [facility]  06/26/2023    This document was created using voice  recognition software (M*Modal Fluency Direct). Although it may be edited, this document may contain errors related to incorrect recognition of the spoken word. Please call the physician if clarification is needed.           No follow-ups on file.

## 2023-06-15 LAB — RPR SER QL: NORMAL

## 2023-06-19 LAB — 1,25(OH)2D3 SERPL-MCNC: 93 PG/ML (ref 20–79)

## 2023-06-20 LAB — VIT B1 BLD-MCNC: 59 UG/L (ref 38–122)

## 2023-06-21 ENCOUNTER — LAB VISIT (OUTPATIENT)
Dept: LAB | Facility: HOSPITAL | Age: 88
End: 2023-06-21
Attending: STUDENT IN AN ORGANIZED HEALTH CARE EDUCATION/TRAINING PROGRAM
Payer: MEDICARE

## 2023-06-21 DIAGNOSIS — E11.22 TYPE 2 DIABETES MELLITUS WITH STAGE 3A CHRONIC KIDNEY DISEASE, WITHOUT LONG-TERM CURRENT USE OF INSULIN: ICD-10-CM

## 2023-06-21 DIAGNOSIS — N18.31 TYPE 2 DIABETES MELLITUS WITH STAGE 3A CHRONIC KIDNEY DISEASE, WITHOUT LONG-TERM CURRENT USE OF INSULIN: ICD-10-CM

## 2023-06-21 LAB
ALBUMIN/CREAT UR: 42.9 UG/MG (ref 0–30)
CREAT UR-MCNC: 63 MG/DL (ref 15–325)
MICROALBUMIN UR DL<=1MG/L-MCNC: 27 UG/ML

## 2023-06-21 PROCEDURE — 82570 ASSAY OF URINE CREATININE: CPT | Performed by: STUDENT IN AN ORGANIZED HEALTH CARE EDUCATION/TRAINING PROGRAM

## 2023-07-04 NOTE — TELEPHONE ENCOUNTER
----- Message from Bhavna Agarwal MA sent at 5/28/2020  2:31 PM CDT -----  Contact: Self       ----- Message -----  From: Yoly [place]  Sent: 5/28/2020   1:35 PM CDT  To: [facility] Staff    Pt is requesting a call regarding rx. Pt states she has problems swallowing metformin. Please advise.  
Called pt, no answer, left message that she can break metformin in half and to try to take with room temperature water.    I will check back with pt in morning.   
Pt wants to know can something else be prescribed to replace the metformin. Pt has trouble swallowing medication.  
The pill may be broken in half.  Is she able to take it if she breaks it in half.  
bed mobility training/gait training/ROM/strengthening/transfer training

## 2023-07-12 DIAGNOSIS — H40.023 OPEN ANGLE WITH BORDERLINE FINDINGS AND HIGH GLAUCOMA RISK IN BOTH EYES: ICD-10-CM

## 2023-07-12 RX ORDER — TIMOLOL MALEATE 5 MG/ML
SOLUTION/ DROPS OPHTHALMIC
Qty: 25 ML | Refills: 0 | Status: SHIPPED | OUTPATIENT
Start: 2023-07-12 | End: 2023-12-04

## 2023-09-20 DIAGNOSIS — H40.003 GLAUCOMA SUSPECT, BILATERAL: ICD-10-CM

## 2023-09-20 RX ORDER — LATANOPROST 50 UG/ML
SOLUTION/ DROPS OPHTHALMIC
Qty: 7.5 ML | Refills: 2 | Status: SHIPPED | OUTPATIENT
Start: 2023-09-20

## 2023-10-02 DIAGNOSIS — L29.9 PRURITUS: ICD-10-CM

## 2023-10-02 RX ORDER — TRIAMCINOLONE ACETONIDE 1 MG/G
OINTMENT TOPICAL
Qty: 80 G | Refills: 1 | Status: SHIPPED | OUTPATIENT
Start: 2023-10-02 | End: 2023-12-29

## 2023-10-02 NOTE — TELEPHONE ENCOUNTER
Refill Decision Note   Calli Jamison  is requesting a refill authorization.  Brief Assessment and Rationale for Refill:  Approve     Medication Therapy Plan:         Comments:     Note composed:1:02 PM 10/02/2023

## 2023-10-02 NOTE — TELEPHONE ENCOUNTER
No care due was identified.  [facility] Embedded Care Due Messages. Reference number: [id number].   10/02/2023 11:16:09 AM CDT

## 2023-12-03 DIAGNOSIS — H40.023 OPEN ANGLE WITH BORDERLINE FINDINGS AND HIGH GLAUCOMA RISK IN BOTH EYES: ICD-10-CM

## 2023-12-04 RX ORDER — TIMOLOL MALEATE 5 MG/ML
SOLUTION/ DROPS OPHTHALMIC
Qty: 20 ML | Refills: 1 | Status: SHIPPED | OUTPATIENT
Start: 2023-12-04 | End: 2024-02-22

## 2023-12-13 ENCOUNTER — HOSPITAL ENCOUNTER (OUTPATIENT)
Dept: RADIOLOGY | Facility: HOSPITAL | Age: 88
Discharge: HOME | End: 2023-12-13
Attending: STUDENT IN AN ORGANIZED HEALTH CARE EDUCATION/TRAINING PROGRAM
Payer: MEDICARE

## 2023-12-13 DIAGNOSIS — R60.0 BILATERAL LOWER EXTREMITY EDEMA: ICD-10-CM

## 2023-12-13 PROCEDURE — 93970 US LOWER EXTREMITY VEINS BILATERAL: ICD-10-PCS | Mod: 26,HCNC,, | Performed by: RADIOLOGY

## 2023-12-13 PROCEDURE — 93970 EXTREMITY STUDY: CPT | Mod: 26,HCNC,, | Performed by: RADIOLOGY

## 2023-12-13 PROCEDURE — 93970 EXTREMITY STUDY: CPT | Mod: TC,HCNC

## 2023-12-20 ENCOUNTER — TELEPHONE (OUTPATIENT)
Dept: INTERNAL MEDICINE | Facility: CLINIC | Age: 88
End: 2023-12-20
Payer: MEDICARE

## 2023-12-20 ENCOUNTER — HOSPITAL ENCOUNTER (OUTPATIENT)
Dept: RADIOLOGY | Facility: HOSPITAL | Age: 88
Discharge: HOME | End: 2023-12-20
Attending: STUDENT IN AN ORGANIZED HEALTH CARE EDUCATION/TRAINING PROGRAM
Payer: MEDICARE

## 2023-12-20 DIAGNOSIS — R41.3 OTHER AMNESIA: ICD-10-CM

## 2023-12-20 PROCEDURE — 70551 MRI BRAIN STEM W/O DYE: CPT | Mod: TC,HCNC

## 2023-12-20 PROCEDURE — 70551 MRI BRAIN WITHOUT CONTRAST: ICD-10-PCS | Mod: 26,HCNC,, | Performed by: RADIOLOGY

## 2023-12-20 PROCEDURE — 70551 MRI BRAIN STEM W/O DYE: CPT | Mod: 26,HCNC,, | Performed by: RADIOLOGY

## 2023-12-20 NOTE — TELEPHONE ENCOUNTER
----- Message from Suresh Jeronimo MD sent at 12/20/2023  2:36 PM CST -----  Regarding: MRI results  Please call patient and let her know that her MRI of the brain showed some progression of the ischemic changes noted on last MRI. These could lead to memory changes--mainly vascular dementia. I recommend continuing the Atorvastatin. Can have her see a cognitive neurologist if she is interested.     Let me know if she has any questions.     ----- Message -----  From: Dayami, Rad Results In  Sent: 12/20/2023   2:26 PM CST  To: Suresh Jeronimo MD

## 2023-12-20 NOTE — TELEPHONE ENCOUNTER
Called and spoke with pt. Informed her of Dr. Jeronimo message regarding results. Pt states if Dr. Jeronimo thinks it's neccessary she will see the neurologist

## 2023-12-26 ENCOUNTER — PATIENT MESSAGE (OUTPATIENT)
Dept: INTERNAL MEDICINE | Facility: CLINIC | Age: 88
End: 2023-12-26
Payer: MEDICARE

## 2023-12-26 DIAGNOSIS — E78.49 OTHER HYPERLIPIDEMIA: ICD-10-CM

## 2023-12-26 DIAGNOSIS — I10 ESSENTIAL HYPERTENSION: ICD-10-CM

## 2023-12-27 RX ORDER — ATORVASTATIN CALCIUM 20 MG/1
20 TABLET, FILM COATED ORAL DAILY
Qty: 90 TABLET | Refills: 3 | Status: SHIPPED | OUTPATIENT
Start: 2023-12-27 | End: 2024-12-26

## 2023-12-27 RX ORDER — AMLODIPINE BESYLATE 5 MG/1
5 TABLET ORAL 2 TIMES DAILY
Qty: 180 TABLET | Refills: 3 | Status: SHIPPED | OUTPATIENT
Start: 2023-12-27

## 2023-12-27 NOTE — TELEPHONE ENCOUNTER
No care due was identified.  [facility] Embedded Care Due Messages. Reference number: [id number].   12/27/2023 8:31:29 AM CST

## 2023-12-29 DIAGNOSIS — L29.9 PRURITUS: ICD-10-CM

## 2023-12-29 RX ORDER — TRIAMCINOLONE ACETONIDE 1 MG/G
OINTMENT TOPICAL
Qty: 80 G | Refills: 0 | Status: SHIPPED | OUTPATIENT
Start: 2023-12-29 | End: 2024-01-26

## 2023-12-29 NOTE — TELEPHONE ENCOUNTER
Care Due:                  Date            Visit Type   Department     Provider  --------------------------------------------------------------------------------                                EP -                              PRIMARY      [facility] INTERNAL  Last Visit: 06-      CARE ([facility])   MEDICINE       Suresh LUNA Phani                              EP -                              PRIMARY      [facility] INTERNAL  Next Visit: 01-      CARE ([facility])   MEDICINE       Suresh LUNA Phani                                                            Last  Test          Frequency    Reason                     Performed    Due Date  --------------------------------------------------------------------------------    CMP.........  12 months..  atorvastatin.............  03-   03-    Lipid Panel.  12 months..  atorvastatin.............  12- 12-    [facility] Embedded Care Due Messages. Reference number: [id number].   12/29/2023 11:14:50 AM CST

## 2023-12-30 NOTE — TELEPHONE ENCOUNTER
Provider Staff:  Action required for this patient    Requires labs      Please see care gap opportunities below in Care Due Message.    Thanks!  [facility]     Appointments      Date Provider   Last Visit   6/14/2023 Suresh Jeronimo MD   Next Visit   1/3/2024 Suresh Jeronimo MD     Refill Decision Note   Calli Jamison  is requesting a refill authorization.  Brief Assessment and Rationale for Refill:  Approve     Medication Therapy Plan:         Comments:     Note composed:9:34 PM 12/29/2023

## 2024-01-01 DIAGNOSIS — I87.2 VENOUS INSUFFICIENCY: ICD-10-CM

## 2024-01-01 DIAGNOSIS — R41.3 MEMORY CHANGES: ICD-10-CM

## 2024-01-01 NOTE — TELEPHONE ENCOUNTER
No care due was identified.  [facility] Embedded Care Due Messages. Reference number: [id number].   1/01/2024 4:20:00 PM CST

## 2024-01-02 RX ORDER — FUROSEMIDE 20 MG/1
20 TABLET ORAL
Qty: 90 TABLET | Refills: 1 | Status: SHIPPED | OUTPATIENT
Start: 2024-01-02

## 2024-01-03 ENCOUNTER — OFFICE VISIT (OUTPATIENT)
Dept: INTERNAL MEDICINE | Facility: CLINIC | Age: 89
End: 2024-01-03
Payer: MEDICARE

## 2024-01-03 VITALS
WEIGHT: 184.5 LBS | SYSTOLIC BLOOD PRESSURE: 134 MMHG | HEIGHT: 60 IN | DIASTOLIC BLOOD PRESSURE: 72 MMHG | HEART RATE: 89 BPM | BODY MASS INDEX: 36.22 KG/M2 | OXYGEN SATURATION: 95 %

## 2024-01-03 DIAGNOSIS — R22.1 MASS OF RIGHT SIDE OF NECK: ICD-10-CM

## 2024-01-03 DIAGNOSIS — M46.96 INFLAMMATORY SPONDYLOPATHY OF LUMBAR REGION: ICD-10-CM

## 2024-01-03 DIAGNOSIS — K50.819 CROHN'S DISEASE OF SMALL AND LARGE INTESTINES WITH COMPLICATION: ICD-10-CM

## 2024-01-03 DIAGNOSIS — N18.32 CHRONIC KIDNEY DISEASE, STAGE 3B: ICD-10-CM

## 2024-01-03 DIAGNOSIS — I48.0 PAROXYSMAL ATRIAL FIBRILLATION: ICD-10-CM

## 2024-01-03 DIAGNOSIS — N18.31 TYPE 2 DIABETES MELLITUS WITH STAGE 3A CHRONIC KIDNEY DISEASE, WITHOUT LONG-TERM CURRENT USE OF INSULIN: ICD-10-CM

## 2024-01-03 DIAGNOSIS — E78.49 OTHER HYPERLIPIDEMIA: ICD-10-CM

## 2024-01-03 DIAGNOSIS — L12.9 PEMPHIGOID: ICD-10-CM

## 2024-01-03 DIAGNOSIS — E66.01 MORBID (SEVERE) OBESITY DUE TO EXCESS CALORIES: ICD-10-CM

## 2024-01-03 DIAGNOSIS — I70.0 AORTIC ATHEROSCLEROSIS: ICD-10-CM

## 2024-01-03 DIAGNOSIS — E11.22 TYPE 2 DIABETES MELLITUS WITH STAGE 3A CHRONIC KIDNEY DISEASE, WITHOUT LONG-TERM CURRENT USE OF INSULIN: ICD-10-CM

## 2024-01-03 DIAGNOSIS — G31.84 MILD COGNITIVE IMPAIRMENT: Primary | ICD-10-CM

## 2024-01-03 DIAGNOSIS — N25.81 SECONDARY HYPERPARATHYROIDISM OF RENAL ORIGIN: ICD-10-CM

## 2024-01-03 PROCEDURE — 1160F RVW MEDS BY RX/DR IN RCRD: CPT | Mod: HCNC,CPTII,S$GLB, | Performed by: STUDENT IN AN ORGANIZED HEALTH CARE EDUCATION/TRAINING PROGRAM

## 2024-01-03 PROCEDURE — 99999 PR PBB SHADOW E&M-EST. PATIENT-LVL V: CPT | Mod: PBBFAC,HCNC,, | Performed by: STUDENT IN AN ORGANIZED HEALTH CARE EDUCATION/TRAINING PROGRAM

## 2024-01-03 PROCEDURE — 1126F AMNT PAIN NOTED NONE PRSNT: CPT | Mod: HCNC,CPTII,S$GLB, | Performed by: STUDENT IN AN ORGANIZED HEALTH CARE EDUCATION/TRAINING PROGRAM

## 2024-01-03 PROCEDURE — 3072F LOW RISK FOR RETINOPATHY: CPT | Mod: HCNC,CPTII,S$GLB, | Performed by: STUDENT IN AN ORGANIZED HEALTH CARE EDUCATION/TRAINING PROGRAM

## 2024-01-03 PROCEDURE — 1101F PT FALLS ASSESS-DOCD LE1/YR: CPT | Mod: HCNC,CPTII,S$GLB, | Performed by: STUDENT IN AN ORGANIZED HEALTH CARE EDUCATION/TRAINING PROGRAM

## 2024-01-03 PROCEDURE — 99214 OFFICE O/P EST MOD 30 MIN: CPT | Mod: HCNC,S$GLB,, | Performed by: STUDENT IN AN ORGANIZED HEALTH CARE EDUCATION/TRAINING PROGRAM

## 2024-01-03 PROCEDURE — 1159F MED LIST DOCD IN RCRD: CPT | Mod: HCNC,CPTII,S$GLB, | Performed by: STUDENT IN AN ORGANIZED HEALTH CARE EDUCATION/TRAINING PROGRAM

## 2024-01-03 PROCEDURE — 3288F FALL RISK ASSESSMENT DOCD: CPT | Mod: HCNC,CPTII,S$GLB, | Performed by: STUDENT IN AN ORGANIZED HEALTH CARE EDUCATION/TRAINING PROGRAM

## 2024-01-03 RX ORDER — DONEPEZIL HYDROCHLORIDE 5 MG/1
5 TABLET, FILM COATED ORAL NIGHTLY
Qty: 90 TABLET | Refills: 3 | Status: SHIPPED | OUTPATIENT
Start: 2024-01-03

## 2024-01-03 NOTE — PROGRESS NOTES
SUBJECTIVE     Chief Complaint   Patient presents with    Follow-up       HPI  Calli ZENG Shaheen is a 89 y.o. female with  Afib s/p loop recorder, TIA, lumbar spinal stenosis, T2DM (12/2022 HgbA1C 6.0), adjustment disorder, glaucoma, pemphigoid, asthma, COPD, HLD, HTN, CKD IIIa, Fe deficiency, secondary HPT, obesity, GERD, esophageal stricture, thyroid nodules, Crohn's disease, LULY, venous insufficiency  that presents for follow-up. LOV 6/14/23.     Accompanied today by her son.     Patient states she is doing well over all today. Main concern today is memory. Lives alone. Does most of ADLs, but does not cook for self because she once forgot to turn off stove. Has a friend who comes to cook for her. Recent MRI showing cerebral loss and findings c/w advanced chronic microvascular ischemic change. She frequently forget what she wants to say, frequently forgets what she reads, family states she frequently repeats herself.     Other concern is she has a mass on the right side of neck. Has been present for years. Brought up to her endocrinologist who suspected either lipoma or prominent parotid glad. Had referred her to ENT, did not establish. No associated pain.     No other complaints today.     PAST MEDICAL HISTORY:  Past Medical History:   Diagnosis Date    Abnormal Pap smear     After-cataract, unspecified - Both Eyes 11/5/2012    Arthritis     ASCUS (atypical squamous cells of undetermined significance) on Pap smear 7/1/2012    Asthma     Atrial fibrillation     Back pain 2009    Bullous pemphigoid     Carpal tunnel syndrome     Chronic back pain     COPD (chronic obstructive pulmonary disease)     Coronary artery disease     Crohn's disease     Depression     Diabetes mellitus     Discoid lupus     Diverticulosis     History of migraine headaches     Hyperlipidemia     Hypertension     Multiple thyroid nodules     Obesity     Open angle with borderline findings and high glaucoma risk in both eyes 9/11/2019     Personal history of colonic polyps     Pulmonary nodule     Renal manifestation of secondary diabetes mellitus     Restless legs syndrome     Sciatica     Right leg    Sleep apnea     Stricture and stenosis of esophagus     Stroke     TIA (transient ischemic attack)        PAST SURGICAL HISTORY:  Past Surgical History:   Procedure Laterality Date    BREAST BIOPSY Left     Breast: wire localization  1/2004    CATARACT EXTRACTION W/  INTRAOCULAR LENS IMPLANT Bilateral     Cataract left eye  1/2008    Cataract: right eye  1/2008    COLON SURGERY      ileocecectomy 2011    COLONOSCOPY  9/30/2008    Crohns in remission    ESOPHAGOGASTRODUODENOSCOPY N/A 7/24/2018    Procedure: EGD (ESOPHAGOGASTRODUODENOSCOPY);  Surgeon: Du Robles MD;  Location: [facility] (4TH FLR);  Service: Endoscopy;  Laterality: N/A;  loop recoreder-battery dead per pt. (not active). corona dpt. to move to morning . unable to do so.EC    ESOPHAGOGASTRODUODENOSCOPY N/A 8/1/2022    Procedure: EGD (ESOPHAGOGASTRODUODENOSCOPY);  Surgeon: Du Robles MD;  Location: [facility] (4TH FLR);  Service: Endoscopy;  Laterality: N/A;  plavix hold ok see te 7/8-tb-clears 4 hrs prior-inst mail and verbal to son elyssatb.Fully vaccinated.EC    HYSTERECTOMY      ovaries remain    Iliocolic Crohns Disease with stricure  3/2011    LA iliocecectomy    Laparoscopic-assisted ileocecectomy.       Left carpal tunnel      Left carpal tunnel surgery      Rectal fistulae repair      TONSILLECTOMY      UPPER GASTROINTESTINAL ENDOSCOPY  11/2011    hiatal hernia, benign appearing esophageal  stricture dilated  prn repeat rec.       FAMILY HISTORY:  Family History   Problem Relation Age of Onset    Pneumonia Father     Cataracts Mother     Macular degeneration Mother     Hypertension Sister     Alkaptonuria Maternal Grandfather     Asthma Son     No Known Problems Son     No Known Problems Son     No Known Problems Son     Crohn's disease Neg Hx     Ulcerative colitis Neg Hx      Inflammatory bowel disease Neg Hx     Amblyopia Neg Hx     Blindness Neg Hx     Glaucoma Neg Hx     Retinal detachment Neg Hx     Strabismus Neg Hx        ALLERGIES AND MEDICATIONS: updated and reviewed.  Review of patient's allergies indicates:   Allergen Reactions    Oxycodone Other (See Comments)    Oxycodone hcl-oxycodone-asa Hallucinations     Other reaction(s): Hallucinations  Other reaction(s): Hallucinations     Current Outpatient Medications   Medication Sig Dispense Refill    amLODIPine (NORVASC) 5 MG tablet Take 1 tablet (5 mg total) by mouth 2 (two) times daily. 180 tablet 3    aspirin (ECOTRIN) 81 MG EC tablet Take 81 mg by mouth once daily.      atorvastatin (LIPITOR) 20 MG tablet Take 1 tablet (20 mg total) by mouth once daily. 90 tablet 3    calcium citrate-vitamin D3 315-200 mg (CITRACAL+D) 315 mg-5 mcg (200 unit) per tablet Take 2 tablets by mouth Daily.      clopidogreL (PLAVIX) 75 mg tablet Take 1 tablet (75 mg total) by mouth once daily. 90 tablet 3    cyanocobalamin 500 MCG tablet Take 1 tablet by mouth Daily.      donepeziL (ARICEPT) 5 MG tablet TAKE 1 TABLET EVERY EVENING 90 tablet 3    furosemide (LASIX) 20 MG tablet TAKE 1 TABLET ONE TIME DAILY 90 tablet 1    latanoprost 0.005 % ophthalmic solution INSTILL 1 DROP INTO BOTH EYES EVERY NIGHT 7.5 mL 2    omeprazole (PRILOSEC) 20 MG capsule Take 1 capsule by mouth daily as needed.       potassium chloride (MICRO-K) 10 MEQ CpSR OPEN 1 CAPSULE AND SPRINKLE OVER UNHEATED SOFT FOOD (APPLESAUCE) AND TAKE EVERY DAY. DO NOT CHEW OR CRUSH 90 capsule 3    terazosin (HYTRIN) 1 MG capsule Take 1 capsule (1 mg total) by mouth every evening. 90 capsule 3    timolol maleate 0.5% (TIMOPTIC) 0.5 % Drop INSTILL 1 DROP INTO BOTH EYES TWICE DAILY 20 mL 1    triamcinolone acetonide 0.1% (KENALOG) 0.1 % ointment APPLY TO THE AFFECTED AREA(S) TWICE DAILY 80 g 0     No current facility-administered medications for this visit.       ROS  Review of Systems    Constitutional:  Negative for activity change, chills and fever.   HENT:  Negative for congestion and hearing loss.    Eyes:  Negative for pain and visual disturbance.   Respiratory:  Negative for cough and shortness of breath.    Cardiovascular:  Negative for chest pain and palpitations.   Gastrointestinal:  Negative for abdominal pain, constipation, diarrhea, nausea and vomiting.   Endocrine: Negative.    Genitourinary: Negative.    Musculoskeletal:  Negative for arthralgias and myalgias.   Skin: Negative.    Allergic/Immunologic: Negative.    Neurological:  Negative for dizziness, light-headedness and headaches.   Hematological: Negative.          OBJECTIVE     Physical Exam  Vitals:    01/03/24 1413   BP: 134/72   Pulse: 89    Body mass index is 36.04 kg/m².  Weight: 83.7 kg (184 lb 8.4 oz)   Height: 5' (152.4 cm)     Physical Exam  Vitals reviewed.   Constitutional:       General: She is not in acute distress.     Appearance: Normal appearance. She is obese.   HENT:      Head: Normocephalic and atraumatic.      Mouth/Throat:      Mouth: Mucous membranes are moist.      Pharynx: Oropharynx is clear.   Eyes:      Extraocular Movements: Extraocular movements intact.      Conjunctiva/sclera: Conjunctivae normal.      Pupils: Pupils are equal, round, and reactive to light.   Neck:     Cardiovascular:      Rate and Rhythm: Normal rate and regular rhythm.      Pulses: Normal pulses.      Heart sounds: Normal heart sounds.   Pulmonary:      Effort: Pulmonary effort is normal.      Breath sounds: Normal breath sounds.   Abdominal:      General: There is no distension.   Musculoskeletal:         General: Normal range of motion.      Cervical back: Normal range of motion and neck supple.   Skin:     General: Skin is warm and dry.   Neurological:      General: No focal deficit present.      Mental Status: She is alert.           Health Maintenance         Date Due Completion Date    Shingles Vaccine (1 of 2) Never done  ---    RSV Vaccine (Age 60+ and Pregnant patients) (1 - 1-dose 60+ series) Never done ---    Colonoscopy 07/12/2015 7/12/2012    TETANUS VACCINE 09/24/2022 9/24/2012    Influenza Vaccine (1) 09/01/2023 12/7/2022    Lipid Panel 12/07/2023 12/7/2022    Hemoglobin A1c 12/14/2023 6/14/2023    Override on 5/22/2018: Done    Eye Exam 05/08/2024 5/8/2023    Diabetes Urine Screening 06/21/2024 6/21/2023              ASSESSMENT     89 y.o. female with     1. Mild cognitive impairment    2. Mass of right side of neck    3. Type 2 diabetes mellitus with stage 3a chronic kidney disease, without long-term current use of insulin    4. Other hyperlipidemia    5. Chronic kidney disease, stage 3b    6. Crohn's disease of small and large intestines with complication    7. Pemphigoid    8. Secondary hyperparathyroidism of renal origin    9. Inflammatory spondylopathy of lumbar region    10. Aortic atherosclerosis    11. Morbid (severe) obesity due to excess calories    12. Paroxysmal atrial fibrillation        PLAN:     1. Mild cognitive impairment  - Suspect likely vascular dementia given findings on MRI. Continue donepezil. Establish with neurology.   - Ambulatory referral/consult to Neurology; Future    2. Mass of right side of neck  - Suspect lipoma given physical exam, establish with ENT per patient request.   - Ambulatory referral/consult to ENT; Future    3. Type 2 diabetes mellitus with stage 3a chronic kidney disease, without long-term current use of insulin  - Controlled on current regimen. Continue.   - HEMOGLOBIN A1C; Future  - BASIC METABOLIC PANEL; Future    4. Other hyperlipidemia  - Stable on statin. Continue.   - LIPID PANEL; Future    5. Chronic kidney disease, stage 3b  - BMP ordered.    6. Crohn's disease of small and large intestines with complication  - Asymptomatic. Continue follow up with GI.     7. Pemphigoid  - Resolved. Continue follow up with Dermatology.     8. Secondary hyperparathyroidism of renal  origin  - Calcium level on BMP in 6/2023 was normal. BMP ordered today.     9. Inflammatory spondylopathy of lumbar region  - Asymptomatic today. Continue to monitor.     10. Aortic atherosclerosis  - Stable on statin, ASA. Continue.     11. Morbid (severe) obesity due to excess calories  - Continue to monitor diet.     12. Paroxysmal atrial fibrillation  - RRR on exam today. Continue follow up with electrophysiology.         RTC in 6 months, earlier PRN       Suresh Jeronimo MD  Family Medicine  [facility]  01/03/2024    This document was created using voice recognition software ([employer]). Although it may be edited, this document may contain errors related to incorrect recognition of the spoken word. Please call the physician if clarification is needed.       No follow-ups on file.

## 2024-01-03 NOTE — TELEPHONE ENCOUNTER
Refill Routing Note   Medication(s) are not appropriate for processing by [facility] for the following reason(s):        Outside of protocol    [facility] action(s):  Route  Approve               Appointments  past 12m or future 3m with PCP    Date Provider   Last Visit   6/14/2023 Suresh Jeronimo MD   Next Visit   1/3/2024 Suresh Jeronimo MD   ED visits in past 90 days: 0        Note composed:7:20 PM 01/02/2024

## 2024-01-26 DIAGNOSIS — L29.9 PRURITUS: ICD-10-CM

## 2024-01-26 RX ORDER — TRIAMCINOLONE ACETONIDE 1 MG/G
OINTMENT TOPICAL
Qty: 80 G | Refills: 1 | Status: SHIPPED | OUTPATIENT
Start: 2024-01-26 | End: 2024-03-22

## 2024-01-26 NOTE — TELEPHONE ENCOUNTER
No care due was identified.  [facility] Embedded Care Due Messages. Reference number: [id number].   1/26/2024 10:52:55 AM CST

## 2024-01-26 NOTE — TELEPHONE ENCOUNTER
Refill Decision Note   Calli Jamison  is requesting a refill authorization.    Brief Assessment and Rationale for Refill:   Approve       Medication Therapy Plan:         Comments:     Note composed:2:43 PM 01/26/2024

## 2024-02-06 ENCOUNTER — LAB VISIT (OUTPATIENT)
Dept: LAB | Facility: HOSPITAL | Age: 89
End: 2024-02-06
Attending: STUDENT IN AN ORGANIZED HEALTH CARE EDUCATION/TRAINING PROGRAM
Payer: MEDICARE

## 2024-02-06 DIAGNOSIS — N18.31 TYPE 2 DIABETES MELLITUS WITH STAGE 3A CHRONIC KIDNEY DISEASE, WITHOUT LONG-TERM CURRENT USE OF INSULIN: ICD-10-CM

## 2024-02-06 DIAGNOSIS — E11.22 TYPE 2 DIABETES MELLITUS WITH STAGE 3A CHRONIC KIDNEY DISEASE, WITHOUT LONG-TERM CURRENT USE OF INSULIN: ICD-10-CM

## 2024-02-06 DIAGNOSIS — E78.49 OTHER HYPERLIPIDEMIA: ICD-10-CM

## 2024-02-06 LAB
ANION GAP SERPL CALC-SCNC: 9 MMOL/L (ref 8–16)
BUN SERPL-MCNC: 12 MG/DL (ref 10–30)
CALCIUM SERPL-MCNC: 9.9 MG/DL (ref 8.7–10.5)
CHLORIDE SERPL-SCNC: 106 MMOL/L (ref 95–110)
CHOLEST SERPL-MCNC: 114 MG/DL (ref 120–199)
CHOLEST/HDLC SERPL: 1.9 {RATIO} (ref 2–5)
CO2 SERPL-SCNC: 26 MMOL/L (ref 23–29)
CREAT SERPL-MCNC: 0.9 MG/DL (ref 0.5–1.4)
EST. GFR  (NO RACE VARIABLE): 60 ML/MIN/1.73 M^2
ESTIMATED AVG GLUCOSE: 120 MG/DL (ref 68–131)
GLUCOSE SERPL-MCNC: 87 MG/DL (ref 70–110)
HBA1C MFR BLD: 5.8 % (ref 4–5.6)
HDLC SERPL-MCNC: 59 MG/DL (ref 40–75)
HDLC SERPL: 51.8 % (ref 20–50)
LDLC SERPL CALC-MCNC: 44.4 MG/DL (ref 63–159)
NONHDLC SERPL-MCNC: 55 MG/DL
POTASSIUM SERPL-SCNC: 3.6 MMOL/L (ref 3.5–5.1)
SODIUM SERPL-SCNC: 141 MMOL/L (ref 136–145)
TRIGL SERPL-MCNC: 53 MG/DL (ref 30–150)

## 2024-02-06 PROCEDURE — 80061 LIPID PANEL: CPT | Mod: HCNC | Performed by: STUDENT IN AN ORGANIZED HEALTH CARE EDUCATION/TRAINING PROGRAM

## 2024-02-06 PROCEDURE — 80048 BASIC METABOLIC PNL TOTAL CA: CPT | Mod: HCNC | Performed by: STUDENT IN AN ORGANIZED HEALTH CARE EDUCATION/TRAINING PROGRAM

## 2024-02-06 PROCEDURE — 36415 COLL VENOUS BLD VENIPUNCTURE: CPT | Mod: HCNC | Performed by: STUDENT IN AN ORGANIZED HEALTH CARE EDUCATION/TRAINING PROGRAM

## 2024-02-06 PROCEDURE — 83036 HEMOGLOBIN GLYCOSYLATED A1C: CPT | Mod: HCNC | Performed by: STUDENT IN AN ORGANIZED HEALTH CARE EDUCATION/TRAINING PROGRAM

## 2024-02-22 DIAGNOSIS — H40.023 OPEN ANGLE WITH BORDERLINE FINDINGS AND HIGH GLAUCOMA RISK IN BOTH EYES: ICD-10-CM

## 2024-02-22 RX ORDER — TIMOLOL MALEATE 5 MG/ML
SOLUTION/ DROPS OPHTHALMIC
Qty: 15 ML | Refills: 2 | Status: SHIPPED | OUTPATIENT
Start: 2024-02-22 | End: 2024-05-06 | Stop reason: SDUPTHER

## 2024-03-22 DIAGNOSIS — L29.9 PRURITUS: ICD-10-CM

## 2024-03-22 RX ORDER — TRIAMCINOLONE ACETONIDE 1 MG/G
OINTMENT TOPICAL
Qty: 80 G | Refills: 1 | Status: SHIPPED | OUTPATIENT
Start: 2024-03-22 | End: 2024-05-20

## 2024-03-22 NOTE — TELEPHONE ENCOUNTER
Care Due:                  Date            Visit Type   Department     Provider  --------------------------------------------------------------------------------                                EP -                              PRIMARY      [facility]  Last Visit: 01-      CARE ([facility])   MEDICINE       Suresh Jeronimo  Next Visit: None Scheduled  None         None Found                                                            Last  Test          Frequency    Reason                     Performed    Due Date  --------------------------------------------------------------------------------    CMP.........  12 months..  atorvastatin.............  03-   03-    [facility] Embedded Care Due Messages. Reference number: [id number].   3/22/2024 10:19:33 AM CDT

## 2024-03-22 NOTE — TELEPHONE ENCOUNTER
Provider Staff:  Action required for this patient    Requires labs      Please see care gap opportunities below in Care Due Message.    Thanks!  [facility]     Appointments      Date Provider   Last Visit   1/3/2024 Suresh Jeronimo MD   Next Visit   Visit date not found Suresh Jeronimo MD     Refill Decision Note   Calli Jamison  is requesting a refill authorization.  Brief Assessment and Rationale for Refill:  Approve     Medication Therapy Plan:         Comments:     Note composed:11:08 AM 03/22/2024

## 2024-05-06 DIAGNOSIS — H40.023 OPEN ANGLE WITH BORDERLINE FINDINGS AND HIGH GLAUCOMA RISK IN BOTH EYES: ICD-10-CM

## 2024-05-06 RX ORDER — TIMOLOL MALEATE 5 MG/ML
1 SOLUTION/ DROPS OPHTHALMIC 2 TIMES DAILY
Qty: 15 ML | Refills: 2 | Status: SHIPPED | OUTPATIENT
Start: 2024-05-06 | End: 2024-05-31 | Stop reason: SDUPTHER

## 2024-05-09 DIAGNOSIS — I87.2 VENOUS INSUFFICIENCY: ICD-10-CM

## 2024-05-09 RX ORDER — FUROSEMIDE 20 MG/1
20 TABLET ORAL
Qty: 90 TABLET | Refills: 2 | Status: SHIPPED | OUTPATIENT
Start: 2024-05-09 | End: 2024-05-31 | Stop reason: SDUPTHER

## 2024-05-10 NOTE — TELEPHONE ENCOUNTER
Refill Decision Note   Calli Jamison  is requesting a refill authorization.  Brief Assessment and Rationale for Refill:  Approve     Medication Therapy Plan:        Comments:     Note composed:8:12 PM 05/09/2024

## 2024-05-10 NOTE — TELEPHONE ENCOUNTER
No care due was identified.  [facility] Embedded Care Due Messages. Reference number: [id number].   5/09/2024 8:09:08 PM CDT

## 2024-05-19 DIAGNOSIS — L29.9 PRURITUS: ICD-10-CM

## 2024-05-19 NOTE — TELEPHONE ENCOUNTER
No care due was identified.  [facility] Embedded Care Due Messages. Reference number: [id number].   5/19/2024 12:20:46 PM CDT

## 2024-05-20 RX ORDER — TRIAMCINOLONE ACETONIDE 1 MG/G
OINTMENT TOPICAL
Qty: 80 G | Refills: 0 | Status: SHIPPED | OUTPATIENT
Start: 2024-05-20 | End: 2024-05-30 | Stop reason: SDUPTHER

## 2024-05-20 NOTE — TELEPHONE ENCOUNTER
Refill Decision Note   Calli Jamison  is requesting a refill authorization.  Brief Assessment and Rationale for Refill:  Approve     Medication Therapy Plan:        Comments:     Note composed:12:24 PM 05/20/2024

## 2024-05-30 ENCOUNTER — PATIENT MESSAGE (OUTPATIENT)
Dept: INTERNAL MEDICINE | Facility: CLINIC | Age: 89
End: 2024-05-30
Payer: MEDICARE

## 2024-05-30 DIAGNOSIS — E78.49 OTHER HYPERLIPIDEMIA: ICD-10-CM

## 2024-05-30 DIAGNOSIS — L29.9 PRURITUS: ICD-10-CM

## 2024-05-30 DIAGNOSIS — I10 ESSENTIAL HYPERTENSION: ICD-10-CM

## 2024-05-31 DIAGNOSIS — H40.003 GLAUCOMA SUSPECT, BILATERAL: ICD-10-CM

## 2024-05-31 DIAGNOSIS — H40.023 OPEN ANGLE WITH BORDERLINE FINDINGS AND HIGH GLAUCOMA RISK IN BOTH EYES: ICD-10-CM

## 2024-05-31 DIAGNOSIS — R41.3 MEMORY CHANGES: ICD-10-CM

## 2024-05-31 DIAGNOSIS — I87.2 VENOUS INSUFFICIENCY: ICD-10-CM

## 2024-05-31 RX ORDER — LATANOPROST 50 UG/ML
1 SOLUTION/ DROPS OPHTHALMIC NIGHTLY
Qty: 7.5 ML | Refills: 2 | Status: SHIPPED | OUTPATIENT
Start: 2024-05-31 | End: 2024-06-10 | Stop reason: SDUPTHER

## 2024-05-31 RX ORDER — DONEPEZIL HYDROCHLORIDE 5 MG/1
5 TABLET, FILM COATED ORAL NIGHTLY
Qty: 90 TABLET | Refills: 3 | Status: SHIPPED | OUTPATIENT
Start: 2024-05-31

## 2024-05-31 RX ORDER — TIMOLOL MALEATE 5 MG/ML
1 SOLUTION/ DROPS OPHTHALMIC 2 TIMES DAILY
Qty: 15 ML | Refills: 2 | Status: SHIPPED | OUTPATIENT
Start: 2024-05-31 | End: 2024-06-10 | Stop reason: SDUPTHER

## 2024-05-31 RX ORDER — FUROSEMIDE 20 MG/1
20 TABLET ORAL DAILY
Qty: 90 TABLET | Refills: 2 | Status: SHIPPED | OUTPATIENT
Start: 2024-05-31

## 2024-05-31 RX ORDER — TRIAMCINOLONE ACETONIDE 1 MG/G
OINTMENT TOPICAL 2 TIMES DAILY
Qty: 80 G | Refills: 1 | Status: SHIPPED | OUTPATIENT
Start: 2024-05-31

## 2024-05-31 RX ORDER — ATORVASTATIN CALCIUM 20 MG/1
20 TABLET, FILM COATED ORAL DAILY
Qty: 90 TABLET | Refills: 3 | Status: SHIPPED | OUTPATIENT
Start: 2024-05-31 | End: 2025-05-31

## 2024-05-31 RX ORDER — AMLODIPINE BESYLATE 5 MG/1
5 TABLET ORAL 2 TIMES DAILY
Qty: 180 TABLET | Refills: 2 | Status: SHIPPED | OUTPATIENT
Start: 2024-05-31

## 2024-05-31 NOTE — TELEPHONE ENCOUNTER
No care due was identified.  [facility] Embedded Care Due Messages. Reference number: [id number].   5/31/2024 11:45:51 AM CDT

## 2024-05-31 NOTE — TELEPHONE ENCOUNTER
Refill Routing Note   Medication(s) are not appropriate for processing by [facility] for the following reason(s):        Required labs outdated    [facility] action(s):  Defer  Approve   Requires labs : Yes             Appointments  past 12m or future 3m with PCP    Date Provider   Last Visit   1/3/2024 Suresh Jeronimo MD   Next Visit   Visit date not found Suresh Jeronimo MD   ED visits in past 90 days: 0        Note composed:4:28 AM 05/31/2024

## 2024-05-31 NOTE — TELEPHONE ENCOUNTER
Refill Routing Note   Medication(s) are not appropriate for processing by [facility] for the following reason(s):        Drug-disease interaction    [facility] action(s):  Defer        Medication Therapy Plan: Drug-Disease: potassium chloride and Esophageal stricture    Pharmacist review requested: Yes   Extended chart review required: Yes     Appointments  past 12m or future 3m with PCP    Date Provider   Last Visit   1/3/2024 Suresh Jeronimo MD   Next Visit   5/31/2024 Suresh Jeronimo MD   ED visits in past 90 days: 0        Note composed:6:02 PM 05/31/2024

## 2024-05-31 NOTE — TELEPHONE ENCOUNTER
No care due was identified.  [facility] Embedded Care Due Messages. Reference number: [id number].   5/31/2024 11:28:34 AM CDT

## 2024-05-31 NOTE — TELEPHONE ENCOUNTER
Care Due:                  Date            Visit Type   Department     Provider  --------------------------------------------------------------------------------                                EP -                              PRIMARY      [facility]  Last Visit: 01-      CARE ([facility])   MEDICINE       Suresh Jeronimo  Next Visit: None Scheduled  None         None Found                                                            Last  Test          Frequency    Reason                     Performed    Due Date  --------------------------------------------------------------------------------    CMP.........  12 months..  atorvastatin.............  03-   03-    [facility] Embedded Care Due Messages. Reference number: [id number].   5/30/2024 10:35:17 PM CDT

## 2024-05-31 NOTE — TELEPHONE ENCOUNTER
Refill Routing Note   Medication(s) are not appropriate for processing by [facility] for the following reason(s):        Drug-disease interaction    [facility] action(s):  Defer        Medication Therapy Plan: Drug-Disease: potassium chloride and Esophageal stricture    Pharmacist review requested: Yes     Appointments  past 12m or future 3m with PCP    Date Provider   Last Visit   1/3/2024 Suresh Jeronimo MD   Next Visit   5/31/2024 Suresh Jeronimo MD   ED visits in past 90 days: 0        Note composed:12:53 PM 05/31/2024

## 2024-05-31 NOTE — TELEPHONE ENCOUNTER
Refill Routing Note   Medication(s) are not appropriate for processing by [facility] for the following reason(s):        Outside of protocol    [facility] action(s):  Approve  Route               Appointments  past 12m or future 3m with PCP    Date Provider   Last Visit   1/3/2024 Suresh Jeronimo MD   Next Visit   Visit date not found Suresh Jeronimo MD   ED visits in past 90 days: 0        Note composed:1:36 PM 05/31/2024

## 2024-06-03 RX ORDER — POTASSIUM CHLORIDE 750 MG/1
CAPSULE, EXTENDED RELEASE ORAL
Qty: 90 CAPSULE | Refills: 2 | Status: SHIPPED | OUTPATIENT
Start: 2024-06-03

## 2024-06-10 ENCOUNTER — OFFICE VISIT (OUTPATIENT)
Dept: OPHTHALMOLOGY | Facility: CLINIC | Age: 89
End: 2024-06-10
Payer: MEDICARE

## 2024-06-10 ENCOUNTER — PATIENT MESSAGE (OUTPATIENT)
Dept: INTERNAL MEDICINE | Facility: CLINIC | Age: 89
End: 2024-06-10
Payer: MEDICARE

## 2024-06-10 DIAGNOSIS — H26.493 PCO (POSTERIOR CAPSULAR OPACIFICATION), BILATERAL: ICD-10-CM

## 2024-06-10 DIAGNOSIS — H40.003 GLAUCOMA SUSPECT, BILATERAL: ICD-10-CM

## 2024-06-10 DIAGNOSIS — Z96.1 PSEUDOPHAKIA: ICD-10-CM

## 2024-06-10 DIAGNOSIS — H04.123 DRY EYE SYNDROME, BILATERAL: ICD-10-CM

## 2024-06-10 DIAGNOSIS — H52.7 REFRACTIVE ERROR: ICD-10-CM

## 2024-06-10 DIAGNOSIS — H40.1121 PRIMARY OPEN ANGLE GLAUCOMA (POAG) OF LEFT EYE, MILD STAGE: Primary | ICD-10-CM

## 2024-06-10 DIAGNOSIS — H40.023 OPEN ANGLE WITH BORDERLINE FINDINGS AND HIGH GLAUCOMA RISK IN BOTH EYES: ICD-10-CM

## 2024-06-10 DIAGNOSIS — E11.9 DM TYPE 2 WITHOUT RETINOPATHY: ICD-10-CM

## 2024-06-10 PROCEDURE — 92014 COMPRE OPH EXAM EST PT 1/>: CPT | Mod: HCNC,S$GLB,, | Performed by: OPHTHALMOLOGY

## 2024-06-10 PROCEDURE — 1101F PT FALLS ASSESS-DOCD LE1/YR: CPT | Mod: HCNC,CPTII,S$GLB, | Performed by: OPHTHALMOLOGY

## 2024-06-10 PROCEDURE — 1126F AMNT PAIN NOTED NONE PRSNT: CPT | Mod: HCNC,CPTII,S$GLB, | Performed by: OPHTHALMOLOGY

## 2024-06-10 PROCEDURE — 3288F FALL RISK ASSESSMENT DOCD: CPT | Mod: HCNC,CPTII,S$GLB, | Performed by: OPHTHALMOLOGY

## 2024-06-10 PROCEDURE — 1159F MED LIST DOCD IN RCRD: CPT | Mod: HCNC,CPTII,S$GLB, | Performed by: OPHTHALMOLOGY

## 2024-06-10 PROCEDURE — 99999 PR PBB SHADOW E&M-EST. PATIENT-LVL III: CPT | Mod: PBBFAC,HCNC,, | Performed by: OPHTHALMOLOGY

## 2024-06-10 PROCEDURE — 2023F DILAT RTA XM W/O RTNOPTHY: CPT | Mod: HCNC,CPTII,S$GLB, | Performed by: OPHTHALMOLOGY

## 2024-06-10 PROCEDURE — 1160F RVW MEDS BY RX/DR IN RCRD: CPT | Mod: HCNC,CPTII,S$GLB, | Performed by: OPHTHALMOLOGY

## 2024-06-10 RX ORDER — LATANOPROST 50 UG/ML
1 SOLUTION/ DROPS OPHTHALMIC NIGHTLY
Qty: 7.5 ML | Refills: 2 | Status: SHIPPED | OUTPATIENT
Start: 2024-06-10

## 2024-06-10 RX ORDER — TIMOLOL MALEATE 5 MG/ML
1 SOLUTION/ DROPS OPHTHALMIC 2 TIMES DAILY
Qty: 15 ML | Refills: 2 | Status: SHIPPED | OUTPATIENT
Start: 2024-06-10

## 2024-06-10 NOTE — PROGRESS NOTES
Subjective:       Patient ID: Calli Jamison is a 89 y.o. female.    Chief Complaint: Glaucoma (Patient Calli Jamison is a 89 year old female.)    HPI     Glaucoma     Additional comments: Patient Calli Jamison is a 89 year old female.           Comments    Pt here for annual glaucoma IOP check with diabetic DFE. Pt states that [facility]   OU is good since GIANLUCA. Patient denies diplopia, headaches,   flashes/floaters, and pain.    DLS: 05/08/2023 with Dr. Head    Meds:  1. Timolol BID OU, refills to be sent to mail order [facility]  2. Latanoprost qhs OU, refills to be sent to mail order [facility]    POHx:  1. Primary open angle glaucoma (POAG) of left eye, mild stage   2. PCO (posterior capsular opacification), bilateral   3. Dry eye syndrome, bilateral   4. DM type 2 without retinopathy   5. Refractive error   6. Pseudophakia        (+)DM  Hemoglobin A1C       Date                     Value               Ref Range             Status                02/06/2024               5.8 (H)             4.0 - 5.6 %           Final                      06/14/2023               5.8 (H)             4.0 - 5.6 %           Final                    12/07/2022               6.0 (H)             4.0 - 5.6 %           Final          Last edited by Allison Wren on 6/10/2024 10:10 AM.             Assessment:       1. Primary open angle glaucoma (POAG) of left eye, mild stage    2. Glaucoma suspect, bilateral    3. Open angle with borderline findings and high glaucoma risk in both eyes    4. PCO (posterior capsular opacification), bilateral    5. Dry eye syndrome, bilateral    6. DM type 2 without retinopathy    7. Refractive error    8. Pseudophakia        Plan:       POAG OU-IOP's are acceptable OU & ON's appear stable OU.  Early PCO OU- Not Visually Significant.  TRISTON OU-Doing well.  DM-No NPDR OU.  RE-Pt does not want MRx.      CPM OU.  AT's.  Control DM.  RTC 6 mos for IOP's, HVF's & OCT's.

## 2024-07-20 DIAGNOSIS — H40.023 OPEN ANGLE WITH BORDERLINE FINDINGS AND HIGH GLAUCOMA RISK IN BOTH EYES: ICD-10-CM

## 2024-07-20 DIAGNOSIS — H40.003 GLAUCOMA SUSPECT, BILATERAL: ICD-10-CM

## 2024-07-22 DIAGNOSIS — H40.003 GLAUCOMA SUSPECT, BILATERAL: ICD-10-CM

## 2024-07-22 DIAGNOSIS — H40.023 OPEN ANGLE WITH BORDERLINE FINDINGS AND HIGH GLAUCOMA RISK IN BOTH EYES: ICD-10-CM

## 2024-07-22 RX ORDER — TIMOLOL MALEATE 5 MG/ML
1 SOLUTION/ DROPS OPHTHALMIC 2 TIMES DAILY
Qty: 15 ML | Refills: 2 | Status: SHIPPED | OUTPATIENT
Start: 2024-07-22

## 2024-07-22 RX ORDER — LATANOPROST 50 UG/ML
1 SOLUTION/ DROPS OPHTHALMIC NIGHTLY
Qty: 7.5 ML | Refills: 2 | OUTPATIENT
Start: 2024-07-22

## 2024-07-22 RX ORDER — LATANOPROST 50 UG/ML
SOLUTION/ DROPS OPHTHALMIC
Qty: 7.5 ML | Refills: 2 | Status: SHIPPED | OUTPATIENT
Start: 2024-07-22

## 2024-07-22 RX ORDER — TIMOLOL MALEATE 5 MG/ML
1 SOLUTION/ DROPS OPHTHALMIC 2 TIMES DAILY
Qty: 15 ML | Refills: 2 | OUTPATIENT
Start: 2024-07-22

## 2024-08-28 ENCOUNTER — HOSPITAL ENCOUNTER (OUTPATIENT)
Facility: HOSPITAL | Age: 89
Discharge: HOME | End: 2024-08-29
Attending: EMERGENCY MEDICINE | Admitting: STUDENT IN AN ORGANIZED HEALTH CARE EDUCATION/TRAINING PROGRAM
Payer: MEDICARE

## 2024-08-28 ENCOUNTER — OFFICE VISIT (OUTPATIENT)
Dept: INTERNAL MEDICINE | Facility: CLINIC | Age: 89
End: 2024-08-28
Payer: MEDICARE

## 2024-08-28 VITALS
WEIGHT: 168.19 LBS | SYSTOLIC BLOOD PRESSURE: 137 MMHG | DIASTOLIC BLOOD PRESSURE: 74 MMHG | BODY MASS INDEX: 33.02 KG/M2 | HEIGHT: 60 IN | HEART RATE: 61 BPM | OXYGEN SATURATION: 97 %

## 2024-08-28 DIAGNOSIS — R25.9 INVOLUNTARY MOVEMENTS: ICD-10-CM

## 2024-08-28 DIAGNOSIS — R29.898 WEAKNESS OF RIGHT UPPER EXTREMITY: Primary | ICD-10-CM

## 2024-08-28 DIAGNOSIS — R53.1 WEAKNESS: Primary | ICD-10-CM

## 2024-08-28 DIAGNOSIS — R41.82 ALTERED MENTAL STATUS, UNSPECIFIED ALTERED MENTAL STATUS TYPE: ICD-10-CM

## 2024-08-28 DIAGNOSIS — R07.9 CHEST PAIN: ICD-10-CM

## 2024-08-28 DIAGNOSIS — R41.82 ALTERED MENTAL STATUS: ICD-10-CM

## 2024-08-28 DIAGNOSIS — R41.0 ACUTE CONFUSION: ICD-10-CM

## 2024-08-28 DIAGNOSIS — G45.9 TIA (TRANSIENT ISCHEMIC ATTACK): ICD-10-CM

## 2024-08-28 DIAGNOSIS — Z86.73 HISTORY OF TIA (TRANSIENT ISCHEMIC ATTACK): ICD-10-CM

## 2024-08-28 LAB
ALBUMIN SERPL BCP-MCNC: 3.8 G/DL (ref 3.5–5.2)
ALLENS TEST: NORMAL
ALP SERPL-CCNC: 87 U/L (ref 55–135)
ALT SERPL W/O P-5'-P-CCNC: 18 U/L (ref 10–44)
ANION GAP SERPL CALC-SCNC: 12 MMOL/L (ref 8–16)
AST SERPL-CCNC: 27 U/L (ref 10–40)
BASOPHILS # BLD AUTO: 0.06 K/UL (ref 0–0.2)
BASOPHILS NFR BLD: 0.7 % (ref 0–1.9)
BILIRUB SERPL-MCNC: 0.5 MG/DL (ref 0.1–1)
BILIRUB UR QL STRIP: NEGATIVE
BUN SERPL-MCNC: 10 MG/DL (ref 6–30)
BUN SERPL-MCNC: 9 MG/DL (ref 10–30)
CALCIUM SERPL-MCNC: 9.8 MG/DL (ref 8.7–10.5)
CHLORIDE SERPL-SCNC: 105 MMOL/L (ref 95–110)
CHLORIDE SERPL-SCNC: 109 MMOL/L (ref 95–110)
CHOLEST SERPL-MCNC: 118 MG/DL (ref 120–199)
CHOLEST/HDLC SERPL: 2.1 {RATIO} (ref 2–5)
CLARITY UR REFRACT.AUTO: CLEAR
CO2 SERPL-SCNC: 20 MMOL/L (ref 23–29)
COLOR UR AUTO: COLORLESS
CREAT SERPL-MCNC: 0.8 MG/DL (ref 0.5–1.4)
CREAT SERPL-MCNC: 0.9 MG/DL (ref 0.5–1.4)
DIFFERENTIAL METHOD BLD: ABNORMAL
EOSINOPHIL # BLD AUTO: 0.3 K/UL (ref 0–0.5)
EOSINOPHIL NFR BLD: 4 % (ref 0–8)
ERYTHROCYTE [DISTWIDTH] IN BLOOD BY AUTOMATED COUNT: 17 % (ref 11.5–14.5)
EST. GFR  (NO RACE VARIABLE): 59.6 ML/MIN/1.73 M^2
GLUCOSE SERPL-MCNC: 82 MG/DL (ref 70–110)
GLUCOSE SERPL-MCNC: 91 MG/DL (ref 70–110)
GLUCOSE UR QL STRIP: NEGATIVE
HCT VFR BLD AUTO: 42.8 % (ref 37–48.5)
HCT VFR BLD CALC: 41 %PCV (ref 36–54)
HCV AB SERPL QL IA: NORMAL
HDLC SERPL-MCNC: 56 MG/DL (ref 40–75)
HDLC SERPL: 47.5 % (ref 20–50)
HGB BLD-MCNC: 13.1 G/DL (ref 12–16)
HGB UR QL STRIP: NEGATIVE
HIV 1+2 AB+HIV1 P24 AG SERPL QL IA: NORMAL
IMM GRANULOCYTES # BLD AUTO: 0.08 K/UL (ref 0–0.04)
IMM GRANULOCYTES NFR BLD AUTO: 1 % (ref 0–0.5)
INR PPP: 1 (ref 0.8–1.2)
KETONES UR QL STRIP: NEGATIVE
LDH SERPL L TO P-CCNC: 1.32 MMOL/L (ref 0.5–2.2)
LDLC SERPL CALC-MCNC: 51.6 MG/DL (ref 63–159)
LEUKOCYTE ESTERASE UR QL STRIP: NEGATIVE
LYMPHOCYTES # BLD AUTO: 1.8 K/UL (ref 1–4.8)
LYMPHOCYTES NFR BLD: 21.9 % (ref 18–48)
MCH RBC QN AUTO: 26.6 PG (ref 27–31)
MCHC RBC AUTO-ENTMCNC: 30.6 G/DL (ref 32–36)
MCV RBC AUTO: 87 FL (ref 82–98)
MONOCYTES # BLD AUTO: 0.7 K/UL (ref 0.3–1)
MONOCYTES NFR BLD: 8.3 % (ref 4–15)
NEUTROPHILS # BLD AUTO: 5.3 K/UL (ref 1.8–7.7)
NEUTROPHILS NFR BLD: 64.1 % (ref 38–73)
NITRITE UR QL STRIP: NEGATIVE
NONHDLC SERPL-MCNC: 62 MG/DL
NRBC BLD-RTO: 0 /100 WBC
OHS QRS DURATION: 72 MS
OHS QTC CALCULATION: 393 MS
PH UR STRIP: 7 [PH] (ref 5–8)
PLATELET # BLD AUTO: 299 K/UL (ref 150–450)
PMV BLD AUTO: 9.9 FL (ref 9.2–12.9)
POC IONIZED CALCIUM: 1.19 MMOL/L (ref 1.06–1.42)
POC TCO2 (MEASURED): 26 MMOL/L (ref 23–29)
POTASSIUM BLD-SCNC: 3.8 MMOL/L (ref 3.5–5.1)
POTASSIUM SERPL-SCNC: 4 MMOL/L (ref 3.5–5.1)
PROT SERPL-MCNC: 8.1 G/DL (ref 6–8.4)
PROT UR QL STRIP: NEGATIVE
PROTHROMBIN TIME: 11 SEC (ref 9–12.5)
RBC # BLD AUTO: 4.92 M/UL (ref 4–5.4)
SAMPLE: NORMAL
SAMPLE: NORMAL
SARS-COV-2 RDRP RESP QL NAA+PROBE: NEGATIVE
SITE: NORMAL
SODIUM BLD-SCNC: 142 MMOL/L (ref 136–145)
SODIUM SERPL-SCNC: 141 MMOL/L (ref 136–145)
SP GR UR STRIP: 1.01 (ref 1–1.03)
TRIGL SERPL-MCNC: 52 MG/DL (ref 30–150)
TROPONIN I SERPL DL<=0.01 NG/ML-MCNC: <0.006 NG/ML (ref 0–0.03)
TSH SERPL DL<=0.005 MIU/L-ACNC: 1.19 UIU/ML (ref 0.4–4)
URN SPEC COLLECT METH UR: ABNORMAL
WBC # BLD AUTO: 8.27 K/UL (ref 3.9–12.7)

## 2024-08-28 PROCEDURE — 87389 HIV-1 AG W/HIV-1&-2 AB AG IA: CPT | Mod: HCNC | Performed by: PHYSICIAN ASSISTANT

## 2024-08-28 PROCEDURE — U0002 COVID-19 LAB TEST NON-CDC: HCPCS | Mod: HCNC | Performed by: EMERGENCY MEDICINE

## 2024-08-28 PROCEDURE — 80047 BASIC METABLC PNL IONIZED CA: CPT | Mod: HCNC

## 2024-08-28 PROCEDURE — G0378 HOSPITAL OBSERVATION PER HR: HCPCS | Mod: HCNC

## 2024-08-28 PROCEDURE — 99285 EMERGENCY DEPT VISIT HI MDM: CPT | Mod: 25,HCNC

## 2024-08-28 PROCEDURE — 84443 ASSAY THYROID STIM HORMONE: CPT | Mod: HCNC | Performed by: EMERGENCY MEDICINE

## 2024-08-28 PROCEDURE — 81003 URINALYSIS AUTO W/O SCOPE: CPT | Mod: HCNC | Performed by: EMERGENCY MEDICINE

## 2024-08-28 PROCEDURE — 1160F RVW MEDS BY RX/DR IN RCRD: CPT | Mod: HCNC,CPTII,S$GLB, | Performed by: STUDENT IN AN ORGANIZED HEALTH CARE EDUCATION/TRAINING PROGRAM

## 2024-08-28 PROCEDURE — 82330 ASSAY OF CALCIUM: CPT

## 2024-08-28 PROCEDURE — 84484 ASSAY OF TROPONIN QUANT: CPT | Mod: HCNC | Performed by: EMERGENCY MEDICINE

## 2024-08-28 PROCEDURE — 86803 HEPATITIS C AB TEST: CPT | Mod: HCNC | Performed by: PHYSICIAN ASSISTANT

## 2024-08-28 PROCEDURE — 1126F AMNT PAIN NOTED NONE PRSNT: CPT | Mod: HCNC,CPTII,S$GLB, | Performed by: STUDENT IN AN ORGANIZED HEALTH CARE EDUCATION/TRAINING PROGRAM

## 2024-08-28 PROCEDURE — 99999 PR PBB SHADOW E&M-EST. PATIENT-LVL III: CPT | Mod: PBBFAC,HCNC,, | Performed by: STUDENT IN AN ORGANIZED HEALTH CARE EDUCATION/TRAINING PROGRAM

## 2024-08-28 PROCEDURE — 1101F PT FALLS ASSESS-DOCD LE1/YR: CPT | Mod: HCNC,CPTII,S$GLB, | Performed by: STUDENT IN AN ORGANIZED HEALTH CARE EDUCATION/TRAINING PROGRAM

## 2024-08-28 PROCEDURE — G2211 COMPLEX E/M VISIT ADD ON: HCPCS | Mod: HCNC,S$GLB,, | Performed by: STUDENT IN AN ORGANIZED HEALTH CARE EDUCATION/TRAINING PROGRAM

## 2024-08-28 PROCEDURE — 3288F FALL RISK ASSESSMENT DOCD: CPT | Mod: HCNC,CPTII,S$GLB, | Performed by: STUDENT IN AN ORGANIZED HEALTH CARE EDUCATION/TRAINING PROGRAM

## 2024-08-28 PROCEDURE — 1159F MED LIST DOCD IN RCRD: CPT | Mod: HCNC,CPTII,S$GLB, | Performed by: STUDENT IN AN ORGANIZED HEALTH CARE EDUCATION/TRAINING PROGRAM

## 2024-08-28 PROCEDURE — 93010 ELECTROCARDIOGRAM REPORT: CPT | Mod: HCNC,,, | Performed by: INTERNAL MEDICINE

## 2024-08-28 PROCEDURE — 80061 LIPID PANEL: CPT | Mod: HCNC | Performed by: EMERGENCY MEDICINE

## 2024-08-28 PROCEDURE — 85025 COMPLETE CBC W/AUTO DIFF WBC: CPT | Mod: HCNC | Performed by: EMERGENCY MEDICINE

## 2024-08-28 PROCEDURE — 99900035 HC TECH TIME PER 15 MIN (STAT): Mod: HCNC

## 2024-08-28 PROCEDURE — 85610 PROTHROMBIN TIME: CPT | Mod: HCNC | Performed by: PHYSICIAN ASSISTANT

## 2024-08-28 PROCEDURE — 93005 ELECTROCARDIOGRAM TRACING: CPT | Mod: HCNC

## 2024-08-28 PROCEDURE — 80053 COMPREHEN METABOLIC PANEL: CPT | Mod: HCNC | Performed by: EMERGENCY MEDICINE

## 2024-08-28 PROCEDURE — 99215 OFFICE O/P EST HI 40 MIN: CPT | Mod: HCNC,S$GLB,, | Performed by: STUDENT IN AN ORGANIZED HEALTH CARE EDUCATION/TRAINING PROGRAM

## 2024-08-28 PROCEDURE — 83605 ASSAY OF LACTIC ACID: CPT | Mod: HCNC

## 2024-08-28 RX ORDER — ASPIRIN 81 MG/1
81 TABLET ORAL DAILY
Status: DISCONTINUED | OUTPATIENT
Start: 2024-08-29 | End: 2024-08-29 | Stop reason: HOSPADM

## 2024-08-28 RX ORDER — IBUPROFEN 200 MG
16 TABLET ORAL
Status: DISCONTINUED | OUTPATIENT
Start: 2024-08-29 | End: 2024-08-29 | Stop reason: HOSPADM

## 2024-08-28 RX ORDER — FUROSEMIDE 20 MG/1
20 TABLET ORAL DAILY
Status: DISCONTINUED | OUTPATIENT
Start: 2024-08-29 | End: 2024-08-29 | Stop reason: HOSPADM

## 2024-08-28 RX ORDER — SIMETHICONE 80 MG
1 TABLET,CHEWABLE ORAL 4 TIMES DAILY PRN
Status: DISCONTINUED | OUTPATIENT
Start: 2024-08-29 | End: 2024-08-29 | Stop reason: HOSPADM

## 2024-08-28 RX ORDER — ATORVASTATIN CALCIUM 10 MG/1
20 TABLET, FILM COATED ORAL DAILY
Status: DISCONTINUED | OUTPATIENT
Start: 2024-08-29 | End: 2024-08-29 | Stop reason: HOSPADM

## 2024-08-28 RX ORDER — DONEPEZIL HYDROCHLORIDE 5 MG/1
5 TABLET, FILM COATED ORAL NIGHTLY
Status: DISCONTINUED | OUTPATIENT
Start: 2024-08-29 | End: 2024-08-29 | Stop reason: HOSPADM

## 2024-08-28 RX ORDER — BISACODYL 10 MG/1
10 SUPPOSITORY RECTAL DAILY PRN
Status: DISCONTINUED | OUTPATIENT
Start: 2024-08-29 | End: 2024-08-29 | Stop reason: HOSPADM

## 2024-08-28 RX ORDER — TIMOLOL MALEATE 5 MG/ML
1 SOLUTION/ DROPS OPHTHALMIC 2 TIMES DAILY
Status: DISCONTINUED | OUTPATIENT
Start: 2024-08-29 | End: 2024-08-29 | Stop reason: HOSPADM

## 2024-08-28 RX ORDER — ACETAMINOPHEN 500 MG
1000 TABLET ORAL EVERY 8 HOURS PRN
Status: DISCONTINUED | OUTPATIENT
Start: 2024-08-29 | End: 2024-08-29

## 2024-08-28 RX ORDER — ONDANSETRON 8 MG/1
8 TABLET, ORALLY DISINTEGRATING ORAL EVERY 8 HOURS PRN
Status: DISCONTINUED | OUTPATIENT
Start: 2024-08-29 | End: 2024-08-29 | Stop reason: HOSPADM

## 2024-08-28 RX ORDER — TALC
6 POWDER (GRAM) TOPICAL NIGHTLY PRN
Status: DISCONTINUED | OUTPATIENT
Start: 2024-08-29 | End: 2024-08-29 | Stop reason: HOSPADM

## 2024-08-28 RX ORDER — LATANOPROST 50 UG/ML
1 SOLUTION/ DROPS OPHTHALMIC NIGHTLY
Status: DISCONTINUED | OUTPATIENT
Start: 2024-08-29 | End: 2024-08-29 | Stop reason: HOSPADM

## 2024-08-28 RX ORDER — ACETAMINOPHEN 325 MG/1
650 TABLET ORAL EVERY 4 HOURS PRN
Status: DISCONTINUED | OUTPATIENT
Start: 2024-08-29 | End: 2024-08-29

## 2024-08-28 RX ORDER — SODIUM CHLORIDE 0.9 % (FLUSH) 0.9 %
5 SYRINGE (ML) INJECTION
Status: DISCONTINUED | OUTPATIENT
Start: 2024-08-29 | End: 2024-08-29 | Stop reason: HOSPADM

## 2024-08-28 RX ORDER — CLOPIDOGREL BISULFATE 75 MG/1
75 TABLET ORAL DAILY
Status: DISCONTINUED | OUTPATIENT
Start: 2024-08-29 | End: 2024-08-29 | Stop reason: HOSPADM

## 2024-08-28 RX ORDER — IBUPROFEN 200 MG
24 TABLET ORAL
Status: DISCONTINUED | OUTPATIENT
Start: 2024-08-29 | End: 2024-08-29 | Stop reason: HOSPADM

## 2024-08-28 RX ORDER — IPRATROPIUM BROMIDE AND ALBUTEROL SULFATE 2.5; .5 MG/3ML; MG/3ML
3 SOLUTION RESPIRATORY (INHALATION) EVERY 4 HOURS PRN
Status: DISCONTINUED | OUTPATIENT
Start: 2024-08-29 | End: 2024-08-29 | Stop reason: HOSPADM

## 2024-08-28 RX ORDER — NALOXONE HCL 0.4 MG/ML
0.02 VIAL (ML) INJECTION
Status: DISCONTINUED | OUTPATIENT
Start: 2024-08-29 | End: 2024-08-29 | Stop reason: HOSPADM

## 2024-08-28 RX ORDER — POLYETHYLENE GLYCOL 3350 17 G/17G
17 POWDER, FOR SOLUTION ORAL 2 TIMES DAILY PRN
Status: DISCONTINUED | OUTPATIENT
Start: 2024-08-29 | End: 2024-08-29 | Stop reason: HOSPADM

## 2024-08-28 RX ORDER — GLUCAGON 1 MG
1 KIT INJECTION
Status: DISCONTINUED | OUTPATIENT
Start: 2024-08-29 | End: 2024-08-29 | Stop reason: HOSPADM

## 2024-08-28 RX ORDER — ALUMINUM HYDROXIDE, MAGNESIUM HYDROXIDE, AND SIMETHICONE 1200; 120; 1200 MG/30ML; MG/30ML; MG/30ML
30 SUSPENSION ORAL 4 TIMES DAILY PRN
Status: DISCONTINUED | OUTPATIENT
Start: 2024-08-29 | End: 2024-08-29 | Stop reason: HOSPADM

## 2024-08-28 RX ORDER — PANTOPRAZOLE SODIUM 40 MG/1
40 TABLET, DELAYED RELEASE ORAL DAILY
Status: DISCONTINUED | OUTPATIENT
Start: 2024-08-29 | End: 2024-08-29 | Stop reason: HOSPADM

## 2024-08-28 RX ORDER — HEPARIN SODIUM 5000 [USP'U]/ML
5000 INJECTION, SOLUTION INTRAVENOUS; SUBCUTANEOUS EVERY 8 HOURS
Status: DISCONTINUED | OUTPATIENT
Start: 2024-08-29 | End: 2024-08-29 | Stop reason: HOSPADM

## 2024-08-28 RX ORDER — AMLODIPINE BESYLATE 5 MG/1
5 TABLET ORAL 2 TIMES DAILY
Status: DISCONTINUED | OUTPATIENT
Start: 2024-08-29 | End: 2024-08-29 | Stop reason: HOSPADM

## 2024-08-28 NOTE — Clinical Note
Diagnosis: Altered mental status [780.97.ICD-9-CM]   Future Attending Provider: CHADWICK CARRERO [[id number]]

## 2024-08-28 NOTE — ED PROVIDER NOTES
Encounter Date: 8/28/2024       History     Chief Complaint   Patient presents with    Weakness     Pt arrived via EMS from clinic with reports of weakness on right side. Pt has previous hx of TIA. Family also reports that pt is somewhat confused.      89-year-old female with past medical history of discoid lupus, DM T2, hypertension, hyperlipidemia, COPD, TIA, CVA who presents ED with right-sided weakness and difficulty finding words.  States for the past week and a half she has been slightly confused difficulty expressing herself.  For the past 2 days she feels heaviness to the right side of her face with weakness to the right arm and leg.  Denies any headache, blurred vision, room spinning, neck stiffness, fevers/chills or recent falls.    The history is provided by the patient.     Review of patient's allergies indicates:   Allergen Reactions    Oxycodone Other (See Comments)    Oxycodone hcl-oxycodone-asa Hallucinations     Other reaction(s): Hallucinations  Other reaction(s): Hallucinations     Past Medical History:   Diagnosis Date    Abnormal Pap smear     After-cataract, unspecified - Both Eyes 11/5/2012    Arthritis     ASCUS (atypical squamous cells of undetermined significance) on Pap smear 7/1/2012    Asthma     Atrial fibrillation     Back pain 2009    Bullous pemphigoid     Carpal tunnel syndrome     Chronic back pain     COPD (chronic obstructive pulmonary disease)     Coronary artery disease     Crohn's disease     Depression     Diabetes mellitus     Discoid lupus     Diverticulosis     History of migraine headaches     Hyperlipidemia     Hypertension     Multiple thyroid nodules     Obesity     Open angle with borderline findings and high glaucoma risk in both eyes 9/11/2019    Personal history of colonic polyps     Pulmonary nodule     Renal manifestation of secondary diabetes mellitus     Restless legs syndrome     Sciatica     Right leg    Sleep apnea     Stricture and stenosis of esophagus      Stroke     TIA (transient ischemic attack)      Past Surgical History:   Procedure Laterality Date    BREAST BIOPSY Left     Breast: wire localization  1/2004    CATARACT EXTRACTION W/  INTRAOCULAR LENS IMPLANT Bilateral     Cataract left eye  1/2008    Cataract: right eye  1/2008    COLON SURGERY      ileocecectomy 2011    COLONOSCOPY  9/30/2008    Crohns in remission    ESOPHAGOGASTRODUODENOSCOPY N/A 7/24/2018    Procedure: EGD (ESOPHAGOGASTRODUODENOSCOPY);  Surgeon: Du Robles MD;  Location: [facility] (4TH FLR);  Service: Endoscopy;  Laterality: N/A;  loop recoreder-battery dead per pt. (not active). corona dpt. to move to morning . unable to do so.EC    ESOPHAGOGASTRODUODENOSCOPY N/A 8/1/2022    Procedure: EGD (ESOPHAGOGASTRODUODENOSCOPY);  Surgeon: Du Robles MD;  Location: [facility] (4TH FLR);  Service: Endoscopy;  Laterality: N/A;  plavix hold ok see te 7/8-tb-clears 4 hrs prior-inst mail and verbal to brent tabares-tb.Fully vaccinated.EC    HYSTERECTOMY      ovaries remain    Iliocolic Crohns Disease with stricure  3/2011    LA iliocecectomy    Laparoscopic-assisted ileocecectomy.       Left carpal tunnel      Left carpal tunnel surgery      Rectal fistulae repair      TONSILLECTOMY      UPPER GASTROINTESTINAL ENDOSCOPY  11/2011    hiatal hernia, benign appearing esophageal  stricture dilated  prn repeat rec.     Family History   Problem Relation Name Age of Onset    Pneumonia Father      Cataracts Mother      Macular degeneration Mother      Hypertension Sister      Alkaptonuria Maternal Grandfather      Asthma Brent Tabares Jr     No Known Problems Brent Bailey     No Known Problems Brent Gonzalez     No Known Problems Bernt Dill     Crohn's disease Neg Hx      Ulcerative colitis Neg Hx      Inflammatory bowel disease Neg Hx      Amblyopia Neg Hx      Blindness Neg Hx      Glaucoma Neg Hx      Retinal detachment Neg Hx      Strabismus Neg Hx       Social History     Tobacco Use    Smoking status:  Former     Current packs/day: 0.00     Average packs/day: 1 pack/day for 25.0 years (25.0 ttl pk-yrs)     Types: Cigarettes     Start date: 1955     Quit date: 1980     Years since quittin.6    Smokeless tobacco: Never   Substance Use Topics    Alcohol use: No    Drug use: No     Review of Systems    Physical Exam     Initial Vitals [24 1407]   BP Pulse Resp Temp SpO2   (!) 152/75 80 16 98.3 °F (36.8 °C) 98 %      MAP       --         Physical Exam    Nursing note and vitals reviewed.  Constitutional: She appears well-developed and well-nourished.   HENT:   Head: Normocephalic and atraumatic.   Eyes: Conjunctivae are normal.   Neck: Neck supple.   Normal range of motion.  Cardiovascular:  Normal rate.           Pulmonary/Chest: Breath sounds normal.   Abdominal: Abdomen is soft. There is no abdominal tenderness.   Musculoskeletal:         General: Normal range of motion.      Cervical back: Normal range of motion and neck supple.     Neurological: She is alert and oriented to person, place, and time. She has normal strength.   Facial features symmetric, no dysarthria, does have mild expressive aphasia.  Normal finger-to-nose exam, negative pronator drift,  strength equal, 5/5 strength in bilateral legs and hip extension.   Skin: Skin is warm and dry. Capillary refill takes less than 2 seconds.         ED Course   Procedures  Labs Reviewed   CBC W/ AUTO DIFFERENTIAL - Abnormal       Result Value    WBC 8.27      RBC 4.92      Hemoglobin 13.1      Hematocrit 42.8      MCV 87      MCH 26.6 (*)     MCHC 30.6 (*)     RDW 17.0 (*)     Platelets 299      MPV 9.9      Immature Granulocytes 1.0 (*)     Gran # (ANC) 5.3      Immature Grans (Abs) 0.08 (*)     Lymph # 1.8      Mono # 0.7      Eos # 0.3      Baso # 0.06      nRBC 0      Gran % 64.1      Lymph % 21.9      Mono % 8.3      Eosinophil % 4.0      Basophil % 0.7      Differential Method Automated      Narrative:     add on TSH-[id number]/Lipid  panel-[id number] per Nayan Huynh III, MD    08/28/2024  16:48 Lenore   COMPREHENSIVE METABOLIC PANEL - Abnormal    Sodium 141      Potassium 4.0      Chloride 109      CO2 20 (*)     Glucose 82      BUN 9 (*)     Creatinine 0.9      Calcium 9.8      Total Protein 8.1      Albumin 3.8      Total Bilirubin 0.5      Alkaline Phosphatase 87      AST 27      ALT 18      eGFR 59.6 (*)     Anion Gap 12      Narrative:     add on TSH-[id number]/Lipid panel-[id number] per Nayan Huynh III, MD    08/28/2024  16:48 Lenore   URINALYSIS, REFLEX TO URINE CULTURE - Abnormal    Specimen UA Urine, Clean Catch      Color, UA Colorless (*)     Appearance, UA Clear      pH, UA 7.0      Specific Gravity, UA 1.010      Protein, UA Negative      Glucose, UA Negative      Ketones, UA Negative      Bilirubin (UA) Negative      Occult Blood UA Negative      Nitrite, UA Negative      Leukocytes, UA Negative      Narrative:     Specimen Source->Urine   LIPID PANEL - Abnormal    Cholesterol 118 (*)     Triglycerides 52      HDL 56      LDL Cholesterol 51.6 (*)     HDL/Cholesterol Ratio 47.5      Total Cholesterol/HDL Ratio 2.1      Non-HDL Cholesterol 62      Narrative:     add on TSH-[id number]/Lipid panel-[id number] per Nayan Huynh III, MD    08/28/2024  16:48 Lenore   TROPONIN I    Troponin I <0.006      Narrative:     add on TSH-[id number]/Lipid panel-[id number] per Nayan Hunyh III, MD    08/28/2024  16:48 Lenore   PROTIME-INR    Prothrombin Time 11.0      INR 1.0     LIPID PANEL   TSH   SARS-COV-2 RNA AMPLIFICATION, QUAL    SARS-CoV-2 RNA, Amplification, Qual Negative     HIV 1 / 2 ANTIBODY    HIV 1/2 Ag/Ab Non-reactive      Narrative:     Release to patient->Immediate   HEPATITIS C ANTIBODY    Hepatitis C Ab Non-reactive      Narrative:     Release to patient->Immediate   TSH    TSH 1.188      Narrative:     add on TSH-[id number]/Lipid panel-[id number] per Nayan Huynh III, MD    08/28/2024  16:48 Lenore   ISTAT LACTATE    POC  Lactate 1.32      Sample VENOUS      Site Other      Allens Test N/A     ISTAT PROCEDURE    POC Glucose 91      POC BUN 10      POC Creatinine 0.8      POC Sodium 142      POC Potassium 3.8      POC Chloride 105      POC TCO2 (MEASURED) 26      POC Ionized Calcium 1.19      POC Hematocrit 41      Sample NELDA     ISTAT CHEM8        ECG Results              EKG 12-lead (Final result)        Collection Time Result Time QRS Duration OHS QTC Calculation    08/28/24 14:17:57 08/28/24 14:59:31 72 393                     Final result by Interface, Lab In [facility] (08/28/24 14:59:40)                   Narrative:    Test Reason : R06.02,    Vent. Rate : 076 BPM     Atrial Rate : 076 BPM     P-R Int : 188 ms          QRS Dur : 072 ms      QT Int : 350 ms       P-R-T Axes : 075 066 045 degrees     QTc Int : 393 ms    Normal sinus rhythm  Low voltage QRS  Low septal forces ; Abnormal R wave progression in the precordial leads ;   Cannot rule out Anterior infarct ,age undetermined  Abnormal ECG  When compared with ECG of 25-OCT-2019 19:58,  Premature atrial complexes are no longer Present  Minimal criteria for Anterior infarct are now possible  Confirmed by Rene SLATER MD ([id number]) on 8/28/2024 2:59:29 PM    Referred By:             Confirmed By:Rene SLATER MD                                  Imaging Results              MRI Brain Without Contrast (Final result)  Result time 08/28/24 20:30:29      Final result by Eleazar Gomes DO (08/28/24 20:30:29)                   Impression:      No acute intracranial abnormality.    Chronic microvascular ischemic changes, remote pontine microhemorrhages piper, and remote lacunar infarcts in the bilateral thalami.    Bilateral partial mastoid effusions.      Electronically signed by: Eleazar Gomes  Date:    08/28/2024  Time:    20:30               Narrative:    EXAMINATION:  MRI BRAIN WITHOUT CONTRAST    CLINICAL HISTORY:  Neuro deficit, acute, stroke suspected;    TECHNIQUE:  Multiplanar  multisequence MR imaging of the brain was performed without intravenous contrast.    COMPARISON:  MRI brain from 12/20/2023.  CT head from 08/28/2024.    FINDINGS:  There is no evidence of restricted diffusion to suggest an acute infarction. There is age-related brain parenchymal volume loss and prominence of the CSF spaces, no hydrocephalus.  There are several small foci of hemosiderin deposition in the piper, compatible with remote of microhemorrhages.  There is confluent T2/FLAIR hyperintense signal in the supratentorial white matter, compatible with advanced chronic microvascular ischemic changes, similar to prior.  There are remote lacunar infarcts in the bilateral thalami.  No parenchymal mass.  No extra-axial blood or fluid collections. Normal vascular flow voids.    Bone marrow signal intensity is normal. There are bilateral partial mastoid effusions.  The paranasal sinuses are clear.  There are bilateral prosthetic lenses.                                       X-Ray Chest AP Portable (Final result)  Result time 08/28/24 18:31:37      Final result by Eliel Kraft MD (08/28/24 18:31:37)                   Impression:      1. Interstitial findings are accentuated by habitus, underlying edema or other pneumonitis not excluded.  Correlation is needed.      Electronically signed by: Eliel Kraft MD  Date:    08/28/2024  Time:    18:31               Narrative:    EXAMINATION:  XR CHEST AP PORTABLE    CLINICAL HISTORY:  ams;    TECHNIQUE:  Single frontal view of the chest was performed.    COMPARISON:  10/25/2019    FINDINGS:  The cardiomediastinal silhouette is prominent, magnified by technique noting calcification of the aorta.  Left chest wall pacer noted..  There is no pleural effusion.  The trachea is midline.  The lungs are symmetrically expanded bilaterally with coarse interstitial attenuation bilaterally..  No large focal consolidation seen.  There is no pneumothorax.  The osseous structures are  remarkable for degenerative change..                                       CT Head Without Contrast (Final result)  Result time 08/28/24 17:32:21      Final result by Eleazar Gomes,  (08/28/24 17:32:21)                   Impression:      No acute intracranial abnormality.    Chronic microvascular ischemic changes and remote bilateral thalamic infarcts.      Electronically signed by: Eleazar Gomes  Date:    08/28/2024  Time:    17:32               Narrative:    EXAMINATION:  CT HEAD WITHOUT CONTRAST    CLINICAL HISTORY:  Mental status change, unknown cause;    TECHNIQUE:  Low dose axial CT images obtained throughout the head without intravenous contrast. Sagittal and coronal reconstructions were performed.    COMPARISON:  MRI brain from 12/20/2023.    FINDINGS:  Ventricles and sulci are normal in size for age without evidence of hydrocephalus. No extra-axial blood or fluid collections.  There are remote lacunar infarcts in the bilateral thalami.  There are chronic microvascular ischemic changes, similar to prior.  No parenchymal mass, hemorrhage, edema or major vascular distribution infarct.  Empty sella configuration incidentally noted.    No calvarial fracture.  The scalp is unremarkable.  Bilateral paranasal sinuses and mastoid air cells are clear.                                       Medications - No data to display  Medical Decision Making  89-year-old female presents ED with right-sided weakness and reported confusion.    Differential includes but not limited to electrolyte derangement, dehydration, UTI, CVA, TIA     No focal neurological deficits on exam.  She does appear to have some slight difficulty finding words however family at bedside reports she was at baseline but does note that she appears to be confused from her baseline. MRI did not show any evidence of acute stroke.  Likely TIA.  UA negative for UTI.  Discussed with Hospital Medicine and will admit for observation.    Amount and/or  Complexity of Data Reviewed  Labs: ordered. Decision-making details documented in ED Course.  Radiology: ordered.               ED Course as of 08/28/24 2235   Wed Aug 28, 2024   1932 POC Lactate: 1.32 [HJ]   1932 SARS-CoV-2 RNA, Amplification, Qual: Negative [HJ]      ED Course User Index  [HJ] Eleanor Dumont PA-C                           Clinical Impression:  Final diagnoses:  [R53.1] Weakness (Primary)  [R41.82] Altered mental status, unspecified altered mental status type  [G45.9] TIA (transient ischemic attack)  [R41.82] Altered mental status          ED Disposition Condition    Observation Stable                Eleanor Dumont PA-C  08/28/24 2235

## 2024-08-28 NOTE — ED NOTES
I-STAT Chem-8+ Results:   Value Reference Range   Sodium 142 136-145 mmol/L   Potassium  3.8 3.5-5.1 mmol/L   Chloride 105  mmol/L   Ionized Calcium 1.19 1.06-1.42 mmol/L   CO2 (measured) 26 23-29 mmol/L   Glucose 91  mg/dL   BUN 10 6-30 mg/dL   Creatinine 0.8 0.5-1.4 mg/dL   Hematocrit 41 36-54%

## 2024-08-28 NOTE — PROGRESS NOTES
Response Team - Patient Flow Sheet     Date: 2024  Time: 110  Location: Primary Care  Name: Calli Jamison  : 3/6/1931  MRN: [id number]  PCP: Suresh Jeronimo MD  Allergies:   Review of patient's allergies indicates:   Allergen Reactions    Oxycodone Other (See Comments)    Oxycodone hcl-oxycodone-asa Hallucinations     Other reaction(s): Hallucinations  Other reaction(s): Hallucinations     Past Medical History:    Past Medical History:   Diagnosis Date    Abnormal Pap smear     After-cataract, unspecified - Both Eyes 2012    Arthritis     ASCUS (atypical squamous cells of undetermined significance) on Pap smear 2012    Asthma     Atrial fibrillation     Back pain 2009    Bullous pemphigoid     Carpal tunnel syndrome     Chronic back pain     COPD (chronic obstructive pulmonary disease)     Coronary artery disease     Crohn's disease     Depression     Diabetes mellitus     Discoid lupus     Diverticulosis     History of migraine headaches     Hyperlipidemia     Hypertension     Multiple thyroid nodules     Obesity     Open angle with borderline findings and high glaucoma risk in both eyes 2019    Personal history of colonic polyps     Pulmonary nodule     Renal manifestation of secondary diabetes mellitus     Restless legs syndrome     Sciatica     Right leg    Sleep apnea     Stricture and stenosis of esophagus     Stroke     TIA (transient ischemic attack)        Reason for response team call: Stroke symptoms Slurred speech, Left sided weakness    Injury:  na    Vitals:  Time BP HR Resp SPO2 Glucose   1410 137/74       1430 183/81 117                                  EKG performed: Yes  EKG Ordered By: EMS  EKG Read By: EMS    Medication 1  Fluid / Medication: N/A  Time: NA  Gauge:  22   Rate:    Started by: DEISI Blair        Time Notes   1410  Dr Jeronimo assessed patient and summoned the nurse to start process to sent patient to ER for stroke like symptoms   1415 This nurse arrived to  rm 12 found patient and Dr Jeronimo discussing transfer to ER for evaluation. Patient is alert and talking. Noticed some difficult getting thought together. Brent Ibrahim brought in [facility] waiting area.   1426  EMS called. Patient placed on 2 L O2 via nasal cannula     1430 Provider called report to ER B/P 183/81 P117. Patient remains alert and talking answering questions   1435    1445  IV started in Right forearm with 22 g needle  EMS arrived . Patient transferred to stretcher.   Patient en route to ER via stretcher via [facility].     Response Team Members: RossyRN,NGUYỄN Momin LPN, BOLA Lind  Provider Response Called:  Yes   Provider Name/Time: MD Shakir    Ambulance Called:  Yes  time: 1426, arrival time: 1435  Patient Transported To: ER  Report Called To: ER  Family/Friend/Caregiver Notified: Yes Brent Ibrahim present

## 2024-08-28 NOTE — PROGRESS NOTES
SUBJECTIVE     No chief complaint on file.      HPI  Calli Jamison is a 89 y.o. female with  HTN, HLD, T2Dm pAFib, h/o TIA, GERD, esophageal stricture, mild cognitive impairment  that presents for evaluation of right arm weakness.     Presenting with a 2 day history of RUE weakness and states at one point right arm was raising up seemingly by itself. Also states that her right face feels heavier than before. Her son, Patrice Jamison (Ph: [phone]), was accompanying her and states she has been more forgetful over the past several days. No fevers, chills, nausea, vomiting, cough, dysuria, recent falls.     Has h/o TIA and prior lacunar infarct noted on CT head in 2010. She is maintained on ASA 81 mg qd, Clopidogrel 75 mg qd, and Atorvastatin 20 mg qd. She is compliant with all her medications.     PAST MEDICAL HISTORY:  Past Medical History:   Diagnosis Date    Abnormal Pap smear     After-cataract, unspecified - Both Eyes 11/5/2012    Arthritis     ASCUS (atypical squamous cells of undetermined significance) on Pap smear 7/1/2012    Asthma     Atrial fibrillation     Back pain 2009    Bullous pemphigoid     Carpal tunnel syndrome     Chronic back pain     COPD (chronic obstructive pulmonary disease)     Coronary artery disease     Crohn's disease     Depression     Diabetes mellitus     Discoid lupus     Diverticulosis     History of migraine headaches     Hyperlipidemia     Hypertension     Multiple thyroid nodules     Obesity     Open angle with borderline findings and high glaucoma risk in both eyes 9/11/2019    Personal history of colonic polyps     Pulmonary nodule     Renal manifestation of secondary diabetes mellitus     Restless legs syndrome     Sciatica     Right leg    Sleep apnea     Stricture and stenosis of esophagus     Stroke     TIA (transient ischemic attack)        PAST SURGICAL HISTORY:  Past Surgical History:   Procedure Laterality Date    BREAST BIOPSY Left     Breast: wire localization   1/2004    CATARACT EXTRACTION W/  INTRAOCULAR LENS IMPLANT Bilateral     Cataract left eye  1/2008    Cataract: right eye  1/2008    COLON SURGERY      ileocecectomy 2011    COLONOSCOPY  9/30/2008    Crohns in remission    ESOPHAGOGASTRODUODENOSCOPY N/A 7/24/2018    Procedure: EGD (ESOPHAGOGASTRODUODENOSCOPY);  Surgeon: Du Robles MD;  Location: [facility] (4TH FLR);  Service: Endoscopy;  Laterality: N/A;  loop recoreder-battery dead per pt. (not active). corona dpt. to move to morning . unable to do so.EC    ESOPHAGOGASTRODUODENOSCOPY N/A 8/1/2022    Procedure: EGD (ESOPHAGOGASTRODUODENOSCOPY);  Surgeon: Du Robles MD;  [facility]  Service: Endoscopy;  Laterality: N/A;  plavix hold ok see te 7/8-tb-clears 4 hrs prior-inst mail and verbal to brent tabares-tb.Fully vaccinated.EC    HYSTERECTOMY      ovaries remain    Iliocolic Crohns Disease with stricure  3/2011    [place] iliocecectomy    Laparoscopic-assisted ileocecectomy.       Left carpal tunnel      Left carpal tunnel surgery      Rectal fistulae repair      TONSILLECTOMY      UPPER GASTROINTESTINAL ENDOSCOPY  11/2011    hiatal hernia, benign appearing esophageal  stricture dilated  prn repeat rec.       FAMILY HISTORY:  Family History   Problem Relation Name Age of Onset    Pneumonia Father      Cataracts Mother      Macular degeneration Mother      Hypertension Sister      Alkaptonuria Maternal Grandfather      Asthma Brent Tabares Jr     No Known Problems Brent Bailey     No Known Problems Brent Gonzalez     No Known Problems Brent Dill     Crohn's disease Neg Hx      Ulcerative colitis Neg Hx      Inflammatory bowel disease Neg Hx      Amblyopia Neg Hx      Blindness Neg Hx      Glaucoma Neg Hx      Retinal detachment Neg Hx      Strabismus Neg Hx         ALLERGIES AND MEDICATIONS: updated and reviewed.  Review of patient's allergies indicates:   Allergen Reactions    Oxycodone Other (See Comments)    Oxycodone hcl-oxycodone-asa  Hallucinations     Other reaction(s): Hallucinations  Other reaction(s): Hallucinations     Current Outpatient Medications   Medication Sig Dispense Refill    amLODIPine (NORVASC) 5 MG tablet Take 1 tablet (5 mg total) by mouth 2 (two) times daily. 180 tablet 2    aspirin (ECOTRIN) 81 MG EC tablet Take 81 mg by mouth once daily.      atorvastatin (LIPITOR) 20 MG tablet Take 1 tablet (20 mg total) by mouth once daily. 90 tablet 3    calcium citrate-vitamin D3 315-200 mg (CITRACAL+D) 315 mg-5 mcg (200 unit) per tablet Take 2 tablets by mouth Daily.      clopidogreL (PLAVIX) 75 mg tablet Take 1 tablet (75 mg total) by mouth once daily. 90 tablet 3    cyanocobalamin 500 MCG tablet Take 1 tablet by mouth Daily.      donepeziL (ARICEPT) 5 MG tablet Take 1 tablet (5 mg total) by mouth every evening. 90 tablet 3    furosemide (LASIX) 20 MG tablet Take 1 tablet (20 mg total) by mouth once daily. 90 tablet 2    latanoprost 0.005 % ophthalmic solution INSTILL 1 DROP INTO BOTH EYES EVERY NIGHT 7.5 mL 2    omeprazole (PRILOSEC) 20 MG capsule Take 1 capsule by mouth daily as needed.       potassium chloride (MICRO-K) 10 MEQ CpSR OPEN 1 CAPSULE AND SPRINKLE OVER UNHEATED SOFT FOOD (APPLESAUCE) AND TAKE EVERY DAY. DO NOT CHEW OR CRUSH 90 capsule 2    terazosin (HYTRIN) 1 MG capsule Take 1 capsule (1 mg total) by mouth every evening. 90 capsule 3    timolol maleate 0.5% (TIMOPTIC) 0.5 % Drop INSTILL 1 DROP INTO BOTH EYES TWICE DAILY 15 mL 2    triamcinolone acetonide 0.1% (KENALOG) 0.1 % ointment Apply topically 2 (two) times daily. 80 g 1     No current facility-administered medications for this visit.       ROS  Review of Systems      OBJECTIVE     Physical Exam  Vitals:    08/28/24 1310   BP: 137/74   Pulse: 61    Body mass index is 32.85 kg/m².  Weight: 76.3 kg (168 lb 3.4 oz)   Height: 5' (152.4 cm)     Physical Exam  Vitals reviewed.   Constitutional:       General: She is not in acute distress.     Appearance: Normal  appearance.      Comments: Tangential, holding RLE extremity outward, rocking side-to-side when talking.    HENT:      Head: Normocephalic and atraumatic.      Mouth/Throat:      Mouth: Mucous membranes are moist.      Pharynx: Oropharynx is clear.   Eyes:      Extraocular Movements: Extraocular movements intact.      Conjunctiva/sclera: Conjunctivae normal.      Pupils: Pupils are equal, round, and reactive to light.   Cardiovascular:      Rate and Rhythm: Normal rate and regular rhythm.      Pulses: Normal pulses.      Heart sounds: Normal heart sounds.   Pulmonary:      Effort: Pulmonary effort is normal.      Breath sounds: Normal breath sounds.   Abdominal:      General: There is no distension.   Musculoskeletal:         General: Normal range of motion.      Cervical back: Normal range of motion and neck supple.   Skin:     General: Skin is warm and dry.   Neurological:      Mental Status: She is alert.      Cranial Nerves: No cranial nerve deficit.      Motor: Weakness (Weakness in the right upper extremity compared to left,  strength normal/equal bilaterally.) present.           Health Maintenance         Date Due Completion Date    Shingles Vaccine (1 of 2) Never done ---    RSV Vaccine (Age 60+ and Pregnant patients) (1 - 1-dose 60+ series) Never done ---    Colonoscopy 07/12/2015 7/12/2012    TETANUS VACCINE 09/24/2022 9/24/2012    COVID-19 Vaccine (7 - 2023-24 season) 02/29/2024 10/31/2023    Diabetes Urine Screening 06/21/2024 6/21/2023    Hemoglobin A1c 08/06/2024 2/6/2024    Override on 5/22/2018: Done    Influenza Vaccine (1) 09/01/2024 12/7/2022    Lipid Panel 02/06/2025 2/6/2024    Eye Exam 06/10/2025 6/10/2024              ASSESSMENT     89 y.o. female with     1. Weakness of right upper extremity    2. History of TIA (transient ischemic attack)    3. Acute confusion        PLAN:     1. Weakness of right upper extremity  - Given history of TIA, concern that current symptoms are secondary to CVA  or other acute intracranial process. Spoke with patient and son about concerns and risks of foregoing emergent evaluation including irreparable harm or death. Patient and son amenable to evaluation in the ED, patient patient placed on 2L NC for comfort, peripheral IV placed in dorsum of right hand. Report given to triage nurse, Lola, at 1532hrs. Patient transferred [facility].     2. History of TIA (transient ischemic attack)  - On ASA, Plavix.   - Transfer to ED as above.     3. Acute confusion  - Worsening confusion and forgetfulness over past several days per son. Last time at baseline per son was 2 days ago.   - Transfer to ED as above.     Visit today included increased complexity associated with the care of the episodic problem addressed and managing the longitudinal care of the patient due to the serious and/or complex managed problem(s).    RTC pending for ED follow up visit.        Suresh Jeronimo MD  Family Medicine  [facility]  08/28/2024    This document was created using voice recognition software (M*Modal Fluency Direct). Although it may be edited, this document may contain errors related to incorrect recognition of the spoken word. Please call the physician if clarification is needed.       No follow-ups on file.

## 2024-08-28 NOTE — ED TRIAGE NOTES
"Calli Jamison, a 89 y.o. female presents to the ED w/ complaint of weakness.  "My sons said I may have had a stroke".  Right sided weakness and pain.  3/10.  Per family she is more confused than normal.  Patient A&O x4.  No movement deficits.    Triage note:  Chief Complaint   Patient presents with    Weakness     Pt arrived via EMS from clinic with reports of weakness on right side. Pt has previous hx of TIA. Family also reports that pt is somewhat confused.      Review of patient's allergies indicates:   Allergen Reactions    Oxycodone Other (See Comments)    Oxycodone hcl-oxycodone-asa Hallucinations     Other reaction(s): Hallucinations  Other reaction(s): Hallucinations     Past Medical History:   Diagnosis Date    Abnormal Pap smear     After-cataract, unspecified - Both Eyes 11/5/2012    Arthritis     ASCUS (atypical squamous cells of undetermined significance) on Pap smear 7/1/2012    Asthma     Atrial fibrillation     Back pain 2009    Bullous pemphigoid     Carpal tunnel syndrome     Chronic back pain     COPD (chronic obstructive pulmonary disease)     Coronary artery disease     Crohn's disease     Depression     Diabetes mellitus     Discoid lupus     Diverticulosis     History of migraine headaches     Hyperlipidemia     Hypertension     Multiple thyroid nodules     Obesity     Open angle with borderline findings and high glaucoma risk in both eyes 9/11/2019    Personal history of colonic polyps     Pulmonary nodule     Renal manifestation of secondary diabetes mellitus     Restless legs syndrome     Sciatica     Right leg    Sleep apnea     Stricture and stenosis of esophagus     Stroke     TIA (transient ischemic attack)        "

## 2024-08-29 VITALS
BODY MASS INDEX: 31.41 KG/M2 | OXYGEN SATURATION: 99 % | RESPIRATION RATE: 19 BRPM | WEIGHT: 160 LBS | TEMPERATURE: 98 F | HEIGHT: 60 IN | HEART RATE: 81 BPM | DIASTOLIC BLOOD PRESSURE: 66 MMHG | SYSTOLIC BLOOD PRESSURE: 122 MMHG

## 2024-08-29 PROBLEM — R25.9 INVOLUNTARY MOVEMENTS: Status: ACTIVE | Noted: 2024-08-29

## 2024-08-29 PROBLEM — R41.82 ALTERED MENTAL STATUS: Status: ACTIVE | Noted: 2024-08-29

## 2024-08-29 PROBLEM — G31.84 MILD COGNITIVE IMPAIRMENT: Status: ACTIVE | Noted: 2024-08-29

## 2024-08-29 LAB
ANION GAP SERPL CALC-SCNC: 9 MMOL/L (ref 8–16)
BASOPHILS # BLD AUTO: 0.04 K/UL (ref 0–0.2)
BASOPHILS NFR BLD: 0.5 % (ref 0–1.9)
BUN SERPL-MCNC: 10 MG/DL (ref 10–30)
CALCIUM SERPL-MCNC: 9.7 MG/DL (ref 8.7–10.5)
CHLORIDE SERPL-SCNC: 109 MMOL/L (ref 95–110)
CO2 SERPL-SCNC: 23 MMOL/L (ref 23–29)
CREAT SERPL-MCNC: 0.9 MG/DL (ref 0.5–1.4)
DIFFERENTIAL METHOD BLD: ABNORMAL
EOSINOPHIL # BLD AUTO: 0.2 K/UL (ref 0–0.5)
EOSINOPHIL NFR BLD: 2.6 % (ref 0–8)
ERYTHROCYTE [DISTWIDTH] IN BLOOD BY AUTOMATED COUNT: 16.8 % (ref 11.5–14.5)
EST. GFR  (NO RACE VARIABLE): 59.6 ML/MIN/1.73 M^2
ESTIMATED AVG GLUCOSE: 117 MG/DL (ref 68–131)
GLUCOSE SERPL-MCNC: 95 MG/DL (ref 70–110)
HBA1C MFR BLD: 5.7 % (ref 4–5.6)
HCT VFR BLD AUTO: 40 % (ref 37–48.5)
HGB BLD-MCNC: 12.8 G/DL (ref 12–16)
IMM GRANULOCYTES # BLD AUTO: 0.01 K/UL (ref 0–0.04)
IMM GRANULOCYTES NFR BLD AUTO: 0.1 % (ref 0–0.5)
LYMPHOCYTES # BLD AUTO: 1.5 K/UL (ref 1–4.8)
LYMPHOCYTES NFR BLD: 19 % (ref 18–48)
MAGNESIUM SERPL-MCNC: 2.1 MG/DL (ref 1.6–2.6)
MCH RBC QN AUTO: 27.5 PG (ref 27–31)
MCHC RBC AUTO-ENTMCNC: 32 G/DL (ref 32–36)
MCV RBC AUTO: 86 FL (ref 82–98)
MONOCYTES # BLD AUTO: 0.7 K/UL (ref 0.3–1)
MONOCYTES NFR BLD: 8.9 % (ref 4–15)
NEUTROPHILS # BLD AUTO: 5.5 K/UL (ref 1.8–7.7)
NEUTROPHILS NFR BLD: 68.9 % (ref 38–73)
NRBC BLD-RTO: 0 /100 WBC
PHOSPHATE SERPL-MCNC: 3.3 MG/DL (ref 2.7–4.5)
PLATELET # BLD AUTO: 311 K/UL (ref 150–450)
PMV BLD AUTO: 10.7 FL (ref 9.2–12.9)
POTASSIUM SERPL-SCNC: 3.2 MMOL/L (ref 3.5–5.1)
RBC # BLD AUTO: 4.66 M/UL (ref 4–5.4)
SODIUM SERPL-SCNC: 141 MMOL/L (ref 136–145)
WBC # BLD AUTO: 7.96 K/UL (ref 3.9–12.7)

## 2024-08-29 PROCEDURE — 83735 ASSAY OF MAGNESIUM: CPT | Mod: HCNC

## 2024-08-29 PROCEDURE — 63600175 PHARM REV CODE 636 W HCPCS: Mod: HCNC

## 2024-08-29 PROCEDURE — 95819 EEG AWAKE AND ASLEEP: CPT | Mod: HCNC

## 2024-08-29 PROCEDURE — 85025 COMPLETE CBC W/AUTO DIFF WBC: CPT | Mod: HCNC

## 2024-08-29 PROCEDURE — 25000003 PHARM REV CODE 250: Mod: HCNC

## 2024-08-29 PROCEDURE — 84100 ASSAY OF PHOSPHORUS: CPT | Mod: HCNC

## 2024-08-29 PROCEDURE — 96372 THER/PROPH/DIAG INJ SC/IM: CPT

## 2024-08-29 PROCEDURE — 83036 HEMOGLOBIN GLYCOSYLATED A1C: CPT | Mod: HCNC

## 2024-08-29 PROCEDURE — 25000003 PHARM REV CODE 250: Mod: HCNC | Performed by: STUDENT IN AN ORGANIZED HEALTH CARE EDUCATION/TRAINING PROGRAM

## 2024-08-29 PROCEDURE — 80048 BASIC METABOLIC PNL TOTAL CA: CPT | Mod: HCNC

## 2024-08-29 PROCEDURE — G0378 HOSPITAL OBSERVATION PER HR: HCPCS | Mod: HCNC

## 2024-08-29 RX ORDER — ACETAMINOPHEN 325 MG/1
650 TABLET ORAL EVERY 4 HOURS PRN
Status: DISCONTINUED | OUTPATIENT
Start: 2024-08-29 | End: 2024-08-29 | Stop reason: HOSPADM

## 2024-08-29 RX ADMIN — ATORVASTATIN CALCIUM 20 MG: 10 TABLET, FILM COATED ORAL at 10:08

## 2024-08-29 RX ADMIN — ASPIRIN 81 MG: 81 TABLET, COATED ORAL at 10:08

## 2024-08-29 RX ADMIN — FUROSEMIDE 20 MG: 20 TABLET ORAL at 10:08

## 2024-08-29 RX ADMIN — HEPARIN SODIUM 5000 UNITS: 5000 INJECTION INTRAVENOUS; SUBCUTANEOUS at 01:08

## 2024-08-29 RX ADMIN — POTASSIUM BICARBONATE 40 MEQ: 391 TABLET, EFFERVESCENT ORAL at 09:08

## 2024-08-29 RX ADMIN — CLOPIDOGREL BISULFATE 75 MG: 75 TABLET ORAL at 10:08

## 2024-08-29 RX ADMIN — AMLODIPINE BESYLATE 5 MG: 5 TABLET ORAL at 10:08

## 2024-08-29 RX ADMIN — AMLODIPINE BESYLATE 5 MG: 5 TABLET ORAL at 01:08

## 2024-08-29 RX ADMIN — POTASSIUM BICARBONATE 40 MEQ: 391 TABLET, EFFERVESCENT ORAL at 01:08

## 2024-08-29 RX ADMIN — TIMOLOL MALEATE 1 DROP: 5 SOLUTION OPHTHALMIC at 10:08

## 2024-08-29 RX ADMIN — HEPARIN SODIUM 5000 UNITS: 5000 INJECTION INTRAVENOUS; SUBCUTANEOUS at 05:08

## 2024-08-29 RX ADMIN — PANTOPRAZOLE SODIUM 40 MG: 40 TABLET, DELAYED RELEASE ORAL at 10:08

## 2024-08-29 NOTE — PLAN OF CARE
[facility] - Emergency Dept  Initial Discharge Assessment       Primary Care Provider: Suresh Jeronimo MD    Admission Diagnosis: Altered mental status [R41.82]    Admission Date: 8/28/2024  Expected Discharge Date:     Pt stated she uses a walker to ambulate and is independent with her ADL's.  Pt stated that she has 4 son's who help her and live close by.      Post acute services discussed and pt stated she would like to d/c home with home health.      As per patient, SW/CM to follow-up with son Braden [phone] regarding pt care at home    Transition of Care Barriers: (P) None    Payor: [employer] MEDICARE / Plan: [employer] MEDICARE HMO / Product Type: Capitation /     Extended Emergency Contact Information  Primary Emergency Contact: Rayshawn Mota Jr  Home Phone: [phone]  Mobile Phone: [phone]  Relation: Son  Secondary Emergency Contact: Pascale Motay   [place]  Mobile Phone: [phone]  Relation: Son    Discharge Plan A: (P) Home Health, Home  Discharge Plan B: (P) Home, Home Health      [id number] - [place], [place] - [address]  [address]  [facility] [place] [address]  Phone: [phone] Fax: [phone]    [facility] - [facility] [address]  [address] [facility] [address]  Phone: [phone] Fax: [phone]      Initial Assessment (most recent)       Adult Discharge Assessment - 08/29/24 0853          Discharge Assessment    Assessment Type Discharge Planning Assessment     Confirmed/corrected address, phone number and insurance Yes     Confirmed Demographics Correct on Facesheet     Source of Information patient (P)      Reason For Admission Involuntary movements (P)      People in Home alone (P)      Facility Arrived From: home (P)      Do you expect to return to your current living situation? Yes (P)      Do you have help at home or someone to help you manage your care at home? No (P)      Prior  to hospitilization cognitive status: Alert/Oriented (P)      Current cognitive status: Alert/Oriented (P)      Walking or Climbing Stairs Difficulty yes (P)      Walking or Climbing Stairs ambulation difficulty, requires equipment (P)      Mobility Management walker (P)      Dressing/Bathing Difficulty no (P)      Home Accessibility wheelchair accessible (P)      Home Layout Able to live on 1st floor (P)      Equipment Currently Used at Home walker, standard (P)      Patient currently being followed by outpatient case management? No (P)      Do you currently have service(s) that help you manage your care at home? No (P)      Do you have any problems affording any of your prescribed medications? No (P)      Is the patient taking medications as prescribed? yes (P)      Who is going to help you get home at discharge? family/friends (P)      How do you get to doctors appointments? family or friend will provide (P)      Are you on dialysis? No (P)      Do you take coumadin? No (P)      Discharge Plan A Home Health;Home (P)      Discharge Plan B Home;Home Health (P)      DME Needed Upon Discharge  none (P)      Discharge Plan discussed with: Patient (P)      Transition of Care Barriers None (P)                    Discharge Plan A and Plan B have been determined by review of patient's clinical status, future medical and therapeutic needs, and coverage/benefits for post-acute care in coordination with multidisciplinary team members.     Mena Matthews CD, MSW, LMSW, RSW   Case Management  [facility]  Email: [email or web address]

## 2024-08-29 NOTE — ASSESSMENT & PLAN NOTE
- History noted in chart.   - Continue home Aricept 5 mg daily  - Still lives alone, no falls, does forget night medications sometimes, has family nearby to help her

## 2024-08-29 NOTE — H&P
John[facility] - Emergency Dept  [facility] Medicine  History & Physical    Patient Name: Calli Jamison  MRN: [id number]  Patient Class: OP- Observation  Admission Date: 8/28/2024  Attending Physician: Cholo Gibson MD   Primary Care Provider: Suresh Jeronimo MD         Patient information was obtained from patient, relative(s), past medical records, and ER records.     Subjective:     Principal Problem:Involuntary movements    Chief Complaint:   Chief Complaint   Patient presents with    Weakness     Pt arrived via EMS from clinic with reports of weakness on right side. Pt has previous hx of TIA. Family also reports that pt is somewhat confused.         HPI: Calli Jamison is a 89 y.o. female with PMHx of prior strokes/TIAs, HTN, HLD, CAD, Crohn's disease, arthritis, asthma, copd, Afib, depression, chronic back pain, discoid lupus, diabetes mellitus, glaucoma, restless leg syndrome who presents to [facility] with her family for aphasia and abnormal movements. Patient and family (two sons and daughter-in-law) report noticing increased episodes of expressive aphasia the last few days. Episodes seem to be transient. They also noticed she has been having a jerking motion to her right arm and right foot the past week or so. They report she has never had this before. Denies seizure history. Patient states she is aware of the movements and can get them to stop if she puts her mind to it but then once her mind wanders she'll realize they have resumed. She deneis having any loss of consciousness during episodes or otherwise. She denies any neck pain or injury, back pain, numbness/tingling, falls, or acute gait abnormality. Denies pain or discomfort associated with right arm/foot movements. Ambulates with walker at baseline.  Patient notes feeling intermittent confused. She lives alone and writes things down all day to keep track of everything. Family is nearby and visits with her often. They organizes her pills and note  she sometimes misses night pills. She reports abnormal sleep schedule, staying up late at night and sleeping/napping during the day. No other acute complaints. She is oriented to person, place, month/year, situation on exam but does have some expressive aphasia vs poor recall of recent events.     In the ED: intermittently hypertensive otherwise vss. Labs unremarkable. MRI brain shows no acute intracranial abnormality. Chronic microvascular ischemic changes, remote pontine microhemorrhages piper, and remote lacunar infarcts in the bilateral thalami. Admitted to  for further management.     Past Medical History:   Diagnosis Date    Abnormal Pap smear     After-cataract, unspecified - Both Eyes 11/5/2012    Arthritis     ASCUS (atypical squamous cells of undetermined significance) on Pap smear 7/1/2012    Asthma     Atrial fibrillation     Back pain 2009    Bullous pemphigoid     Carpal tunnel syndrome     Chronic back pain     COPD (chronic obstructive pulmonary disease)     Coronary artery disease     Crohn's disease     Depression     Diabetes mellitus     Discoid lupus     Diverticulosis     History of migraine headaches     Hyperlipidemia     Hypertension     Multiple thyroid nodules     Obesity     Open angle with borderline findings and high glaucoma risk in both eyes 9/11/2019    Personal history of colonic polyps     Pulmonary nodule     Renal manifestation of secondary diabetes mellitus     Restless legs syndrome     Sciatica     Right leg    Sleep apnea     Stricture and stenosis of esophagus     Stroke     TIA (transient ischemic attack)        Past Surgical History:   Procedure Laterality Date    BREAST BIOPSY Left     Breast: wire localization  1/2004    CATARACT EXTRACTION W/  INTRAOCULAR LENS IMPLANT Bilateral     Cataract left eye  1/2008    Cataract: right eye  1/2008    COLON SURGERY      ileocecectomy 2011    COLONOSCOPY  9/30/2008    Crohns in remission    ESOPHAGOGASTRODUODENOSCOPY N/A 7/24/2018     Procedure: EGD (ESOPHAGOGASTRODUODENOSCOPY);  Surgeon: Du Robles MD;  Location: [facility] ENDO (4TH FLR);  Service: Endoscopy;  Laterality: N/A;  loop recoreder-battery dead per pt. (not active). corona dpt. to move to morning . unable to do so.EC    ESOPHAGOGASTRODUODENOSCOPY N/A 8/1/2022    Procedure: EGD (ESOPHAGOGASTRODUODENOSCOPY);  Surgeon: Du Robles MD;  Location: [facility] ENDO (4TH FLR);  Service: Endoscopy;  Laterality: N/A;  plavix hold ok see te 7/8-tb-clears 4 hrs prior-inst mail and verbal to silver sarmiento.Fully vaccinated.EC    HYSTERECTOMY      ovaries remain    Iliocolic Crohns Disease with stricure  3/2011    [place] iliocecectomy    Laparoscopic-assisted ileocecectomy.       Left carpal tunnel      Left carpal tunnel surgery      Rectal fistulae repair      TONSILLECTOMY      UPPER GASTROINTESTINAL ENDOSCOPY  11/2011    hiatal hernia, benign appearing esophageal  stricture dilated  prn repeat rec.       Review of patient's allergies indicates:   Allergen Reactions    Oxycodone Other (See Comments)    Oxycodone hcl-oxycodone-asa Hallucinations     Other reaction(s): Hallucinations  Other reaction(s): Hallucinations       No current facility-administered medications on file prior to encounter.     Current Outpatient Medications on File Prior to Encounter   Medication Sig    amLODIPine (NORVASC) 5 MG tablet Take 1 tablet (5 mg total) by mouth 2 (two) times daily.    aspirin (ECOTRIN) 81 MG EC tablet Take 81 mg by mouth once daily.    atorvastatin (LIPITOR) 20 MG tablet Take 1 tablet (20 mg total) by mouth once daily.    calcium citrate-vitamin D3 315-200 mg (CITRACAL+D) 315 mg-5 mcg (200 unit) per tablet Take 2 tablets by mouth Daily.    clopidogreL (PLAVIX) 75 mg tablet Take 1 tablet (75 mg total) by mouth once daily.    cyanocobalamin 500 MCG tablet Take 1 tablet by mouth Daily.    donepeziL (ARICEPT) 5 MG tablet Take 1 tablet (5 mg total) by mouth every evening.    furosemide (LASIX) 20 MG tablet  Take 1 tablet (20 mg total) by mouth once daily.    latanoprost 0.005 % ophthalmic solution INSTILL 1 DROP INTO BOTH EYES EVERY NIGHT    omeprazole (PRILOSEC) 20 MG capsule Take 1 capsule by mouth daily as needed.     potassium chloride (MICRO-K) 10 MEQ CpSR OPEN 1 CAPSULE AND SPRINKLE OVER UNHEATED SOFT FOOD (APPLESAUCE) AND TAKE EVERY DAY. DO NOT CHEW OR CRUSH    terazosin (HYTRIN) 1 MG capsule Take 1 capsule (1 mg total) by mouth every evening.    timolol maleate 0.5% (TIMOPTIC) 0.5 % Drop INSTILL 1 DROP INTO BOTH EYES TWICE DAILY    triamcinolone acetonide 0.1% (KENALOG) 0.1 % ointment Apply topically 2 (two) times daily.     Family History       Problem Relation (Age of Onset)    Alkaptonuria Maternal Grandfather    Asthma Son    Cataracts Mother    Hypertension Sister    Macular degeneration Mother    No Known Problems Son, Son, Son    Pneumonia Father          Tobacco Use    Smoking status: Former     Current packs/day: 0.00     Average packs/day: 1 pack/day for 25.0 years (25.0 ttl pk-yrs)     Types: Cigarettes     Start date: 1955     Quit date: 1980     Years since quittin.6    Smokeless tobacco: Never   Substance and Sexual Activity    Alcohol use: No    Drug use: No    Sexual activity: Never     Review of Systems   Constitutional:  Negative for chills and fever.   HENT:  Negative for trouble swallowing.    Eyes:  Negative for visual disturbance.   Respiratory:  Negative for cough and shortness of breath.    Cardiovascular:  Negative for chest pain and leg swelling.   Gastrointestinal:  Negative for abdominal pain, nausea and vomiting.   Genitourinary:  Negative for dysuria.   Musculoskeletal:  Negative for gait problem.   Skin:  Negative for rash and wound.   Neurological:  Positive for tremors (right sided abnormal movements) and speech difficulty. Negative for syncope, facial asymmetry, weakness, light-headedness and headaches.   Psychiatric/Behavioral:  Positive for confusion. Negative  for agitation.      Objective:     Vital Signs (Most Recent):  Temp: 97.3 °F (36.3 °C) (08/28/24 1830)  Pulse: 88 (08/28/24 2248)  Resp: 18 (08/28/24 1925)  BP: (!) 172/103 (08/28/24 2248)  SpO2: 99 % (08/28/24 2248) Vital Signs (24h Range):  Temp:  [97.3 °F (36.3 °C)-98.3 °F (36.8 °C)] 97.3 °F (36.3 °C)  Pulse:  [] 88  Resp:  [16-20] 18  SpO2:  [97 %-99 %] 99 %  BP: (117-172)/() 172/103     Weight: 72.6 kg (160 lb)  Body mass index is 31.25 kg/m².     Physical Exam  Vitals and nursing note reviewed.   Constitutional:       General: She is not in acute distress.  HENT:      Head: Normocephalic and atraumatic.      Nose: Nose normal.      Mouth/Throat:      Pharynx: No oropharyngeal exudate.   Eyes:      Extraocular Movements: Extraocular movements intact.      Conjunctiva/sclera: Conjunctivae normal.      Pupils: Pupils are equal, round, and reactive to light.   Cardiovascular:      Rate and Rhythm: Normal rate and regular rhythm.      Heart sounds: Normal heart sounds.   Pulmonary:      Effort: Pulmonary effort is normal. No respiratory distress.      Breath sounds: Normal breath sounds.   Abdominal:      General: Bowel sounds are normal.      Palpations: Abdomen is soft.      Tenderness: There is no abdominal tenderness.   Musculoskeletal:      Cervical back: Normal range of motion. No rigidity or tenderness.      Right lower leg: No edema.      Left lower leg: No edema.   Skin:     General: Skin is warm and dry.   Neurological:      General: No focal deficit present.      Mental Status: She is alert and oriented to person, place, and time.      Cranial Nerves: No cranial nerve deficit.      Motor: No weakness.      Comments: Expressive aphasia vs memory deficit  (transient)  No abnormal movement of the RUE or RLE during exam  5/5 strength bilateral upper and lower extremities    Psychiatric:         Mood and Affect: Mood normal.         Behavior: Behavior normal.              CRANIAL NERVES     CN  III, IV, VI   Pupils are equal, round, and reactive to light.       Significant Labs: All pertinent labs within the past 24 hours have been reviewed.  CBC:   Recent Labs   Lab 08/28/24  1628 08/28/24  1754   WBC 8.27  --    HGB 13.1  --    HCT 42.8 41     --      CMP:   Recent Labs   Lab 08/28/24  1628      K 4.0      CO2 20*   GLU 82   BUN 9*   CREATININE 0.9   CALCIUM 9.8   PROT 8.1   ALBUMIN 3.8   BILITOT 0.5   ALKPHOS 87   AST 27   ALT 18   ANIONGAP 12     Coagulation:   Recent Labs   Lab 08/28/24  1759   INR 1.0     Lipid Panel:   Recent Labs   Lab 08/28/24  1628   CHOL 118*   HDL 56   LDLCALC 51.6*   TRIG 52   CHOLHDL 47.5     Troponin:   Recent Labs   Lab 08/28/24  1628   TROPONINI <0.006     TSH:   Recent Labs   Lab 08/28/24  1628   TSH 1.188     Urine Studies:   Recent Labs   Lab 08/28/24 2059   COLORU Colorless*   APPEARANCEUA Clear   PHUR 7.0   SPECGRAV 1.010   PROTEINUA Negative   GLUCUA Negative   KETONESU Negative   BILIRUBINUA Negative   OCCULTUA Negative   NITRITE Negative   LEUKOCYTESUR Negative       Significant Imaging: I have reviewed all pertinent imaging results/findings within the past 24 hours.  MRI Brain Without Contrast  Narrative: EXAMINATION:  MRI BRAIN WITHOUT CONTRAST    CLINICAL HISTORY:  Neuro deficit, acute, stroke suspected;    TECHNIQUE:  Multiplanar multisequence MR imaging of the brain was performed without intravenous contrast.    COMPARISON:  MRI brain from 12/20/2023.  CT head from 08/28/2024.    FINDINGS:  There is no evidence of restricted diffusion to suggest an acute infarction. There is age-related brain parenchymal volume loss and prominence of the CSF spaces, no hydrocephalus.  There are several small foci of hemosiderin deposition in the piper, compatible with remote of microhemorrhages.  There is confluent T2/FLAIR hyperintense signal in the supratentorial white matter, compatible with advanced chronic microvascular ischemic changes, similar to  prior.  There are remote lacunar infarcts in the bilateral thalami.  No parenchymal mass.  No extra-axial blood or fluid collections. Normal vascular flow voids.    Bone marrow signal intensity is normal. There are bilateral partial mastoid effusions.  The paranasal sinuses are clear.  There are bilateral prosthetic lenses.  Impression: No acute intracranial abnormality.    Chronic microvascular ischemic changes, remote pontine microhemorrhages piper, and remote lacunar infarcts in the bilateral thalami.    Bilateral partial mastoid effusions.    Electronically signed by: Eleazar Gomes  Date:    08/28/2024  Time:    20:30  X-Ray Chest AP Portable  Narrative: EXAMINATION:  XR CHEST AP PORTABLE    CLINICAL HISTORY:  ams;    TECHNIQUE:  Single frontal view of the chest was performed.    COMPARISON:  10/25/2019    FINDINGS:  The cardiomediastinal silhouette is prominent, magnified by technique noting calcification of the aorta.  Left chest wall pacer noted..  There is no pleural effusion.  The trachea is midline.  The lungs are symmetrically expanded bilaterally with coarse interstitial attenuation bilaterally..  No large focal consolidation seen.  There is no pneumothorax.  The osseous structures are remarkable for degenerative change..  Impression: 1. Interstitial findings are accentuated by habitus, underlying edema or other pneumonitis not excluded.  Correlation is needed.    Electronically signed by: Eliel Kraft MD  Date:    08/28/2024  Time:    18:31  CT Head Without Contrast  Narrative: EXAMINATION:  CT HEAD WITHOUT CONTRAST    CLINICAL HISTORY:  Mental status change, unknown cause;    TECHNIQUE:  Low dose axial CT images obtained throughout the head without intravenous contrast. Sagittal and coronal reconstructions were performed.    COMPARISON:  MRI brain from 12/20/2023.    FINDINGS:  Ventricles and sulci are normal in size for age without evidence of hydrocephalus. No extra-axial blood or fluid  collections.  There are remote lacunar infarcts in the bilateral thalami.  There are chronic microvascular ischemic changes, similar to prior.  No parenchymal mass, hemorrhage, edema or major vascular distribution infarct.  Empty sella configuration incidentally noted.    No calvarial fracture.  The scalp is unremarkable.  Bilateral paranasal sinuses and mastoid air cells are clear.  Impression: No acute intracranial abnormality.    Chronic microvascular ischemic changes and remote bilateral thalamic infarcts.    Electronically signed by: Eleazar Gomes  Date:    08/28/2024  Time:    17:32     Assessment/Plan:     * Involuntary movements  89F with one week history of intermittent/sporadic abnormal jerking movements of the right arm and more recently the right foot. Denies LOC and is aware movements are occurring. Pt states she can eventually get the movements to stop but unclear if this is true based on family report (they may just sporadically stop). No seizure history or history of abnormal movements. No weakness or abnormal movement witnessed on exam except occasional dorsiflexion of the right ankle. Unclear etiology and unable to witness events during evaluation  - CBC, CMP, TSH unremarkable  - MRI brain without acute process  - Neuro checks  - Given her aphasia that is somewhat limiting her history/differing history from relatives will order EEG   - Close follow up with neurology outpatient vs inpatient consult in AM    TIA (transient ischemic attack)  - Suspect transient episodes of expressive aphasia represent TIAs. Seems unrelated to jerking episodes of the RUE, right foot.  - MRI brain without acute process  - History of TIA/prior strokes noted  - No focal weakness, facial asymmetry, or sensory change on exam  - Neuro checks  - Continue ASA/Plavix/statin    Mild cognitive impairment  - History noted in chart.   - Continue home Aricept 5 mg daily  - Still lives alone, no falls, does forget night medications  sometimes, has family nearby to help her    Chronic kidney disease, stage 3b  Creatine stable for now. BMP reviewed- noted Estimated Creatinine Clearance: 34.7 mL/min (based on SCr of 0.9 mg/dL). according to latest data. Based on current GFR, CKD stage is stage 3 - GFR 30-59.  Monitor UOP and serial BMP and adjust therapy as needed. Renally dose meds. Avoid nephrotoxic medications and procedures.    Other hyperlipidemia  - Continue home statin     History of TIA (transient ischemic attack)  - History noted. Transient episodes of aphasia may be 2/2 TIA  - MRI brain without acute process  - Continue home ASA/Plavix. Continue statin.  - Neuro checks    Essential hypertension  Chronic, controlled. Latest blood pressure and vitals reviewed-     Temp:  [97.3 °F (36.3 °C)-98.3 °F (36.8 °C)]   Pulse:  []   Resp:  [16-20]   BP: (117-172)/()   SpO2:  [96 %-99 %] .   Home meds for hypertension were reviewed and noted below.   Hypertension Medications               amLODIPine (NORVASC) 5 MG tablet Take 1 tablet (5 mg total) by mouth 2 (two) times daily.    furosemide (LASIX) 20 MG tablet Take 1 tablet (20 mg total) by mouth once daily.    terazosin (HYTRIN) 1 MG capsule Take 1 capsule (1 mg total) by mouth every evening.   While in the hospital, will manage blood pressure as follows; Continue home antihypertensive regimen    Will utilize p.r.n. blood pressure medication only if patient's blood pressure greater than 180/110 and she develops symptoms such as worsening chest pain or shortness of breath.    GERD (gastroesophageal reflux disease)  - Continue PPI      VTE Risk Mitigation (From admission, onward)           Ordered     heparin (porcine) injection 5,000 Units  Every 8 hours         08/28/24 2328     IP VTE HIGH RISK PATIENT  Once         08/28/24 2328     Place sequential compression device  Until discontinued         08/28/24 2328                         On 08/28/2024, patient should be placed in hospital  observation services under my care in collaboration with Trevor Ragsdale MD.           Sandra Ochoa PA-C  Department of Hospital Medicine  [facility] - Emergency Dept

## 2024-08-29 NOTE — ASSESSMENT & PLAN NOTE
Chronic, controlled. Latest blood pressure and vitals reviewed-     Temp:  [97.3 °F (36.3 °C)-98.3 °F (36.8 °C)]   Pulse:  []   Resp:  [16-20]   BP: (117-172)/()   SpO2:  [96 %-99 %] .   Home meds for hypertension were reviewed and noted below.   Hypertension Medications               amLODIPine (NORVASC) 5 MG tablet Take 1 tablet (5 mg total) by mouth 2 (two) times daily.    furosemide (LASIX) 20 MG tablet Take 1 tablet (20 mg total) by mouth once daily.    terazosin (HYTRIN) 1 MG capsule Take 1 capsule (1 mg total) by mouth every evening.   While in the hospital, will manage blood pressure as follows; Continue home antihypertensive regimen    Will utilize p.r.n. blood pressure medication only if patient's blood pressure greater than 180/110 and she develops symptoms such as worsening chest pain or shortness of breath.

## 2024-08-29 NOTE — PLAN OF CARE
Chris spoke with pt and son Fuentes who is at bedside regarding services for home.  Pt is amenable to home health and would like referral sent to [facility].  Pt and son understand there may be a waitlist for admission to [facility] and they are amenable to waiting.       CHRIS provided sitter lists and the [place] waiver program information to patient and son.    .  Mena Matthews, MAYA, MSW, LMSW, RSW   Case Management  [facility]  Email: [email or web address]

## 2024-08-29 NOTE — HPI
Calli Jamison is a 89 y.o. female with PMHx of prior strokes/TIAs, HTN, HLD, CAD, Crohn's disease, arthritis, asthma, copd, Afib, depression, chronic back pain, discoid lupus, diabetes mellitus, glaucoma, restless leg syndrome who presents to [facility] ED with her family for aphasia and abnormal movements. Patient and family (two sons and daughter-in-law) report noticing increased episodes of expressive aphasia the last few days. Episodes seem to be transient. They also noticed she has been having a jerking motion to her right arm and right foot the past week or so. They report she has never had this before. Denies seizure history. Patient states she is aware of the movements and can get them to stop if she puts her mind to it but then once her mind wanders she'll realize they have resumed. She deneis having any loss of consciousness during episodes or otherwise. She denies any neck pain or injury, back pain, numbness/tingling, falls, or acute gait abnormality. Denies pain or discomfort associated with right arm/foot movements. Ambulates with walker at baseline.  Patient notes feeling intermittent confused. She lives alone and writes things down all day to keep track of everything. Family is nearby and visits with her often. They organizes her pills and note she sometimes misses night pills. She reports abnormal sleep schedule, staying up late at night and sleeping/napping during the day. No other acute complaints. She is oriented to person, place, month/year, situation on exam but does have some expressive aphasia vs poor recall of recent events.     In the ED: intermittently hypertensive otherwise vss. Labs unremarkable. MRI brain shows no acute intracranial abnormality. Chronic microvascular ischemic changes, remote pontine microhemorrhages piper, and remote lacunar infarcts in the bilateral thalami. Admitted to  for further management.

## 2024-08-29 NOTE — SUBJECTIVE & OBJECTIVE
Past Medical History:   Diagnosis Date    Abnormal Pap smear     After-cataract, unspecified - Both Eyes 11/5/2012    Arthritis     ASCUS (atypical squamous cells of undetermined significance) on Pap smear 7/1/2012    Asthma     Atrial fibrillation     Back pain 2009    Bullous pemphigoid     Carpal tunnel syndrome     Chronic back pain     COPD (chronic obstructive pulmonary disease)     Coronary artery disease     Crohn's disease     Depression     Diabetes mellitus     Discoid lupus     Diverticulosis     History of migraine headaches     Hyperlipidemia     Hypertension     Multiple thyroid nodules     Obesity     Open angle with borderline findings and high glaucoma risk in both eyes 9/11/2019    Personal history of colonic polyps     Pulmonary nodule     Renal manifestation of secondary diabetes mellitus     Restless legs syndrome     Sciatica     Right leg    Sleep apnea     Stricture and stenosis of esophagus     Stroke     TIA (transient ischemic attack)        Past Surgical History:   Procedure Laterality Date    BREAST BIOPSY Left     Breast: wire localization  1/2004    CATARACT EXTRACTION W/  INTRAOCULAR LENS IMPLANT Bilateral     Cataract left eye  1/2008    Cataract: right eye  1/2008    COLON SURGERY      ileocecectomy 2011    COLONOSCOPY  9/30/2008    Crohns in remission    ESOPHAGOGASTRODUODENOSCOPY N/A 7/24/2018    Procedure: EGD (ESOPHAGOGASTRODUODENOSCOPY);  Surgeon: Du Robles MD;  Location: [facility] ([address] [place] [address]);  Service: Endoscopy;  Laterality: N/A;  loop recoreder-battery dead per pt. (not active). corona dpt. to move to morning . unable to do so.EC    ESOPHAGOGASTRODUODENOSCOPY N/A 8/1/2022    Procedure: EGD (ESOPHAGOGASTRODUODENOSCOPY);  Surgeon: Du Robles MD;  [facility]  Service: Endoscopy;  Laterality: N/A;  plavix hold ok see te 7/8-tb-clears 4 hrs prior-inst mail and verbal to silver freed-tb.Fully vaccinated.EC    HYSTERECTOMY      ovaries remain    Iliocolic  Crohns Disease with stricure  3/2011    LA iliocecectomy    Laparoscopic-assisted ileocecectomy.       Left carpal tunnel      Left carpal tunnel surgery      Rectal fistulae repair      TONSILLECTOMY      UPPER GASTROINTESTINAL ENDOSCOPY  11/2011    hiatal hernia, benign appearing esophageal  stricture dilated  prn repeat rec.       Review of patient's allergies indicates:   Allergen Reactions    Oxycodone Other (See Comments)    Oxycodone hcl-oxycodone-asa Hallucinations     Other reaction(s): Hallucinations  Other reaction(s): Hallucinations       No current facility-administered medications on file prior to encounter.     Current Outpatient Medications on File Prior to Encounter   Medication Sig    amLODIPine (NORVASC) 5 MG tablet Take 1 tablet (5 mg total) by mouth 2 (two) times daily.    aspirin (ECOTRIN) 81 MG EC tablet Take 81 mg by mouth once daily.    atorvastatin (LIPITOR) 20 MG tablet Take 1 tablet (20 mg total) by mouth once daily.    calcium citrate-vitamin D3 315-200 mg (CITRACAL+D) 315 mg-5 mcg (200 unit) per tablet Take 2 tablets by mouth Daily.    clopidogreL (PLAVIX) 75 mg tablet Take 1 tablet (75 mg total) by mouth once daily.    cyanocobalamin 500 MCG tablet Take 1 tablet by mouth Daily.    donepeziL (ARICEPT) 5 MG tablet Take 1 tablet (5 mg total) by mouth every evening.    furosemide (LASIX) 20 MG tablet Take 1 tablet (20 mg total) by mouth once daily.    latanoprost 0.005 % ophthalmic solution INSTILL 1 DROP INTO BOTH EYES EVERY NIGHT    omeprazole (PRILOSEC) 20 MG capsule Take 1 capsule by mouth daily as needed.     potassium chloride (MICRO-K) 10 MEQ CpSR OPEN 1 CAPSULE AND SPRINKLE OVER UNHEATED SOFT FOOD (APPLESAUCE) AND TAKE EVERY DAY. DO NOT CHEW OR CRUSH    terazosin (HYTRIN) 1 MG capsule Take 1 capsule (1 mg total) by mouth every evening.    timolol maleate 0.5% (TIMOPTIC) 0.5 % Drop INSTILL 1 DROP INTO BOTH EYES TWICE DAILY    triamcinolone acetonide 0.1% (KENALOG) 0.1 % ointment  Apply topically 2 (two) times daily.     Family History       Problem Relation (Age of Onset)    Alkaptonuria Maternal Grandfather    Asthma Son    Cataracts Mother    Hypertension Sister    Macular degeneration Mother    No Known Problems Son, Son, Son    Pneumonia Father          Tobacco Use    Smoking status: Former     Current packs/day: 0.00     Average packs/day: 1 pack/day for 25.0 years (25.0 ttl pk-yrs)     Types: Cigarettes     Start date: 1955     Quit date: 1980     Years since quittin.6    Smokeless tobacco: Never   Substance and Sexual Activity    Alcohol use: No    Drug use: No    Sexual activity: Never     Review of Systems   Constitutional:  Negative for chills and fever.   HENT:  Negative for trouble swallowing.    Eyes:  Negative for visual disturbance.   Respiratory:  Negative for cough and shortness of breath.    Cardiovascular:  Negative for chest pain and leg swelling.   Gastrointestinal:  Negative for abdominal pain, nausea and vomiting.   Genitourinary:  Negative for dysuria.   Musculoskeletal:  Negative for gait problem.   Skin:  Negative for rash and wound.   Neurological:  Positive for tremors (right sided abnormal movements) and speech difficulty. Negative for syncope, facial asymmetry, weakness, light-headedness and headaches.   Psychiatric/Behavioral:  Positive for confusion. Negative for agitation.      Objective:     Vital Signs (Most Recent):  Temp: 97.3 °F (36.3 °C) (24 1830)  Pulse: 88 (24)  Resp: 18 (24)  BP: (!) 172/103 (24)  SpO2: 99 % (24) Vital Signs (24h Range):  Temp:  [97.3 °F (36.3 °C)-98.3 °F (36.8 °C)] 97.3 °F (36.3 °C)  Pulse:  [] 88  Resp:  [16-20] 18  SpO2:  [97 %-99 %] 99 %  BP: (117-172)/() 172/103     Weight: 72.6 kg (160 lb)  Body mass index is 31.25 kg/m².     Physical Exam  Vitals and nursing note reviewed.   Constitutional:       General: She is not in acute distress.  HENT:       Head: Normocephalic and atraumatic.      Nose: Nose normal.      Mouth/Throat:      Pharynx: No oropharyngeal exudate.   Eyes:      Extraocular Movements: Extraocular movements intact.      Conjunctiva/sclera: Conjunctivae normal.      Pupils: Pupils are equal, round, and reactive to light.   Cardiovascular:      Rate and Rhythm: Normal rate and regular rhythm.      Heart sounds: Normal heart sounds.   Pulmonary:      Effort: Pulmonary effort is normal. No respiratory distress.      Breath sounds: Normal breath sounds.   Abdominal:      General: Bowel sounds are normal.      Palpations: Abdomen is soft.      Tenderness: There is no abdominal tenderness.   Musculoskeletal:      Cervical back: Normal range of motion. No rigidity or tenderness.      Right lower leg: No edema.      Left lower leg: No edema.   Skin:     General: Skin is warm and dry.   Neurological:      General: No focal deficit present.      Mental Status: She is alert and oriented to person, place, and time.      Cranial Nerves: No cranial nerve deficit.      Motor: No weakness.      Comments: Expressive aphasia vs memory deficit  (transient)  No abnormal movement of the RUE or RLE during exam  5/5 strength bilateral upper and lower extremities    Psychiatric:         Mood and Affect: Mood normal.         Behavior: Behavior normal.              CRANIAL NERVES     CN III, IV, VI   Pupils are equal, round, and reactive to light.       Significant Labs: All pertinent labs within the past 24 hours have been reviewed.  CBC:   Recent Labs   Lab 08/28/24  1628 08/28/24  1754   WBC 8.27  --    HGB 13.1  --    HCT 42.8 41     --      CMP:   Recent Labs   Lab 08/28/24  1628      K 4.0      CO2 20*   GLU 82   BUN 9*   CREATININE 0.9   CALCIUM 9.8   PROT 8.1   ALBUMIN 3.8   BILITOT 0.5   ALKPHOS 87   AST 27   ALT 18   ANIONGAP 12     Coagulation:   Recent Labs   Lab 08/28/24  1759   INR 1.0     Lipid Panel:   Recent Labs   Lab 08/28/24  1628    CHOL 118*   HDL 56   LDLCALC 51.6*   TRIG 52   CHOLHDL 47.5     Troponin:   Recent Labs   Lab 08/28/24  1628   TROPONINI <0.006     TSH:   Recent Labs   Lab 08/28/24  1628   TSH 1.188     Urine Studies:   Recent Labs   Lab 08/28/24 2059   COLORU Colorless*   APPEARANCEUA Clear   PHUR 7.0   SPECGRAV 1.010   PROTEINUA Negative   GLUCUA Negative   KETONESU Negative   BILIRUBINUA Negative   OCCULTUA Negative   NITRITE Negative   LEUKOCYTESUR Negative       Significant Imaging: I have reviewed all pertinent imaging results/findings within the past 24 hours.  MRI Brain Without Contrast  Narrative: EXAMINATION:  MRI BRAIN WITHOUT CONTRAST    CLINICAL HISTORY:  Neuro deficit, acute, stroke suspected;    TECHNIQUE:  Multiplanar multisequence MR imaging of the brain was performed without intravenous contrast.    COMPARISON:  MRI brain from 12/20/2023.  CT head from 08/28/2024.    FINDINGS:  There is no evidence of restricted diffusion to suggest an acute infarction. There is age-related brain parenchymal volume loss and prominence of the CSF spaces, no hydrocephalus.  There are several small foci of hemosiderin deposition in the piper, compatible with remote of microhemorrhages.  There is confluent T2/FLAIR hyperintense signal in the supratentorial white matter, compatible with advanced chronic microvascular ischemic changes, similar to prior.  There are remote lacunar infarcts in the bilateral thalami.  No parenchymal mass.  No extra-axial blood or fluid collections. Normal vascular flow voids.    Bone marrow signal intensity is normal. There are bilateral partial mastoid effusions.  The paranasal sinuses are clear.  There are bilateral prosthetic lenses.  Impression: No acute intracranial abnormality.    Chronic microvascular ischemic changes, remote pontine microhemorrhages piper, and remote lacunar infarcts in the bilateral thalami.    Bilateral partial mastoid effusions.    Electronically signed by: Eleazar  Mireya  Date:    08/28/2024  Time:    20:30  X-Ray Chest AP Portable  Narrative: EXAMINATION:  XR CHEST AP PORTABLE    CLINICAL HISTORY:  ams;    TECHNIQUE:  Single frontal view of the chest was performed.    COMPARISON:  10/25/2019    FINDINGS:  The cardiomediastinal silhouette is prominent, magnified by technique noting calcification of the aorta.  Left chest wall pacer noted..  There is no pleural effusion.  The trachea is midline.  The lungs are symmetrically expanded bilaterally with coarse interstitial attenuation bilaterally..  No large focal consolidation seen.  There is no pneumothorax.  The osseous structures are remarkable for degenerative change..  Impression: 1. Interstitial findings are accentuated by habitus, underlying edema or other pneumonitis not excluded.  Correlation is needed.    Electronically signed by: Eliel Kraft MD  Date:    08/28/2024  Time:    18:31  CT Head Without Contrast  Narrative: EXAMINATION:  CT HEAD WITHOUT CONTRAST    CLINICAL HISTORY:  Mental status change, unknown cause;    TECHNIQUE:  Low dose axial CT images obtained throughout the head without intravenous contrast. Sagittal and coronal reconstructions were performed.    COMPARISON:  MRI brain from 12/20/2023.    FINDINGS:  Ventricles and sulci are normal in size for age without evidence of hydrocephalus. No extra-axial blood or fluid collections.  There are remote lacunar infarcts in the bilateral thalami.  There are chronic microvascular ischemic changes, similar to prior.  No parenchymal mass, hemorrhage, edema or major vascular distribution infarct.  Empty sella configuration incidentally noted.    No calvarial fracture.  The scalp is unremarkable.  Bilateral paranasal sinuses and mastoid air cells are clear.  Impression: No acute intracranial abnormality.    Chronic microvascular ischemic changes and remote bilateral thalamic infarcts.    Electronically signed by: Eleazar  Mireya  Date:    08/28/2024  Time:    17:32

## 2024-08-29 NOTE — PROCEDURES
DATE: 8/29/24    EEG NUMBER: FH     REFERRING PHYSICIAN:  Dr. Babb     This EEG was performed to assess for subclinical seizures      ELECTROENCEPHALOGRAM REPORT     METHODOLOGY:  Electroencephalographic (EEG) recording is with electrodes placed according to the International 10-20 placement system.  Thirty two (32) channels of digital signal are simultaneously recorded from the scalp and may include EKG, EMG, and/or eye monitors.   Recording band pass was 0.1 to 512 hz.  Digital video recording of the patient is simultaneously recorded with the EEG.  The nursing staff report clinical symptoms and may press an event button when the patient has symptoms of clinical interest to the treating physicians.  EEG and video recording is stored and archived in digital format.  The entire recording is visually reviewed, and the times identified by computer analysis as being spikes or seizures are reviewed again.  Activation procedures which include photic stimulation, hyperventilation and instructing patients to perform simple task are done in selected patients.   Compresses spectral analysis (CSA) is also performed on the activity recorded from each individual channel.  This is displayed as a power display of frequencies from 0 to 30 Hz over time.   The CSA analysis is done and displayed continuously.  This is reviewed for asymmetries in power between homologous areas of the scalp and for presence of changes in power which can be seen when seizures occur.  Sections of suspected abnormalities on the CSA is then compared with the original EEG recording.                [employer] software was also utilized in the review of this study.  This software suite analyzes the EEG recording in multiple domains.  Coherence and rhythmicity is computed to identify EEG sections which may contain organized seizures.  Each channel undergoes analysis to detect presence of spike and sharp waves which have special and morphological  characteristic of epileptic activity.  The routine EEG recording is converted from spacial into frequency domain.  This is then displayed comparing homologous areas to identify areas of significant asymmetry.  Algorithm to identify non-cortically generated artifact is used to separate eye movement, EMG and other artifact from the EEG.     EEG FINDINGS:  The recording was obtained with a number of standard bipolar and referential montages during wakefulness, drowsiness and sleep.  In the alert state, the posterior background rhythm was a symmetric, well-modulated 9 Hz activity, which reacted symmetrically to eye opening.  Activation procedures were not performed.  Infrequent independent left and right temporal focal mixed delta range slowing was noted.  .  During drowsiness, the background rhythm waxed and waned and there were periods of slowing.  During stage II sleep, symmetric V waves and sleep spindles were noted. There were no interictal epileptiform abnormalities and no clinical or electrographic seizures were recorded.    The EKG channel revealed a sinus rhythm.     IMPRESSION:  This is an abnormal EEG during wakefulness, drowsiness and sleep.  Infrequent independent left and right temporal focal mixed delta range slowing was noted.       CLINICAL CORRELATION:  The patient is a 89 year-old female who is being evaluated for altered sensorium. The patient is currently not maintained on any antiseizure medications.  This is an abnormal EEG during wakefulness, drowsiness and sleep.  The presence of independent left right temporal focal slowing suggestive focal neural dysfunction in these regions.There is no evidence of an epileptic process on this recording.  No seizures were recorded during this study.

## 2024-08-29 NOTE — DISCHARGE INSTRUCTIONS
- Luckily your MRI didn't show any acute changes (like a stroke, tumor, bleeding, etc). There were some small areas where there was signs of small prior/old strokes.   - There was a small area of that was abnormal (ie the neurons are intermittently firing weird), but there was no evidence of seizures.   - You are stable to discharge home, but should follow-up with a neurologist in clinic to further work-up & manage some of the symptoms you've been having.   - A referral has been placed to the Neurology clinic. Their office will call you to schedule an appt. If you do not hear from them by next week, call their office.   - Orders for Home Health (therapist & nurse) have been placed.   - Continue taking your home medications as you were before.     - If you start having recurrent signs of difficulty talking, weakness, slurred speech, confusion, numbness, or fainting- contact your doctor or return to the ER.

## 2024-08-29 NOTE — PLAN OF CARE
[facility] Dept      HOME HEALTH ORDERS  FACE TO FACE ENCOUNTER    Patient Name: Calli Jamison  YOB: 1931    PCP: Suresh Jeronimo MD   [facility] [address] [facility] [address]  PCP Phone Number: [phone]  PCP Fax: [phone]    Encounter Date: 8/28/24    Admit to Home Health    Diagnoses:  Active Hospital Problems    Diagnosis  POA    *Involuntary movements [R25.9]  Yes    Mild cognitive impairment [G31.84]  Yes    Chronic kidney disease, stage 3b [N18.32]  Yes    TIA (transient ischemic attack) [G45.9]  Yes    Other hyperlipidemia [E78.49]  Yes    History of TIA (transient ischemic attack) [Z86.73]  Not Applicable    Essential hypertension [I10]  Yes    GERD (gastroesophageal reflux disease) [K21.9]  Yes      Resolved Hospital Problems   No resolved problems to display.       Follow Up Appointments:  No future appointments.    Allergies:  Review of patient's allergies indicates:   Allergen Reactions    Oxycodone Other (See Comments)    Oxycodone hcl-oxycodone-asa Hallucinations     Other reaction(s): Hallucinations  Other reaction(s): Hallucinations       Medications: Review discharge medications with patient and family and provide education.    Current Facility-Administered Medications   Medication Dose Route Frequency Provider Last Rate Last Admin    acetaminophen tablet 1,000 mg  1,000 mg Oral Q8H PRN Sandra Ochoa PA-C        acetaminophen tablet 650 mg  650 mg Oral Q4H PRN Sandra Ochoa PA-C        albuterol-ipratropium 2.5 mg-0.5 mg/3 mL nebulizer solution 3 mL  3 mL Nebulization Q4H PRN Sandra Ochoa PA-C        aluminum-magnesium hydroxide-simethicone 200-200-20 mg/5 mL suspension 30 mL  30 mL Oral QID PRN Sandra Ochoa PA-C        amLODIPine tablet 5 mg  5 mg Oral BID Sandra Ochoa PA-C   5 mg at 08/29/24 1046    aspirin EC tablet 81 mg  81 mg Oral Daily Sandra Ochoa PA-C   81 mg at 08/29/24 1047    atorvastatin  tablet 20 mg  20 mg Oral Daily Sandra Ochoa PA-C   20 mg at 08/29/24 1047    bisacodyL suppository 10 mg  10 mg Rectal Daily PRN Sandra Ochoa PA-C        clopidogreL tablet 75 mg  75 mg Oral Daily Sandra Ochoa PA-C   75 mg at 08/29/24 1046    dextrose 10% bolus 125 mL 125 mL  12.5 g Intravenous PRN Sandra Ochoa PA-C        dextrose 10% bolus 250 mL 250 mL  25 g Intravenous PRN Sandra Ochoa PA-C        donepeziL tablet 5 mg  5 mg Oral QHS Sandra Ochoa PA-C        furosemide tablet 20 mg  20 mg Oral Daily Sandra Ochoa PA-C   20 mg at 08/29/24 1046    glucagon (human recombinant) injection 1 mg  1 mg Intramuscular PRN Sandra Ochoa PA-C        glucose chewable tablet 16 g  16 g Oral PRN Sandra Ochoa PA-C        glucose chewable tablet 24 g  24 g Oral PRN Sandra Ochoa PA-C        heparin (porcine) injection 5,000 Units  5,000 Units Subcutaneous Q8H Sandra Ochoa PA-C   5,000 Units at 08/29/24 0522    latanoprost 0.005 % ophthalmic solution 1 drop  1 drop Both Eyes QHS Sandra Ochoa PA-C        melatonin tablet 6 mg  6 mg Oral Nightly PRN Sandra Ochoa PA-C        naloxone 0.4 mg/mL injection 0.02 mg  0.02 mg Intravenous PRN Sandra Ochoa PA-C        ondansetron disintegrating tablet 8 mg  8 mg Oral Q8H PRN Sandra Ochoa PA-C        pantoprazole EC tablet 40 mg  40 mg Oral Daily Sandra Ochoa PA-C   40 mg at 08/29/24 1046    polyethylene glycol packet 17 g  17 g Oral BID PRN Sandra Ochoa PA-C        potassium bicarbonate disintegrating tablet 40 mEq  40 mEq Oral Q4H Rubina Babb MD   40 mEq at 08/29/24 0949    simethicone chewable tablet 80 mg  1 tablet Oral QID PRN Sandra Ochoa PA-C        sodium chloride 0.9% flush 5 mL  5 mL Intravenous PRN Sandra Ochoa PA-C        timolol maleate 0.5% ophthalmic solution 1 drop  1 drop Both Eyes BID Sandra Ochoa PA-C   1 drop at 08/29/24 1050      Current Outpatient Medications   Medication Sig Dispense Refill    amLODIPine (NORVASC) 5 MG tablet Take 1 tablet (5 mg total) by mouth 2 (two) times daily. 180 tablet 2    aspirin (ECOTRIN) 81 MG EC tablet Take 81 mg by mouth once daily.      atorvastatin (LIPITOR) 20 MG tablet Take 1 tablet (20 mg total) by mouth once daily. 90 tablet 3    calcium citrate-vitamin D3 315-200 mg (CITRACAL+D) 315 mg-5 mcg (200 unit) per tablet Take 2 tablets by mouth Daily.      clopidogreL (PLAVIX) 75 mg tablet Take 1 tablet (75 mg total) by mouth once daily. 90 tablet 3    cyanocobalamin 500 MCG tablet Take 1 tablet by mouth Daily.      donepeziL (ARICEPT) 5 MG tablet Take 1 tablet (5 mg total) by mouth every evening. 90 tablet 3    furosemide (LASIX) 20 MG tablet Take 1 tablet (20 mg total) by mouth once daily. 90 tablet 2    latanoprost 0.005 % ophthalmic solution INSTILL 1 DROP INTO BOTH EYES EVERY NIGHT 7.5 mL 2    omeprazole (PRILOSEC) 20 MG capsule Take 1 capsule by mouth daily as needed.       potassium chloride (MICRO-K) 10 MEQ CpSR OPEN 1 CAPSULE AND SPRINKLE OVER UNHEATED SOFT FOOD (APPLESAUCE) AND TAKE EVERY DAY. DO NOT CHEW OR CRUSH 90 capsule 2    terazosin (HYTRIN) 1 MG capsule Take 1 capsule (1 mg total) by mouth every evening. 90 capsule 3    timolol maleate 0.5% (TIMOPTIC) 0.5 % Drop INSTILL 1 DROP INTO BOTH EYES TWICE DAILY 15 mL 2    triamcinolone acetonide 0.1% (KENALOG) 0.1 % ointment Apply topically 2 (two) times daily. 80 g 1        Medication List        CONTINUE taking these medications      amLODIPine 5 MG tablet  Commonly known as: NORVASC  Take 1 tablet (5 mg total) by mouth 2 (two) times daily.     aspirin 81 MG EC tablet  Commonly known as: ECOTRIN  Take 81 mg by mouth once daily.     atorvastatin 20 MG tablet  Commonly known as: LIPITOR  Take 1 tablet (20 mg total) by mouth once daily.     calcium citrate-vitamin D3 315-200 mg 315 mg-5 mcg (200 unit) per tablet  Commonly known as: CITRACAL+D  Take  2 tablets by mouth Daily.     clopidogreL 75 mg tablet  Commonly known as: PLAVIX  Take 1 tablet (75 mg total) by mouth once daily.     cyanocobalamin 500 MCG tablet  Take 1 tablet by mouth Daily.     donepeziL 5 MG tablet  Commonly known as: ARICEPT  Take 1 tablet (5 mg total) by mouth every evening.     furosemide 20 MG tablet  Commonly known as: LASIX  Take 1 tablet (20 mg total) by mouth once daily.     latanoprost 0.005 % ophthalmic solution  INSTILL 1 DROP INTO BOTH EYES EVERY NIGHT     omeprazole 20 MG capsule  Commonly known as: PRILOSEC  Take 1 capsule by mouth daily as needed.     potassium chloride 10 MEQ Cpsr  Commonly known as: MICRO-K  OPEN 1 CAPSULE AND SPRINKLE OVER UNHEATED SOFT FOOD (APPLESAUCE) AND TAKE EVERY DAY. DO NOT CHEW OR CRUSH     terazosin 1 MG capsule  Commonly known as: HYTRIN  Take 1 capsule (1 mg total) by mouth every evening.     timolol maleate 0.5% 0.5 % Drop  Commonly known as: TIMOPTIC  INSTILL 1 DROP INTO BOTH EYES TWICE DAILY     triamcinolone acetonide 0.1% 0.1 % ointment  Commonly known as: KENALOG  Apply topically 2 (two) times daily.                I have seen and examined this patient within the last 30 days. My clinical findings that support the need for the home health skilled services and home bound status are the following:no   Weakness/numbness causing balance and gait disturbance due to Weakness/Debility making it taxing to leave home.     Diet:   Cardiac (low cholesterol & low salt), soft/bite-sized (IDDSI level 6)    Labs:  SN to perform labs:  CBC: Weekly; 2 week(s) and BMP: Weekly; 2 week(s) and Report Lab results to PCP.    Referrals/ Consults  Physical Therapy to evaluate and treat. Evaluate for home safety and equipment needs; Establish/upgrade home exercise program. Perform / instruct on therapeutic exercises, gait training, transfer training, and Range of Motion.  Occupational Therapy to evaluate and treat. Evaluate home environment for safety and equipment  needs. Perform/Instruct on transfers, ADL training, ROM, and therapeutic exercises.    Activities:   activity as tolerated    Nursing:   Agency to admit patient within 24 hours of hospital discharge unless specified on physician order or at patient request    SN to complete comprehensive assessment including routine vital signs. Instruct on disease process and s/s of complications to report to MD. Review/verify medication list sent home with the patient at time of discharge  and instruct patient/caregiver as needed. Frequency may be adjusted depending on start of care date.     Skilled nurse to perform up to 3 visits PRN for symptoms related to diagnosis    Notify MD if SBP > 160 or < 90; DBP > 90 or < 50; HR > 120 or < 50; Temp > 101; O2 < 88%    Ok to schedule additional visits based on staff availability and patient request on consecutive days within the home health episode.    When multiple disciplines ordered:    Start of Care occurs on Sunday - Wednesday schedule remaining discipline evaluations as ordered on separate consecutive days following the start of care.    Thursday SOC -schedule subsequent evaluations Friday and Monday the following week.     Friday - Saturday SOC - schedule subsequent discipline evaluations on consecutive days starting Monday of the following week.    For all post-discharge communication and subsequent orders please contact patient's primary care physician. If unable to reach primary care physician or do not receive response within 30 minutes, please contact Dr. Rubina Babb (Team G) for clinical staff order clarification    Miscellaneous   N/A    Home Health Aide:  Nursing Three times weekly, Physical Therapy Three times weekly, and Occupational Therapy Three times weekly    Wound Care Orders  no    I certify that this patient is confined to her home and needs intermittent skilled nursing care, physical therapy, and occupational therapy.

## 2024-08-29 NOTE — ASSESSMENT & PLAN NOTE
- Suspect transient episodes of expressive aphasia represent TIAs. Seems unrelated to jerking episodes of the RUE, right foot.  - MRI brain without acute process  - History of TIA/prior strokes noted  - No focal weakness, facial asymmetry, or sensory change on exam  - Neuro checks  - Continue ASA/Plavix/statin

## 2024-08-29 NOTE — ED NOTES
Assumed care of the patient. Report received from DEISI Huggins. Pt on continuous cardiac monitoring, continuous pulse oximetry, and automatic BP cuff cycling Q15min. Pt in hospital gown, side rails up X2, bed low and locked, and call light is placed within reach. No family/visitors at bedside at this time. Pt denies any complaints or needs. Patiented repositioned for comfort.     Calli Jamison, a 89 y.o. female presents to the ED w/ complaint of confusion.    Triage note:  Chief Complaint   Patient presents with    Weakness     Pt arrived via EMS from clinic with reports of weakness on right side. Pt has previous hx of TIA. Family also reports that pt is somewhat confused.      Review of patient's allergies indicates:   Allergen Reactions    Oxycodone Other (See Comments)    Oxycodone hcl-oxycodone-asa Hallucinations     Other reaction(s): Hallucinations  Other reaction(s): Hallucinations     Past Medical History:   Diagnosis Date    Abnormal Pap smear     After-cataract, unspecified - Both Eyes 11/5/2012    Arthritis     ASCUS (atypical squamous cells of undetermined significance) on Pap smear 7/1/2012    Asthma     Atrial fibrillation     Back pain 2009    Bullous pemphigoid     Carpal tunnel syndrome     Chronic back pain     COPD (chronic obstructive pulmonary disease)     Coronary artery disease     Crohn's disease     Depression     Diabetes mellitus     Discoid lupus     Diverticulosis     History of migraine headaches     Hyperlipidemia     Hypertension     Multiple thyroid nodules     Obesity     Open angle with borderline findings and high glaucoma risk in both eyes 9/11/2019    Personal history of colonic polyps     Pulmonary nodule     Renal manifestation of secondary diabetes mellitus     Restless legs syndrome     Sciatica     Right leg    Sleep apnea     Stricture and stenosis of esophagus     Stroke     TIA (transient ischemic attack)

## 2024-08-29 NOTE — ASSESSMENT & PLAN NOTE
- History noted. Transient episodes of aphasia may be 2/2 TIA  - MRI brain without acute process  - Continue home ASA/Plavix. Continue statin.  - Neuro checks

## 2024-08-29 NOTE — ASSESSMENT & PLAN NOTE
Creatine stable for now. BMP reviewed- noted Estimated Creatinine Clearance: 34.7 mL/min (based on SCr of 0.9 mg/dL). according to latest data. Based on current GFR, CKD stage is stage 3 - GFR 30-59.  Monitor UOP and serial BMP and adjust therapy as needed. Renally dose meds. Avoid nephrotoxic medications and procedures.

## 2024-08-29 NOTE — ASSESSMENT & PLAN NOTE
89F with one week history of intermittent/sporadic abnormal jerking movements of the right arm and more recently the right foot. Denies LOC and is aware movements are occurring. Pt states she can eventually get the movements to stop but unclear if this is true based on family report (they may just sporadically stop). No seizure history or history of abnormal movements. No weakness or abnormal movement witnessed on exam except occasional dorsiflexion of the right ankle. Unclear etiology and unable to witness events during evaluation  - CBC, CMP, TSH unremarkable  - MRI brain without acute process  - Neuro checks  - Given her aphasia that is somewhat limiting her history/differing history from relatives will order EEG   - Close follow up with neurology outpatient vs inpatient consult in AM

## 2024-08-29 NOTE — PLAN OF CARE
08/29/24 1321   Post-Acute Status   Post-Acute Authorization Home Health   Home Health Status Pending medical clearance/testing   Discharge Delays None known at this time   Discharge Plan   Discharge Plan A Home Health     Pt and son Fuentes requested the home health referral be sent to [facility].  Pt and son Fuentes understand that there may be a wait time for admission to home health and are amenable to waiting.      SW sent referral via careport to [facility]       Discharge Plan A and Plan B have been determined by review of patient's clinical status, future medical and therapeutic needs, and coverage/benefits for post-acute care in coordination with multidisciplinary team members.    Mena Matthews, MAYA, MSW, LMSW, RSW   Case Management  [facility]  Email: [email or web address]

## 2024-08-30 ENCOUNTER — PATIENT OUTREACH (OUTPATIENT)
Dept: ADMINISTRATIVE | Facility: CLINIC | Age: 89
End: 2024-08-30
Payer: MEDICARE

## 2024-08-30 ENCOUNTER — PATIENT MESSAGE (OUTPATIENT)
Dept: INTERNAL MEDICINE | Facility: CLINIC | Age: 89
End: 2024-08-30
Payer: MEDICARE

## 2024-08-30 NOTE — DISCHARGE SUMMARY
[facility] Dept  Hospital Medicine  Discharge Summary      Patient Name: Calli Jamison  MRN: [id number]  DALJIT: [id number]  Patient Class: OP- Observation  Admission Date: 8/28/2024  Hospital Length of Stay: 0 days  Discharge Date and Time: 8/29/2024  4:16 PM  Attending Physician: Rubina Babb MD  Discharging Provider: Rubina Babb MD  Primary Care Provider: Suresh Jeronimo MD  Hospital Medicine Team: [facility]    Primary Care Team: [facility]      HPI:   Calli Jamison is a 89 y.o. female with PMHx of prior strokes/TIAs, HTN, HLD, CAD, Crohn's disease, arthritis, asthma, copd, Afib, depression, chronic back pain, discoid lupus, diabetes mellitus, glaucoma, restless leg syndrome who [place] [facility] [place] her family for aphasia and abnormal movements. Patient and family (two sons and daughter-in-law) report noticing increased episodes of expressive aphasia the last few days. Episodes seem to be transient. They also noticed she has been having a jerking motion to her right arm and right foot the past week or so. They report she has never had this before. Denies seizure history. Patient states she is aware of the movements and can get them to stop if she puts her mind to it but then once her mind wanders she'll realize they have resumed. She deneis having any loss of consciousness during episodes or otherwise. She denies any neck pain or injury, back pain, numbness/tingling, falls, or acute gait abnormality. Denies pain or discomfort associated with right arm/foot movements. Ambulates with walker at baseline.  Patient notes feeling intermittent confused. She lives alone and writes things down all day to keep track of everything. Family is nearby and visits with her often. They organizes her pills and note she sometimes misses night pills. She reports abnormal sleep schedule, staying up late at night and sleeping/napping during the day. No other acute complaints. She is oriented to  person, place, month/year, situation on exam but does have some expressive aphasia vs poor recall of recent events.     Hospital Course:  On arrival to ED, pt mildly hypertensive (130s-150s/70s-80s), but otherwise HDS on RA & afebrile. CBC, CMP, troponin, TSH & UA nml/unremakrable. MRI negative for acute findings but noted chronic microvascular ischemic changes, remote pontine microhemorrhages piper, & remote lacunar infarcts in the b/l thalami, along w/ b/l partial mastoid effusion. Admitted to  service for observation. EEG showed infrequent independent L & R temporal focal mixed delta range slowing, but negative for epileptiform activity. Discussed findings w/ Neurology who agreed that pt appropriate for discharge home and outpatient Neurology follow-up. Patient & family in agreement with discharging home w/ HH and following up with Neurology in clinic.       Goals of Care Treatment Preferences:  Code Status: Full Code    Consults:  None    Final Active Diagnoses:    Diagnosis Date Noted POA    PRINCIPAL PROBLEM:  Involuntary movements [R25.9] 08/29/2024 Yes    Mild cognitive impairment [G31.84] 08/29/2024 Yes    Altered mental status [R41.82] 08/29/2024 Unknown    Chronic kidney disease, stage 3b [N18.32] 01/03/2024 Yes    TIA (transient ischemic attack) [G45.9] 10/25/2019 Yes    Other hyperlipidemia [E78.49] 07/31/2019 Yes    History of TIA (transient ischemic attack) [Z86.73] 08/31/2016 Not Applicable    Essential hypertension [I10] 04/11/2016 Yes    GERD (gastroesophageal reflux disease) [K21.9] 07/01/2012 Yes      Problems Resolved During this Admission:       Discharged Condition: fair; pt seen and examined prior to discharge     Disposition: Home or Self Care    Follow Up:   Follow-up Information       Schedule an appointment as soon as possible for a visit  with [facility].    Specialty: Neurology  Contact information:  [address] [facility]  [facility]  [id number] [phone]  Additional information:  [facility] [place] - Main Building, 7th Floor   Please park in [facility] and take Clinic elevator                           Patient Instructions:  (Per AVS)   - Luckily your MRI didn't show any acute changes (like a stroke, tumor, bleeding, etc). There were some small areas where there was signs of small prior/old strokes.   - There was a small area of that was abnormal (ie the neurons are intermittently firing weird), but there was no evidence of seizures.   - You are stable to discharge home, but should follow-up with a neurologist in clinic to further work-up & manage some of the symptoms you've been having.   - A referral has been placed to the Neurology clinic. Their office will call you to schedule an appt. If you do not hear from them by next week, call their office.   - Orders for Home Health (therapist & nurse) have been placed.   - Continue taking your home medications as you were before.     - If you start having recurrent signs of difficulty talking, weakness, slurred speech, confusion, numbness, or fainting- contact your doctor or return to the ER.        Ambulatory referral/consult to Neurology   Standing Status: Future   Referral Priority: Routine Referral Type: Consultation   Referral Reason: Specialty Services Required   Requested Specialty: Neurology   Number of Visits Requested: 1       Significant Diagnostic Studies: N/A ; as discussed in "Hospital Course" above    Pending Diagnostic Studies:       None           Medications:  Reconciled Home Medications:      Medication List        CHANGE how you take these medications      latanoprost 0.005 % ophthalmic solution  INSTILL 1 DROP INTO BOTH EYES EVERY NIGHT  What changed: See the new instructions.     potassium chloride 10 MEQ Cpsr  Commonly known as: MICRO-K  OPEN 1 CAPSULE AND SPRINKLE OVER UNHEATED SOFT FOOD (APPLESAUCE) AND TAKE EVERY DAY. DO NOT CHEW OR CRUSH  What changed: See the new " instructions.            CONTINUE taking these medications      amLODIPine 5 MG tablet  Commonly known as: NORVASC  Take 1 tablet (5 mg total) by mouth 2 (two) times daily.     aspirin 81 MG EC tablet  Commonly known as: ECOTRIN  Take 81 mg by mouth once daily.     atorvastatin 20 MG tablet  Commonly known as: LIPITOR  Take 1 tablet (20 mg total) by mouth once daily.     clopidogreL 75 mg tablet  Commonly known as: PLAVIX  Take 1 tablet (75 mg total) by mouth once daily.     donepeziL 5 MG tablet  Commonly known as: ARICEPT  Take 1 tablet (5 mg total) by mouth every evening.     furosemide 20 MG tablet  Commonly known as: LASIX  Take 1 tablet (20 mg total) by mouth once daily.     terazosin 1 MG capsule  Commonly known as: HYTRIN  Take 1 capsule (1 mg total) by mouth every evening.     timolol maleate 0.5% 0.5 % Drop  Commonly known as: TIMOPTIC  INSTILL 1 DROP INTO BOTH EYES TWICE DAILY              Indwelling Lines/Drains at time of discharge:   Lines/Drains/Airways       None              Time spent on the discharge of patient: 35 minutes         Rubina Babb MD  Department of Hospital Medicine  [facility] - Emergency Dept

## 2024-08-30 NOTE — PHARMACY MED REC
"      Admission Medication History     The home medication history was taken by Valencia Daniels.    You may go to "Admission" then "Reconcile Home Medications" tabs to review and/or act upon these items.     The home medication list has been updated by the Pharmacy department.   Please read ALL comments highlighted in yellow.   Please address this information as you see fit.    Feel free to contact us if you have any questions or require assistance.      The medications listed below were removed from the home medication list. Please reorder if appropriate:  Patient reports no longer taking the following medication(s):  CALCIUM CITRATE-VITAMIN D3 200 UNIT TABLET  CYANOCOBALAMIN 500 MCG TABLET  OMEPRAZOLE 20 MG CAPSULE  TRIAMCINOLONE ACETONIDE 0.1 % OINTMENT    Medications listed below were obtained from: Patient/family and Analytic software- [employer]  Current Outpatient Medications on File Prior to Encounter   Medication Sig    amLODIPine (NORVASC) 5 MG tablet   Take 1 tablet (5 mg total) by mouth 2 (two) times daily.    aspirin (ECOTRIN) 81 MG EC tablet   Take 81 mg by mouth once daily.    atorvastatin (LIPITOR) 20 MG tablet   Take 1 tablet (20 mg total) by mouth once daily.    clopidogreL (PLAVIX) 75 mg tablet   Take 1 tablet (75 mg total) by mouth once daily.    donepeziL (ARICEPT) 5 MG tablet   Take 1 tablet (5 mg total) by mouth every evening.    furosemide (LASIX) 20 MG tablet   Take 1 tablet (20 mg total) by mouth once daily.    latanoprost 0.005 % ophthalmic solution   Place 1 drop into both eyes every evening.    potassium chloride (MICRO-K) 10 MEQ CpSR   Take 10 mEq by mouth nightly.    terazosin (HYTRIN) 1 MG capsule   Take 1 capsule (1 mg total) by mouth every evening.    timolol maleate 0.5% (TIMOPTIC) 0.5 % Drop   Instill 1 drop into both eyes twice daily.             Valencia Daniels  EXT [id number]            .          "

## 2024-09-03 ENCOUNTER — TELEPHONE (OUTPATIENT)
Dept: NEUROLOGY | Facility: CLINIC | Age: 89
End: 2024-09-03
Payer: MEDICARE

## 2024-09-03 NOTE — TELEPHONE ENCOUNTER
----- Message from Hemalatha Disla MD sent at 8/29/2024  3:28 PM CDT -----  Regarding: scheduling new patient  Hi Cordelia!    Ms. Jamison was admitted for transient aphasia and needs to have hospital follow up in Neurology clinic.     Thanks,    Hemalatha

## 2024-09-03 NOTE — TELEPHONE ENCOUNTER
Spoke with the patient's son, Rayshawn. Offered next available appointment in resident clinic next week at 3 pm. Rayshawn stated Mom prefers morning appts. Rayshawn accepted next available with Dr Lissett Spicer next Thursday at 820 am, verbalized understanding, and repeated back date/time/location of scheduled appointment.

## 2024-09-09 ENCOUNTER — TELEPHONE (OUTPATIENT)
Dept: NEUROLOGY | Facility: CLINIC | Age: 89
End: 2024-09-09
Payer: MEDICARE

## 2024-09-09 NOTE — TELEPHONE ENCOUNTER
LVM informing patient 9/12 appointment with Dr. Spicer has been canceled due to a change in providers schedule. Advised patient call back to reschedule.

## 2024-09-17 ENCOUNTER — LAB VISIT (OUTPATIENT)
Dept: LAB | Facility: HOSPITAL | Age: 89
End: 2024-09-17
Attending: STUDENT IN AN ORGANIZED HEALTH CARE EDUCATION/TRAINING PROGRAM
Payer: MEDICARE

## 2024-09-17 DIAGNOSIS — I10 ESSENTIAL HYPERTENSION, MALIGNANT: Primary | ICD-10-CM

## 2024-09-17 LAB
ALBUMIN SERPL BCP-MCNC: 3.8 G/DL (ref 3.5–5.2)
ALP SERPL-CCNC: 100 U/L (ref 55–135)
ALT SERPL W/O P-5'-P-CCNC: 14 U/L (ref 10–44)
ANION GAP SERPL CALC-SCNC: 9 MMOL/L (ref 8–16)
ANION GAP SERPL CALC-SCNC: 9 MMOL/L (ref 8–16)
AST SERPL-CCNC: 18 U/L (ref 10–40)
BASOPHILS # BLD AUTO: 0.07 K/UL (ref 0–0.2)
BASOPHILS NFR BLD: 0.8 % (ref 0–1.9)
BILIRUB SERPL-MCNC: 0.4 MG/DL (ref 0.1–1)
BUN SERPL-MCNC: 12 MG/DL (ref 10–30)
BUN SERPL-MCNC: 12 MG/DL (ref 10–30)
CALCIUM SERPL-MCNC: 10.2 MG/DL (ref 8.7–10.5)
CALCIUM SERPL-MCNC: 10.2 MG/DL (ref 8.7–10.5)
CHLORIDE SERPL-SCNC: 108 MMOL/L (ref 95–110)
CHLORIDE SERPL-SCNC: 108 MMOL/L (ref 95–110)
CO2 SERPL-SCNC: 25 MMOL/L (ref 23–29)
CO2 SERPL-SCNC: 25 MMOL/L (ref 23–29)
CREAT SERPL-MCNC: 1 MG/DL (ref 0.5–1.4)
CREAT SERPL-MCNC: 1 MG/DL (ref 0.5–1.4)
DIFFERENTIAL METHOD BLD: ABNORMAL
EOSINOPHIL # BLD AUTO: 0.4 K/UL (ref 0–0.5)
EOSINOPHIL NFR BLD: 3.9 % (ref 0–8)
ERYTHROCYTE [DISTWIDTH] IN BLOOD BY AUTOMATED COUNT: 17 % (ref 11.5–14.5)
EST. GFR  (NO RACE VARIABLE): 52.5 ML/MIN/1.73 M^2
EST. GFR  (NO RACE VARIABLE): 52.5 ML/MIN/1.73 M^2
GLUCOSE SERPL-MCNC: 77 MG/DL (ref 70–110)
GLUCOSE SERPL-MCNC: 77 MG/DL (ref 70–110)
HCT VFR BLD AUTO: 43.4 % (ref 37–48.5)
HGB BLD-MCNC: 13 G/DL (ref 12–16)
IMM GRANULOCYTES # BLD AUTO: 0.03 K/UL (ref 0–0.04)
IMM GRANULOCYTES NFR BLD AUTO: 0.3 % (ref 0–0.5)
LYMPHOCYTES # BLD AUTO: 1.6 K/UL (ref 1–4.8)
LYMPHOCYTES NFR BLD: 17.5 % (ref 18–48)
MCH RBC QN AUTO: 26.7 PG (ref 27–31)
MCHC RBC AUTO-ENTMCNC: 30 G/DL (ref 32–36)
MCV RBC AUTO: 89 FL (ref 82–98)
MONOCYTES # BLD AUTO: 0.8 K/UL (ref 0.3–1)
MONOCYTES NFR BLD: 8.6 % (ref 4–15)
NEUTROPHILS # BLD AUTO: 6.4 K/UL (ref 1.8–7.7)
NEUTROPHILS NFR BLD: 68.9 % (ref 38–73)
NRBC BLD-RTO: 0 /100 WBC
PLATELET # BLD AUTO: 332 K/UL (ref 150–450)
PMV BLD AUTO: 10.8 FL (ref 9.2–12.9)
POTASSIUM SERPL-SCNC: 3.4 MMOL/L (ref 3.5–5.1)
POTASSIUM SERPL-SCNC: 3.4 MMOL/L (ref 3.5–5.1)
PROT SERPL-MCNC: 7.9 G/DL (ref 6–8.4)
RBC # BLD AUTO: 4.87 M/UL (ref 4–5.4)
SODIUM SERPL-SCNC: 142 MMOL/L (ref 136–145)
SODIUM SERPL-SCNC: 142 MMOL/L (ref 136–145)
WBC # BLD AUTO: 9.32 K/UL (ref 3.9–12.7)

## 2024-09-17 PROCEDURE — 36415 COLL VENOUS BLD VENIPUNCTURE: CPT | Mod: HCNC | Performed by: STUDENT IN AN ORGANIZED HEALTH CARE EDUCATION/TRAINING PROGRAM

## 2024-09-17 PROCEDURE — 85025 COMPLETE CBC W/AUTO DIFF WBC: CPT | Mod: HCNC | Performed by: STUDENT IN AN ORGANIZED HEALTH CARE EDUCATION/TRAINING PROGRAM

## 2024-09-17 PROCEDURE — 80053 COMPREHEN METABOLIC PANEL: CPT | Mod: HCNC | Performed by: STUDENT IN AN ORGANIZED HEALTH CARE EDUCATION/TRAINING PROGRAM

## 2024-09-20 DIAGNOSIS — Z86.73 HISTORY OF TIA (TRANSIENT ISCHEMIC ATTACK): ICD-10-CM

## 2024-09-20 DIAGNOSIS — I10 ESSENTIAL HYPERTENSION: ICD-10-CM

## 2024-09-20 NOTE — TELEPHONE ENCOUNTER
No care due was identified.  [facility] Embedded Care Due Messages. Reference number: [id number].   9/20/2024 12:34:16 PM CDT

## 2024-09-24 RX ORDER — TERAZOSIN 1 MG/1
1 CAPSULE ORAL DAILY
Qty: 90 CAPSULE | Refills: 3 | Status: SHIPPED | OUTPATIENT
Start: 2024-09-24 | End: 2025-09-19

## 2024-09-24 RX ORDER — CLOPIDOGREL BISULFATE 75 MG/1
75 TABLET ORAL DAILY
Qty: 90 TABLET | Refills: 3 | Status: SHIPPED | OUTPATIENT
Start: 2024-09-24

## 2024-10-01 ENCOUNTER — EXTERNAL HOME HEALTH (OUTPATIENT)
Dept: HOME HEALTH SERVICES | Facility: HOSPITAL | Age: 89
End: 2024-10-01
Payer: MEDICARE

## 2024-10-11 ENCOUNTER — DOCUMENT SCAN (OUTPATIENT)
Dept: HOME HEALTH SERVICES | Facility: HOSPITAL | Age: 89
End: 2024-10-11
Payer: MEDICARE

## 2024-10-17 ENCOUNTER — DOCUMENT SCAN (OUTPATIENT)
Dept: HOME HEALTH SERVICES | Facility: HOSPITAL | Age: 89
End: 2024-10-17
Payer: MEDICARE

## 2025-01-17 DIAGNOSIS — I10 ESSENTIAL HYPERTENSION: ICD-10-CM

## 2025-01-17 NOTE — TELEPHONE ENCOUNTER
No care due was identified.  [facility] Embedded Care Due Messages. Reference number: [id number].   1/17/2025 4:45:51 PM CST

## 2025-01-23 RX ORDER — AMLODIPINE BESYLATE 5 MG/1
5 TABLET ORAL 2 TIMES DAILY
Qty: 180 TABLET | Refills: 2 | Status: SHIPPED | OUTPATIENT
Start: 2025-01-23 | End: 2025-01-31 | Stop reason: SDUPTHER

## 2025-01-30 DIAGNOSIS — Z00.00 ENCOUNTER FOR MEDICARE ANNUAL WELLNESS EXAM: ICD-10-CM

## 2025-01-31 DIAGNOSIS — I10 ESSENTIAL HYPERTENSION: ICD-10-CM

## 2025-01-31 DIAGNOSIS — Z86.73 HISTORY OF TIA (TRANSIENT ISCHEMIC ATTACK): ICD-10-CM

## 2025-01-31 RX ORDER — TERAZOSIN 1 MG/1
1 CAPSULE ORAL DAILY
Qty: 30 CAPSULE | Refills: 0 | Status: SHIPPED | OUTPATIENT
Start: 2025-01-31 | End: 2026-01-26

## 2025-01-31 RX ORDER — CLOPIDOGREL BISULFATE 75 MG/1
75 TABLET ORAL DAILY
Qty: 30 TABLET | Refills: 0 | Status: SHIPPED | OUTPATIENT
Start: 2025-01-31

## 2025-01-31 RX ORDER — AMLODIPINE BESYLATE 5 MG/1
5 TABLET ORAL 2 TIMES DAILY
Qty: 60 TABLET | Refills: 0 | Status: SHIPPED | OUTPATIENT
Start: 2025-01-31

## 2025-01-31 NOTE — TELEPHONE ENCOUNTER
No care due was identified.  [facility] Embedded Care Due Messages. Reference number: [id number].   1/31/2025 11:46:34 AM CST

## 2025-02-26 DIAGNOSIS — H40.003 GLAUCOMA SUSPECT, BILATERAL: ICD-10-CM

## 2025-02-26 RX ORDER — LATANOPROST 50 UG/ML
SOLUTION/ DROPS OPHTHALMIC
Qty: 7.5 ML | Refills: 2 | Status: SHIPPED | OUTPATIENT
Start: 2025-02-26

## 2025-04-20 DIAGNOSIS — R41.3 MEMORY CHANGES: ICD-10-CM

## 2025-04-21 RX ORDER — DONEPEZIL HYDROCHLORIDE 5 MG/1
5 TABLET, FILM COATED ORAL NIGHTLY
Qty: 90 TABLET | Refills: 3 | Status: SHIPPED | OUTPATIENT
Start: 2025-04-21

## 2025-04-21 NOTE — TELEPHONE ENCOUNTER
Refill Routing Note   Medication(s) are not appropriate for processing by [facility] for the following reason(s):        Outside of protocol    [facility] action(s):  Route             Appointments  past 12m or future 3m with PCP    Date Provider   Last Visit   8/28/2024 Suresh Jeronimo MD   Next Visit   Visit date not found Suresh Jeronimo MD   ED visits in past 90 days: 0        Note composed:11:49 AM 04/21/2025

## 2025-04-26 DIAGNOSIS — I10 ESSENTIAL HYPERTENSION: ICD-10-CM

## 2025-04-26 NOTE — TELEPHONE ENCOUNTER
No care due was identified.  [facility] Embedded Care Due Messages. Reference number: [id number].   4/26/2025 4:20:29 PM CDT

## 2025-04-28 RX ORDER — AMLODIPINE BESYLATE 5 MG/1
5 TABLET ORAL 2 TIMES DAILY
Qty: 180 TABLET | Refills: 0 | Status: SHIPPED | OUTPATIENT
Start: 2025-04-28

## 2025-04-28 NOTE — TELEPHONE ENCOUNTER
Refill Decision Note   Calli Jamison  is requesting a refill authorization.  Brief Assessment and Rationale for Refill:  Approve     Medication Therapy Plan:         Extended chart review required: Yes   Comments:     Note composed:9:33 AM 04/28/2025

## 2025-05-01 DIAGNOSIS — I10 ESSENTIAL HYPERTENSION: ICD-10-CM

## 2025-05-01 NOTE — TELEPHONE ENCOUNTER
No care due was identified.  [facility] Embedded Care Due Messages. Reference number: [id number].   5/01/2025 2:56:59 PM CDT

## 2025-05-02 RX ORDER — TERAZOSIN 1 MG/1
1 CAPSULE ORAL DAILY
Qty: 90 CAPSULE | Refills: 1 | Status: SHIPPED | OUTPATIENT
Start: 2025-05-02 | End: 2026-04-27

## 2025-05-05 DIAGNOSIS — Z86.73 HISTORY OF TIA (TRANSIENT ISCHEMIC ATTACK): ICD-10-CM

## 2025-05-05 DIAGNOSIS — I10 ESSENTIAL HYPERTENSION: ICD-10-CM

## 2025-05-05 NOTE — TELEPHONE ENCOUNTER
No care due was identified.  [facility] Embedded Care Due Messages. Reference number: [id number].   5/05/2025 11:31:38 AM CDT

## 2025-05-06 RX ORDER — CLOPIDOGREL BISULFATE 75 MG/1
75 TABLET ORAL DAILY
Qty: 90 TABLET | Refills: 1 | Status: SHIPPED | OUTPATIENT
Start: 2025-05-06

## 2025-05-06 RX ORDER — TERAZOSIN 1 MG/1
1 CAPSULE ORAL DAILY
Qty: 90 CAPSULE | Refills: 1 | Status: SHIPPED | OUTPATIENT
Start: 2025-05-06

## 2025-05-09 RX ORDER — POTASSIUM CHLORIDE 750 MG/1
CAPSULE, EXTENDED RELEASE ORAL
Qty: 90 CAPSULE | Refills: 3 | Status: SHIPPED | OUTPATIENT
Start: 2025-05-09

## 2025-05-09 NOTE — TELEPHONE ENCOUNTER
No care due was identified.  [facility] Embedded Care Due Messages. Reference number: [id number].   5/09/2025 11:31:10 AM CDT

## 2025-05-20 ENCOUNTER — TELEPHONE (OUTPATIENT)
Dept: INTERNAL MEDICINE | Facility: CLINIC | Age: OVER 89
End: 2025-05-20
Payer: MEDICARE

## 2025-05-20 DIAGNOSIS — Z86.73 HISTORY OF TIA (TRANSIENT ISCHEMIC ATTACK): ICD-10-CM

## 2025-05-20 RX ORDER — CLOPIDOGREL BISULFATE 75 MG/1
75 TABLET ORAL DAILY
Qty: 30 TABLET | Refills: 0 | Status: SHIPPED | OUTPATIENT
Start: 2025-05-20

## 2025-05-20 NOTE — TELEPHONE ENCOUNTER
----- Message from Med Assistant Alisa sent at 5/16/2025 11:27 AM CDT -----  Contact: [phone]/ Candace Jamison  Pt is asking for a bigger quantity of the medication below  ----- Message -----  From: Nena Miller  Sent: 5/16/2025  11:13 AM CDT  To: Phani Monzon    .[email or web address] Patient is calling for Medical Advice regarding:Patient would like a month supply of clopidogreL (PLAVIX) 75 mg tablet 30 tablet  because they have not received the mail order from [facility] since it was called in on May 6 and patient has been without her medicationPharmacy name and phone#:[facility] [address] [place] [address] [address] [facility] Phone: [phone]: [phone] wants a call back or thru [facility]:callComments:Please call to confirm today please.

## 2025-05-29 ENCOUNTER — PATIENT MESSAGE (OUTPATIENT)
Dept: ADMINISTRATIVE | Facility: HOSPITAL | Age: OVER 89
End: 2025-05-29
Payer: MEDICARE

## 2025-06-26 DIAGNOSIS — I10 ESSENTIAL HYPERTENSION: ICD-10-CM

## 2025-06-26 RX ORDER — AMLODIPINE BESYLATE 5 MG/1
5 TABLET ORAL 2 TIMES DAILY
Qty: 180 TABLET | Refills: 3 | Status: SHIPPED | OUTPATIENT
Start: 2025-06-26

## 2025-06-26 NOTE — TELEPHONE ENCOUNTER
Care Due:                  Date            Visit Type   Department     Provider  --------------------------------------------------------------------------------                                EP -                              PRIMARY      [facility] INTER[facility]  Last Visit: 08-      CARE ([facility])   MEDICINE       Suresh Jeronimo  Next Visit: None Scheduled  None         None Found                                                            Last  Test          Frequency    Reason                     Performed    Due Date  --------------------------------------------------------------------------------    CBC.........  12 months..  clopidogreL..............  09- 09-    CMP.........  12 months..  atorvastatin, furosemide.  09- 09-    Lipid Panel.  12 months..  atorvastatin.............  08- 08-    [facility] Embedded Care Due Messages. Reference number: [id number].   6/26/2025 2:27:33 AM CDT

## 2025-06-27 ENCOUNTER — TELEPHONE (OUTPATIENT)
Dept: PHARMACY | Facility: CLINIC | Age: OVER 89
End: 2025-06-27
Payer: MEDICARE

## 2025-06-27 NOTE — TELEPHONE ENCOUNTER
[facility]/Population Health Chart Review & Patient Outreach Details For Medication Adherence Project    Reason for Outreach Encounter: 3rd Party payor non-compliance report ([employer], [employer], [employer], etc)  2.  Patient Outreach Method: Reviewed patient chart   3.   Medication in question:    Hyperlipidemia Medications              atorvastatin (LIPITOR) 20 MG tablet Take 1 tablet (20 mg total) by mouth once daily.                  atorvastatin  last filled  5/6 for 90 day supply      4.  Reviewed and or Updates Made To: Patient Chart  5. Outreach Outcomes and/or actions taken: Patient filled medication and is on track to be adherent  Additional Notes:

## 2025-07-10 ENCOUNTER — TELEPHONE (OUTPATIENT)
Dept: INTERNAL MEDICINE | Facility: CLINIC | Age: OVER 89
End: 2025-07-10
Payer: MEDICARE

## 2025-07-10 DIAGNOSIS — N18.31 TYPE 2 DIABETES MELLITUS WITH STAGE 3A CHRONIC KIDNEY DISEASE, WITHOUT LONG-TERM CURRENT USE OF INSULIN: Primary | ICD-10-CM

## 2025-07-10 DIAGNOSIS — E11.22 TYPE 2 DIABETES MELLITUS WITH STAGE 3A CHRONIC KIDNEY DISEASE, WITHOUT LONG-TERM CURRENT USE OF INSULIN: Primary | ICD-10-CM

## 2025-07-10 DIAGNOSIS — N18.32 CHRONIC KIDNEY DISEASE, STAGE 3B: ICD-10-CM

## 2025-07-10 NOTE — TELEPHONE ENCOUNTER
----- Message from Med Assistant Alisa sent at 7/7/2025 12:07 PM CDT -----  Regarding: FW: Requesting blood lab orders  May you place the lab orders and I'll assist with scheduling  ----- Message -----  From: Cristina Molina  Sent: 7/7/2025  11:56 AM CDT  To: Phani ROBLES Staff  Subject: Requesting blood lab orders                      Pt son Rayshawn would like to request blood lab orders for annual visit 9/10/25.  Please be so kind to assist with this matter.    Rayshawn can be reached at [phone]        Thank you!

## 2025-07-12 DIAGNOSIS — I87.2 VENOUS INSUFFICIENCY: ICD-10-CM

## 2025-07-12 NOTE — TELEPHONE ENCOUNTER
No care due was identified.  [facility] Embedded Care Due Messages. Reference number: [id number].   7/12/2025 2:09:20 AM CDT

## 2025-07-17 RX ORDER — FUROSEMIDE 20 MG/1
20 TABLET ORAL
Qty: 90 TABLET | Refills: 3 | Status: SHIPPED | OUTPATIENT
Start: 2025-07-17

## 2025-07-21 DIAGNOSIS — E78.49 OTHER HYPERLIPIDEMIA: ICD-10-CM

## 2025-07-21 RX ORDER — ATORVASTATIN CALCIUM 20 MG/1
20 TABLET, FILM COATED ORAL
Qty: 90 TABLET | Refills: 3 | Status: SHIPPED | OUTPATIENT
Start: 2025-07-21

## 2025-07-21 NOTE — TELEPHONE ENCOUNTER
No care due was identified.  [facility] Embedded Care Due Messages. Reference number: [id number].   7/21/2025 10:36:51 AM CDT

## 2025-07-23 DIAGNOSIS — I10 ESSENTIAL HYPERTENSION: ICD-10-CM

## 2025-07-23 NOTE — TELEPHONE ENCOUNTER
No care due was identified.  [facility] Embedded Care Due Messages. Reference number: [id number].   7/23/2025 2:11:22 AM CDT

## 2025-07-26 RX ORDER — TERAZOSIN 1 MG/1
1 CAPSULE ORAL
Qty: 100 CAPSULE | Refills: 3 | Status: SHIPPED | OUTPATIENT
Start: 2025-07-26